# Patient Record
Sex: FEMALE | Race: WHITE | NOT HISPANIC OR LATINO | Employment: UNEMPLOYED | URBAN - NONMETROPOLITAN AREA
[De-identification: names, ages, dates, MRNs, and addresses within clinical notes are randomized per-mention and may not be internally consistent; named-entity substitution may affect disease eponyms.]

---

## 2023-10-18 ENCOUNTER — APPOINTMENT (OUTPATIENT)
Dept: PODIATRY | Facility: CLINIC | Age: 83
End: 2023-10-18
Payer: MEDICARE

## 2023-11-20 ENCOUNTER — LAB (OUTPATIENT)
Dept: LAB | Facility: LAB | Age: 83
End: 2023-11-20
Payer: MEDICARE

## 2023-11-20 DIAGNOSIS — E78.2 MIXED HYPERLIPIDEMIA: ICD-10-CM

## 2023-11-20 DIAGNOSIS — I10 ESSENTIAL (PRIMARY) HYPERTENSION: Primary | ICD-10-CM

## 2023-11-20 DIAGNOSIS — R74.01 ELEVATION OF LEVELS OF LIVER TRANSAMINASE LEVELS: ICD-10-CM

## 2023-11-20 DIAGNOSIS — K21.9 GASTRO-ESOPHAGEAL REFLUX DISEASE WITHOUT ESOPHAGITIS: ICD-10-CM

## 2023-11-20 DIAGNOSIS — E83.42 HYPOMAGNESEMIA: ICD-10-CM

## 2023-11-20 DIAGNOSIS — E11.9 TYPE 2 DIABETES MELLITUS WITHOUT COMPLICATIONS (MULTI): ICD-10-CM

## 2023-11-20 LAB
ALBUMIN SERPL BCP-MCNC: 4.2 G/DL (ref 3.4–5)
ALP SERPL-CCNC: 68 U/L (ref 33–136)
ALT SERPL W P-5'-P-CCNC: 26 U/L (ref 7–45)
ANION GAP SERPL CALC-SCNC: 12 MMOL/L (ref 10–20)
AST SERPL W P-5'-P-CCNC: 29 U/L (ref 9–39)
BASOPHILS # BLD AUTO: 0.14 X10*3/UL (ref 0–0.1)
BASOPHILS NFR BLD AUTO: 1.2 %
BILIRUB SERPL-MCNC: 0.8 MG/DL (ref 0–1.2)
BUN SERPL-MCNC: 20 MG/DL (ref 6–23)
CALCIUM SERPL-MCNC: 9.7 MG/DL (ref 8.6–10.3)
CHLORIDE SERPL-SCNC: 102 MMOL/L (ref 98–107)
CHOLEST SERPL-MCNC: 122 MG/DL (ref 0–199)
CHOLESTEROL/HDL RATIO: 2
CO2 SERPL-SCNC: 30 MMOL/L (ref 21–32)
CREAT SERPL-MCNC: 0.74 MG/DL (ref 0.5–1.05)
CREAT UR-MCNC: 47 MG/DL (ref 20–320)
EOSINOPHIL # BLD AUTO: 0.4 X10*3/UL (ref 0–0.4)
EOSINOPHIL NFR BLD AUTO: 3.4 %
ERYTHROCYTE [DISTWIDTH] IN BLOOD BY AUTOMATED COUNT: 12.3 % (ref 11.5–14.5)
GFR SERPL CREATININE-BSD FRML MDRD: 79 ML/MIN/1.73M*2
GLUCOSE SERPL-MCNC: 126 MG/DL (ref 74–99)
HCT VFR BLD AUTO: 46.8 % (ref 36–46)
HDLC SERPL-MCNC: 59.6 MG/DL
HGB BLD-MCNC: 14.5 G/DL (ref 12–16)
IMM GRANULOCYTES # BLD AUTO: 0.04 X10*3/UL (ref 0–0.5)
IMM GRANULOCYTES NFR BLD AUTO: 0.3 % (ref 0–0.9)
LDLC SERPL CALC-MCNC: 39 MG/DL
LYMPHOCYTES # BLD AUTO: 2.41 X10*3/UL (ref 0.8–3)
LYMPHOCYTES NFR BLD AUTO: 20.3 %
MAGNESIUM SERPL-MCNC: 2.18 MG/DL (ref 1.6–2.4)
MCH RBC QN AUTO: 29.5 PG (ref 26–34)
MCHC RBC AUTO-ENTMCNC: 31 G/DL (ref 32–36)
MCV RBC AUTO: 95 FL (ref 80–100)
MICROALBUMIN UR-MCNC: 18.5 MG/L
MICROALBUMIN/CREAT UR: 39.4 UG/MG CREAT
MONOCYTES # BLD AUTO: 0.94 X10*3/UL (ref 0.05–0.8)
MONOCYTES NFR BLD AUTO: 7.9 %
NEUTROPHILS # BLD AUTO: 7.92 X10*3/UL (ref 1.6–5.5)
NEUTROPHILS NFR BLD AUTO: 66.9 %
NON HDL CHOLESTEROL: 62 MG/DL (ref 0–149)
NRBC BLD-RTO: 0 /100 WBCS (ref 0–0)
PLATELET # BLD AUTO: 251 X10*3/UL (ref 150–450)
POTASSIUM SERPL-SCNC: 4.1 MMOL/L (ref 3.5–5.3)
PROT SERPL-MCNC: 7.5 G/DL (ref 6.4–8.2)
RBC # BLD AUTO: 4.92 X10*6/UL (ref 4–5.2)
SODIUM SERPL-SCNC: 140 MMOL/L (ref 136–145)
TRIGL SERPL-MCNC: 119 MG/DL (ref 0–149)
TSH SERPL-ACNC: 0.21 MIU/L (ref 0.44–3.98)
VLDL: 24 MG/DL (ref 0–40)
WBC # BLD AUTO: 11.9 X10*3/UL (ref 4.4–11.3)

## 2023-11-20 PROCEDURE — 36415 COLL VENOUS BLD VENIPUNCTURE: CPT

## 2023-11-20 PROCEDURE — 82043 UR ALBUMIN QUANTITATIVE: CPT

## 2023-11-20 PROCEDURE — 83036 HEMOGLOBIN GLYCOSYLATED A1C: CPT

## 2023-11-20 PROCEDURE — 82570 ASSAY OF URINE CREATININE: CPT

## 2023-11-21 LAB
EST. AVERAGE GLUCOSE BLD GHB EST-MCNC: 140 MG/DL
HBA1C MFR BLD: 6.5 %

## 2024-04-05 DIAGNOSIS — I63.09: Primary | ICD-10-CM

## 2024-04-09 RX ORDER — ASPIRIN 81 MG/1
81 TABLET ORAL DAILY
Qty: 90 TABLET | Refills: 0 | Status: SHIPPED | OUTPATIENT
Start: 2024-04-09

## 2024-05-07 ENCOUNTER — HOSPITAL ENCOUNTER (EMERGENCY)
Facility: HOSPITAL | Age: 84
Discharge: ANOTHER HOSPITAL | End: 2024-05-08
Attending: EMERGENCY MEDICINE
Payer: MEDICARE

## 2024-05-07 ENCOUNTER — APPOINTMENT (OUTPATIENT)
Dept: CARDIOLOGY | Facility: HOSPITAL | Age: 84
End: 2024-05-07
Payer: MEDICARE

## 2024-05-07 ENCOUNTER — APPOINTMENT (OUTPATIENT)
Dept: RADIOLOGY | Facility: HOSPITAL | Age: 84
End: 2024-05-07
Payer: MEDICARE

## 2024-05-07 DIAGNOSIS — K52.9 ENTERITIS: ICD-10-CM

## 2024-05-07 DIAGNOSIS — E87.6 HYPOKALEMIA: ICD-10-CM

## 2024-05-07 DIAGNOSIS — R53.1 GENERALIZED WEAKNESS: ICD-10-CM

## 2024-05-07 DIAGNOSIS — R19.7 DIARRHEA, UNSPECIFIED TYPE: Primary | ICD-10-CM

## 2024-05-07 DIAGNOSIS — N30.00 ACUTE CYSTITIS WITHOUT HEMATURIA: ICD-10-CM

## 2024-05-07 PROBLEM — N39.0 UTI (URINARY TRACT INFECTION): Status: ACTIVE | Noted: 2024-05-07

## 2024-05-07 LAB
ALBUMIN SERPL BCP-MCNC: 4.4 G/DL (ref 3.4–5)
ALP SERPL-CCNC: 54 U/L (ref 33–136)
ALT SERPL W P-5'-P-CCNC: 29 U/L (ref 7–45)
ANION GAP SERPL CALC-SCNC: 12 MMOL/L (ref 10–20)
APPEARANCE UR: ABNORMAL
AST SERPL W P-5'-P-CCNC: 31 U/L (ref 9–39)
BACTERIA #/AREA URNS AUTO: ABNORMAL /HPF
BASOPHILS # BLD AUTO: 0.07 X10*3/UL (ref 0–0.1)
BASOPHILS NFR BLD AUTO: 0.5 %
BILIRUB SERPL-MCNC: 0.8 MG/DL (ref 0–1.2)
BILIRUB UR STRIP.AUTO-MCNC: NEGATIVE MG/DL
BUN SERPL-MCNC: 24 MG/DL (ref 6–23)
CALCIUM SERPL-MCNC: 10 MG/DL (ref 8.6–10.3)
CARDIAC TROPONIN I PNL SERPL HS: 13 NG/L (ref 0–13)
CHLORIDE SERPL-SCNC: 102 MMOL/L (ref 98–107)
CO2 SERPL-SCNC: 29 MMOL/L (ref 21–32)
COLOR UR: COLORLESS
CREAT SERPL-MCNC: 0.64 MG/DL (ref 0.5–1.05)
EGFRCR SERPLBLD CKD-EPI 2021: 87 ML/MIN/1.73M*2
EOSINOPHIL # BLD AUTO: 0.06 X10*3/UL (ref 0–0.4)
EOSINOPHIL NFR BLD AUTO: 0.4 %
ERYTHROCYTE [DISTWIDTH] IN BLOOD BY AUTOMATED COUNT: 13.2 % (ref 11.5–14.5)
GLUCOSE SERPL-MCNC: 192 MG/DL (ref 74–99)
GLUCOSE UR STRIP.AUTO-MCNC: ABNORMAL MG/DL
HCT VFR BLD AUTO: 42.2 % (ref 36–46)
HGB BLD-MCNC: 14.1 G/DL (ref 12–16)
IMM GRANULOCYTES # BLD AUTO: 0.05 X10*3/UL (ref 0–0.5)
IMM GRANULOCYTES NFR BLD AUTO: 0.4 % (ref 0–0.9)
KETONES UR STRIP.AUTO-MCNC: NEGATIVE MG/DL
LEUKOCYTE ESTERASE UR QL STRIP.AUTO: ABNORMAL
LYMPHOCYTES # BLD AUTO: 1.67 X10*3/UL (ref 0.8–3)
LYMPHOCYTES NFR BLD AUTO: 11.8 %
MAGNESIUM SERPL-MCNC: 2.17 MG/DL (ref 1.6–2.4)
MCH RBC QN AUTO: 29.9 PG (ref 26–34)
MCHC RBC AUTO-ENTMCNC: 33.4 G/DL (ref 32–36)
MCV RBC AUTO: 89 FL (ref 80–100)
MONOCYTES # BLD AUTO: 0.83 X10*3/UL (ref 0.05–0.8)
MONOCYTES NFR BLD AUTO: 5.8 %
MUCOUS THREADS #/AREA URNS AUTO: ABNORMAL /LPF
NEUTROPHILS # BLD AUTO: 11.51 X10*3/UL (ref 1.6–5.5)
NEUTROPHILS NFR BLD AUTO: 81.1 %
NITRITE UR QL STRIP.AUTO: NEGATIVE
NRBC BLD-RTO: 0 /100 WBCS (ref 0–0)
PH UR STRIP.AUTO: 7 [PH]
PLATELET # BLD AUTO: 237 X10*3/UL (ref 150–450)
POTASSIUM SERPL-SCNC: 2.9 MMOL/L (ref 3.5–5.3)
PROT SERPL-MCNC: 8.2 G/DL (ref 6.4–8.2)
PROT UR STRIP.AUTO-MCNC: ABNORMAL MG/DL
RBC # BLD AUTO: 4.72 X10*6/UL (ref 4–5.2)
RBC # UR STRIP.AUTO: ABNORMAL /UL
RBC #/AREA URNS AUTO: ABNORMAL /HPF
SODIUM SERPL-SCNC: 140 MMOL/L (ref 136–145)
SP GR UR STRIP.AUTO: 1.01
UROBILINOGEN UR STRIP.AUTO-MCNC: NORMAL MG/DL
WBC # BLD AUTO: 14.2 X10*3/UL (ref 4.4–11.3)
WBC #/AREA URNS AUTO: ABNORMAL /HPF

## 2024-05-07 PROCEDURE — 84075 ASSAY ALKALINE PHOSPHATASE: CPT | Performed by: EMERGENCY MEDICINE

## 2024-05-07 PROCEDURE — 36415 COLL VENOUS BLD VENIPUNCTURE: CPT | Performed by: EMERGENCY MEDICINE

## 2024-05-07 PROCEDURE — 83735 ASSAY OF MAGNESIUM: CPT | Performed by: EMERGENCY MEDICINE

## 2024-05-07 PROCEDURE — 80053 COMPREHEN METABOLIC PANEL: CPT | Mod: 91,MUE | Performed by: NURSE PRACTITIONER

## 2024-05-07 PROCEDURE — 93005 ELECTROCARDIOGRAM TRACING: CPT

## 2024-05-07 PROCEDURE — 81001 URINALYSIS AUTO W/SCOPE: CPT | Performed by: EMERGENCY MEDICINE

## 2024-05-07 PROCEDURE — 2550000001 HC RX 255 CONTRASTS: Performed by: EMERGENCY MEDICINE

## 2024-05-07 PROCEDURE — 96365 THER/PROPH/DIAG IV INF INIT: CPT

## 2024-05-07 PROCEDURE — 87086 URINE CULTURE/COLONY COUNT: CPT | Mod: GENLAB | Performed by: EMERGENCY MEDICINE

## 2024-05-07 PROCEDURE — 84484 ASSAY OF TROPONIN QUANT: CPT | Performed by: EMERGENCY MEDICINE

## 2024-05-07 PROCEDURE — 74177 CT ABD & PELVIS W/CONTRAST: CPT

## 2024-05-07 PROCEDURE — 2500000001 HC RX 250 WO HCPCS SELF ADMINISTERED DRUGS (ALT 637 FOR MEDICARE OP): Performed by: STUDENT IN AN ORGANIZED HEALTH CARE EDUCATION/TRAINING PROGRAM

## 2024-05-07 PROCEDURE — 2500000005 HC RX 250 GENERAL PHARMACY W/O HCPCS: Performed by: EMERGENCY MEDICINE

## 2024-05-07 PROCEDURE — 74177 CT ABD & PELVIS W/CONTRAST: CPT | Mod: FOREIGN READ | Performed by: RADIOLOGY

## 2024-05-07 PROCEDURE — 99285 EMERGENCY DEPT VISIT HI MDM: CPT | Mod: 25

## 2024-05-07 PROCEDURE — 96366 THER/PROPH/DIAG IV INF ADDON: CPT

## 2024-05-07 PROCEDURE — 96367 TX/PROPH/DG ADDL SEQ IV INF: CPT

## 2024-05-07 PROCEDURE — 85025 COMPLETE CBC W/AUTO DIFF WBC: CPT | Performed by: EMERGENCY MEDICINE

## 2024-05-07 PROCEDURE — 2500000004 HC RX 250 GENERAL PHARMACY W/ HCPCS (ALT 636 FOR OP/ED): Performed by: EMERGENCY MEDICINE

## 2024-05-07 RX ORDER — TALC
3 POWDER (GRAM) TOPICAL NIGHTLY PRN
Status: CANCELLED | OUTPATIENT
Start: 2024-05-07

## 2024-05-07 RX ORDER — POTASSIUM CHLORIDE 20 MEQ/1
40 TABLET, EXTENDED RELEASE ORAL ONCE
Status: CANCELLED | OUTPATIENT
Start: 2024-05-07 | End: 2024-05-07

## 2024-05-07 RX ORDER — POLYETHYLENE GLYCOL 3350 17 G/17G
17 POWDER, FOR SOLUTION ORAL DAILY PRN
Status: CANCELLED | OUTPATIENT
Start: 2024-05-07

## 2024-05-07 RX ORDER — MAGNESIUM HYDROXIDE 2400 MG/10ML
10 SUSPENSION ORAL DAILY PRN
Status: CANCELLED | OUTPATIENT
Start: 2024-05-07

## 2024-05-07 RX ORDER — POTASSIUM CHLORIDE 14.9 MG/ML
20 INJECTION INTRAVENOUS
Status: COMPLETED | OUTPATIENT
Start: 2024-05-07 | End: 2024-05-07

## 2024-05-07 RX ORDER — ONDANSETRON HYDROCHLORIDE 2 MG/ML
4 INJECTION, SOLUTION INTRAVENOUS EVERY 8 HOURS PRN
Status: CANCELLED | OUTPATIENT
Start: 2024-05-07

## 2024-05-07 RX ORDER — CEFTRIAXONE 1 G/50ML
1 INJECTION, SOLUTION INTRAVENOUS ONCE
Status: COMPLETED | OUTPATIENT
Start: 2024-05-07 | End: 2024-05-07

## 2024-05-07 RX ORDER — ACETAMINOPHEN 325 MG/1
650 TABLET ORAL EVERY 4 HOURS PRN
Status: CANCELLED | OUTPATIENT
Start: 2024-05-07

## 2024-05-07 RX ORDER — TRAZODONE HYDROCHLORIDE 50 MG/1
50 TABLET ORAL NIGHTLY
Status: DISCONTINUED | OUTPATIENT
Start: 2024-05-07 | End: 2024-05-08 | Stop reason: HOSPADM

## 2024-05-07 RX ORDER — BISACODYL 10 MG/1
10 SUPPOSITORY RECTAL DAILY PRN
Status: CANCELLED | OUTPATIENT
Start: 2024-05-07

## 2024-05-07 RX ORDER — HEPARIN SODIUM 5000 [USP'U]/ML
5000 INJECTION, SOLUTION INTRAVENOUS; SUBCUTANEOUS EVERY 8 HOURS
Status: CANCELLED | OUTPATIENT
Start: 2024-05-07

## 2024-05-07 RX ADMIN — IOHEXOL 75 ML: 350 INJECTION, SOLUTION INTRAVENOUS at 16:08

## 2024-05-07 RX ADMIN — CEFTRIAXONE 1 G: 1 INJECTION, SOLUTION INTRAVENOUS at 16:37

## 2024-05-07 RX ADMIN — TRAZODONE HYDROCHLORIDE 50 MG: 50 TABLET ORAL at 21:21

## 2024-05-07 RX ADMIN — Medication 2 L/MIN: at 20:00

## 2024-05-07 RX ADMIN — Medication 2 L/MIN: at 18:25

## 2024-05-07 RX ADMIN — POTASSIUM CHLORIDE 20 MEQ: 14.9 INJECTION, SOLUTION INTRAVENOUS at 18:27

## 2024-05-07 RX ADMIN — POTASSIUM CHLORIDE 20 MEQ: 14.9 INJECTION, SOLUTION INTRAVENOUS at 17:01

## 2024-05-07 ASSESSMENT — PAIN SCALES - GENERAL
PAINLEVEL_OUTOF10: 0 - NO PAIN
PAINLEVEL_OUTOF10: 0 - NO PAIN

## 2024-05-07 ASSESSMENT — COLUMBIA-SUICIDE SEVERITY RATING SCALE - C-SSRS
6. HAVE YOU EVER DONE ANYTHING, STARTED TO DO ANYTHING, OR PREPARED TO DO ANYTHING TO END YOUR LIFE?: NO
2. HAVE YOU ACTUALLY HAD ANY THOUGHTS OF KILLING YOURSELF?: NO
1. IN THE PAST MONTH, HAVE YOU WISHED YOU WERE DEAD OR WISHED YOU COULD GO TO SLEEP AND NOT WAKE UP?: NO

## 2024-05-07 ASSESSMENT — PAIN - FUNCTIONAL ASSESSMENT: PAIN_FUNCTIONAL_ASSESSMENT: 0-10

## 2024-05-07 NOTE — ED PROVIDER NOTES
HPI   Chief Complaint   Patient presents with    Diarrhea     Pt. States she woke this a.m. with diarrhea.       HPI                    Sant[place] Coma Scale Score: 15                     Patient History   Past Medical History:   Diagnosis Date    Deficiency of other specified B group vitamins 2016    Vitamin B12 deficiency    Encounter for examination of eyes and vision without abnormal findings     Diabetic eye exam    Occlusion and stenosis of bilateral carotid arteries 2016    Atherosclerosis of both carotid arteries    Personal history of other medical treatment     History of mammogram     Past Surgical History:   Procedure Laterality Date     SECTION, CLASSIC  2016     Section    CHOLECYSTECTOMY  2014    Cholecystectomy    COLONOSCOPY  2014    Colonoscopy (Fiberoptic)    CT HEAD ANGIO W AND WO IV CONTRAST  2021    CT HEAD ANGIO W AND WO IV CONTRAST [facility]    ESOPHAGOGASTRODUODENOSCOPY  2014    Diagnostic Esophagogastroduodenoscopy    GALLBLADDER SURGERY  2016    Gallbladder Surgery    MR HEAD ANGIO WO IV CONTRAST  2021    MR HEAD ANGIO WO IV CONTRAST [facility]    MR NECK ANGIO WO IV CONTRAST  2021    MR NECK ANGIO WO IV CONTRAST [facility]     No family history on file.  Social History     Tobacco Use    Smoking status: Never    Smokeless tobacco: Never   Vaping Use    Vaping status: Never Used   Substance Use Topics    Alcohol use: Never    Drug use: Never       Physical Exam   ED Triage Vitals [24 1420]   Temperature Heart Rate Respirations BP   36.4 °C (97.6 °F) 71 16 175/71      Pulse Ox Temp Source Heart Rate Source Patient Position   97 % Temporal Monitor Sitting      BP Location FiO2 (%)     Left arm --       Physical Exam  Constitutional:       General: She is not in acute distress.     Appearance: Normal appearance. She is not toxic-appearing.   HENT:      Head:  Normocephalic and atraumatic.      Right Ear: Tympanic membrane normal.      Left Ear: Tympanic membrane normal.      Mouth/Throat:      Mouth: Mucous membranes are moist.      Pharynx: Oropharynx is clear.   Eyes:      Conjunctiva/sclera: Conjunctivae normal.      Pupils: Pupils are equal, round, and reactive to light.   Cardiovascular:      Rate and Rhythm: Normal rate and regular rhythm.      Pulses: Normal pulses.      Heart sounds: Normal heart sounds.   Pulmonary:      Effort: Pulmonary effort is normal. No respiratory distress.      Breath sounds: Normal breath sounds. No wheezing.   Abdominal:      General: Bowel sounds are normal.      Palpations: Abdomen is soft.      Tenderness: There is no abdominal tenderness. There is no guarding or rebound.   Musculoskeletal:         General: Normal range of motion.      Cervical back: Normal range of motion.   Skin:     General: Skin is warm and dry.   Neurological:      General: No focal deficit present.      Mental Status: She is alert and oriented to person, place, and time.         ED Course & MDM   Diagnoses as of 05/07/24 1826   Diarrhea, unspecified type   Hypokalemia   Enteritis   Acute cystitis without hematuria   Generalized weakness       Medical Decision Making  Pleasant 83-year-old female presents to the ER with chief complaint of diarrhea generalized weakness just feeling blah she reports that started last night around 1600.  Patient came to the ED with generalized weakness son brought the patient in.  Patient a workup here in the ED patient is found to have a urinary tract infection and hypokalemia believe the patient would benefit IV fluids potassium and antibiotics.  Discussed with the patient patient will be transferred there for definitive care.        Procedure  Procedures     Saul Hall DO  05/07/24 1826

## 2024-05-08 ENCOUNTER — APPOINTMENT (OUTPATIENT)
Dept: CARDIOLOGY | Facility: HOSPITAL | Age: 84
DRG: 690 | End: 2024-05-08
Payer: MEDICARE

## 2024-05-08 ENCOUNTER — HOSPITAL ENCOUNTER (INPATIENT)
Facility: HOSPITAL | Age: 84
LOS: 2 days | Discharge: HOME HEALTH | DRG: 690 | End: 2024-05-10
Attending: INTERNAL MEDICINE | Admitting: INTERNAL MEDICINE
Payer: MEDICARE

## 2024-05-08 VITALS
RESPIRATION RATE: 14 BRPM | OXYGEN SATURATION: 95 % | SYSTOLIC BLOOD PRESSURE: 121 MMHG | HEART RATE: 64 BPM | TEMPERATURE: 97.6 F | HEIGHT: 61 IN | DIASTOLIC BLOOD PRESSURE: 76 MMHG | BODY MASS INDEX: 17.94 KG/M2 | WEIGHT: 95.02 LBS

## 2024-05-08 DIAGNOSIS — K59.00 CONSTIPATION, UNSPECIFIED CONSTIPATION TYPE: ICD-10-CM

## 2024-05-08 DIAGNOSIS — F41.9 ANXIETY: ICD-10-CM

## 2024-05-08 DIAGNOSIS — N39.0 UTI (URINARY TRACT INFECTION): Primary | ICD-10-CM

## 2024-05-08 LAB
ALBUMIN SERPL BCP-MCNC: 4.2 G/DL (ref 3.4–5)
ALBUMIN SERPL BCP-MCNC: 4.4 G/DL (ref 3.4–5)
ALP SERPL-CCNC: 52 U/L (ref 33–136)
ALP SERPL-CCNC: 61 U/L (ref 33–136)
ALT SERPL W P-5'-P-CCNC: 26 U/L (ref 7–45)
ALT SERPL W P-5'-P-CCNC: 28 U/L (ref 7–45)
ANION GAP SERPL CALC-SCNC: 12 MMOL/L (ref 10–20)
ANION GAP SERPL CALC-SCNC: 15 MMOL/L (ref 10–20)
AST SERPL W P-5'-P-CCNC: 27 U/L (ref 9–39)
AST SERPL W P-5'-P-CCNC: 37 U/L (ref 9–39)
BASOPHILS # BLD AUTO: 0.07 X10*3/UL (ref 0–0.1)
BASOPHILS NFR BLD AUTO: 0.5 %
BILIRUB SERPL-MCNC: 0.8 MG/DL (ref 0–1.2)
BILIRUB SERPL-MCNC: 0.9 MG/DL (ref 0–1.2)
BUN SERPL-MCNC: 20 MG/DL (ref 6–23)
BUN SERPL-MCNC: 26 MG/DL (ref 6–23)
CALCIUM SERPL-MCNC: 10.1 MG/DL (ref 8.6–10.3)
CALCIUM SERPL-MCNC: 9.3 MG/DL (ref 8.6–10.3)
CHLORIDE SERPL-SCNC: 101 MMOL/L (ref 98–107)
CHLORIDE SERPL-SCNC: 101 MMOL/L (ref 98–107)
CO2 SERPL-SCNC: 28 MMOL/L (ref 21–32)
CO2 SERPL-SCNC: 31 MMOL/L (ref 21–32)
CREAT SERPL-MCNC: 0.67 MG/DL (ref 0.5–1.05)
CREAT SERPL-MCNC: 0.69 MG/DL (ref 0.5–1.05)
EGFRCR SERPLBLD CKD-EPI 2021: 85 ML/MIN/1.73M*2
EGFRCR SERPLBLD CKD-EPI 2021: 86 ML/MIN/1.73M*2
EOSINOPHIL # BLD AUTO: 0.03 X10*3/UL (ref 0–0.4)
EOSINOPHIL NFR BLD AUTO: 0.2 %
ERYTHROCYTE [DISTWIDTH] IN BLOOD BY AUTOMATED COUNT: 13.1 % (ref 11.5–14.5)
GLUCOSE BLD MANUAL STRIP-MCNC: 112 MG/DL (ref 74–99)
GLUCOSE BLD MANUAL STRIP-MCNC: 124 MG/DL (ref 74–99)
GLUCOSE BLD MANUAL STRIP-MCNC: 126 MG/DL (ref 74–99)
GLUCOSE SERPL-MCNC: 112 MG/DL (ref 74–99)
GLUCOSE SERPL-MCNC: 196 MG/DL (ref 74–99)
HCT VFR BLD AUTO: 46.9 % (ref 36–46)
HGB BLD-MCNC: 15.3 G/DL (ref 12–16)
HOLD SPECIMEN: NORMAL
IMM GRANULOCYTES # BLD AUTO: 0.04 X10*3/UL (ref 0–0.5)
IMM GRANULOCYTES NFR BLD AUTO: 0.3 % (ref 0–0.9)
LYMPHOCYTES # BLD AUTO: 1.92 X10*3/UL (ref 0.8–3)
LYMPHOCYTES NFR BLD AUTO: 14.9 %
MCH RBC QN AUTO: 30.1 PG (ref 26–34)
MCHC RBC AUTO-ENTMCNC: 32.6 G/DL (ref 32–36)
MCV RBC AUTO: 92 FL (ref 80–100)
MONOCYTES # BLD AUTO: 1.14 X10*3/UL (ref 0.05–0.8)
MONOCYTES NFR BLD AUTO: 8.9 %
NEUTROPHILS # BLD AUTO: 9.67 X10*3/UL (ref 1.6–5.5)
NEUTROPHILS NFR BLD AUTO: 75.2 %
NRBC BLD-RTO: 0 /100 WBCS (ref 0–0)
PLATELET # BLD AUTO: 249 X10*3/UL (ref 150–450)
POTASSIUM SERPL-SCNC: 3 MMOL/L (ref 3.5–5.3)
POTASSIUM SERPL-SCNC: 3.3 MMOL/L (ref 3.5–5.3)
PROT SERPL-MCNC: 7.7 G/DL (ref 6.4–8.2)
PROT SERPL-MCNC: 8.1 G/DL (ref 6.4–8.2)
RBC # BLD AUTO: 5.09 X10*6/UL (ref 4–5.2)
SODIUM SERPL-SCNC: 141 MMOL/L (ref 136–145)
SODIUM SERPL-SCNC: 141 MMOL/L (ref 136–145)
WBC # BLD AUTO: 12.9 X10*3/UL (ref 4.4–11.3)

## 2024-05-08 PROCEDURE — 87506 IADNA-DNA/RNA PROBE TQ 6-11: CPT | Mod: GEALAB | Performed by: NURSE PRACTITIONER

## 2024-05-08 PROCEDURE — 85025 COMPLETE CBC W/AUTO DIFF WBC: CPT | Performed by: NURSE PRACTITIONER

## 2024-05-08 PROCEDURE — 93010 ELECTROCARDIOGRAM REPORT: CPT | Performed by: INTERNAL MEDICINE

## 2024-05-08 PROCEDURE — G0378 HOSPITAL OBSERVATION PER HR: HCPCS

## 2024-05-08 PROCEDURE — 82947 ASSAY GLUCOSE BLOOD QUANT: CPT

## 2024-05-08 PROCEDURE — 99223 1ST HOSP IP/OBS HIGH 75: CPT | Performed by: NURSE PRACTITIONER

## 2024-05-08 PROCEDURE — 93005 ELECTROCARDIOGRAM TRACING: CPT

## 2024-05-08 PROCEDURE — 87493 C DIFF AMPLIFIED PROBE: CPT | Mod: GEALAB | Performed by: NURSE PRACTITIONER

## 2024-05-08 PROCEDURE — 97165 OT EVAL LOW COMPLEX 30 MIN: CPT | Mod: GO

## 2024-05-08 PROCEDURE — 1100000001 HC PRIVATE ROOM DAILY

## 2024-05-08 PROCEDURE — 96372 THER/PROPH/DIAG INJ SC/IM: CPT | Performed by: NURSE PRACTITIONER

## 2024-05-08 PROCEDURE — 2500000001 HC RX 250 WO HCPCS SELF ADMINISTERED DRUGS (ALT 637 FOR MEDICARE OP): Performed by: NURSE PRACTITIONER

## 2024-05-08 PROCEDURE — 87329 GIARDIA AG IA: CPT | Performed by: NURSE PRACTITIONER

## 2024-05-08 PROCEDURE — 2500000004 HC RX 250 GENERAL PHARMACY W/ HCPCS (ALT 636 FOR OP/ED): Performed by: NURSE PRACTITIONER

## 2024-05-08 PROCEDURE — 87328 CRYPTOSPORIDIUM AG IA: CPT | Performed by: NURSE PRACTITIONER

## 2024-05-08 PROCEDURE — 99221 1ST HOSP IP/OBS SF/LOW 40: CPT | Performed by: INTERNAL MEDICINE

## 2024-05-08 PROCEDURE — 84075 ASSAY ALKALINE PHOSPHATASE: CPT | Performed by: NURSE PRACTITIONER

## 2024-05-08 RX ORDER — AMLODIPINE BESYLATE 5 MG/1
5 TABLET ORAL DAILY
COMMUNITY

## 2024-05-08 RX ORDER — CEFTRIAXONE 1 G/50ML
1 INJECTION, SOLUTION INTRAVENOUS EVERY 24 HOURS
Status: DISCONTINUED | OUTPATIENT
Start: 2024-05-08 | End: 2024-05-10 | Stop reason: HOSPADM

## 2024-05-08 RX ORDER — LEVOTHYROXINE SODIUM 50 UG/1
50 TABLET ORAL
COMMUNITY

## 2024-05-08 RX ORDER — DEXTROSE 50 % IN WATER (D50W) INTRAVENOUS SYRINGE
12.5
Status: DISCONTINUED | OUTPATIENT
Start: 2024-05-08 | End: 2024-05-10 | Stop reason: HOSPADM

## 2024-05-08 RX ORDER — ONDANSETRON HYDROCHLORIDE 2 MG/ML
4 INJECTION, SOLUTION INTRAVENOUS EVERY 6 HOURS PRN
Status: DISCONTINUED | OUTPATIENT
Start: 2024-05-08 | End: 2024-05-10 | Stop reason: HOSPADM

## 2024-05-08 RX ORDER — TRAZODONE HYDROCHLORIDE 50 MG/1
50 TABLET ORAL NIGHTLY
COMMUNITY

## 2024-05-08 RX ORDER — LANOLIN ALCOHOL/MO/W.PET/CERES
400 CREAM (GRAM) TOPICAL DAILY
COMMUNITY

## 2024-05-08 RX ORDER — ASPIRIN 81 MG/1
81 TABLET ORAL DAILY
Status: DISCONTINUED | OUTPATIENT
Start: 2024-05-08 | End: 2024-05-10 | Stop reason: HOSPADM

## 2024-05-08 RX ORDER — RAMIPRIL 10 MG/1
10 CAPSULE ORAL DAILY
COMMUNITY
Start: 2024-02-12

## 2024-05-08 RX ORDER — POTASSIUM CHLORIDE 1.5 G/1.58G
40 POWDER, FOR SOLUTION ORAL DAILY
Status: DISCONTINUED | OUTPATIENT
Start: 2024-05-08 | End: 2024-05-10 | Stop reason: HOSPADM

## 2024-05-08 RX ORDER — INSULIN LISPRO 100 [IU]/ML
0-5 INJECTION, SOLUTION INTRAVENOUS; SUBCUTANEOUS
Status: DISCONTINUED | OUTPATIENT
Start: 2024-05-08 | End: 2024-05-10 | Stop reason: HOSPADM

## 2024-05-08 RX ORDER — SODIUM CHLORIDE 9 MG/ML
75 INJECTION, SOLUTION INTRAVENOUS CONTINUOUS
Status: DISCONTINUED | OUTPATIENT
Start: 2024-05-08 | End: 2024-05-10 | Stop reason: HOSPADM

## 2024-05-08 RX ORDER — OLANZAPINE 10 MG/2ML
2.5 INJECTION, POWDER, FOR SOLUTION INTRAMUSCULAR EVERY 6 HOURS PRN
Status: DISCONTINUED | OUTPATIENT
Start: 2024-05-08 | End: 2024-05-10 | Stop reason: HOSPADM

## 2024-05-08 RX ORDER — PANTOPRAZOLE SODIUM 40 MG/1
40 TABLET, DELAYED RELEASE ORAL
Status: DISCONTINUED | OUTPATIENT
Start: 2024-05-09 | End: 2024-05-08

## 2024-05-08 RX ORDER — ONDANSETRON HYDROCHLORIDE 2 MG/ML
INJECTION, SOLUTION INTRAVENOUS
Status: DISPENSED
Start: 2024-05-08 | End: 2024-05-08

## 2024-05-08 RX ORDER — METOPROLOL TARTRATE 25 MG/1
25 TABLET, FILM COATED ORAL 2 TIMES DAILY
COMMUNITY

## 2024-05-08 RX ORDER — HYDRALAZINE HYDROCHLORIDE 20 MG/ML
5 INJECTION INTRAMUSCULAR; INTRAVENOUS EVERY 6 HOURS PRN
Status: DISCONTINUED | OUTPATIENT
Start: 2024-05-08 | End: 2024-05-10 | Stop reason: HOSPADM

## 2024-05-08 RX ORDER — ATORVASTATIN CALCIUM 40 MG/1
40 TABLET, FILM COATED ORAL DAILY
COMMUNITY

## 2024-05-08 RX ORDER — PANTOPRAZOLE SODIUM 40 MG/10ML
40 INJECTION, POWDER, LYOPHILIZED, FOR SOLUTION INTRAVENOUS
Status: DISCONTINUED | OUTPATIENT
Start: 2024-05-09 | End: 2024-05-10 | Stop reason: HOSPADM

## 2024-05-08 RX ORDER — HEPARIN SODIUM 5000 [USP'U]/ML
5000 INJECTION, SOLUTION INTRAVENOUS; SUBCUTANEOUS EVERY 8 HOURS
Status: DISCONTINUED | OUTPATIENT
Start: 2024-05-08 | End: 2024-05-10 | Stop reason: HOSPADM

## 2024-05-08 RX ADMIN — HEPARIN SODIUM 5000 UNITS: 5000 INJECTION INTRAVENOUS; SUBCUTANEOUS at 10:51

## 2024-05-08 RX ADMIN — CEFTRIAXONE SODIUM 1 G: 1 INJECTION, SOLUTION INTRAVENOUS at 10:51

## 2024-05-08 RX ADMIN — SODIUM CHLORIDE 75 ML/HR: 9 INJECTION, SOLUTION INTRAVENOUS at 10:51

## 2024-05-08 RX ADMIN — ONDANSETRON 4 MG: 2 INJECTION INTRAMUSCULAR; INTRAVENOUS at 10:53

## 2024-05-08 RX ADMIN — ASPIRIN 81 MG: 81 TABLET, COATED ORAL at 10:51

## 2024-05-08 RX ADMIN — POTASSIUM CHLORIDE 40 MEQ: 1.5 POWDER, FOR SOLUTION ORAL at 17:00

## 2024-05-08 RX ADMIN — HEPARIN SODIUM 5000 UNITS: 5000 INJECTION INTRAVENOUS; SUBCUTANEOUS at 17:00

## 2024-05-08 SDOH — SOCIAL STABILITY: SOCIAL INSECURITY: ARE YOU OR HAVE YOU BEEN THREATENED OR ABUSED PHYSICALLY, EMOTIONALLY, OR SEXUALLY BY ANYONE?: NO

## 2024-05-08 SDOH — SOCIAL STABILITY: SOCIAL INSECURITY: DOES ANYONE TRY TO KEEP YOU FROM HAVING/CONTACTING OTHER FRIENDS OR DOING THINGS OUTSIDE YOUR HOME?: NO

## 2024-05-08 SDOH — ECONOMIC STABILITY: HOUSING INSECURITY: IN THE LAST 12 MONTHS, HOW MANY PLACES HAVE YOU LIVED?: 1

## 2024-05-08 SDOH — SOCIAL STABILITY: SOCIAL INSECURITY: DO YOU FEEL ANYONE HAS EXPLOITED OR TAKEN ADVANTAGE OF YOU FINANCIALLY OR OF YOUR PERSONAL PROPERTY?: NO

## 2024-05-08 SDOH — SOCIAL STABILITY: SOCIAL INSECURITY: DO YOU FEEL UNSAFE GOING BACK TO THE PLACE WHERE YOU ARE LIVING?: NO

## 2024-05-08 SDOH — ECONOMIC STABILITY: INCOME INSECURITY: IN THE LAST 12 MONTHS, WAS THERE A TIME WHEN YOU WERE NOT ABLE TO PAY THE MORTGAGE OR RENT ON TIME?: NO

## 2024-05-08 SDOH — ECONOMIC STABILITY: TRANSPORTATION INSECURITY
IN THE PAST 12 MONTHS, HAS LACK OF TRANSPORTATION KEPT YOU FROM MEETINGS, WORK, OR FROM GETTING THINGS NEEDED FOR DAILY LIVING?: NO

## 2024-05-08 SDOH — SOCIAL STABILITY: SOCIAL INSECURITY: HAS ANYONE EVER THREATENED TO HURT YOUR FAMILY OR YOUR PETS?: NO

## 2024-05-08 SDOH — ECONOMIC STABILITY: INCOME INSECURITY: HOW HARD IS IT FOR YOU TO PAY FOR THE VERY BASICS LIKE FOOD, HOUSING, MEDICAL CARE, AND HEATING?: NOT HARD AT ALL

## 2024-05-08 SDOH — ECONOMIC STABILITY: HOUSING INSECURITY
IN THE LAST 12 MONTHS, WAS THERE A TIME WHEN YOU DID NOT HAVE A STEADY PLACE TO SLEEP OR SLEPT IN A SHELTER (INCLUDING NOW)?: NO

## 2024-05-08 SDOH — SOCIAL STABILITY: SOCIAL INSECURITY: ARE THERE ANY APPARENT SIGNS OF INJURIES/BEHAVIORS THAT COULD BE RELATED TO ABUSE/NEGLECT?: NO

## 2024-05-08 SDOH — SOCIAL STABILITY: SOCIAL INSECURITY: HAVE YOU HAD THOUGHTS OF HARMING ANYONE ELSE?: NO

## 2024-05-08 SDOH — SOCIAL STABILITY: SOCIAL INSECURITY: WERE YOU ABLE TO COMPLETE ALL THE BEHAVIORAL HEALTH SCREENINGS?: YES

## 2024-05-08 SDOH — SOCIAL STABILITY: SOCIAL INSECURITY: ABUSE: ADULT

## 2024-05-08 SDOH — ECONOMIC STABILITY: TRANSPORTATION INSECURITY
IN THE PAST 12 MONTHS, HAS THE LACK OF TRANSPORTATION KEPT YOU FROM MEDICAL APPOINTMENTS OR FROM GETTING MEDICATIONS?: NO

## 2024-05-08 SDOH — SOCIAL STABILITY: SOCIAL INSECURITY: HAVE YOU HAD ANY THOUGHTS OF HARMING ANYONE ELSE?: NO

## 2024-05-08 ASSESSMENT — ACTIVITIES OF DAILY LIVING (ADL)
GROOMING: NEEDS ASSISTANCE
WALKS IN HOME: NEEDS ASSISTANCE
ADEQUATE_TO_COMPLETE_ADL: YES
PATIENT'S MEMORY ADEQUATE TO SAFELY COMPLETE DAILY ACTIVITIES?: YES
HEARING - RIGHT EAR: FUNCTIONAL
FEEDING YOURSELF: NEEDS ASSISTANCE
TOILETING: NEEDS ASSISTANCE
BATHING_ASSISTANCE: STAND BY
ADL_ASSISTANCE: NEEDS ASSISTANCE
DRESSING YOURSELF: NEEDS ASSISTANCE
JUDGMENT_ADEQUATE_SAFELY_COMPLETE_DAILY_ACTIVITIES: YES
HEARING - LEFT EAR: FUNCTIONAL
BATHING: NEEDS ASSISTANCE

## 2024-05-08 ASSESSMENT — COGNITIVE AND FUNCTIONAL STATUS - GENERAL
CLIMB 3 TO 5 STEPS WITH RAILING: A LOT
DRESSING REGULAR UPPER BODY CLOTHING: A LITTLE
STANDING UP FROM CHAIR USING ARMS: A LITTLE
DAILY ACTIVITIY SCORE: 20
WALKING IN HOSPITAL ROOM: A LOT
PERSONAL GROOMING: A LITTLE
MOVING TO AND FROM BED TO CHAIR: A LITTLE
STANDING UP FROM CHAIR USING ARMS: A LITTLE
WALKING IN HOSPITAL ROOM: A LITTLE
CLIMB 3 TO 5 STEPS WITH RAILING: A LOT
MOVING FROM LYING ON BACK TO SITTING ON SIDE OF FLAT BED WITH BEDRAILS: A LITTLE
DRESSING REGULAR LOWER BODY CLOTHING: A LITTLE
MOVING TO AND FROM BED TO CHAIR: A LITTLE
STANDING UP FROM CHAIR USING ARMS: A LITTLE
MOVING FROM LYING ON BACK TO SITTING ON SIDE OF FLAT BED WITH BEDRAILS: A LITTLE
MOBILITY SCORE: 16
TURNING FROM BACK TO SIDE WHILE IN FLAT BAD: A LITTLE
DRESSING REGULAR LOWER BODY CLOTHING: A LITTLE
DAILY ACTIVITIY SCORE: 19
DAILY ACTIVITIY SCORE: 19
TURNING FROM BACK TO SIDE WHILE IN FLAT BAD: A LITTLE
TOILETING: A LITTLE
HELP NEEDED FOR BATHING: A LITTLE
MOVING FROM LYING ON BACK TO SITTING ON SIDE OF FLAT BED WITH BEDRAILS: A LITTLE
TOILETING: A LITTLE
WALKING IN HOSPITAL ROOM: A LITTLE
TOILETING: A LITTLE
TURNING FROM BACK TO SIDE WHILE IN FLAT BAD: A LITTLE
MOVING FROM LYING ON BACK TO SITTING ON SIDE OF FLAT BED WITH BEDRAILS: A LITTLE
MOVING TO AND FROM BED TO CHAIR: A LITTLE
MOVING TO AND FROM BED TO CHAIR: A LITTLE
TURNING FROM BACK TO SIDE WHILE IN FLAT BAD: A LITTLE
MOBILITY SCORE: 17
CLIMB 3 TO 5 STEPS WITH RAILING: A LOT
DRESSING REGULAR UPPER BODY CLOTHING: A LITTLE
DRESSING REGULAR LOWER BODY CLOTHING: A LITTLE
HELP NEEDED FOR BATHING: A LITTLE
HELP NEEDED FOR BATHING: A LITTLE
DAILY ACTIVITIY SCORE: 19
MOBILITY SCORE: 17
DRESSING REGULAR LOWER BODY CLOTHING: A LITTLE
MOBILITY SCORE: 16
DRESSING REGULAR UPPER BODY CLOTHING: A LITTLE
PERSONAL GROOMING: A LITTLE
HELP NEEDED FOR BATHING: A LITTLE
TOILETING: A LITTLE
TOILETING: A LITTLE
DRESSING REGULAR LOWER BODY CLOTHING: A LITTLE
PERSONAL GROOMING: A LITTLE
WALKING IN HOSPITAL ROOM: A LOT
DAILY ACTIVITIY SCORE: 20
PERSONAL GROOMING: A LITTLE
HELP NEEDED FOR BATHING: A LITTLE
CLIMB 3 TO 5 STEPS WITH RAILING: A LOT
PATIENT BASELINE BEDBOUND: NO
STANDING UP FROM CHAIR USING ARMS: A LITTLE
PERSONAL GROOMING: A LITTLE

## 2024-05-08 ASSESSMENT — COLUMBIA-SUICIDE SEVERITY RATING SCALE - C-SSRS
2. HAVE YOU ACTUALLY HAD ANY THOUGHTS OF KILLING YOURSELF?: NO
6. HAVE YOU EVER DONE ANYTHING, STARTED TO DO ANYTHING, OR PREPARED TO DO ANYTHING TO END YOUR LIFE?: NO
1. IN THE PAST MONTH, HAVE YOU WISHED YOU WERE DEAD OR WISHED YOU COULD GO TO SLEEP AND NOT WAKE UP?: NO

## 2024-05-08 ASSESSMENT — LIFESTYLE VARIABLES
HOW OFTEN DO YOU HAVE A DRINK CONTAINING ALCOHOL: NEVER
HOW MANY STANDARD DRINKS CONTAINING ALCOHOL DO YOU HAVE ON A TYPICAL DAY: PATIENT DOES NOT DRINK
AUDIT-C TOTAL SCORE: 0
SKIP TO QUESTIONS 9-10: 1
AUDIT-C TOTAL SCORE: 0
HOW OFTEN DO YOU HAVE 6 OR MORE DRINKS ON ONE OCCASION: NEVER

## 2024-05-08 ASSESSMENT — PATIENT HEALTH QUESTIONNAIRE - PHQ9
1. LITTLE INTEREST OR PLEASURE IN DOING THINGS: NOT AT ALL
SUM OF ALL RESPONSES TO PHQ9 QUESTIONS 1 & 2: 0
2. FEELING DOWN, DEPRESSED OR HOPELESS: NOT AT ALL

## 2024-05-08 ASSESSMENT — PAIN SCALES - GENERAL
PAINLEVEL_OUTOF10: 0 - NO PAIN

## 2024-05-08 ASSESSMENT — ENCOUNTER SYMPTOMS
ABDOMINAL PAIN: 0
RESPIRATORY NEGATIVE: 1
ABDOMINAL DISTENTION: 1
VOMITING: 0
FATIGUE: 1
BLOOD IN STOOL: 0
DIARRHEA: 1
JOINT SWELLING: 0
RECTAL PAIN: 0
WEAKNESS: 1
PSYCHIATRIC NEGATIVE: 1

## 2024-05-08 ASSESSMENT — PAIN - FUNCTIONAL ASSESSMENT
PAIN_FUNCTIONAL_ASSESSMENT: 0-10

## 2024-05-08 NOTE — PROGRESS NOTES
Occupational Therapy    Evaluation    Patient Name: Maisha Agosto  MRN: [id number]  Today's Date: 5/8/2024  Time Calculation  Start Time: 1318  Stop Time: 1339  Time Calculation (min): 21 min    Assessment  IP OT Assessment  OT Assessment: Pt presents at baseline (supervision level) for ADL and functional mobility. No skilled OT needs identified, recommend ambulating with nursing staff using WW to maintain general strength.  Prognosis: Good  Barriers to Discharge: None  Evaluation/Treatment Tolerance: Patient tolerated treatment well  Medical Staff Made Aware: Yes  End of Session Communication: Bedside nurse  End of Session Patient Position: Up in chair, Alarm on  Plan:  Treatment Interventions: ADL retraining  OT Discharge Recommendations: No further acute OT  Equipment Recommended upon Discharge: Wheeled walker  OT Recommended Transfer Status: Stand by assist  OT - OK to Discharge: Yes (per OT POC)    Subjective   Current Problem:  No diagnosis found.  General:  General  Reason for Referral: 84 yo female referred to OT for weakness, impaired ADL, impaired mobility  Referred By: ELIO Gibson-CNP  Past Medical History Relevant to Rehab: None  Family/Caregiver Present: Yes  Caregiver Feedback: Son present, able to confirm PLOF and housing layout  Prior to Session Communication: Bedside nurse  Patient Position Received: Bed, 3 rail up, Alarm off, not on at start of session  General Comment: Pt pleasantly confused, cooperative, participative in OT evaluation. IV in place  Precautions:  Medical Precautions: Fall precautions  Precautions Comment: C-diff ruleout     Pain:  Pain Assessment  Pain Assessment: 0-10  Pain Score: 0 - No pain    Objective   Cognition:  Overall Cognitive Status: Impaired  Orientation Level: Disoriented to time, Disoriented to situation  Insight: Within function limits  Impulsive: Within functional limits     Home Living:  Type of Home: House  Lives With: Adult children (son, dtr)  Home  Adaptive Equipment: Walker rolling or standard  Home Layout: One level  Home Access: Stairs to enter with rails  Entrance Stairs-Rails: Right  Entrance Stairs-Number of Steps: 2  Bathroom Shower/Tub: Walk-in shower  Bathroom Toilet: Standard  Bathroom Equipment: Grab bars in shower, Shower chair with back   Prior Function:  Level of [place]: Needs assistance with ADLs, Needs assistance with homemaking  Receives Help From: Home health, Family  ADL Assistance: Needs assistance (Dtr assists with showers, has supervision for dressing/toileting)  Homemaking Assistance: Needs assistance (family completes)  Ambulatory Assistance: Independent (with FWW)  Prior Function Comments: Has home aide M-F to assist with day-to-day needs when dtr is at work, has 24/7 supervision at home     ADL:  Eating Assistance: Independent  Grooming Assistance: Stand by  Bathing Assistance: Stand by  UE Dressing Assistance: Independent  LE Dressing Assistance: Stand by  Toileting Assistance with Device: Stand by  Functional Assistance: Stand by  ADL Comments: Pt able to don/doff socks using forward flexion and no assist needed. Other ADL performance anticipated d/t current clinical presentation.  Activity Tolerance:  Endurance: Tolerates 10 - 20 min exercise with multiple rests  Bed Mobility/Transfers: Bed Mobility  Bed Mobility: Yes  Bed Mobility 1  Bed Mobility 1: Supine to sitting  Level of Assistance 1: Close supervision    Transfers  Transfer: Yes  Transfer 1  Transfer From 1: Bed to  Transfer to 1: Stand  Technique 1: Sit to stand, Stand to sit  Transfer Device 1: Walker  Transfer Level of Assistance 1: Close supervision  Trials/Comments 1: multiple trials no assist needed  Transfers 2  Transfer From 2: Stand to  Transfer to 2: Chair with arms  Technique 2: Stand pivot  Transfer Device 2: Walker  Transfer Level of Assistance 2: Close supervision, Minimal verbal cues  Trials/Comments 2: cues to square up to sitting surface, hand  placement.    Functional Mobility:  Functional Mobility  Functional Mobility Performed: Yes  Functional Mobility 1  Surface 1: Level tile  Device 1: Rolling walker  Assistance 1: Contact guard  Comments 1: ambulated household distance using WW with good balance, fair-good endurance, no physical assist needed  Sitting Balance:  Static Sitting Balance  Static Sitting-Balance Support: Feet supported  Static Sitting-Level of Assistance: Independent  Standing Balance:  Static Standing Balance  Static Standing-Balance Support: Bilateral upper extremity supported  Static Standing-Level of Assistance: Close supervision    Strength:  Strength Comments: BUE WFL    Coordination:  Movements are Fluid and Coordinated: Yes   Hand Function:  Hand Function  Gross Grasp: Functional  Coordination: Functional  Extremities: RUE   RUE : Within Functional Limits and LUE   LUE: Within Functional Limits    Outcome Measures: [facility] Daily Activity  Putting on and taking off regular lower body clothing: A little  Bathing (including washing, rinsing, drying): A little  Putting on and taking off regular upper body clothing: None  Toileting, which includes using toilet, bedpan or urinal: A little  Taking care of personal grooming such as brushing teeth: A little  Eating Meals: None  Daily Activity - Total Score: 20      Education Documentation  Body Mechanics, taught by Agusto Garcia OT at 5/8/2024  1:58 PM.  Learner: Patient  Readiness: Acceptance  Method: Explanation  Response: Verbalizes Understanding    Precautions, taught by Agusto Garcia OT at 5/8/2024  1:58 PM.  Learner: Patient  Readiness: Acceptance  Method: Explanation  Response: Verbalizes Understanding    ADL Training, taught by Agusto Garcia OT at 5/8/2024  1:58 PM.  Learner: Patient  Readiness: Acceptance  Method: Explanation  Response: Verbalizes Understanding    Education Comments  No comments found.

## 2024-05-08 NOTE — CONSULTS
Patient: Maisha Agosto   Age: 83 y.o.   Gender: female   Room/Bed: 234/234-A     Attending: Arcadio Marie DO  Code Status:  Full Code    History of Presenting Illness  Patient: Maisha Agosto is a 83-year-old female with history of moderate dementia, COPD, CAD, diabetes, GERD, hypertension, hyperlipidemia, rheumatoid arthritis who presents with acute onset diarrhea.  History is limited due to patient's mentation.  Patient complains of watery diarrhea for the past couple days but is uncertain when started exactly.  She denies red blood per rectum, hematochezia, melena.  She admits to chills but no fevers. she denies abdominal pain, nausea, vomiting.  She is normally constipated at baseline.     Past Medical History   Past Medical History:   Diagnosis Date    Deficiency of other specified B group vitamins 2016    Vitamin B12 deficiency    Encounter for examination of eyes and vision without abnormal findings     Diabetic eye exam    Occlusion and stenosis of bilateral carotid arteries 2016    Atherosclerosis of both carotid arteries    Personal history of other medical treatment     History of mammogram      Past Surgical History:   Past Surgical History:   Procedure Laterality Date     SECTION, CLASSIC  2016     Section    CHOLECYSTECTOMY  2014    Cholecystectomy    COLONOSCOPY  2014    Colonoscopy (Fiberoptic)    CT HEAD ANGIO W AND WO IV CONTRAST  2021    CT HEAD ANGIO W AND WO IV CONTRAST [facility]    ESOPHAGOGASTRODUODENOSCOPY  2014    Diagnostic Esophagogastroduodenoscopy    GALLBLADDER SURGERY  2016    Gallbladder Surgery    MR HEAD ANGIO WO IV CONTRAST  2021    MR HEAD ANGIO WO IV CONTRAST [facility]    MR NECK ANGIO WO IV CONTRAST  2021    MR NECK ANGIO WO IV CONTRAST [facility]     Family History:   No family history on file.  Social History:   Tobacco Use: Low Risk   "(5/7/2024)    Patient History     Smoking Tobacco Use: Never     Smokeless Tobacco Use: Never     Passive Exposure: Not on file      Social History     Substance and Sexual Activity   Alcohol Use Never       Outpatient Medications:  Current Outpatient Medications   Medication Instructions    aspirin 81 mg, oral, Daily, as directed        ED Course   Vitals - /78 (BP Location: Right arm, Patient Position: Lying)   Pulse 85   Temp 36.7 °C (98.1 °F) (Temporal)   Resp 18   Wt (!) 44 kg (97 lb)   SpO2 93%     Labs:   CBC:  Recent Labs     05/08/24  0938 05/07/24  1440 11/20/23  0915   WBC 12.9* 14.2* 11.9*   HGB 15.3 14.1 14.5   HCT 46.9* 42.2 46.8*    237 251   MCV 92 89 95     CMP:  Recent Labs     05/07/24  1440 11/20/23  0915 06/23/22  1048     140 140 139   K 3.0*  2.9* 4.1 3.8     102 102 101   CO2 28  29 30 32   ANIONGAP 15  12 12 10   BUN 26*  24* 20 21   CREATININE 0.69  0.64 0.74 0.76   EGFR 85  87 79  --    GLUCOSE 196*  192* 126* 141*     Recent Labs     05/07/24  1440 11/20/23  0915 04/30/22  1114 05/11/20  0831 01/09/20  0615 01/08/20  1509   ALBUMIN 4.4  4.4 4.2 3.7   < > 3.7 4.3   ALKPHOS 61  54 68 45   < > 42 47   ALT 28  29 26 23   < > 20 26   AST 37  31 29 22   < > 20 27   BILITOT 0.8  0.8 0.8 0.4   < > 0.5 0.5   LIPASE  --   --   --   --  44 760*    < > = values in this interval not displayed.     Calcium/Phos:   Lab Results   Component Value Date    CALCIUM 10.0 05/07/2024    CALCIUM 10.1 05/07/2024      COAG:   Recent Labs     06/23/22  1048 04/30/22  1114 04/27/22  1328   INR 1.0 0.9 0.9   DDIMERVTE  --  684*  --      CRP: No results found for: \"CRP\"   [unfilled]   [facility]:  Recent Labs     12/29/23  0913 11/20/23  0915 04/24/23  0837 10/21/22  0918 07/06/21  0818 03/12/21  0821 11/23/20  0915 05/11/20  0831 01/10/20  0558   TSH  --  0.21*  --   --   --  1.05 0.81  --  0.95   HGBA1C 6.5* 6.5* 6.6* 7.1*   < > 6.2  --    < >  --     < > = values in this " "interval not displayed.      CARDIAC:   Recent Labs     05/07/24  1440 06/23/22  1345 06/23/22  1223 06/23/22  1048 06/23/22  1048 04/23/18  1108   TROPHS 13 10 9   < > 10  --    BNP  --   --   --   --  91 97    < > = values in this interval not displayed.     Recent Labs     11/20/23  0915 04/28/22  0617 03/12/21  0821 11/23/20  0915   CHOL 122 215* 238* 194   LDLF  --  117* 134* 80   LDLCALC 39  --   --   --    HDL 59.6 53.1 64.0 50.0   TRIG 119 225* 199* 320*     No data recorded    Micro/ID:   No results found for the last 90 days.                   No lab exists for component: \"AGALPCRNB\"   .ID  No results found for: \"URINECULTURE\", \"BLOODCULT\", \"CSFCULTSMEAR\"    Imaging:   No orders to display     Encounter Date: 05/07/24   ECG 12 lead   Result Value    Ventricular Rate 64    Atrial Rate 64    CT Interval 164    QRS Duration 74    QT Interval 414    QTC Calculation(Bazett) 427    P Axis 49    R Axis 13    T Axis 95    QRS Count 11    Q Onset 225    P Onset 143    P Offset 206    T Offset 432    QTC Fredericia 422    Narrative    Sinus rhythm with Premature atrial complexes with Aberrant conduction  ST & T wave abnormality, consider anterolateral ischemia  Abnormal ECG  When compared with ECG of 23-JUN-2022 13:50,  Aberrant conduction is now Present  T wave inversion now evident in Anterior leads  QT has shortened     No echocardiogram results found for the past 12 months  CT abdomen pelvis w IV contrast    Result Date: 5/7/2024  STUDY: CT Abdomen and Pelvis with IV Contrast; 05/07/2024, 4:09 PM INDICATION: Generalized chest pain. COMPARISON: CT AP: 06/23/22; CTA chest: 06/23/22. ACCESSION NUMBER(S): [id number] ORDERING CLINICIAN: AVELINO MCINTYRE TECHNIQUE: CT of the abdomen and pelvis was performed.  Contiguous axial images were obtained at 3 mm slice thickness through the abdomen and pelvis. Coronal and sagittal reconstructions at 3 mm slice thickness were performed.  Omnipaque 350 65 mL was administered " intravenously.  FINDINGS: LOWER CHEST: Limited visualization of the lower chest. Upper normal heart size. Mildly increased markings in the lung bases, likely scarring and/or atelectasis.  ABDOMEN:  LIVER: No hepatomegaly.  Smooth surface contour.  Normal attenuation.  BILE DUCTS: Dilated intra and extrahepatic bile duct measuring up to 1.5 cm in diameter, previously 1.3 cm.  GALLBLADDER: Prior cholecystectomy. STOMACH: Small hiatal hernia.  No abnormalities otherwise identified.  PANCREAS: Abnormal appearance of the pancreas. No solid appearing lesions are identified, but there are numerous cystic lesions, mildly increased from prior. The largest measures approximately 2.8 x 2.3 cm, previously 2.3 x 1.9 cm. There appears to be relative atrophy of the body and tail of the pancreas with prominence of the pancreatic duct.  SPLEEN: No splenomegaly or focal splenic lesion.  ADRENAL GLANDS: No thickening or nodules.  KIDNEYS AND URETERS: Kidneys are normal in size and location.  No renal or ureteral calculi.  PELVIS:  BLADDER: Moderate distention of the urinary bladder.  REPRODUCTIVE ORGANS: Normal size uterus. No evidence of an abnormal adnexal mass.  BOWEL: Mild prominence of fluid-filled small bowel loops without definite transition. The appendix is not visualized with certainty. Moderate colonic diverticulosis without associated inflammatory changes.  Prominent stool in the distal colon/rectum.  VESSELS: Very prominent atherosclerotic calcifications.  PERITONEUM/RETROPERITONEUM/LYMPH NODES: No free fluid.  No pneumoperitoneum. No lymphadenopathy.  ABDOMINAL WALL: No abnormalities identified. SOFT TISSUES: No abnormalities identified.  BONES: No acute fracture or aggressive osseous lesion.  Degenerative changes in the spine, particularly at L5-S1.    1.Prior cholecystectomy. Dilated intra and extrahepatic bile duct measuring up to 1.5 cm in diameter, previously 1.3 cm. No visualized obstructing mass or common duct  stone. 2.Abnormal appearance of the pancreas. No solid appearing lesions are identified, but there are numerous cystic lesions, mildly increased in size from prior. The largest measures approximately 2.8 x 2.3 cm, previously 2.3 x 1.9 cm. There appears to be relative atrophy of the body and tail of the pancreas with prominence of the pancreatic duct. 3.Moderate colonic diverticulosis without associated inflammatory changes. Prominent stool in the distal colon/rectum suggesting constipation. 4.Mild prominence of fluid-filled small bowel loops without definite transition. This may be ileus or enteritis. 5.No evidence of free fluid or free air. 6.Moderate distention of the urinary bladder. 7.Very prominent atherosclerotic calcifications. Elective follow-up with MRI of the pancreas pre and postcontrast with MRCP is recommended. Signed by Piter Araujo MD    ECG 12 lead    Result Date: 5/7/2024  Sinus rhythm with Premature atrial complexes with Aberrant conduction ST & T wave abnormality, consider anterolateral ischemia Abnormal ECG When compared with ECG of 23-JUN-2022 13:50, Aberrant conduction is now Present T wave inversion now evident in Anterior leads QT has shortened     Interventions:  Medications   cefTRIAXone (Rocephin) IVPB 1 g (0 g intravenous Stopped 5/8/24 1121)   sodium chloride 0.9% infusion (75 mL/hr intravenous New Bag 5/8/24 1051)   heparin (porcine) injection 5,000 Units (5,000 Units subcutaneous Given 5/8/24 1051)   pantoprazole (ProtoNix) injection 40 mg (has no administration in time range)   aspirin EC tablet 81 mg (81 mg oral Given 5/8/24 1051)   ondansetron (Zofran) injection 4 mg (4 mg intravenous Given 5/8/24 1053)   ondansetron (Zofran) injection  - Omnicell Override Pull (has no administration in time range)       Objective Data  Physical Exam  Constitutional:       General: She is not in acute distress.     Appearance: Normal appearance.   HENT:      Head: Normocephalic and atraumatic.       Right Ear: Tympanic membrane, ear canal and external ear normal.      Left Ear: Tympanic membrane, ear canal and external ear normal.      Nose: Nose normal.      Mouth/Throat:      Mouth: Mucous membranes are moist.      Pharynx: Oropharynx is clear.   Eyes:      General: No scleral icterus.     Extraocular Movements: Extraocular movements intact.      Conjunctiva/sclera: Conjunctivae normal.      Pupils: Pupils are equal, round, and reactive to light.   Cardiovascular:      Rate and Rhythm: Normal rate and regular rhythm.      Pulses: Normal pulses.      Heart sounds: Normal heart sounds. No murmur heard.     No gallop.   Pulmonary:      Effort: Pulmonary effort is normal.      Breath sounds: Normal breath sounds.   Abdominal:      General: Abdomen is flat. Bowel sounds are normal.      Palpations: Abdomen is soft.      Tenderness: There is no abdominal tenderness. There is no guarding or rebound.   Musculoskeletal:         General: Normal range of motion.      Cervical back: Normal range of motion and neck supple.      Right lower leg: No edema.      Left lower leg: No edema.   Skin:     General: Skin is warm and dry.      Capillary Refill: Capillary refill takes less than 2 seconds.      Coloration: Skin is not jaundiced or pale.      Findings: No bruising, erythema, lesion or rash.   Neurological:      General: No focal deficit present.      Mental Status: She is alert. Mental status is at baseline.      Cranial Nerves: No cranial nerve deficit.      Sensory: No sensory deficit.      Motor: No weakness.      Coordination: Coordination normal.      Deep Tendon Reflexes: Reflexes normal.   Psychiatric:         Mood and Affect: Mood normal.         Behavior: Behavior normal.          Assessment and Plan     # Acute watery diarrhea likely overflow incontinence from chronic stool burden, rule out infectious enterocolitis  # Dilated intra and extrahepatic bile duct, Pancreatic cysts with atrophy of the body and  tail  # Colonic diverticulosis without associated inflammation  -Rule out infectious colitis with stool pathogen PCR, C. difficile PCR  -Strict bowel regimen once infectious etiology is ruled out  -Supportive care fluids, diet as tolerated  -LFTs within normal limits, can follow-up with MRCP outpatient, however further surveillance is not recommended based on previous workup with benign etiology, age, comorbidities    GI will sign off, thank you for the consult    Chon Shirley,    PGY-2, IM     I saw and evaluated the patient. I personally obtained the key and critical portions of the history and physical exam or was physically present for key and critical portions performed by the resident-Dr Shirley. I reviewed the resident's documentation and discussed the patient with the resident. I agree with the resident's medical decision making as documented in the note.      Suspected overflow incontinence.  Rule out infectious etiology  Bowel regimen if infection.  Branched IPMN SP EUS 4 years ago  Consider outpatient MRI

## 2024-05-08 NOTE — CARE PLAN
The clinical goals for the shift include patient will remain free of falls    Over the shift, the patient uneventful night. Pt rested comfortably.  VSS. Will continue to monitor.

## 2024-05-08 NOTE — PROGRESS NOTES
Physical Therapy                 Therapy Communication Note    Patient Name: Maisha Agosto  MRN: [id number]  Today's Date: 5/8/2024     Discipline: Physical Therapy    Missed Visit Reason: Missed Visit Reason: Other (Comment) (Per OT patient is at functional baseline. No skilled PT necessary at this time. Cancel consult.)    Missed Time: Cancel

## 2024-05-08 NOTE — H&P
History Of Present Illness  Maisha Agosto is a 83 y.o. female who was transferred from [place] after presenting with a 4 days history of diarrhea and generalized weakness. She was diagnosed with UTI and hypokalemia. Patient had one episode of watery diarrhea over  24 hours. She denies red blood per rectum, hematochezia, melena. She admits to chills but no fevers. she denies abdominal pain, nausea, vomiting.  CT abdomen showed: Dilated intra and extrahepatic bile duct measuring up to 1.5 cm in diameter, previously 1.3 cm. No visualized obstructing mass or common duct stone .Abnormal appearance of the pancreas. No solid appearing lesions are identified, but there are numerous cystic lesions, mildly  increased in size from prior. The largest measures approximately 2.8 x 2.3 cm, previously 2.3 x 1.9 cm. There appears to be relative atrophy of the body and tail of the pancreas with prominence of the pancreatic duct.  It also shows fluid-filled bowel loops.  Pertinent blood work showed a potassium level of 2.9.  Patient transferred to [facility] for GI workup.     Past Medical History  She has a past medical history of Deficiency of other specified B group vitamins (2016), Encounter for examination of eyes and vision without abnormal findings, Occlusion and stenosis of bilateral carotid arteries (2016), and Personal history of other medical treatment.    Surgical History  She has a past surgical history that includes Cholecystectomy (2014); Esophagogastroduodenoscopy (2014); Colonoscopy (2014);  section, classic (2016); Gallbladder surgery (2016); MR angio head wo IV contrast (2021); MR angio neck wo IV contrast (2021); and CT angio head w and wo IV contrast (2021).     Social History  She reports that she has never smoked. She has never used smokeless tobacco. She reports that she does not drink alcohol and does not use drugs.    Family History  No family  history on file.     Allergies  Patient has no known allergies.    Review of Systems   Constitutional:  Positive for fatigue.   HENT: Negative.     Respiratory: Negative.     Cardiovascular:  Negative for chest pain and leg swelling.   Gastrointestinal:  Positive for abdominal distention and diarrhea. Negative for abdominal pain, blood in stool, rectal pain and vomiting.   Genitourinary:  Negative for menstrual problem and urgency.   Musculoskeletal:  Negative for joint swelling.   Neurological:  Positive for weakness.   Psychiatric/Behavioral: Negative.          Physical Exam  HENT:      Mouth/Throat:      Mouth: Mucous membranes are dry.   Eyes:      Extraocular Movements: Extraocular movements intact.   Cardiovascular:      Rate and Rhythm: Regular rhythm.      Heart sounds: Normal heart sounds.   Pulmonary:      Breath sounds: Normal breath sounds.   Abdominal:      General: There is distension.      Tenderness: There is no abdominal tenderness. There is no left CVA tenderness or guarding.   Musculoskeletal:      Cervical back: Neck supple.      Right lower leg: No edema.      Left lower leg: No edema.   Skin:     General: Skin is dry.   Neurological:      General: No focal deficit present.      Mental Status: She is alert and oriented to person, place, and time.   Psychiatric:         Mood and Affect: Mood normal.          Last Recorded Vitals  /83   Pulse 79   Temp 36.5 °C (97.7 °F) (Temporal)   Resp 20   Wt (!) 44 kg (97 lb)   SpO2 98%     Relevant Results        CBC and Auto Differential        Component Value Flag Ref Range Units Status    WBC 14.2      4.4 - 11.3 x10*3/uL Final    nRBC 0.0      0.0 - 0.0 /100 WBCs Final    RBC 4.72      4.00 - 5.20 x10*6/uL Final    Hemoglobin 14.1      12.0 - 16.0 g/dL Final    Hematocrit 42.2      36.0 - 46.0 % Final    MCV 89      80 - 100 fL Final    MCH 29.9      26.0 - 34.0 pg Final    MCHC 33.4      32.0 - 36.0 g/dL Final    RDW 13.2      11.5 - 14.5 %  Final    Platelets 237      150 - 450 x10*3/uL Final    Neutrophils % 81.1      40.0 - 80.0 % Final    Immature Granulocytes %, Automated 0.4      0.0 - 0.9 % Final    Comment:    Immature Granulocyte Count (IG) includes promyelocytes, myelocytes and metamyelocytes but does not include bands. Percent differential counts (%) should be interpreted in the context of the absolute cell counts (cells/UL).    Lymphocytes % 11.8      13.0 - 44.0 % Final    Monocytes % 5.8      2.0 - 10.0 % Final    Eosinophils % 0.4      0.0 - 6.0 % Final    Basophils % 0.5      0.0 - 2.0 % Final    Neutrophils Absolute 11.51      1.60 - 5.50 x10*3/uL Final    Comment:    Percent differential counts (%) should be interpreted in the context of the absolute cell counts (cells/uL).    Immature Granulocytes Absolute, Automated 0.05      0.00 - 0.50 x10*3/uL Final    Lymphocytes Absolute 1.67      0.80 - 3.00 x10*3/uL Final    Monocytes Absolute 0.83      0.05 - 0.80 x10*3/uL Final    Eosinophils Absolute 0.06      0.00 - 0.40 x10*3/uL Final    Basophils Absolute 0.07      0.00 - 0.10 x10*3/uL Final                  Comprehensive metabolic panel        Component Value Flag Ref Range Units Status    Glucose 192      74 - 99 mg/dL Final    Sodium 140      136 - 145 mmol/L Final    Potassium 2.9      3.5 - 5.3 mmol/L Final    Chloride 102      98 - 107 mmol/L Final    Bicarbonate 29      21 - 32 mmol/L Final    Anion Gap 12      10 - 20 mmol/L Final    Urea Nitrogen 24      6 - 23 mg/dL Final    Creatinine 0.64      0.50 - 1.05 mg/dL Final    eGFR 87      >60 mL/min/1.73m*2 Final    Comment:    Calculations of estimated GFR are performed using the 2021 CKD-EPI Study Refit equation without the race variable for the IDMS-Traceable creatinine methods.  https://jasn.asnjournals.org/content/early/2021/09/22/ASN.8784173506    Calcium 10.0      8.6 - 10.3 mg/dL Final    Albumin 4.4      3.4 - 5.0 g/dL Final    Alkaline Phosphatase 54      33 - 136 U/L  Final    Total Protein 8.2      6.4 - 8.2 g/dL Final    AST 31      9 - 39 U/L Final    Bilirubin, Total 0.8      0.0 - 1.2 mg/dL Final    ALT 29      7 - 45 U/L Final    Comment:    Patients treated with Sulfasalazine may generate falsely decreased results for ALT.                  Urinalysis with Reflex Microscopic        Component Value Flag Ref Range Units Status    Color, Urine Colorless   (Normal)   Light-Yellow, Yellow, Dark-Yellow  Final    Appearance, Urine Turbid   (Normal)   Clear  Final    Specific Gravity, Urine 1.010      1.005 - 1.035  Final    pH, Urine 7.0      5.0, 5.5, 6.0, 6.5, 7.0, 7.5, 8.0  Final    Protein, Urine 50 (1+)      NEGATIVE, 10 (TRACE), 20 (TRACE) mg/dL Final    Glucose, Urine 200 (2+)      Normal mg/dL Final    Blood, Urine 0.06 (1+)      NEGATIVE  Final    Ketones, Urine NEGATIVE      NEGATIVE mg/dL Final    Bilirubin, Urine NEGATIVE      NEGATIVE  Final    Urobilinogen, Urine Normal      Normal mg/dL Final    Nitrite, Urine NEGATIVE      NEGATIVE  Final    Leukocyte Esterase, Urine 75 Loida/µL      NEGATIVE  Final                  Troponin I, High Sensitivity        Component Value Flag Ref Range Units Status    Troponin I, High Sensitivity 13      0 - 13 ng/L Final                  ECG 12 lead        Component Value Flag Ref Range Units Status    Ventricular Rate 64       BPM Preliminary    Atrial Rate 64       BPM Preliminary    CO Interval 164       ms Preliminary    QRS Duration 74       ms Preliminary    QT Interval 414       ms Preliminary    QTC Calculation(Bazett) 427       ms Preliminary    P Axis 49       degrees Preliminary    R Axis 13       degrees Preliminary    T Axis 95       degrees Preliminary    QRS Count 11       beats Preliminary    Q Onset 225       ms Preliminary    P Onset 143       ms Preliminary    P Offset 206       ms Preliminary    T Offset 432       ms Preliminary    QTC Fredericia 422       ms Preliminary                 Sinus rhythm with Premature  atrial complexes with Aberrant conduction  ST & T wave abnormality, consider anterolateral ischemia  Abnormal ECG  When compared with ECG of 23-JUN-2022 13:50,  Aberrant conduction is now Present  T wave inversion now evident in Anterior leads  QT has shortened         Microscopic Only, Urine        Component Value Flag Ref Range Units Status    WBC, Urine 11-20      1-5, NONE /HPF Final    RBC, Urine 6-10      NONE, 1-2, 3-5 /HPF Final    Bacteria, Urine 1+      NONE SEEN /HPF Final    Mucus, Urine FEW      Reference range not established. /LPF Final                  CT abdomen pelvis w IV contrast          STUDY:  CT Abdomen and Pelvis with IV Contrast; 05/07/2024, 4:09 PM  INDICATION:  Generalized chest pain.  COMPARISON:  CT AP: 06/23/22; CTA chest: 06/23/22.  ACCESSION NUMBER(S):  [id number]  ORDERING CLINICIAN:  AVELINO MCINTYRE  TECHNIQUE:  CT of the abdomen and pelvis was performed.  Contiguous axial images  were obtained at 3 mm slice thickness through the abdomen and pelvis.   Coronal and sagittal reconstructions at 3 mm slice thickness were  performed.  Omnipaque 350 65 mL was administered intravenously.    FINDINGS:  LOWER CHEST:  Limited visualization of the lower chest. Upper normal heart size.  Mildly increased markings in the lung bases, likely scarring and/or  atelectasis.    ABDOMEN:     LIVER:  No hepatomegaly.  Smooth surface contour.  Normal attenuation.     BILE DUCTS:  Dilated intra and extrahepatic bile duct measuring up to 1.5 cm in  diameter, previously 1.3 cm.     GALLBLADDER:  Prior cholecystectomy.   STOMACH:  Small hiatal hernia.  No abnormalities otherwise identified.     PANCREAS:  Abnormal appearance of the pancreas. No solid appearing lesions are  identified, but there are numerous cystic lesions, mildly increased  from prior. The largest measures approximately 2.8 x 2.3 cm,  previously 2.3 x 1.9 cm. There appears to be relative atrophy of the  body and tail of the pancreas with  prominence of the pancreatic duct.     SPLEEN:  No splenomegaly or focal splenic lesion.     ADRENAL GLANDS:  No thickening or nodules.     KIDNEYS AND URETERS:  Kidneys are normal in size and location.  No renal or ureteral  calculi.     PELVIS:     BLADDER:  Moderate distention of the urinary bladder.     REPRODUCTIVE ORGANS:  Normal size uterus. No evidence of an abnormal adnexal mass.     BOWEL:  Mild prominence of fluid-filled small bowel loops without definite  transition.  The appendix is not visualized with certainty. Moderate colonic  diverticulosis without associated inflammatory changes.  Prominent  stool in the distal colon/rectum.     VESSELS:  Very prominent atherosclerotic calcifications.     PERITONEUM/RETROPERITONEUM/LYMPH NODES:  No free fluid.  No pneumoperitoneum.  No lymphadenopathy.     ABDOMINAL WALL:  No abnormalities identified.  SOFT TISSUES:   No abnormalities identified.     BONES:  No acute fracture or aggressive osseous lesion.  Degenerative changes  in the spine, particularly at L5-S1.  IMPRESSION:  1.Prior cholecystectomy. Dilated intra and extrahepatic bile duct  measuring up to 1.5 cm in diameter, previously 1.3 cm. No visualized  obstructing mass or common duct stone.  2.Abnormal appearance of the pancreas. No solid appearing lesions  are identified, but there are numerous cystic lesions, mildly  increased in size from prior. The largest measures approximately 2.8 x  2.3 cm, previously 2.3 x 1.9 cm. There appears to be relative atrophy  of the body and tail of the pancreas with prominence of the pancreatic  duct.  3.Moderate colonic diverticulosis without associated inflammatory  changes. Prominent stool in the distal colon/rectum suggesting  constipation.  4.Mild prominence of fluid-filled small bowel loops without  definite transition. This may be ileus or enteritis.  5.No evidence of free fluid or free air.  6.Moderate distention of the urinary bladder.  7.Very prominent  atherosclerotic calcifications.  Elective follow-up with MRI of the pancreas pre and postcontrast with  MRCP is recommended.  Signed by Piter Araujo MD         Magnesium        Component Value Flag Ref Range Units Status    Magnesium 2.17      1.60 - 2.40 mg/dL Final                       Assessment/Plan   Principal Problem:    UTI (urinary tract infection)      Diarrhea  -One episode in 24 hours  -CT shows fluid filled loops  -Stool studies for PCR pathogens and C. Difficile  -Antiemetic for nausea  -Pain management  -Monitor electrolytes  -GI consult    Hypokalemia  -Treated with potassium yesterday, will repeat level and treat as needed  -Magnesium level in the a.m.    Abnormal CT  -Pancreatic lesions an dilated intra and extrahepatic ducts  -GI consult    Possible UTI  Leukocytosis  -Pending UC  -Will continue ceftriaxone  -Repeat CBC in the a.m.  -Monitor for SIRS    DM type II  -Not currently on any treatment  -Hemoglobin A1c 6.5  -Insulin sliding scale ordered  -Diabetic diet    Dementia  -Adding Zyprexa    Chronic respiratory failure on 2L/NC    HTN  -PRN Hydralazine  -Monitor vitals    Nursing is calling the family for a list of medications.        ELIO Gibson-CNP

## 2024-05-08 NOTE — CARE PLAN
The patient's goals for the shift include  pt will remain safe.    The clinical goals for the shift include pt will remain oriented to self during shift    Pt remained oriented to self during shift. Pt visiting with at the bedside. Uneventful day for pt.     Problem: Pain - Adult  Goal: Verbalizes/displays adequate comfort level or baseline comfort level  Outcome: Progressing     Problem: Safety - Adult  Goal: Free from fall injury  Outcome: Progressing     Problem: Fall/Injury  Goal: Not fall by end of shift  Outcome: Progressing  Goal: Be free from injury by end of the shift  Outcome: Progressing  Goal: Use assistive devices by end of the shift  Outcome: Progressing

## 2024-05-09 LAB
ANION GAP SERPL CALC-SCNC: 15 MMOL/L (ref 10–20)
BASOPHILS # BLD AUTO: 0.1 X10*3/UL (ref 0–0.1)
BASOPHILS NFR BLD AUTO: 0.6 %
BUN SERPL-MCNC: 17 MG/DL (ref 6–23)
C COLI+JEJ+UPSA DNA STL QL NAA+PROBE: NOT DETECTED
C DIF TOX TCDA+TCDB STL QL NAA+PROBE: NOT DETECTED
CALCIUM SERPL-MCNC: 8.6 MG/DL (ref 8.6–10.3)
CHLORIDE SERPL-SCNC: 103 MMOL/L (ref 98–107)
CO2 SERPL-SCNC: 27 MMOL/L (ref 21–32)
CREAT SERPL-MCNC: 0.59 MG/DL (ref 0.5–1.05)
EC STX1 GENE STL QL NAA+PROBE: NOT DETECTED
EC STX2 GENE STL QL NAA+PROBE: NOT DETECTED
EGFRCR SERPLBLD CKD-EPI 2021: 88 ML/MIN/1.73M*2
EOSINOPHIL # BLD AUTO: 0.02 X10*3/UL (ref 0–0.4)
EOSINOPHIL NFR BLD AUTO: 0.1 %
ERYTHROCYTE [DISTWIDTH] IN BLOOD BY AUTOMATED COUNT: 13.1 % (ref 11.5–14.5)
ERYTHROCYTE [DISTWIDTH] IN BLOOD BY AUTOMATED COUNT: 13.1 % (ref 11.5–14.5)
GLUCOSE BLD MANUAL STRIP-MCNC: 106 MG/DL (ref 74–99)
GLUCOSE BLD MANUAL STRIP-MCNC: 113 MG/DL (ref 74–99)
GLUCOSE BLD MANUAL STRIP-MCNC: 113 MG/DL (ref 74–99)
GLUCOSE SERPL-MCNC: 114 MG/DL (ref 74–99)
HCT VFR BLD AUTO: 42.3 % (ref 36–46)
HCT VFR BLD AUTO: 42.3 % (ref 36–46)
HGB BLD-MCNC: 13.6 G/DL (ref 12–16)
HGB BLD-MCNC: 13.6 G/DL (ref 12–16)
IMM GRANULOCYTES # BLD AUTO: 0.05 X10*3/UL (ref 0–0.5)
IMM GRANULOCYTES NFR BLD AUTO: 0.3 % (ref 0–0.9)
LYMPHOCYTES # BLD AUTO: 2.19 X10*3/UL (ref 0.8–3)
LYMPHOCYTES NFR BLD AUTO: 14.2 %
MAGNESIUM SERPL-MCNC: 1.79 MG/DL (ref 1.6–2.4)
MCH RBC QN AUTO: 29.5 PG (ref 26–34)
MCH RBC QN AUTO: 29.5 PG (ref 26–34)
MCHC RBC AUTO-ENTMCNC: 32.2 G/DL (ref 32–36)
MCHC RBC AUTO-ENTMCNC: 32.2 G/DL (ref 32–36)
MCV RBC AUTO: 92 FL (ref 80–100)
MCV RBC AUTO: 92 FL (ref 80–100)
MONOCYTES # BLD AUTO: 1.47 X10*3/UL (ref 0.05–0.8)
MONOCYTES NFR BLD AUTO: 9.5 %
NEUTROPHILS # BLD AUTO: 11.63 X10*3/UL (ref 1.6–5.5)
NEUTROPHILS NFR BLD AUTO: 75.3 %
NOROVIRUS GI + GII RNA STL NAA+PROBE: NOT DETECTED
NRBC BLD-RTO: 0 /100 WBCS (ref 0–0)
NRBC BLD-RTO: 0 /100 WBCS (ref 0–0)
PLATELET # BLD AUTO: 257 X10*3/UL (ref 150–450)
PLATELET # BLD AUTO: 257 X10*3/UL (ref 150–450)
POTASSIUM SERPL-SCNC: 3.1 MMOL/L (ref 3.5–5.3)
RBC # BLD AUTO: 4.61 X10*6/UL (ref 4–5.2)
RBC # BLD AUTO: 4.61 X10*6/UL (ref 4–5.2)
RV RNA STL NAA+PROBE: NOT DETECTED
SALMONELLA DNA STL QL NAA+PROBE: NOT DETECTED
SHIGELLA DNA SPEC QL NAA+PROBE: NOT DETECTED
SODIUM SERPL-SCNC: 142 MMOL/L (ref 136–145)
V CHOLERAE DNA STL QL NAA+PROBE: NOT DETECTED
WBC # BLD AUTO: 15.4 X10*3/UL (ref 4.4–11.3)
WBC # BLD AUTO: 15.4 X10*3/UL (ref 4.4–11.3)
Y ENTEROCOL DNA STL QL NAA+PROBE: NOT DETECTED

## 2024-05-09 PROCEDURE — 2500000004 HC RX 250 GENERAL PHARMACY W/ HCPCS (ALT 636 FOR OP/ED): Performed by: NURSE PRACTITIONER

## 2024-05-09 PROCEDURE — 83735 ASSAY OF MAGNESIUM: CPT | Performed by: NURSE PRACTITIONER

## 2024-05-09 PROCEDURE — 96372 THER/PROPH/DIAG INJ SC/IM: CPT | Performed by: NURSE PRACTITIONER

## 2024-05-09 PROCEDURE — 2500000005 HC RX 250 GENERAL PHARMACY W/O HCPCS: Performed by: NURSE PRACTITIONER

## 2024-05-09 PROCEDURE — 80048 BASIC METABOLIC PNL TOTAL CA: CPT | Performed by: NURSE PRACTITIONER

## 2024-05-09 PROCEDURE — 1100000001 HC PRIVATE ROOM DAILY

## 2024-05-09 PROCEDURE — 2500000001 HC RX 250 WO HCPCS SELF ADMINISTERED DRUGS (ALT 637 FOR MEDICARE OP): Performed by: NURSE PRACTITIONER

## 2024-05-09 PROCEDURE — 82947 ASSAY GLUCOSE BLOOD QUANT: CPT

## 2024-05-09 PROCEDURE — 99233 SBSQ HOSP IP/OBS HIGH 50: CPT | Performed by: NURSE PRACTITIONER

## 2024-05-09 PROCEDURE — 85027 COMPLETE CBC AUTOMATED: CPT | Performed by: NURSE PRACTITIONER

## 2024-05-09 PROCEDURE — 36415 COLL VENOUS BLD VENIPUNCTURE: CPT | Performed by: NURSE PRACTITIONER

## 2024-05-09 PROCEDURE — 87324 CLOSTRIDIUM AG IA: CPT | Mod: GEALAB | Performed by: INTERNAL MEDICINE

## 2024-05-09 PROCEDURE — 2500000004 HC RX 250 GENERAL PHARMACY W/ HCPCS (ALT 636 FOR OP/ED): Mod: JZ | Performed by: INTERNAL MEDICINE

## 2024-05-09 PROCEDURE — 87493 C DIFF AMPLIFIED PROBE: CPT | Mod: GEALAB | Performed by: INTERNAL MEDICINE

## 2024-05-09 PROCEDURE — C9113 INJ PANTOPRAZOLE SODIUM, VIA: HCPCS | Performed by: NURSE PRACTITIONER

## 2024-05-09 RX ORDER — TRAZODONE HYDROCHLORIDE 50 MG/1
50 TABLET ORAL NIGHTLY
Status: DISCONTINUED | OUTPATIENT
Start: 2024-05-09 | End: 2024-05-10 | Stop reason: HOSPADM

## 2024-05-09 RX ORDER — TRAMADOL HYDROCHLORIDE 50 MG/1
50 TABLET ORAL EVERY 8 HOURS PRN
Status: DISCONTINUED | OUTPATIENT
Start: 2024-05-09 | End: 2024-05-10 | Stop reason: HOSPADM

## 2024-05-09 RX ORDER — METOPROLOL TARTRATE 25 MG/1
25 TABLET, FILM COATED ORAL 2 TIMES DAILY
Status: DISCONTINUED | OUTPATIENT
Start: 2024-05-09 | End: 2024-05-10 | Stop reason: HOSPADM

## 2024-05-09 RX ORDER — AMLODIPINE BESYLATE 5 MG/1
5 TABLET ORAL DAILY
Status: DISCONTINUED | OUTPATIENT
Start: 2024-05-09 | End: 2024-05-10 | Stop reason: HOSPADM

## 2024-05-09 RX ORDER — LANOLIN ALCOHOL/MO/W.PET/CERES
400 CREAM (GRAM) TOPICAL DAILY
Status: DISCONTINUED | OUTPATIENT
Start: 2024-05-09 | End: 2024-05-10 | Stop reason: HOSPADM

## 2024-05-09 RX ORDER — ATORVASTATIN CALCIUM 40 MG/1
40 TABLET, FILM COATED ORAL NIGHTLY
Status: DISCONTINUED | OUTPATIENT
Start: 2024-05-09 | End: 2024-05-10 | Stop reason: HOSPADM

## 2024-05-09 RX ORDER — LEVOTHYROXINE SODIUM 50 UG/1
50 TABLET ORAL
Status: DISCONTINUED | OUTPATIENT
Start: 2024-05-10 | End: 2024-05-10 | Stop reason: HOSPADM

## 2024-05-09 RX ORDER — LISINOPRIL 20 MG/1
20 TABLET ORAL DAILY
Status: DISCONTINUED | OUTPATIENT
Start: 2024-05-09 | End: 2024-05-10 | Stop reason: HOSPADM

## 2024-05-09 RX ORDER — POTASSIUM CHLORIDE 1.5 G/1.58G
40 POWDER, FOR SOLUTION ORAL ONCE
Status: COMPLETED | OUTPATIENT
Start: 2024-05-09 | End: 2024-05-09

## 2024-05-09 RX ORDER — HYDROXYZINE HYDROCHLORIDE 25 MG/1
25 TABLET, FILM COATED ORAL 3 TIMES DAILY
Status: DISCONTINUED | OUTPATIENT
Start: 2024-05-09 | End: 2024-05-10 | Stop reason: HOSPADM

## 2024-05-09 RX ORDER — METRONIDAZOLE 500 MG/100ML
500 INJECTION, SOLUTION INTRAVENOUS EVERY 12 HOURS
Status: DISCONTINUED | OUTPATIENT
Start: 2024-05-09 | End: 2024-05-10 | Stop reason: HOSPADM

## 2024-05-09 RX ADMIN — HYDROXYZINE HYDROCHLORIDE 25 MG: 25 TABLET ORAL at 16:45

## 2024-05-09 RX ADMIN — POTASSIUM CHLORIDE 40 MEQ: 1.5 POWDER, FOR SOLUTION ORAL at 09:00

## 2024-05-09 RX ADMIN — HEPARIN SODIUM 5000 UNITS: 5000 INJECTION INTRAVENOUS; SUBCUTANEOUS at 09:00

## 2024-05-09 RX ADMIN — ASPIRIN 81 MG: 81 TABLET, COATED ORAL at 09:00

## 2024-05-09 RX ADMIN — HYDROXYZINE HYDROCHLORIDE 25 MG: 25 TABLET ORAL at 20:56

## 2024-05-09 RX ADMIN — CEFTRIAXONE SODIUM 1 G: 1 INJECTION, SOLUTION INTRAVENOUS at 09:00

## 2024-05-09 RX ADMIN — HEPARIN SODIUM 5000 UNITS: 5000 INJECTION INTRAVENOUS; SUBCUTANEOUS at 16:45

## 2024-05-09 RX ADMIN — SODIUM CHLORIDE 75 ML/HR: 9 INJECTION, SOLUTION INTRAVENOUS at 01:38

## 2024-05-09 RX ADMIN — METOPROLOL TARTRATE 25 MG: 25 TABLET, FILM COATED ORAL at 20:56

## 2024-05-09 RX ADMIN — ATORVASTATIN CALCIUM 40 MG: 40 TABLET, FILM COATED ORAL at 20:56

## 2024-05-09 RX ADMIN — TRAMADOL HYDROCHLORIDE 50 MG: 50 TABLET, COATED ORAL at 10:29

## 2024-05-09 RX ADMIN — PANTOPRAZOLE SODIUM 40 MG: 40 INJECTION, POWDER, FOR SOLUTION INTRAVENOUS at 06:17

## 2024-05-09 RX ADMIN — METOPROLOL TARTRATE 25 MG: 25 TABLET, FILM COATED ORAL at 10:29

## 2024-05-09 RX ADMIN — Medication 400 MG: at 10:29

## 2024-05-09 RX ADMIN — POTASSIUM CHLORIDE 40 MEQ: 1.5 POWDER, FOR SOLUTION ORAL at 16:45

## 2024-05-09 RX ADMIN — HYDROXYZINE HYDROCHLORIDE 25 MG: 25 TABLET ORAL at 10:29

## 2024-05-09 RX ADMIN — METRONIDAZOLE 500 MG: 500 INJECTION, SOLUTION INTRAVENOUS at 14:14

## 2024-05-09 RX ADMIN — ONDANSETRON 4 MG: 2 INJECTION INTRAMUSCULAR; INTRAVENOUS at 10:21

## 2024-05-09 RX ADMIN — HEPARIN SODIUM 5000 UNITS: 5000 INJECTION INTRAVENOUS; SUBCUTANEOUS at 01:29

## 2024-05-09 RX ADMIN — AMLODIPINE BESYLATE 5 MG: 5 TABLET ORAL at 10:29

## 2024-05-09 RX ADMIN — SODIUM PHOSPHATE 1 ENEMA: 7; 19 ENEMA RECTAL at 14:53

## 2024-05-09 RX ADMIN — LISINOPRIL 20 MG: 20 TABLET ORAL at 10:29

## 2024-05-09 RX ADMIN — TRAZODONE HYDROCHLORIDE 50 MG: 50 TABLET ORAL at 20:56

## 2024-05-09 ASSESSMENT — PAIN SCALES - GENERAL
PAINLEVEL_OUTOF10: 2
PAINLEVEL_OUTOF10: 8
PAINLEVEL_OUTOF10: 0 - NO PAIN

## 2024-05-09 ASSESSMENT — COGNITIVE AND FUNCTIONAL STATUS - GENERAL
STANDING UP FROM CHAIR USING ARMS: A LITTLE
DAILY ACTIVITIY SCORE: 20
TURNING FROM BACK TO SIDE WHILE IN FLAT BAD: A LITTLE
HELP NEEDED FOR BATHING: A LITTLE
MOBILITY SCORE: 17
MOVING FROM LYING ON BACK TO SITTING ON SIDE OF FLAT BED WITH BEDRAILS: A LITTLE
HELP NEEDED FOR BATHING: A LITTLE
MOVING FROM LYING ON BACK TO SITTING ON SIDE OF FLAT BED WITH BEDRAILS: A LITTLE
STANDING UP FROM CHAIR USING ARMS: A LITTLE
CLIMB 3 TO 5 STEPS WITH RAILING: A LOT
MOVING TO AND FROM BED TO CHAIR: A LITTLE
CLIMB 3 TO 5 STEPS WITH RAILING: A LOT
TURNING FROM BACK TO SIDE WHILE IN FLAT BAD: A LITTLE
PERSONAL GROOMING: A LITTLE
DAILY ACTIVITIY SCORE: 20
WALKING IN HOSPITAL ROOM: A LITTLE
MOVING TO AND FROM BED TO CHAIR: A LITTLE
DRESSING REGULAR LOWER BODY CLOTHING: A LITTLE
MOBILITY SCORE: 17
WALKING IN HOSPITAL ROOM: A LITTLE
DRESSING REGULAR LOWER BODY CLOTHING: A LITTLE
PERSONAL GROOMING: A LITTLE
TOILETING: A LITTLE
TOILETING: A LITTLE

## 2024-05-09 ASSESSMENT — PAIN - FUNCTIONAL ASSESSMENT
PAIN_FUNCTIONAL_ASSESSMENT: 0-10

## 2024-05-09 ASSESSMENT — ACTIVITIES OF DAILY LIVING (ADL): LACK_OF_TRANSPORTATION: NO

## 2024-05-09 NOTE — CARE PLAN
The patient's goals for the shift include  pt will have controlled pain by the end of shift.    The clinical goals for the shift include pt will remain oriented to self during shift    Pt remained oriented to self during shift. Antibiotics given per order. Tolerated well. Pt able to verbalize when pt needed pain interventions.  Pt complaining of increased stomach pain/constipation/urgency with BM. Messaged Cheyenne Barksdale and Dr solorzano for interventions. Sp suggested bowel regimen/KUB. Fleet enema ordered. Tolerated well with minimal results. Cheyenne barksdale said may try mag citrate after KUB or soap cheryl enema.    Problem: Pain - Adult  Goal: Verbalizes/displays adequate comfort level or baseline comfort level  Outcome: Progressing     Problem: Safety - Adult  Goal: Free from fall injury  Outcome: Progressing     Problem: Fall/Injury  Goal: Not fall by end of shift  Outcome: Progressing  Goal: Be free from injury by end of the shift  Outcome: Progressing  Goal: Use assistive devices by end of the shift  Outcome: Progressing

## 2024-05-09 NOTE — CONSULTS
Consults  Referred by MANDI Marie MD: Ascencion Valente, DO    Reason For Consult  leukocytosis    History Of Present Illness  Maisha Agosto is a 83 y.o. female, hx of HTN, hx of DM, hx of RA, she was admitted for diarrhea and weakness x 3 days, no fever, no abdominal pain, no emesis, no travel, no ill contacts, no recent antibiotics, the WBC were slightly up, the LFT were N, the ct without dilated biliary sp cholecystectomy, pancreatic cysts, prominent fluid filled small bowel, distended bladder, the ua with pyuria, the culture with g-ve bacilli, her WBC are trending up, she is on Rocephin     Past Medical History  She has a past medical history of Deficiency of other specified B group vitamins (2016), Encounter for examination of eyes and vision without abnormal findings, Occlusion and stenosis of bilateral carotid arteries (2016), and Personal history of other medical treatment.    Surgical History  She has a past surgical history that includes Cholecystectomy (2014); Esophagogastroduodenoscopy (2014); Colonoscopy (2014);  section, classic (2016); Gallbladder surgery (2016); MR angio head wo IV contrast (2021); MR angio neck wo IV contrast (2021); and CT angio head w and wo IV contrast (2021).     Social History     Occupational History    Not on file   Tobacco Use    Smoking status: Never    Smokeless tobacco: Never   Vaping Use    Vaping status: Never Used   Substance and Sexual Activity    Alcohol use: Never    Drug use: Never    Sexual activity: Not on file     Travel History   Travel since 22    No documented travel since 24          Family History  No family history on file., no immunodeficiency  Allergies  Patient has no known allergies.     Immunization History   Administered Date(s) Administered    Pfizer COVID-19 vaccine, 2023, 12 years and older, (30mcg/0.3mL) 2023    Pfizer COVID-19 vaccine, bivalent, age 12  years and older (30 mcg/0.3 mL) 11/03/2022    Pfizer Gray Cap SARS-CoV-2 08/01/2022    Pfizer Purple Cap SARS-CoV-2 03/14/2021, 04/02/2021, 11/12/2021   Pneumonia vaccine is up to date  Zacarias fall risk 60, preventive protocol was implemented  Depression screen is negative    Medications  Home medications:  Medications Prior to Admission   Medication Sig Dispense Refill Last Dose    ramipril (Altace) 10 mg capsule Take 1 capsule (10 mg) by mouth once daily.       amLODIPine (Norvasc) 5 mg tablet Take 1 tablet (5 mg) by mouth once daily.       aspirin 81 mg EC tablet TAKE ONE TABLET BY MOUTH EVERY DAY AS DIRECTED 90 tablet 0     atorvastatin (Lipitor) 40 mg tablet Take 1 tablet (40 mg) by mouth once daily.       levothyroxine (Synthroid, Levoxyl) 50 mcg tablet Take 1 tablet (50 mcg) by mouth once daily in the morning. Take before meals.       magnesium oxide (Mag-Ox) 400 mg (241.3 mg magnesium) tablet Take 1 tablet (400 mg) by mouth once daily.       metoprolol tartrate (Lopressor) 25 mg tablet Take 1 tablet (25 mg) by mouth 2 times a day.       traZODone (Desyrel) 50 mg tablet Take 1 tablet (50 mg) by mouth once daily at bedtime.        Current medications:  Scheduled medications  amLODIPine, 5 mg, oral, Daily  aspirin, 81 mg, oral, Daily  atorvastatin, 40 mg, oral, Nightly  cefTRIAXone, 1 g, intravenous, q24h  heparin (porcine), 5,000 Units, subcutaneous, q8h  hydrOXYzine HCL, 25 mg, oral, TID  insulin lispro, 0-5 Units, subcutaneous, TID with meals  [START ON 5/10/2024] levothyroxine, 50 mcg, oral, Daily before breakfast  lisinopril, 20 mg, oral, Daily  magnesium oxide, 400 mg, oral, Daily  metoprolol tartrate, 25 mg, oral, BID  pantoprazole, 40 mg, intravenous, Daily before breakfast  potassium chloride, 40 mEq, oral, Daily  potassium chloride, 40 mEq, oral, Once  traZODone, 50 mg, oral, Nightly      Continuous medications  sodium chloride 0.9%, 75 mL/hr, Last Rate: 75 mL/hr (05/09/24 0138)      PRN  medications  PRN medications: dextrose, glucagon, hydrALAZINE, OLANZapine, ondansetron, traMADol    Review of Systems   All other systems reviewed and are negative.       Objective  Range of Vitals (last 24 hours)  Heart Rate:  [64-96]   Temp:  [36.5 °C (97.7 °F)-37 °C (98.6 °F)]   Resp:  [16-18]   BP: (117-143)/(67-80)   SpO2:  [91 %-94 %]   Daily Weight  05/08/24 : (!) 44 kg (97 lb)    Body mass index is 18.94 kg/m².   Nutritional consult    Physical Exam  Constitutional:       Appearance: Normal appearance.   HENT:      Head: Normocephalic and atraumatic.      Mouth/Throat:      Mouth: Mucous membranes are moist.      Pharynx: Oropharynx is clear.   Eyes:      Pupils: Pupils are equal, round, and reactive to light.   Cardiovascular:      Rate and Rhythm: Normal rate and regular rhythm.      Heart sounds: Normal heart sounds.   Pulmonary:      Effort: Pulmonary effort is normal.      Breath sounds: Normal breath sounds.   Abdominal:      General: Abdomen is flat. Bowel sounds are normal.      Palpations: Abdomen is soft.   Musculoskeletal:      Cervical back: Normal range of motion.   Neurological:      Mental Status: She is alert.          Relevant Results  Outside Hospital Results  reviewed  Labs  Results from last 72 hours   Lab Units 05/09/24  0610 05/08/24  0938 05/07/24  1440   WBC AUTO x10*3/uL 15.4* 12.9* 14.2*   HEMOGLOBIN g/dL 13.6 15.3 14.1   HEMATOCRIT % 42.3 46.9* 42.2   PLATELETS AUTO x10*3/uL 257 249 237   NEUTROS PCT AUTO %  --  75.2 81.1   LYMPHS PCT AUTO %  --  14.9 11.8   MONOS PCT AUTO %  --  8.9 5.8   EOS PCT AUTO %  --  0.2 0.4     Results from last 72 hours   Lab Units 05/09/24  0610 05/08/24  0938 05/07/24  1440   SODIUM mmol/L 142 141 141  140   POTASSIUM mmol/L 3.1* 3.3* 3.0*  2.9*   CHLORIDE mmol/L 103 101 101  102   CO2 mmol/L 27 31 28  29   BUN mg/dL 17 20 26*  24*   CREATININE mg/dL 0.59 0.67 0.69  0.64   GLUCOSE mg/dL 114* 112* 196*  192*   CALCIUM mg/dL 8.6 9.3 10.1  10.0  "  ANION GAP mmol/L 15 12 15  12   EGFR mL/min/1.73m*2 88 86 85  87     Results from last 72 hours   Lab Units 05/08/24  0938 05/07/24  1440   ALK PHOS U/L 52 61  54   BILIRUBIN TOTAL mg/dL 0.9 0.8  0.8   PROTEIN TOTAL g/dL 7.7 8.1  8.2   ALT U/L 26 28  29   AST U/L 27 37  31   ALBUMIN g/dL 4.2 4.4  4.4     Estimated Creatinine Clearance: 48.4 mL/min (by C-G formula based on SCr of 0.59 mg/dL).  No results found for: \"CRP\", \"SEDRATE\"  No results found for: \"HIV1X2\", \"HIVCONF\", \"RMAVLD6RU\"  No results found for: \"HEPCABINIT\", \"HEPCAB\", \"HCVPCRQUANT\"  Microbiology  Susceptibility data from last 90 days.  Collected Specimen Info Organism   05/07/24 Urine from Clean Catch/Voided Enteric bacilli   Imaging  Reviewed      Assessment/Plan   Leukocytosis  Diarrhea  Possible UTI    Recommendations :  Continue Rocephin  Add Flagyl  Stool studies  Add the diff to the CBC    I spent minutes in the professional and overall care of this patient.      Marcello Falcon MD  "

## 2024-05-09 NOTE — PROGRESS NOTES
05/09/24 1119   Discharge Planning   Living Arrangements Children   Support Systems Children   Assistance Needed Alert and oriented x 1-2, Patient lives at home with her daughter, while the daughter is at work the patient has private duty [employer] M-F 8-8.5 hours per day, Walker, Shower chair, W/C used at home, Does not drive   Type of Residence Private residence   Number of Stairs to Enter Residence 2   Number of Stairs Within Residence 0   Do you have animals or pets at home? Yes   Type of Animals or Pets 1 dog   Who is requesting discharge planning? Provider   Home or Post Acute Services Other (Comment)  (TBD PT evaluation pending, OT evaluation completed)   Patient expects to be discharged to: TBD pending PT/OT recommendations   Does the patient need discharge transport arranged? No   Financial Resource Strain   How hard is it for you to pay for the very basics like food, housing, medical care, and heating? Not hard   Housing Stability   In the last 12 months, was there a time when you were not able to pay the mortgage or rent on time? N   In the last 12 months, how many places have you lived? 1   In the last 12 months, was there a time when you did not have a steady place to sleep or slept in a shelter (including now)? N   Transportation Needs   In the past 12 months, has lack of transportation kept you from medical appointments or from getting medications? no   In the past 12 months, has lack of transportation kept you from meetings, work, or from getting things needed for daily living? No   Patient Choice   Provider Choice list and CMS website (https://medicare.gov/care-compare#search) for post-acute Quality and Resource Measure Data were provided and reviewed with: Family;Patient   Patient / Family choosing to utilize agency / facility established prior to hospitalization No

## 2024-05-09 NOTE — PROGRESS NOTES
Maisha Agosto is a 83 y.o. female on day 1 of admission presenting with UTI (urinary tract infection).      Subjective   The patient is in bed c/o abdominal cramping. Patients feeling like she has to have a bowel movement.        Objective     Last Recorded Vitals  /75 (BP Location: Right arm, Patient Position: Lying)   Pulse 66   Temp 36.3 °C (97.3 °F) (Temporal)   Resp 18   Wt (!) 44 kg (97 lb)   SpO2 92%   Intake/Output last 3 Shifts:    Intake/Output Summary (Last 24 hours) at 5/9/2024 1525  Last data filed at 5/9/2024 1151  Gross per 24 hour   Intake 1692.5 ml   Output 1 ml   Net 1691.5 ml       Admission Weight  Weight: (!) 44 kg (97 lb) (05/08/24 0158)    Daily Weight  05/08/24 : (!) 44 kg (97 lb)    Image Results  ECG 12 lead  Sinus rhythm with occasional Premature ventricular complexes  Moderate voltage criteria for LVH, may be normal variant  T wave abnormality, consider inferior ischemia  T wave abnormality, consider anterolateral ischemia  Abnormal ECG  When compared with ECG of 07-MAY-2024 14:35, (unconfirmed)  Premature ventricular complexes are now Present  Aberrant conduction is no longer Present      Physical Exam  HENT:      Mouth/Throat:      Mouth: Mucous membranes are dry.   Eyes:      Extraocular Movements: Extraocular movements intact.   Cardiovascular:      Rate and Rhythm: Regular rhythm.      Heart sounds: Normal heart sounds.   Pulmonary:      Breath sounds: Normal breath sounds.   Abdominal:      General: There is distension.      Tenderness: There is no abdominal tenderness. There is no left CVA tenderness or guarding.   Musculoskeletal:      Cervical back: Neck supple.      Right lower leg: No edema.      Left lower leg: No edema.   Skin:     General: Skin is dry.   Neurological:      General: No focal deficit present.      Mental Status: She is alert and oriented to person, place, and time.   Psychiatric:         Mood and Affect: Mood normal.     Relevant  Results      Results for orders placed or performed during the hospital encounter of 05/08/24 (from the past 24 hour(s))   POCT GLUCOSE   Result Value Ref Range    POCT Glucose 112 (H) 74 - 99 mg/dL   POCT GLUCOSE   Result Value Ref Range    POCT Glucose 124 (H) 74 - 99 mg/dL   ECG 12 lead   Result Value Ref Range    Ventricular Rate 83 BPM    Atrial Rate 83 BPM    MD Interval 146 ms    QRS Duration 76 ms    QT Interval 376 ms    QTC Calculation(Bazett) 441 ms    P Axis 54 degrees    R Axis 21 degrees    T Axis 162 degrees    QRS Count 14 beats    Q Onset 218 ms    P Onset 145 ms    P Offset 203 ms    T Offset 406 ms    QTC Fredericia 419 ms   CBC   Result Value Ref Range    WBC 15.4 (H) 4.4 - 11.3 x10*3/uL    nRBC 0.0 0.0 - 0.0 /100 WBCs    RBC 4.61 4.00 - 5.20 x10*6/uL    Hemoglobin 13.6 12.0 - 16.0 g/dL    Hematocrit 42.3 36.0 - 46.0 %    MCV 92 80 - 100 fL    MCH 29.5 26.0 - 34.0 pg    MCHC 32.2 32.0 - 36.0 g/dL    RDW 13.1 11.5 - 14.5 %    Platelets 257 150 - 450 x10*3/uL   Magnesium   Result Value Ref Range    Magnesium 1.79 1.60 - 2.40 mg/dL   Basic Metabolic Panel   Result Value Ref Range    Glucose 114 (H) 74 - 99 mg/dL    Sodium 142 136 - 145 mmol/L    Potassium 3.1 (L) 3.5 - 5.3 mmol/L    Chloride 103 98 - 107 mmol/L    Bicarbonate 27 21 - 32 mmol/L    Anion Gap 15 10 - 20 mmol/L    Urea Nitrogen 17 6 - 23 mg/dL    Creatinine 0.59 0.50 - 1.05 mg/dL    eGFR 88 >60 mL/min/1.73m*2    Calcium 8.6 8.6 - 10.3 mg/dL   CBC and Auto Differential   Result Value Ref Range    WBC 15.4 (H) 4.4 - 11.3 x10*3/uL    nRBC 0.0 0.0 - 0.0 /100 WBCs    RBC 4.61 4.00 - 5.20 x10*6/uL    Hemoglobin 13.6 12.0 - 16.0 g/dL    Hematocrit 42.3 36.0 - 46.0 %    MCV 92 80 - 100 fL    MCH 29.5 26.0 - 34.0 pg    MCHC 32.2 32.0 - 36.0 g/dL    RDW 13.1 11.5 - 14.5 %    Platelets 257 150 - 450 x10*3/uL    Neutrophils % 75.3 40.0 - 80.0 %    Immature Granulocytes %, Automated 0.3 0.0 - 0.9 %    Lymphocytes % 14.2 13.0 - 44.0 %    Monocytes %  9.5 2.0 - 10.0 %    Eosinophils % 0.1 0.0 - 6.0 %    Basophils % 0.6 0.0 - 2.0 %    Neutrophils Absolute 11.63 (H) 1.60 - 5.50 x10*3/uL    Immature Granulocytes Absolute, Automated 0.05 0.00 - 0.50 x10*3/uL    Lymphocytes Absolute 2.19 0.80 - 3.00 x10*3/uL    Monocytes Absolute 1.47 (H) 0.05 - 0.80 x10*3/uL    Eosinophils Absolute 0.02 0.00 - 0.40 x10*3/uL    Basophils Absolute 0.10 0.00 - 0.10 x10*3/uL   POCT GLUCOSE   Result Value Ref Range    POCT Glucose 106 (H) 74 - 99 mg/dL   POCT GLUCOSE   Result Value Ref Range    POCT Glucose 113 (H) 74 - 99 mg/dL             Assessment/Plan                  Principal Problem:    UTI (urinary tract infection)    Diarrhea vs constipation  Abdominal pain  -One episode in 24 hours  -CT shows fluid filled loops  -Stool studies for PCR pathogens   -C. Difficile  -Antiemetic for nausea  -Pain management  -Monitor electrolytes  -GI provided rec including bowel regimen and KUB     Hypokalemia  -Treated with potassium yesterday, will repeat level and treat as needed  -Magnesium level in the a.m.     Abnormal CT  -Pancreatic lesions an dilated intra and extrahepatic ducts  -GI provided recommendations     Possible UTI  Leukocytosis worsening  -Pending UC  -Will continue ceftriaxone and add Flagyl   -Repeat CBC in the a.m.  -Monitor for SIRS  -ID added     DM type II  -Not currently on any treatment  -Hemoglobin A1c 6.5  -Insulin sliding scale ordered  -Diabetic diet     Dementia  -Adding Zyprexa     Chronic respiratory failure on 2L/NC     HTN  -PRN Hydralazine  -Monitor vitals     Dispo: Anticipate discharge home with medically ready.              Cheyenne Draper, APRN-CNP

## 2024-05-10 ENCOUNTER — HOME HEALTH ADMISSION (OUTPATIENT)
Dept: HOME HEALTH SERVICES | Facility: HOME HEALTH | Age: 84
End: 2024-05-10
Payer: MEDICARE

## 2024-05-10 ENCOUNTER — DOCUMENTATION (OUTPATIENT)
Dept: HOME HEALTH SERVICES | Facility: HOME HEALTH | Age: 84
End: 2024-05-10
Payer: MEDICARE

## 2024-05-10 VITALS
HEART RATE: 61 BPM | WEIGHT: 97 LBS | DIASTOLIC BLOOD PRESSURE: 70 MMHG | RESPIRATION RATE: 18 BRPM | BODY MASS INDEX: 19.04 KG/M2 | HEIGHT: 60 IN | TEMPERATURE: 98.1 F | SYSTOLIC BLOOD PRESSURE: 173 MMHG | OXYGEN SATURATION: 93 %

## 2024-05-10 LAB
ANION GAP SERPL CALC-SCNC: 10 MMOL/L (ref 10–20)
ATRIAL RATE: 64 BPM
BACTERIA UR CULT: ABNORMAL
BUN SERPL-MCNC: 18 MG/DL (ref 6–23)
C DIF TOX TCDA+TCDB STL QL NAA+PROBE: DETECTED
CALCIUM SERPL-MCNC: 8.4 MG/DL (ref 8.6–10.3)
CHLORIDE SERPL-SCNC: 110 MMOL/L (ref 98–107)
CO2 SERPL-SCNC: 25 MMOL/L (ref 21–32)
CREAT SERPL-MCNC: 0.65 MG/DL (ref 0.5–1.05)
EGFRCR SERPLBLD CKD-EPI 2021: 86 ML/MIN/1.73M*2
ERYTHROCYTE [DISTWIDTH] IN BLOOD BY AUTOMATED COUNT: 13.4 % (ref 11.5–14.5)
GLUCOSE BLD MANUAL STRIP-MCNC: 148 MG/DL (ref 74–99)
GLUCOSE BLD MANUAL STRIP-MCNC: 96 MG/DL (ref 74–99)
GLUCOSE SERPL-MCNC: 91 MG/DL (ref 74–99)
HCT VFR BLD AUTO: 37.9 % (ref 36–46)
HGB BLD-MCNC: 12 G/DL (ref 12–16)
MCH RBC QN AUTO: 29.6 PG (ref 26–34)
MCHC RBC AUTO-ENTMCNC: 31.7 G/DL (ref 32–36)
MCV RBC AUTO: 93 FL (ref 80–100)
NRBC BLD-RTO: 0 /100 WBCS (ref 0–0)
P AXIS: 49 DEGREES
P OFFSET: 206 MS
P ONSET: 143 MS
PLATELET # BLD AUTO: 204 X10*3/UL (ref 150–450)
POTASSIUM SERPL-SCNC: 3.8 MMOL/L (ref 3.5–5.3)
PR INTERVAL: 164 MS
Q ONSET: 225 MS
QRS COUNT: 11 BEATS
QRS DURATION: 74 MS
QT INTERVAL: 414 MS
QTC CALCULATION(BAZETT): 427 MS
QTC FREDERICIA: 422 MS
R AXIS: 13 DEGREES
RBC # BLD AUTO: 4.06 X10*6/UL (ref 4–5.2)
SODIUM SERPL-SCNC: 141 MMOL/L (ref 136–145)
T AXIS: 95 DEGREES
T OFFSET: 432 MS
VENTRICULAR RATE: 64 BPM
WBC # BLD AUTO: 11.7 X10*3/UL (ref 4.4–11.3)

## 2024-05-10 PROCEDURE — 36415 COLL VENOUS BLD VENIPUNCTURE: CPT | Performed by: NURSE PRACTITIONER

## 2024-05-10 PROCEDURE — C9113 INJ PANTOPRAZOLE SODIUM, VIA: HCPCS | Performed by: NURSE PRACTITIONER

## 2024-05-10 PROCEDURE — 99239 HOSP IP/OBS DSCHRG MGMT >30: CPT | Performed by: NURSE PRACTITIONER

## 2024-05-10 PROCEDURE — 2500000001 HC RX 250 WO HCPCS SELF ADMINISTERED DRUGS (ALT 637 FOR MEDICARE OP): Performed by: NURSE PRACTITIONER

## 2024-05-10 PROCEDURE — 80048 BASIC METABOLIC PNL TOTAL CA: CPT | Performed by: NURSE PRACTITIONER

## 2024-05-10 PROCEDURE — 2500000004 HC RX 250 GENERAL PHARMACY W/ HCPCS (ALT 636 FOR OP/ED): Performed by: NURSE PRACTITIONER

## 2024-05-10 PROCEDURE — 82947 ASSAY GLUCOSE BLOOD QUANT: CPT

## 2024-05-10 PROCEDURE — 85027 COMPLETE CBC AUTOMATED: CPT | Performed by: NURSE PRACTITIONER

## 2024-05-10 PROCEDURE — 2500000004 HC RX 250 GENERAL PHARMACY W/ HCPCS (ALT 636 FOR OP/ED): Mod: JZ | Performed by: INTERNAL MEDICINE

## 2024-05-10 PROCEDURE — 97161 PT EVAL LOW COMPLEX 20 MIN: CPT | Mod: GP

## 2024-05-10 RX ORDER — HYDROXYZINE HYDROCHLORIDE 25 MG/1
25 TABLET, FILM COATED ORAL 3 TIMES DAILY PRN
Qty: 90 TABLET | Refills: 0 | Status: SHIPPED | OUTPATIENT
Start: 2024-05-10 | End: 2024-06-09

## 2024-05-10 RX ORDER — POLYETHYLENE GLYCOL 3350 17 G/17G
17 POWDER, FOR SOLUTION ORAL DAILY
Qty: 30 PACKET | Refills: 0 | Status: SHIPPED | OUTPATIENT
Start: 2024-05-10 | End: 2024-06-09

## 2024-05-10 RX ORDER — NITROFURANTOIN 25; 75 MG/1; MG/1
100 CAPSULE ORAL 2 TIMES DAILY
Qty: 14 CAPSULE | Refills: 0 | Status: SHIPPED | OUTPATIENT
Start: 2024-05-10 | End: 2024-05-18 | Stop reason: HOSPADM

## 2024-05-10 RX ADMIN — ASPIRIN 81 MG: 81 TABLET, COATED ORAL at 09:06

## 2024-05-10 RX ADMIN — HEPARIN SODIUM 5000 UNITS: 5000 INJECTION INTRAVENOUS; SUBCUTANEOUS at 09:05

## 2024-05-10 RX ADMIN — CEFTRIAXONE SODIUM 1 G: 1 INJECTION, SOLUTION INTRAVENOUS at 09:09

## 2024-05-10 RX ADMIN — PANTOPRAZOLE SODIUM 40 MG: 40 INJECTION, POWDER, FOR SOLUTION INTRAVENOUS at 06:16

## 2024-05-10 RX ADMIN — SODIUM CHLORIDE 75 ML/HR: 9 INJECTION, SOLUTION INTRAVENOUS at 09:07

## 2024-05-10 RX ADMIN — LISINOPRIL 20 MG: 20 TABLET ORAL at 09:06

## 2024-05-10 RX ADMIN — LEVOTHYROXINE SODIUM 50 MCG: 50 TABLET ORAL at 06:16

## 2024-05-10 RX ADMIN — METOPROLOL TARTRATE 25 MG: 25 TABLET, FILM COATED ORAL at 09:06

## 2024-05-10 RX ADMIN — Medication 400 MG: at 11:55

## 2024-05-10 RX ADMIN — POTASSIUM CHLORIDE 40 MEQ: 1.5 POWDER, FOR SOLUTION ORAL at 09:06

## 2024-05-10 RX ADMIN — HYDROXYZINE HYDROCHLORIDE 25 MG: 25 TABLET ORAL at 09:06

## 2024-05-10 RX ADMIN — METRONIDAZOLE 500 MG: 500 INJECTION, SOLUTION INTRAVENOUS at 13:03

## 2024-05-10 RX ADMIN — AMLODIPINE BESYLATE 5 MG: 5 TABLET ORAL at 09:06

## 2024-05-10 RX ADMIN — METRONIDAZOLE 500 MG: 500 INJECTION, SOLUTION INTRAVENOUS at 02:23

## 2024-05-10 ASSESSMENT — COGNITIVE AND FUNCTIONAL STATUS - GENERAL
MOVING TO AND FROM BED TO CHAIR: A LITTLE
WALKING IN HOSPITAL ROOM: A LITTLE
MOBILITY SCORE: 18
STANDING UP FROM CHAIR USING ARMS: A LITTLE
MOVING FROM LYING ON BACK TO SITTING ON SIDE OF FLAT BED WITH BEDRAILS: A LITTLE
CLIMB 3 TO 5 STEPS WITH RAILING: A LITTLE
TURNING FROM BACK TO SIDE WHILE IN FLAT BAD: A LITTLE

## 2024-05-10 ASSESSMENT — PAIN SCALES - GENERAL
PAINLEVEL_OUTOF10: 0 - NO PAIN
PAINLEVEL_OUTOF10: 0 - NO PAIN

## 2024-05-10 ASSESSMENT — ACTIVITIES OF DAILY LIVING (ADL): ADL_ASSISTANCE: NEEDS ASSISTANCE

## 2024-05-10 ASSESSMENT — PAIN - FUNCTIONAL ASSESSMENT
PAIN_FUNCTIONAL_ASSESSMENT: 0-10
PAIN_FUNCTIONAL_ASSESSMENT: 0-10

## 2024-05-10 NOTE — PROGRESS NOTES
Maisha Agosto is a 83 y.o. female on day 2 of admission presenting with UTI (urinary tract infection).    Subjective   Interval History: no fever, no new complaints        Review of Systems    Objective   Range of Vitals (last 24 hours)  Heart Rate:  [61-83]   Temp:  [35.7 °C (96.3 °F)-36.7 °C (98.1 °F)]   Resp:  [16-18]   BP: (122-185)/(70-78)   SpO2:  [91 %-94 %]   Daily Weight  05/08/24 : (!) 44 kg (97 lb)    Body mass index is 18.94 kg/m².    Physical Exam  Constitutional:       Appearance: Normal appearance.   HENT:      Head: Normocephalic and atraumatic.      Mouth/Throat:      Mouth: Mucous membranes are moist.      Pharynx: Oropharynx is clear.   Eyes:      Pupils: Pupils are equal, round, and reactive to light.   Cardiovascular:      Rate and Rhythm: Normal rate and regular rhythm.      Heart sounds: Normal heart sounds.   Pulmonary:      Effort: Pulmonary effort is normal.      Breath sounds: Normal breath sounds.   Abdominal:      General: Abdomen is flat. Bowel sounds are normal.      Palpations: Abdomen is soft.   Musculoskeletal:      Cervical back: Normal range of motion.   Neurological:      Mental Status: She is alert.         Antibiotics  cefTRIAXone (Rocephin) IVPB 1 g  sodium chloride 0.9% infusion  heparin (porcine) injection 5,000 Units  pantoprazole (ProtoNix) EC tablet 40 mg  pantoprazole (ProtoNix) injection 40 mg  aspirin EC tablet 81 mg  ondansetron (Zofran) injection 4 mg  ondansetron (Zofran) injection  - Omnicell Override Pull  potassium chloride (Klor-Con) packet 40 mEq  glucagon (Glucagen) injection 1 mg  dextrose 50 % injection 12.5 g  insulin lispro (HumaLOG) injection 0-5 Units  OLANZapine (ZyPREXA) injection 2.5 mg  hydrALAZINE (Apresoline) injection 5 mg  levothyroxine (Synthroid, Levoxyl) tablet  atorvastatin (Lipitor) tablet    metoprolol tartrate (Lopressor) tablet  ramipril (Altace) capsule  traZODone (Desyrel) tablet  amLODIPine (Norvasc) tablet  amLODIPine (Norvasc) tablet  "5 mg  atorvastatin (Lipitor) tablet 40 mg  levothyroxine (Synthroid, Levoxyl) tablet 50 mcg  magnesium oxide (Mag-Ox) tablet 400 mg  metoprolol tartrate (Lopressor) tablet 25 mg  lisinopril tablet 20 mg  traZODone (Desyrel) tablet 50 mg  potassium chloride (Klor-Con) packet 40 mEq  hydrOXYzine HCL (Atarax) tablet 25 mg  traMADol (Ultram) tablet 50 mg  metroNIDAZOLE (Flagyl) 500 mg in NaCl (iso-os) 100 mL  sodium phosphates (Fleets) 19-7 gram/118 mL enema 1 enema      Relevant Results  Labs  Results from last 72 hours   Lab Units 05/10/24  0619 05/09/24  0610 05/08/24  0938 05/07/24  1440   WBC AUTO x10*3/uL 11.7* 15.4*  15.4* 12.9* 14.2*   HEMOGLOBIN g/dL 12.0 13.6  13.6 15.3 14.1   HEMATOCRIT % 37.9 42.3  42.3 46.9* 42.2   PLATELETS AUTO x10*3/uL 204 257  257 249 237   NEUTROS PCT AUTO %  --  75.3 75.2 81.1   LYMPHS PCT AUTO %  --  14.2 14.9 11.8   MONOS PCT AUTO %  --  9.5 8.9 5.8   EOS PCT AUTO %  --  0.1 0.2 0.4     Results from last 72 hours   Lab Units 05/10/24  0619 05/09/24  0610 05/08/24  0938   SODIUM mmol/L 141 142 141   POTASSIUM mmol/L 3.8 3.1* 3.3*   CHLORIDE mmol/L 110* 103 101   CO2 mmol/L 25 27 31   BUN mg/dL 18 17 20   CREATININE mg/dL 0.65 0.59 0.67   GLUCOSE mg/dL 91 114* 112*   CALCIUM mg/dL 8.4* 8.6 9.3   ANION GAP mmol/L 10 15 12   EGFR mL/min/1.73m*2 86 88 86     Results from last 72 hours   Lab Units 05/08/24  0938 05/07/24  1440   ALK PHOS U/L 52 61  54   BILIRUBIN TOTAL mg/dL 0.9 0.8  0.8   PROTEIN TOTAL g/dL 7.7 8.1  8.2   ALT U/L 26 28  29   AST U/L 27 37  31   ALBUMIN g/dL 4.2 4.4  4.4     Estimated Creatinine Clearance: 44 mL/min (by C-G formula based on SCr of 0.65 mg/dL).  No results found for: \"CRP\"  Microbiology  Susceptibility data from last 14 days.  Collected Specimen Info Organism Amoxicillin/Clavulanate Ampicillin Ampicillin/Sulbactam Cefazolin Cefazolin (uncomplicated UTIs only) Ciprofloxacin Gentamicin Nitrofurantoin Piperacillin/Tazobactam " Trimethoprim/Sulfamethoxazole   05/07/24 Urine from Clean Catch/Voided Klebsiella pneumoniae/variicola  S  R  S  S  S  S  S  S  S  S   Imaging  Reviewed        Assessment/Plan   Leukocytosis, trending down  Diarrhea, C. Diff / stoo; PCR negative  Possible UTI, the culture with Klebsiella     Recommendations :  Continue Rocephin and Flagyl  Discussed with the medical team     I spent minutes in the professional and overall care of this patient.      Marcello Falcon MD

## 2024-05-10 NOTE — DISCHARGE SUMMARY
Discharge Diagnosis  UTI (urinary tract infection)  Constipation  Dilated intra and extrahepatic bile duct, Pancreatic cysts with atrophy of the body and tail     Issues Requiring Follow-Up  Constipation  Abnormal CT    Discharge Meds     Your medication list        START taking these medications        Instructions Last Dose Given Next Dose Due   hydrOXYzine HCL 25 mg tablet  Commonly known as: Atarax      Take 1 tablet (25 mg) by mouth 3 times a day as needed for anxiety.       nitrofurantoin (macrocrystal-monohydrate) 100 mg capsule  Commonly known as: Macrobid      Take 1 capsule (100 mg) by mouth 2 times a day for 7 days.       polyethylene glycol 17 gram packet  Commonly known as: Glycolax, Miralax      Take 17 g by mouth once daily.              CONTINUE taking these medications        Instructions Last Dose Given Next Dose Due   amLODIPine 5 mg tablet  Commonly known as: Norvasc           aspirin 81 mg EC tablet      TAKE ONE TABLET BY MOUTH EVERY DAY AS DIRECTED       atorvastatin 40 mg tablet  Commonly known as: Lipitor           levothyroxine 50 mcg tablet  Commonly known as: Synthroid, Levoxyl           magnesium oxide 400 mg (241.3 mg magnesium) tablet  Commonly known as: Mag-Ox           metoprolol tartrate 25 mg tablet  Commonly known as: Lopressor           ramipril 10 mg capsule  Commonly known as: Altace           traZODone 50 mg tablet  Commonly known as: Desyrel                     Where to Get Your Medications        These medications were sent to [id number] [place] [id number] [address]      Phone: [phone]   hydrOXYzine HCL 25 mg tablet  nitrofurantoin (macrocrystal-monohydrate) 100 mg capsule  polyethylene glycol 17 gram packet         Test Results Pending At Discharge  Pending Labs       Order Current Status    C. difficile, PCR In process    Cryptosporidium Antigen, Stool In process    Giardia Antigen In process    Ova/Para +  Giardia/Cryptosporidium Antigen In process          Maisha Agosto is a 83 y.o. female who was transferred from [place] after presenting with a 4 days history of diarrhea and generalized weakness.       #Constipation  #Abdominal pain  #Dilated intra and extrahepatic bile duct, Pancreatic cysts with atrophy of the body and tail   #UTI   #Hypokalemia  -CT shows fluid filled loops  -Stool studies for PCR pathogens   -C. Difficile negative  -UC with Klebsiella   -Mag Citrate one dose  -Enema  -Potassium supplement  -LFTs within normal limits, can follow-up with MRCP outpatient, however further surveillance is not recommended based on previous workup with benign etiology, age, comorbidities        Pertinent Physical Exam At Time of Discharge  Physical Exam  HENT:      Mouth/Throat:      Mouth: Mucous membranes are moist.   Eyes:      Extraocular Movements: Extraocular movements intact.   Cardiovascular:      Rate and Rhythm: Regular rhythm.      Heart sounds: Normal heart sounds.   Pulmonary:      Breath sounds: Normal breath sounds.   Abdominal:      General: There is no distention.      Tenderness: There is no abdominal tenderness. There is no left CVA tenderness or guarding.   Musculoskeletal:      Cervical back: Neck supple.      Right lower leg: No edema.      Left lower leg: No edema.   Skin:     General: Skin is dry.   Neurological:      General: No focal deficit present.      Mental Status: She is alert and oriented to person, place, and time.   Psychiatric:         Mood and Affect: Mood normal.        Outpatient Follow-Up  No future appointments.      ELIO Gibson-CNP

## 2024-05-10 NOTE — NURSING NOTE
Discharge instructions reviewed with patient and son. Eduction on new prescriptions, side effects and follow up appointments noted. IV removed, intact. No additional questions.

## 2024-05-10 NOTE — PROGRESS NOTES
05/10/24 1036   Discharge Planning   Living Arrangements Children   Support Systems Children   Assistance Needed Alert and oriented x 1-2, Patient lives at home with her daughter, while the daughter is at work the patient has private duty HHA M-F 8-8.5 hours per day, Walker, Shower chair, W/C used at home, Does not drive   Type of Residence Private residence   Number of Stairs to Enter Residence 2   Number of Stairs Within Residence 0   Do you have animals or pets at home? Yes   Type of Animals or Pets 1dog   Who is requesting discharge planning? Provider   Home or Post Acute Services None   Patient expects to be discharged to: Home with daughter and son, no discharge needs identified, PT/OT state patient is at her fucntional baseline, patient also has private duty caregivers 5 days/week 8-8.5 hours per day   Does the patient need discharge transport arranged? No   Patient Choice   Provider Choice list and CMS website (https://medicare.gov/care-compare#search) for post-acute Quality and Resource Measure Data were provided and reviewed with: Family   Patient / Family choosing to utilize agency / facility established prior to hospitalization Yes

## 2024-05-10 NOTE — PROGRESS NOTES
Physical Therapy    Physical Therapy Evaluation    Patient Name: Maisha Agosto  MRN: [id number]  Today's Date: 5/10/2024   Time Calculation  Start Time: 1235  Stop Time: 1247  Time Calculation (min): 12 min    Assessment/Plan   PT Assessment  PT Assessment Results: Decreased strength, Decreased endurance, Impaired balance, Decreased mobility  Rehab Prognosis: Good  Barriers to Discharge: None  Evaluation/Treatment Tolerance: Patient tolerated treatment well  Medical Staff Made Aware: Yes  Strengths: Housing layout, Support of Caregivers  Barriers to Participation: Comorbidities  End of Session Communication: Bedside nurse  Assessment Comment: Patient tolerated mobility well, son present throughout assessment and feels comfortable with patients level of care. Rec low intensity PT intervention.  End of Session Patient Position: Up in chair, Alarm off, not on at start of session (no alarm necessary per staff)  IP OR SWING BED PT PLAN  Inpatient or Swing Bed: Inpatient  PT Plan  Treatment/Interventions: Bed mobility, Transfer training, Gait training, Balance training, Strengthening, Endurance training  PT Plan: Skilled PT  PT Frequency: 2 times per week  PT Discharge Recommendations: Low intensity level of continued care  Equipment Recommended upon Discharge: Wheeled walker (owns)  PT Recommended Transfer Status: Assist x1  PT - OK to Discharge: Yes (per PT POC)      Subjective   General Visit Information:  General  Reason for Referral: Impaired functional mobility; UTI (Reconsulted d/t increased assist required)  Referred By: Arcadio Marie  Past Medical History Relevant to Rehab: None  Missed Visit: No  Missed Visit Reason: Other (Comment) (Per OT patient is at functional baseline. No skilled PT necessary at this time. Cancel consult.)  Family/Caregiver Present: Yes (Son)  Prior to Session Communication: Bedside nurse (NP)  Patient Position Received: Up in chair, Alarm off, not on at start of session  General Comment:  Patient pleasantly confused, agreeable to PT eval  Home Living:  Home Living  Type of Home: House  Lives With: Adult children (Patient currently staying at sons home while dtr is on vacation, typically lives with dtr)  Home Adaptive Equipment: Walker rolling or standard  Home Layout: One level  Home Access: Stairs to enter with rails  Entrance Stairs-Rails: Right  Entrance Stairs-Number of Steps: 2  Bathroom Shower/Tub: Walk-in shower  Bathroom Toilet: Standard  Bathroom Equipment: Grab bars in shower, Shower chair with back  Home Living Comments: Values are related to dtrs home. Son has a ramped entrance, tub/shower combination with a transfer bench, first floor set up  Prior Level of Function:  Prior Function Per Pt/Caregiver Report  Level of [place]: Needs assistance with ADLs, Needs assistance with homemaking  Receives Help From: Family, Personal care attendant (Patient has 24/7 care in place, when dtr is at work aide is with patient)  ADL Assistance: Needs assistance  Homemaking Assistance: Needs assistance  Ambulatory Assistance: Independent (with 2WW)  Precautions:  Precautions  Medical Precautions: Fall precautions    Objective   Pain:  Pain Assessment  Pain Assessment: 0-10  Pain Score: 0 - No pain  Cognition:  Cognition  Overall Cognitive Status: Impaired at baseline  Orientation Level: Disoriented to time, Disoriented to situation    General Assessments:    Activity Tolerance  Endurance: Tolerates 10 - 20 min exercise with multiple rests    Sensation  Light Touch: No apparent deficits    Strength  Strength Comments: B LE 4/5    Coordination  Movements are Fluid and Coordinated: Yes    Postural Control  Postural Control: Within Functional Limits    Static Sitting Balance  Static Sitting-Balance Support: No upper extremity supported, Feet supported  Static Sitting-Level of Assistance: Independent    Static Standing Balance  Static Standing-Balance Support: Bilateral upper extremity supported  Static  Standing-Level of Assistance: Contact guard  Functional Assessments:    Transfers  Transfer: Yes  Transfer 1  Transfer From 1: Sit to, Stand to  Transfer to 1: Sit, Stand  Technique 1: Sit to stand, Stand to sit  Transfer Device 1: Walker  Transfer Level of Assistance 1: Contact guard    Ambulation/Gait Training  Ambulation/Gait Training Performed: Yes  Ambulation/Gait Training 1  Surface 1: Level tile  Device 1: Rolling walker  Assistance 1: Close supervision  Quality of Gait 1:  (flexed posture)  Comments/Distance (ft) 1: 10' fwd/retro    Outcome Measures:  [facility] Basic Mobility  Turning from your back to your side while in a flat bed without using bedrails: A little  Moving from lying on your back to sitting on the side of a flat bed without using bedrails: A little  Moving to and from bed to chair (including a wheelchair): A little  Standing up from a chair using your arms (e.g. wheelchair or bedside chair): A little  To walk in hospital room: A little  Climbing 3-5 steps with railing: A little  Basic Mobility - Total Score: 18    Encounter Problems       Encounter Problems (Active)       Balance       STG - Maintains dynamic standing balance without upper extremity support x 5' with dual task supervision  (Progressing)       Start:  05/10/24    Expected End:  05/24/24               Mobility       STG - Patient will ambulate with 2WW 50' mod I  (Progressing)       Start:  05/10/24    Expected End:  05/24/24               PT Transfers       STG - Patient will perform bed mobility independently  (Progressing)       Start:  05/10/24    Expected End:  05/24/24            STG - Patient will transfer sit to and from stand mod I  (Progressing)       Start:  05/10/24    Expected End:  05/24/24               Pain - Adult              Education Documentation  Precautions, taught by Eleanor Rea, PT at 5/10/2024  1:41 PM.  Learner: Patient  Readiness: Acceptance  Method: Explanation  Response: Verbalizes  Understanding    Body Mechanics, taught by Eleanor Rea, PT at 5/10/2024  1:41 PM.  Learner: Patient  Readiness: Acceptance  Method: Explanation  Response: Verbalizes Understanding    Mobility Training, taught by Eleanor Rea, PT at 5/10/2024  1:41 PM.  Learner: Patient  Readiness: Acceptance  Method: Explanation  Response: Verbalizes Understanding    Education Comments  No comments found.

## 2024-05-10 NOTE — CARE PLAN
The patient's goals for the shift include  to sleep at least 6 hours.    The clinical goals for the shift include Pt to have formed stool this shift    Over the shift, the patient did not make progress toward the following goals. Barriers to progression include ***. Recommendations to address these barriers include ***.

## 2024-05-10 NOTE — CARE PLAN
The patient's goals for the shift include  no inconitence    The clinical goals for the shift include Pt to have formed stool this shift      Problem: Safety - Adult  Goal: Free from fall injury  Outcome: Progressing     Problem: Chronic Conditions and Co-morbidities  Goal: Patient's chronic conditions and co-morbidity symptoms are monitored and maintained or improved  Outcome: Progressing     Problem: Fall/Injury  Goal: Not fall by end of shift  Outcome: Progressing  Goal: Be free from injury by end of the shift  Outcome: Progressing  Goal: Use assistive devices by end of the shift  Outcome: Progressing

## 2024-05-10 NOTE — DISCHARGE INSTRUCTIONS
Please take the nitrofurantion as prescribed.   Follow up with PCP in one week.  Abnormal CT result: Dilated intra and extrahepatic bile duct, Pancreatic cysts with atrophy of the body and tail. Follow up with GI.

## 2024-05-11 LAB
C DIFF TOX A+B STL QL IA: NEGATIVE
CRYPTOSP AG STL QL IA: NEGATIVE
G LAMBLIA AG STL QL IA: NEGATIVE

## 2024-05-15 ENCOUNTER — HOSPITAL ENCOUNTER (EMERGENCY)
Facility: HOSPITAL | Age: 84
Discharge: OTHER INSTITUTION | End: 2024-05-16
Attending: EMERGENCY MEDICINE
Payer: MEDICARE

## 2024-05-15 DIAGNOSIS — R19.7 DIARRHEA, UNSPECIFIED TYPE: Primary | ICD-10-CM

## 2024-05-15 LAB
ALBUMIN SERPL BCP-MCNC: 3.6 G/DL (ref 3.4–5)
ALP SERPL-CCNC: 43 U/L (ref 33–136)
ALT SERPL W P-5'-P-CCNC: 31 U/L (ref 7–45)
ANION GAP SERPL CALC-SCNC: 9 MMOL/L (ref 10–20)
AST SERPL W P-5'-P-CCNC: 34 U/L (ref 9–39)
BILIRUB SERPL-MCNC: 0.5 MG/DL (ref 0–1.2)
BUN SERPL-MCNC: 27 MG/DL (ref 6–23)
CALCIUM SERPL-MCNC: 10.2 MG/DL (ref 8.6–10.3)
CHLORIDE SERPL-SCNC: 103 MMOL/L (ref 98–107)
CO2 SERPL-SCNC: 31 MMOL/L (ref 21–32)
CREAT SERPL-MCNC: 1.01 MG/DL (ref 0.5–1.05)
EGFRCR SERPLBLD CKD-EPI 2021: 55 ML/MIN/1.73M*2
ERYTHROCYTE [DISTWIDTH] IN BLOOD BY AUTOMATED COUNT: 13.2 % (ref 11.5–14.5)
GLUCOSE SERPL-MCNC: 106 MG/DL (ref 74–99)
HCT VFR BLD AUTO: 38.3 % (ref 36–46)
HGB BLD-MCNC: 12.3 G/DL (ref 12–16)
MCH RBC QN AUTO: 29.8 PG (ref 26–34)
MCHC RBC AUTO-ENTMCNC: 32.1 G/DL (ref 32–36)
MCV RBC AUTO: 93 FL (ref 80–100)
NRBC BLD-RTO: 0 /100 WBCS (ref 0–0)
PLATELET # BLD AUTO: 235 X10*3/UL (ref 150–450)
POTASSIUM SERPL-SCNC: 3.7 MMOL/L (ref 3.5–5.3)
PROT SERPL-MCNC: 6.8 G/DL (ref 6.4–8.2)
RBC # BLD AUTO: 4.13 X10*6/UL (ref 4–5.2)
SODIUM SERPL-SCNC: 139 MMOL/L (ref 136–145)
WBC # BLD AUTO: 15 X10*3/UL (ref 4.4–11.3)

## 2024-05-15 PROCEDURE — 96360 HYDRATION IV INFUSION INIT: CPT

## 2024-05-15 PROCEDURE — 80053 COMPREHEN METABOLIC PANEL: CPT | Performed by: EMERGENCY MEDICINE

## 2024-05-15 PROCEDURE — 2500000004 HC RX 250 GENERAL PHARMACY W/ HCPCS (ALT 636 FOR OP/ED): Performed by: EMERGENCY MEDICINE

## 2024-05-15 PROCEDURE — 86140 C-REACTIVE PROTEIN: CPT | Performed by: EMERGENCY MEDICINE

## 2024-05-15 PROCEDURE — 96361 HYDRATE IV INFUSION ADD-ON: CPT

## 2024-05-15 PROCEDURE — 85027 COMPLETE CBC AUTOMATED: CPT | Performed by: EMERGENCY MEDICINE

## 2024-05-15 PROCEDURE — 36415 COLL VENOUS BLD VENIPUNCTURE: CPT | Performed by: EMERGENCY MEDICINE

## 2024-05-15 PROCEDURE — 85652 RBC SED RATE AUTOMATED: CPT | Performed by: EMERGENCY MEDICINE

## 2024-05-15 PROCEDURE — 99285 EMERGENCY DEPT VISIT HI MDM: CPT | Mod: 25

## 2024-05-15 PROCEDURE — 82565 ASSAY OF CREATININE: CPT | Performed by: EMERGENCY MEDICINE

## 2024-05-15 RX ADMIN — SODIUM CHLORIDE 1000 ML: 9 INJECTION, SOLUTION INTRAVENOUS at 22:02

## 2024-05-15 ASSESSMENT — PAIN - FUNCTIONAL ASSESSMENT: PAIN_FUNCTIONAL_ASSESSMENT: 0-10

## 2024-05-15 ASSESSMENT — PAIN SCALES - GENERAL: PAINLEVEL_OUTOF10: 0 - NO PAIN

## 2024-05-15 NOTE — SIGNIFICANT EVENT
Follow Up Phone Call    Outgoing phone call    Spoke to: Maisha Agotso Relationship:self   Phone number: [phone]      Outcome: I left a message on answering machine   No chief complaint on file.         Diagnosis:Not applicable

## 2024-05-16 ENCOUNTER — APPOINTMENT (OUTPATIENT)
Dept: RADIOLOGY | Facility: HOSPITAL | Age: 84
End: 2024-05-16
Payer: MEDICARE

## 2024-05-16 ENCOUNTER — HOSPITAL ENCOUNTER (OUTPATIENT)
Facility: HOSPITAL | Age: 84
Setting detail: OBSERVATION
LOS: 1 days | Discharge: HOME | End: 2024-05-18
Attending: INTERNAL MEDICINE | Admitting: INTERNAL MEDICINE
Payer: MEDICARE

## 2024-05-16 ENCOUNTER — APPOINTMENT (OUTPATIENT)
Dept: CARDIOLOGY | Facility: HOSPITAL | Age: 84
End: 2024-05-16
Payer: MEDICARE

## 2024-05-16 VITALS
HEIGHT: 62 IN | BODY MASS INDEX: 17.11 KG/M2 | RESPIRATION RATE: 18 BRPM | WEIGHT: 93 LBS | SYSTOLIC BLOOD PRESSURE: 113 MMHG | DIASTOLIC BLOOD PRESSURE: 62 MMHG | OXYGEN SATURATION: 100 % | HEART RATE: 65 BPM | TEMPERATURE: 97.8 F

## 2024-05-16 DIAGNOSIS — N39.0 URINARY TRACT INFECTION WITHOUT HEMATURIA, SITE UNSPECIFIED: ICD-10-CM

## 2024-05-16 DIAGNOSIS — A04.72 COLITIS DUE TO CLOSTRIDIOIDES DIFFICILE: ICD-10-CM

## 2024-05-16 DIAGNOSIS — Z74.09 IMPAIRED FUNCTIONAL MOBILITY, BALANCE, GAIT, AND ENDURANCE: ICD-10-CM

## 2024-05-16 DIAGNOSIS — E86.0 DEHYDRATION: Primary | ICD-10-CM

## 2024-05-16 LAB
APPEARANCE UR: CLEAR
BILIRUB UR STRIP.AUTO-MCNC: NEGATIVE MG/DL
C DIF TOX TCDA+TCDB STL QL NAA+PROBE: DETECTED
COLOR UR: COLORLESS
CREAT SERPL-MCNC: 1.01 MG/DL (ref 0.5–1.05)
CRP SERPL-MCNC: 0.3 MG/DL
EGFRCR SERPLBLD CKD-EPI 2021: 55 ML/MIN/1.73M*2
ERYTHROCYTE [SEDIMENTATION RATE] IN BLOOD BY WESTERGREN METHOD: 14 MM/H (ref 0–30)
GLUCOSE UR STRIP.AUTO-MCNC: NORMAL MG/DL
KETONES UR STRIP.AUTO-MCNC: NEGATIVE MG/DL
LACTATE SERPL-SCNC: 0.6 MMOL/L (ref 0.4–2)
LEUKOCYTE ESTERASE UR QL STRIP.AUTO: NEGATIVE
NITRITE UR QL STRIP.AUTO: NEGATIVE
PH UR STRIP.AUTO: 7.5 [PH]
PROT UR STRIP.AUTO-MCNC: NEGATIVE MG/DL
RBC # UR STRIP.AUTO: NEGATIVE /UL
SP GR UR STRIP.AUTO: 1.01
UROBILINOGEN UR STRIP.AUTO-MCNC: NORMAL MG/DL

## 2024-05-16 PROCEDURE — 2500000001 HC RX 250 WO HCPCS SELF ADMINISTERED DRUGS (ALT 637 FOR MEDICARE OP)

## 2024-05-16 PROCEDURE — 87493 C DIFF AMPLIFIED PROBE: CPT | Mod: GENLAB | Performed by: EMERGENCY MEDICINE

## 2024-05-16 PROCEDURE — 36415 COLL VENOUS BLD VENIPUNCTURE: CPT | Performed by: EMERGENCY MEDICINE

## 2024-05-16 PROCEDURE — 2500000005 HC RX 250 GENERAL PHARMACY W/O HCPCS: Performed by: EMERGENCY MEDICINE

## 2024-05-16 PROCEDURE — 83605 ASSAY OF LACTIC ACID: CPT | Performed by: EMERGENCY MEDICINE

## 2024-05-16 PROCEDURE — 2500000004 HC RX 250 GENERAL PHARMACY W/ HCPCS (ALT 636 FOR OP/ED): Performed by: INTERNAL MEDICINE

## 2024-05-16 PROCEDURE — 93005 ELECTROCARDIOGRAM TRACING: CPT

## 2024-05-16 PROCEDURE — 74177 CT ABD & PELVIS W/CONTRAST: CPT | Performed by: STUDENT IN AN ORGANIZED HEALTH CARE EDUCATION/TRAINING PROGRAM

## 2024-05-16 PROCEDURE — 2550000001 HC RX 255 CONTRASTS: Performed by: EMERGENCY MEDICINE

## 2024-05-16 PROCEDURE — G0378 HOSPITAL OBSERVATION PER HR: HCPCS

## 2024-05-16 PROCEDURE — 87324 CLOSTRIDIUM AG IA: CPT | Mod: 59,GENLAB | Performed by: EMERGENCY MEDICINE

## 2024-05-16 PROCEDURE — 2500000006 HC RX 250 W HCPCS SELF ADMINISTERED DRUGS (ALT 637 FOR ALL PAYERS): Performed by: INTERNAL MEDICINE

## 2024-05-16 PROCEDURE — 96372 THER/PROPH/DIAG INJ SC/IM: CPT | Performed by: INTERNAL MEDICINE

## 2024-05-16 PROCEDURE — 2500000001 HC RX 250 WO HCPCS SELF ADMINISTERED DRUGS (ALT 637 FOR MEDICARE OP): Performed by: INTERNAL MEDICINE

## 2024-05-16 PROCEDURE — 81003 URINALYSIS AUTO W/O SCOPE: CPT | Performed by: EMERGENCY MEDICINE

## 2024-05-16 PROCEDURE — 2500000006 HC RX 250 W HCPCS SELF ADMINISTERED DRUGS (ALT 637 FOR ALL PAYERS): Performed by: EMERGENCY MEDICINE

## 2024-05-16 PROCEDURE — 74177 CT ABD & PELVIS W/CONTRAST: CPT

## 2024-05-16 RX ORDER — METOPROLOL TARTRATE 25 MG/1
25 TABLET, FILM COATED ORAL 2 TIMES DAILY
Status: DISCONTINUED | OUTPATIENT
Start: 2024-05-16 | End: 2024-05-18 | Stop reason: HOSPADM

## 2024-05-16 RX ORDER — LEVOTHYROXINE SODIUM 25 UG/1
TABLET ORAL
Status: COMPLETED
Start: 2024-05-16 | End: 2024-05-16

## 2024-05-16 RX ORDER — LEVOTHYROXINE SODIUM 50 UG/1
50 TABLET ORAL
Status: DISCONTINUED | OUTPATIENT
Start: 2024-05-17 | End: 2024-05-18 | Stop reason: HOSPADM

## 2024-05-16 RX ORDER — AMLODIPINE BESYLATE 5 MG/1
5 TABLET ORAL DAILY
Status: DISCONTINUED | OUTPATIENT
Start: 2024-05-16 | End: 2024-05-18 | Stop reason: HOSPADM

## 2024-05-16 RX ORDER — ATORVASTATIN CALCIUM 40 MG/1
40 TABLET, FILM COATED ORAL DAILY
Status: DISCONTINUED | OUTPATIENT
Start: 2024-05-16 | End: 2024-05-16

## 2024-05-16 RX ORDER — ACETAMINOPHEN 325 MG/1
650 TABLET ORAL 3 TIMES DAILY
Status: DISCONTINUED | OUTPATIENT
Start: 2024-05-16 | End: 2024-05-18 | Stop reason: HOSPADM

## 2024-05-16 RX ORDER — VANCOMYCIN HYDROCHLORIDE 125 MG/1
125 CAPSULE ORAL 4 TIMES DAILY
Status: DISCONTINUED | OUTPATIENT
Start: 2024-05-16 | End: 2024-05-16 | Stop reason: HOSPADM

## 2024-05-16 RX ORDER — VANCOMYCIN HYDROCHLORIDE 125 MG/1
125 CAPSULE ORAL 4 TIMES DAILY
Status: DISCONTINUED | OUTPATIENT
Start: 2024-05-16 | End: 2024-05-18 | Stop reason: HOSPADM

## 2024-05-16 RX ORDER — HEPARIN SODIUM 5000 [USP'U]/ML
5000 INJECTION, SOLUTION INTRAVENOUS; SUBCUTANEOUS EVERY 12 HOURS
Status: DISCONTINUED | OUTPATIENT
Start: 2024-05-16 | End: 2024-05-18 | Stop reason: HOSPADM

## 2024-05-16 RX ORDER — ASPIRIN 81 MG/1
81 TABLET ORAL DAILY
Status: DISCONTINUED | OUTPATIENT
Start: 2024-05-16 | End: 2024-05-18 | Stop reason: HOSPADM

## 2024-05-16 RX ORDER — ATORVASTATIN CALCIUM 40 MG/1
40 TABLET, FILM COATED ORAL NIGHTLY
Status: DISCONTINUED | OUTPATIENT
Start: 2024-05-16 | End: 2024-05-18 | Stop reason: HOSPADM

## 2024-05-16 RX ORDER — TRAZODONE HYDROCHLORIDE 50 MG/1
50 TABLET ORAL NIGHTLY
Status: DISCONTINUED | OUTPATIENT
Start: 2024-05-16 | End: 2024-05-18 | Stop reason: HOSPADM

## 2024-05-16 RX ORDER — HYDROXYZINE HYDROCHLORIDE 25 MG/1
25 TABLET, FILM COATED ORAL 3 TIMES DAILY PRN
Status: DISCONTINUED | OUTPATIENT
Start: 2024-05-16 | End: 2024-05-18 | Stop reason: HOSPADM

## 2024-05-16 RX ORDER — DEXTROSE MONOHYDRATE, SODIUM CHLORIDE, SODIUM LACTATE, POTASSIUM CHLORIDE, CALCIUM CHLORIDE 5; 600; 310; 179; 20 G/100ML; MG/100ML; MG/100ML; MG/100ML; MG/100ML
50 INJECTION, SOLUTION INTRAVENOUS CONTINUOUS
Status: DISPENSED | OUTPATIENT
Start: 2024-05-16 | End: 2024-05-17

## 2024-05-16 RX ORDER — LEVOTHYROXINE SODIUM 50 UG/1
50 TABLET ORAL DAILY
Status: DISCONTINUED | OUTPATIENT
Start: 2024-05-16 | End: 2024-05-16 | Stop reason: HOSPADM

## 2024-05-16 RX ADMIN — Medication 2 L/MIN: at 07:57

## 2024-05-16 RX ADMIN — VANCOMYCIN HYDROCHLORIDE 125 MG: 125 CAPSULE ORAL at 13:20

## 2024-05-16 RX ADMIN — VANCOMYCIN HYDROCHLORIDE 125 MG: 125 CAPSULE ORAL at 06:08

## 2024-05-16 RX ADMIN — HEPARIN SODIUM 5000 UNITS: 5000 INJECTION, SOLUTION INTRAVENOUS; SUBCUTANEOUS at 13:20

## 2024-05-16 RX ADMIN — VANCOMYCIN HYDROCHLORIDE 125 MG: 125 CAPSULE ORAL at 20:20

## 2024-05-16 RX ADMIN — ATORVASTATIN CALCIUM 40 MG: 40 TABLET, FILM COATED ORAL at 20:20

## 2024-05-16 RX ADMIN — TRAZODONE HYDROCHLORIDE 50 MG: 50 TABLET ORAL at 20:20

## 2024-05-16 RX ADMIN — METOPROLOL TARTRATE 25 MG: 25 TABLET, FILM COATED ORAL at 21:00

## 2024-05-16 RX ADMIN — IOHEXOL 63 ML: 350 INJECTION, SOLUTION INTRAVENOUS at 00:45

## 2024-05-16 RX ADMIN — ASPIRIN 81 MG: 81 TABLET, COATED ORAL at 13:20

## 2024-05-16 RX ADMIN — LEVOTHYROXINE SODIUM 50 MCG: 50 TABLET ORAL at 06:07

## 2024-05-16 RX ADMIN — VANCOMYCIN HYDROCHLORIDE 125 MG: 125 CAPSULE ORAL at 17:09

## 2024-05-16 RX ADMIN — DEXTROSE MONOHYDRATE, SODIUM CHLORIDE, SODIUM LACTATE, POTASSIUM CHLORIDE, CALCIUM CHLORIDE 50 ML/HR: 5; 600; 310; 179; 20 INJECTION, SOLUTION INTRAVENOUS at 13:28

## 2024-05-16 RX ADMIN — HYDROXYZINE HYDROCHLORIDE 25 MG: 25 TABLET, FILM COATED ORAL at 20:20

## 2024-05-16 RX ADMIN — LEVOTHYROXINE SODIUM 50 MCG: 25 TABLET ORAL at 06:07

## 2024-05-16 RX ADMIN — ACETAMINOPHEN 650 MG: 325 TABLET ORAL at 20:20

## 2024-05-16 SDOH — SOCIAL STABILITY: SOCIAL INSECURITY: WITHIN THE LAST YEAR, HAVE YOU BEEN HUMILIATED OR EMOTIONALLY ABUSED IN OTHER WAYS BY YOUR PARTNER OR EX-PARTNER?: NO

## 2024-05-16 SDOH — SOCIAL STABILITY: SOCIAL INSECURITY
WITHIN THE LAST YEAR, HAVE TO BEEN RAPED OR FORCED TO HAVE ANY KIND OF SEXUAL ACTIVITY BY YOUR PARTNER OR EX-PARTNER?: NO

## 2024-05-16 SDOH — ECONOMIC STABILITY: INCOME INSECURITY: IN THE PAST 12 MONTHS, HAS THE ELECTRIC, GAS, OIL, OR WATER COMPANY THREATENED TO SHUT OFF SERVICE IN YOUR HOME?: NO

## 2024-05-16 SDOH — HEALTH STABILITY: MENTAL HEALTH: HOW OFTEN DO YOU HAVE 6 OR MORE DRINKS ON ONE OCCASION?: NEVER

## 2024-05-16 SDOH — SOCIAL STABILITY: SOCIAL INSECURITY: DO YOU FEEL ANYONE HAS EXPLOITED OR TAKEN ADVANTAGE OF YOU FINANCIALLY OR OF YOUR PERSONAL PROPERTY?: NO

## 2024-05-16 SDOH — SOCIAL STABILITY: SOCIAL INSECURITY: ARE THERE ANY APPARENT SIGNS OF INJURIES/BEHAVIORS THAT COULD BE RELATED TO ABUSE/NEGLECT?: NO

## 2024-05-16 SDOH — HEALTH STABILITY: MENTAL HEALTH: HOW OFTEN DO YOU HAVE A DRINK CONTAINING ALCOHOL?: NEVER

## 2024-05-16 SDOH — HEALTH STABILITY: PHYSICAL HEALTH: ON AVERAGE, HOW MANY DAYS PER WEEK DO YOU ENGAGE IN MODERATE TO STRENUOUS EXERCISE (LIKE A BRISK WALK)?: 0 DAYS

## 2024-05-16 SDOH — SOCIAL STABILITY: SOCIAL INSECURITY
WITHIN THE LAST YEAR, HAVE YOU BEEN KICKED, HIT, SLAPPED, OR OTHERWISE PHYSICALLY HURT BY YOUR PARTNER OR EX-PARTNER?: NO

## 2024-05-16 SDOH — SOCIAL STABILITY: SOCIAL NETWORK: ARE YOU MARRIED, WIDOWED, DIVORCED, SEPARATED, NEVER MARRIED, OR LIVING WITH A PARTNER?: WIDOWED

## 2024-05-16 SDOH — ECONOMIC STABILITY: HOUSING INSECURITY: IN THE LAST 12 MONTHS, HOW MANY PLACES HAVE YOU LIVED?: 1

## 2024-05-16 SDOH — SOCIAL STABILITY: SOCIAL NETWORK
DO YOU BELONG TO ANY CLUBS OR ORGANIZATIONS SUCH AS CHURCH GROUPS UNIONS, FRATERNAL OR ATHLETIC GROUPS, OR SCHOOL GROUPS?: NO

## 2024-05-16 SDOH — SOCIAL STABILITY: SOCIAL INSECURITY: DO YOU FEEL UNSAFE GOING BACK TO THE PLACE WHERE YOU ARE LIVING?: NO

## 2024-05-16 SDOH — SOCIAL STABILITY: SOCIAL INSECURITY: ABUSE: ADULT

## 2024-05-16 SDOH — HEALTH STABILITY: MENTAL HEALTH
STRESS IS WHEN SOMEONE FEELS TENSE, NERVOUS, ANXIOUS, OR CAN'T SLEEP AT NIGHT BECAUSE THEIR MIND IS TROUBLED. HOW STRESSED ARE YOU?: NOT AT ALL

## 2024-05-16 SDOH — ECONOMIC STABILITY: FOOD INSECURITY: WITHIN THE PAST 12 MONTHS, YOU WORRIED THAT YOUR FOOD WOULD RUN OUT BEFORE YOU GOT MONEY TO BUY MORE.: NEVER TRUE

## 2024-05-16 SDOH — ECONOMIC STABILITY: FOOD INSECURITY: WITHIN THE PAST 12 MONTHS, THE FOOD YOU BOUGHT JUST DIDN'T LAST AND YOU DIDN'T HAVE MONEY TO GET MORE.: NEVER TRUE

## 2024-05-16 SDOH — SOCIAL STABILITY: SOCIAL INSECURITY: HAVE YOU HAD ANY THOUGHTS OF HARMING ANYONE ELSE?: NO

## 2024-05-16 SDOH — HEALTH STABILITY: MENTAL HEALTH: HOW MANY STANDARD DRINKS CONTAINING ALCOHOL DO YOU HAVE ON A TYPICAL DAY?: PATIENT DOES NOT DRINK

## 2024-05-16 SDOH — SOCIAL STABILITY: SOCIAL NETWORK
IN A TYPICAL WEEK, HOW MANY TIMES DO YOU TALK ON THE PHONE WITH FAMILY, FRIENDS, OR NEIGHBORS?: MORE THAN THREE TIMES A WEEK

## 2024-05-16 SDOH — ECONOMIC STABILITY: INCOME INSECURITY: IN THE LAST 12 MONTHS, WAS THERE A TIME WHEN YOU WERE NOT ABLE TO PAY THE MORTGAGE OR RENT ON TIME?: NO

## 2024-05-16 SDOH — ECONOMIC STABILITY: INCOME INSECURITY: HOW HARD IS IT FOR YOU TO PAY FOR THE VERY BASICS LIKE FOOD, HOUSING, MEDICAL CARE, AND HEATING?: NOT HARD AT ALL

## 2024-05-16 SDOH — SOCIAL STABILITY: SOCIAL INSECURITY: WITHIN THE LAST YEAR, HAVE YOU BEEN AFRAID OF YOUR PARTNER OR EX-PARTNER?: NO

## 2024-05-16 SDOH — SOCIAL STABILITY: SOCIAL INSECURITY: HAVE YOU HAD THOUGHTS OF HARMING ANYONE ELSE?: NO

## 2024-05-16 SDOH — SOCIAL STABILITY: SOCIAL INSECURITY: WERE YOU ABLE TO COMPLETE ALL THE BEHAVIORAL HEALTH SCREENINGS?: NO

## 2024-05-16 SDOH — SOCIAL STABILITY: SOCIAL NETWORK: HOW OFTEN DO YOU GET TOGETHER WITH FRIENDS OR RELATIVES?: MORE THAN THREE TIMES A WEEK

## 2024-05-16 SDOH — SOCIAL STABILITY: SOCIAL INSECURITY: HAS ANYONE EVER THREATENED TO HURT YOUR FAMILY OR YOUR PETS?: NO

## 2024-05-16 SDOH — SOCIAL STABILITY: SOCIAL NETWORK: HOW OFTEN DO YOU ATTEND CHURCH OR RELIGIOUS SERVICES?: MORE THAN 4 TIMES PER YEAR

## 2024-05-16 SDOH — HEALTH STABILITY: MENTAL HEALTH
HOW OFTEN DO YOU NEED TO HAVE SOMEONE HELP YOU WHEN YOU READ INSTRUCTIONS, PAMPHLETS, OR OTHER WRITTEN MATERIAL FROM YOUR DOCTOR OR PHARMACY?: NEVER

## 2024-05-16 SDOH — HEALTH STABILITY: PHYSICAL HEALTH: ON AVERAGE, HOW MANY MINUTES DO YOU ENGAGE IN EXERCISE AT THIS LEVEL?: 0 MIN

## 2024-05-16 SDOH — SOCIAL STABILITY: SOCIAL INSECURITY: DOES ANYONE TRY TO KEEP YOU FROM HAVING/CONTACTING OTHER FRIENDS OR DOING THINGS OUTSIDE YOUR HOME?: NO

## 2024-05-16 SDOH — SOCIAL STABILITY: SOCIAL NETWORK: HOW OFTEN DO YOU ATTENT MEETINGS OF THE CLUB OR ORGANIZATION YOU BELONG TO?: NEVER

## 2024-05-16 SDOH — SOCIAL STABILITY: SOCIAL INSECURITY: ARE YOU OR HAVE YOU BEEN THREATENED OR ABUSED PHYSICALLY, EMOTIONALLY, OR SEXUALLY BY ANYONE?: NO

## 2024-05-16 ASSESSMENT — ACTIVITIES OF DAILY LIVING (ADL)
WALKS IN HOME: INDEPENDENT
JUDGMENT_ADEQUATE_SAFELY_COMPLETE_DAILY_ACTIVITIES: YES
HEARING - RIGHT EAR: FUNCTIONAL
LACK_OF_TRANSPORTATION: NO
LACK_OF_TRANSPORTATION: NO
HEARING - LEFT EAR: FUNCTIONAL
PATIENT'S MEMORY ADEQUATE TO SAFELY COMPLETE DAILY ACTIVITIES?: NO
GROOMING: INDEPENDENT
TOILETING: INDEPENDENT
BATHING: INDEPENDENT
FEEDING YOURSELF: INDEPENDENT
ADEQUATE_TO_COMPLETE_ADL: YES
DRESSING YOURSELF: INDEPENDENT

## 2024-05-16 ASSESSMENT — ENCOUNTER SYMPTOMS
CONFUSION: 1
CONSTITUTIONAL NEGATIVE: 1
EYES NEGATIVE: 1
DIARRHEA: 1
MUSCULOSKELETAL NEGATIVE: 1
ALLERGIC/IMMUNOLOGIC NEGATIVE: 1
HEMATOLOGIC/LYMPHATIC NEGATIVE: 1
NEUROLOGICAL NEGATIVE: 1
ENDOCRINE NEGATIVE: 1
CARDIOVASCULAR NEGATIVE: 1
RESPIRATORY NEGATIVE: 1

## 2024-05-16 ASSESSMENT — PAIN DESCRIPTION - LOCATION: LOCATION: GENERALIZED

## 2024-05-16 ASSESSMENT — COLUMBIA-SUICIDE SEVERITY RATING SCALE - C-SSRS
2. HAVE YOU ACTUALLY HAD ANY THOUGHTS OF KILLING YOURSELF?: NO
1. IN THE PAST MONTH, HAVE YOU WISHED YOU WERE DEAD OR WISHED YOU COULD GO TO SLEEP AND NOT WAKE UP?: NO
6. HAVE YOU EVER DONE ANYTHING, STARTED TO DO ANYTHING, OR PREPARED TO DO ANYTHING TO END YOUR LIFE?: NO

## 2024-05-16 ASSESSMENT — PAIN SCALES - GENERAL
PAINLEVEL_OUTOF10: 0 - NO PAIN
PAINLEVEL_OUTOF10: 5 - MODERATE PAIN
PAINLEVEL_OUTOF10: 0 - NO PAIN

## 2024-05-16 ASSESSMENT — COGNITIVE AND FUNCTIONAL STATUS - GENERAL
DAILY ACTIVITIY SCORE: 18
DRESSING REGULAR UPPER BODY CLOTHING: A LITTLE
PERSONAL GROOMING: A LITTLE
MOBILITY SCORE: 20
MOBILITY SCORE: 20
DRESSING REGULAR LOWER BODY CLOTHING: A LITTLE
WALKING IN HOSPITAL ROOM: A LITTLE
DRESSING REGULAR UPPER BODY CLOTHING: A LITTLE
HELP NEEDED FOR BATHING: A LITTLE
HELP NEEDED FOR BATHING: A LITTLE
DAILY ACTIVITIY SCORE: 18
MOVING TO AND FROM BED TO CHAIR: A LITTLE
TOILETING: A LITTLE
TOILETING: A LITTLE
WALKING IN HOSPITAL ROOM: A LITTLE
CLIMB 3 TO 5 STEPS WITH RAILING: A LITTLE
STANDING UP FROM CHAIR USING ARMS: A LITTLE
STANDING UP FROM CHAIR USING ARMS: A LITTLE
DRESSING REGULAR LOWER BODY CLOTHING: A LITTLE
CLIMB 3 TO 5 STEPS WITH RAILING: A LITTLE
MOVING TO AND FROM BED TO CHAIR: A LITTLE
PATIENT BASELINE BEDBOUND: NO
EATING MEALS: A LITTLE
EATING MEALS: A LITTLE
PERSONAL GROOMING: A LITTLE

## 2024-05-16 ASSESSMENT — LIFESTYLE VARIABLES
AUDIT-C TOTAL SCORE: 0
HOW OFTEN DO YOU HAVE 6 OR MORE DRINKS ON ONE OCCASION: NEVER
SKIP TO QUESTIONS 9-10: 1
SKIP TO QUESTIONS 9-10: 1
AUDIT-C TOTAL SCORE: 0
HOW MANY STANDARD DRINKS CONTAINING ALCOHOL DO YOU HAVE ON A TYPICAL DAY: PATIENT DOES NOT DRINK
AUDIT-C TOTAL SCORE: 0
HOW OFTEN DO YOU HAVE A DRINK CONTAINING ALCOHOL: NEVER

## 2024-05-16 ASSESSMENT — PATIENT HEALTH QUESTIONNAIRE - PHQ9
SUM OF ALL RESPONSES TO PHQ9 QUESTIONS 1 & 2: 0
1. LITTLE INTEREST OR PLEASURE IN DOING THINGS: NOT AT ALL
2. FEELING DOWN, DEPRESSED OR HOPELESS: NOT AT ALL

## 2024-05-16 ASSESSMENT — PAIN - FUNCTIONAL ASSESSMENT
PAIN_FUNCTIONAL_ASSESSMENT: 0-10
PAIN_FUNCTIONAL_ASSESSMENT: 0-10

## 2024-05-16 NOTE — PROGRESS NOTES
05/16/24 1543   Physical Activity   On average, how many days per week do you engage in moderate to strenuous exercise (like a brisk walk)? 0 days   On average, how many minutes do you engage in exercise at this level? 0 min   Financial Resource Strain   How hard is it for you to pay for the very basics like food, housing, medical care, and heating? Not hard   Housing Stability   In the last 12 months, was there a time when you were not able to pay the mortgage or rent on time? N   In the last 12 months, how many places have you lived? 1   In the last 12 months, was there a time when you did not have a steady place to sleep or slept in a shelter (including now)? N   Transportation Needs   In the past 12 months, has lack of transportation kept you from medical appointments or from getting medications? no   In the past 12 months, has lack of transportation kept you from meetings, work, or from getting things needed for daily living? No   Food Insecurity   Within the past 12 months, you worried that your food would run out before you got the money to buy more. Never true   Within the past 12 months, the food you bought just didn't last and you didn't have money to get more. Never true   Stress   Do you feel stress - tense, restless, nervous, or anxious, or unable to sleep at night because your mind is troubled all the time - these days? Not at all   Social Connections   In a typical week, how many times do you talk on the phone with family, friends, or neighbors? More than 3   How often do you get together with friends or relatives? More than 3   How often do you attend Nondenominational or Yarsani services? More than 4   Do you belong to any clubs or organizations such as Nondenominational groups, unions, fraternal or athletic groups, or school groups? No   How often do you attend meetings of the clubs or organizations you belong to? Never   Are you , , , , never , or living with a partner?     Intimate Partner Violence   Within the last year, have you been afraid of your partner or ex-partner? No   Within the last year, have you been humiliated or emotionally abused in other ways by your partner or ex-partner? No   Within the last year, have you been kicked, hit, slapped, or otherwise physically hurt by your partner or ex-partner? No   Within the last year, have you been raped or forced to have any kind of sexual activity by your partner or ex-partner? No   Alcohol Use   Q1: How often do you have a drink containing alcohol? Never   Q2: How many drinks containing alcohol do you have on a typical day when you are drinking? None   Q3: How often do you have six or more drinks on one occasion? Never   Utilities   In the past 12 months has the electric, gas, oil, or water company threatened to shut off services in your home? No   Health Literacy   How often do you need to have someone help you when you read instructions, pamphlets, or other written material from your doctor or pharmacy? Never

## 2024-05-16 NOTE — PROGRESS NOTES
05/16/24 1459   [facility] Disability Status   Are you deaf or do you have serious difficulty hearing? N   Are you blind or do you have serious difficulty seeing, even when wearing glasses? N   Because of a physical, mental, or emotional condition, do you have serious difficulty concentrating, remembering, or making decisions? (5 years old or older) Y   Do you have serious difficulty walking or climbing stairs? Y   Do you have serious difficulty dressing or bathing? N   Because of a physical, mental, or emotional condition, do you have serious difficulty doing errands alone such as visiting the doctor? N

## 2024-05-16 NOTE — PROGRESS NOTES
05/16/24 1500   Discharge Planning   Living Arrangements Children   Support Systems Children;Family members   Assistance Needed Assist with ADLS   Type of Residence Private residence  (has 3 childre, ech child takes her to their home weekly)   Number of Stairs to Enter Residence 3   Number of Stairs Within Residence 0   Type of Animals or Pets cat   Home or Post Acute Services None   Patient expects to be discharged to: Home with no needs   Does the patient need discharge transport arranged? No   Financial Resource Strain   How hard is it for you to pay for the very basics like food, housing, medical care, and heating? Not hard   Housing Stability   In the last 12 months, was there a time when you were not able to pay the mortgage or rent on time? N   In the last 12 months, was there a time when you did not have a steady place to sleep or slept in a shelter (including now)? N   Transportation Needs   In the past 12 months, has lack of transportation kept you from medical appointments or from getting medications? no   In the past 12 months, has lack of transportation kept you from meetings, work, or from getting things needed for daily living? No     Maisha Agosto is a 83 y.o. female presenting with diarrhea.  Patient had recently been hospitalized at [facility] for diarrhea was discharged back to home C. difficile PCR's were positive patient continued to have episodes of diarrhea presented to [facility] emergency room earlier today he was found to have evidence of dehydration and CT scan showing some distal colitis she does not appear toxic she has been eating and drinking she is very frail has evidence of vascular dementia does not appear toxic she was and transferred subsequently to [facility].   Patient is , has 3 children, patient stays with children and takes turns weekly.Patient some help with ADLs, children do the house chores, cooking and shopping. Patient does not  drive and dependent on family. Patient has no outside services.      Discharge to home with no needs

## 2024-05-16 NOTE — H&P
History Of Present Illness  Maisha Agosto is a 83 y.o. female presenting with diarrhea.  Patient had recently been hospitalized at [facility] for diarrhea was discharged back to home C. difficile PCR's were positive patient continued to have episodes of diarrhea presented to [facility] emergency room earlier today he was found to have evidence of dehydration and CT scan showing some distal colitis she does not appear toxic she has been eating and drinking she is very frail has evidence of vascular dementia does not appear toxic she was and transferred subsequently to [facility].  IV fluids are initiated as well as well as oral vancomycin she is assigned to observation status would anticipate discharge tomorrow.     Past Medical History  Past Medical History:   Diagnosis Date    Deficiency of other specified B group vitamins 2016    Vitamin B12 deficiency    Encounter for examination of eyes and vision without abnormal findings     Diabetic eye exam    Occlusion and stenosis of bilateral carotid arteries 2016    Atherosclerosis of both carotid arteries    Personal history of other medical treatment     History of mammogram       Surgical History  Past Surgical History:   Procedure Laterality Date     SECTION, CLASSIC  2016     Section    CHOLECYSTECTOMY  2014    Cholecystectomy    COLONOSCOPY  2014    Colonoscopy (Fiberoptic)    CT HEAD ANGIO W AND WO IV CONTRAST  2021    CT HEAD ANGIO W AND WO IV CONTRAST 2021 [facility]    ESOPHAGOGASTRODUODENOSCOPY  2014    Diagnostic Esophagogastroduodenoscopy    GALLBLADDER SURGERY  2016    Gallbladder Surgery    MR HEAD ANGIO WO IV CONTRAST  2021    MR HEAD ANGIO WO IV CONTRAST 2021 [facility]    MR NECK ANGIO WO IV CONTRAST  2021    MR NECK ANGIO WO IV CONTRAST 2021 [facility]        Social History  She reports that she has never  smoked. She has never used smokeless tobacco. She reports that she does not drink alcohol and does not use drugs.    Family History  No family history on file.     Allergies  Patient has no known allergies.    Review of Systems   Constitutional: Negative.    HENT: Negative.     Eyes: Negative.    Respiratory: Negative.     Cardiovascular: Negative.    Gastrointestinal:  Positive for diarrhea.   Endocrine: Negative.    Genitourinary: Negative.    Musculoskeletal: Negative.    Skin: Negative.    Allergic/Immunologic: Negative.    Neurological: Negative.    Hematological: Negative.    Psychiatric/Behavioral:  Positive for confusion.    All other systems reviewed and are negative.       Physical Exam  Vitals and nursing note reviewed.   Constitutional:       Appearance: Normal appearance.      Comments: Frail in appearance very thin body habitus with loss of subcutaneous fat suggestive of at least moderate protein calorie malnutrition   HENT:      Head: Normocephalic and atraumatic.      Mouth/Throat:      Mouth: Mucous membranes are dry.   Eyes:      Extraocular Movements: Extraocular movements intact.      Pupils: Pupils are equal, round, and reactive to light.   Cardiovascular:      Rate and Rhythm: Normal rate and regular rhythm.      Pulses: Normal pulses.      Heart sounds: Murmur heard.   Pulmonary:      Effort: Pulmonary effort is normal.      Breath sounds: Normal breath sounds.   Abdominal:      General: Abdomen is flat.      Palpations: Abdomen is soft.   Musculoskeletal:         General: Normal range of motion.      Cervical back: Normal range of motion and neck supple.   Skin:     General: Skin is warm and dry.      Capillary Refill: Capillary refill takes less than 2 seconds.   Neurological:      General: No focal deficit present.      Mental Status: She is alert and oriented to person, place, and time. Mental status is at baseline.   Psychiatric:         Mood and Affect: Mood normal.          Last  Recorded Vitals  Blood pressure 105/58, pulse 66, temperature 36.6 °C (97.9 °F), temperature source Temporal, resp. rate 16, SpO2 98%.    Relevant Results        Results for orders placed or performed during the hospital encounter of 05/15/24 (from the past 24 hour(s))   CBC   Result Value Ref Range    WBC 15.0 (H) 4.4 - 11.3 x10*3/uL    nRBC 0.0 0.0 - 0.0 /100 WBCs    RBC 4.13 4.00 - 5.20 x10*6/uL    Hemoglobin 12.3 12.0 - 16.0 g/dL    Hematocrit 38.3 36.0 - 46.0 %    MCV 93 80 - 100 fL    MCH 29.8 26.0 - 34.0 pg    MCHC 32.1 32.0 - 36.0 g/dL    RDW 13.2 11.5 - 14.5 %    Platelets 235 150 - 450 x10*3/uL   Comprehensive metabolic panel   Result Value Ref Range    Glucose 106 (H) 74 - 99 mg/dL    Sodium 139 136 - 145 mmol/L    Potassium 3.7 3.5 - 5.3 mmol/L    Chloride 103 98 - 107 mmol/L    Bicarbonate 31 21 - 32 mmol/L    Anion Gap 9 (L) 10 - 20 mmol/L    Urea Nitrogen 27 (H) 6 - 23 mg/dL    Creatinine 1.01 0.50 - 1.05 mg/dL    eGFR 55 (L) >60 mL/min/1.73m*2    Calcium 10.2 8.6 - 10.3 mg/dL    Albumin 3.6 3.4 - 5.0 g/dL    Alkaline Phosphatase 43 33 - 136 U/L    Total Protein 6.8 6.4 - 8.2 g/dL    AST 34 9 - 39 U/L    Bilirubin, Total 0.5 0.0 - 1.2 mg/dL    ALT 31 7 - 45 U/L   C-reactive protein   Result Value Ref Range    C-Reactive Protein 0.30 <1.00 mg/dL   Creatinine, Serum   Result Value Ref Range    Creatinine 1.01 0.50 - 1.05 mg/dL    eGFR 55 (L) >60 mL/min/1.73m*2   Sedimentation rate, automated   Result Value Ref Range    Sedimentation Rate 14 0 - 30 mm/h   Lactate   Result Value Ref Range    Lactate 0.6 0.4 - 2.0 mmol/L   Urinalysis with Reflex Microscopic   Result Value Ref Range    Color, Urine Colorless (N) Light-Yellow, Yellow, Dark-Yellow    Appearance, Urine Clear Clear    Specific Gravity, Urine 1.014 1.005 - 1.035    pH, Urine 7.5 5.0, 5.5, 6.0, 6.5, 7.0, 7.5, 8.0    Protein, Urine NEGATIVE NEGATIVE, 10 (TRACE), 20 (TRACE) mg/dL    Glucose, Urine Normal Normal mg/dL    Blood, Urine NEGATIVE  NEGATIVE    Ketones, Urine NEGATIVE NEGATIVE mg/dL    Bilirubin, Urine NEGATIVE NEGATIVE    Urobilinogen, Urine Normal Normal mg/dL    Nitrite, Urine NEGATIVE NEGATIVE    Leukocyte Esterase, Urine NEGATIVE NEGATIVE   ECG 12 lead   Result Value Ref Range    Ventricular Rate 67 BPM    Atrial Rate 67 BPM    ND Interval 134 ms    QRS Duration 78 ms    QT Interval 414 ms    QTC Calculation(Bazett) 437 ms    P Axis 48 degrees    R Axis 4 degrees    T Axis 73 degrees    QRS Count 11 beats    Q Onset 224 ms    P Onset 157 ms    P Offset 200 ms    T Offset 431 ms    QTC Fredericia 429 ms     Signed Time Phone Pager   Jude Lovett MD 5/16/2024 01:31 [phone]     Exam Information    Status Exam Begun Exam Ended   Final 5/16/2024 00:34 5/16/2024 00:43     Study Result    Narrative & Impression   Interpreted By:  Jude Lovett,   STUDY:  CT ABDOMEN PELVIS W IV CONTRAST;  5/16/2024 12:43 am      INDICATION:  Signs/Symptoms:continued pain and diarrhea.      COMPARISON:  05/07/2024      ACCESSION NUMBER(S):  [id number]      ORDERING CLINICIAN:  YOJANA JIMENEZ      TECHNIQUE:  Contiguous axial images of the abdomen and pelvis were obtained after  the intravenous administration of iodinated contrast. Coronal and  sagittal reformatted images were reconstructed from the axial data.      FINDINGS:  LOWER CHEST: Cardiomegaly. Mitral annular calcifications. Trace  bilateral pleural effusions (right > left) (new from prior). Mildly  basilar atelectasis. Redemonstration of mild scarring in the medial  left lower lobe with traction bronchiectasis. Redemonstration of mild  bronchiectasis and atelectasis in the lingula.          ABDOMEN/PELVIS:          LIVER: Stable hepatomegaly. Scattered simple cysts again noted. No  suspicious lesions. There is periportal edema.      BILE DUCTS: Diffuse intrahepatic and extrahepatic dilatation is  unchanged.      GALLBLADDER: Surgically absent.      PANCREAS: Redemonstration off  numerous pancreatic cysts the largest  measuring 3.7 x 1.9 cm within the pancreatic neck partially encasing  the SMV. Additional cysts are seen within the pancreatic tail  measuring up to 2.5 cm x 2 cm. There is mild dilatation of the main  pancreatic duct within the tail measuring approximately 0.4 cm.      SPLEEN: No significant abnormality.      ADRENALS: No significant abnormality.      KIDNEYS, URETERS, BLADDER: There is mild bilateral  hydroureteronephrosis similar to prior study. The urinary bladder  wall thickness appears within normal limits for degree of distention.      REPRODUCTIVE ORGANS: The uterus is surgically absent. There is  diffuse hypoattenuation thickening of the vaginal cuff similar to  prior study with small amount of air in the vagina.      VESSELS: The portal and hepatic veins are patent. Moderate calcific  aorta bi-iliac atherosclerosis. 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 There is aneurysmal  dilatation of the left common iliac artery measuring 1.6 cm. There  transient occlusion of the right common femoral artery with immediate  reconstitution of the superficial femoral artery, unchanged from  prior study.      RETROPERITONEUM/LYMPH NODES: No enlarged lymph nodes. No acute  retroperitoneal abnormality.      BOWEL/MESENTERY/PERITONEUM: There is diffuse wall and fold thickening  of the stomach similar to prior study consistent with chronic  gastritis/peptic ulcer disease. There is redemonstration of a large  air and fluid-filled diverticulum from the descending duodenum  without evidence of acute diverticulitis. There is another large  diverticulum arising from the 4th segment of the duodenum with  air-fluid level not demonstrating evidence of diverticulitis.  Compared to prior study, there is significant progression of rectal  wall thickening and inflammation with surrounding fat stranding  consistent with proctitis. There is generalized milder  diffuse  colonic wall thickening in the descending colon and ascending colon  with mild surrounding fat stranding similar to prior study. There is  colonic diverticulosis without evidence of acute diverticulitis.          ABDOMINAL WALL: Small fat containing bilateral femoral hernias.          MUSCULOSKELETAL: No acute osseous abnormality. No suspicious osseous  lesion.      IMPRESSION:  1. Significant worsening of proctitis in similar degree of diffuse  colitis involving the ascending and descending colon predominantly.      2. Similar appearance of diffuse gastritis.      3. Numerous noninflamed duodenal and colonic diverticula.      4. Mildly larger small bilateral pleural effusions.      Additional chronic nonacute findings as above.          MACRO:  None.      Signed by: Jude Lovett 5/16/2024   Scheduled medications  acetaminophen, 650 mg, oral, TID  amLODIPine, 5 mg, oral, Daily  aspirin, 81 mg, oral, Daily  atorvastatin, 40 mg, oral, Daily  heparin, 5,000 Units, subcutaneous, q12h  [START ON 5/17/2024] levothyroxine, 50 mcg, oral, Daily before breakfast  metoprolol tartrate, 25 mg, oral, BID  traZODone, 50 mg, oral, Nightly  vancomycin, 125 mg, oral, 4x daily      Continuous medications   dextrose 5% lactated Ringer's with KCl 20 mEq/L, 50 mL/hr      PRN medications  PRN medications: hydrOXYzine HCL         Assessment/Plan   Principal Problem:    Colitis due to Clostridioides difficile  Active Problems:    Dehydration      Hypertensive vascular disease  Dehydration  Moderate protein calorie malnutrition  Vascular dementia    Patient assigned observation initiated on oral vancomycin therapy her ramipril is held IV fluids are initiated she is continued on her metoprolol and Norvasc.  Will monitor her vital signs PT OT assessment again process towards discharge planning with possible home health care monitoring       I spent 60 minutes in the professional and overall care of this patient.      Carlos LEIGH  Maryjo, DO

## 2024-05-16 NOTE — ACP (ADVANCE CARE PLANNING)
Confirming Previous Code Status:   Advance Care Planning Note     Discussion Date: 05/16/24   Discussion Participants: patient and daughter    The patient wishes to discuss Advance Care Planning today and the following is a brief summary of our discussion.     Patient has capacity to make their own medical decisions: Yes  Health Care Agent/Surrogate Decision Maker documented in chart: Yes    Documents on file and valid:  Advance Directive/Living Will: Yes   Health Care Power of : Yes  Other: Per the patient's daughter the patient does have a DNR for care arrest DO NOT INTUBATE order in her living well would refuse such services in her opinion given her advanced age and comorbidities    Communication of Medical Status/Prognosis:   Discussed the patient's underlying medical conditions including advanced vascular dementia    Communication of Treatment Goals/Options:   Discussed advance care planning relative to CODE STATUS    Treatment Decisions  Goals of Care: quality of life is prioritized; willing to accept low-burden treatments to achieve meaningful goals   DNR CCA DNI no ICU level care  Follow Up Plan  CODE STATUS changed to DNR CCA DNI no ICU level care  Team Members  Dr. Sibley  Time Statement: Total face to face time spent on advance care planning was 30 minutes minutes with 25 minutes spent in counseling, including the explanation.    Carlos Sibley DO  5/16/2024 5:21 PM

## 2024-05-16 NOTE — ED PROVIDER NOTES
HPI   Chief Complaint   Patient presents with    Diarrhea     Loose stool starting         Patient has been home from the hospital for 5 days and continued to have profuse malodorous diarrhea.  She was hospitalized 5 or 6 days ago for what was thought to be C. difficile.  Her PCR was positive but her Eun was negative so she was not treated for C. difficile.  Since going home her diarrhea has worsened and she is having abdominal pain nausea but no vomiting.  Her bed smells strongly of diarrhea.  Her stool is very malodorous and mucoid.                        [place] Coma Scale Score: 15                     Patient History   Past Medical History:   Diagnosis Date    Deficiency of other specified B group vitamins 2016    Vitamin B12 deficiency    Encounter for examination of eyes and vision without abnormal findings     Diabetic eye exam    Occlusion and stenosis of bilateral carotid arteries 2016    Atherosclerosis of both carotid arteries    Personal history of other medical treatment     History of mammogram     Past Surgical History:   Procedure Laterality Date     SECTION, CLASSIC  2016     Section    CHOLECYSTECTOMY  2014    Cholecystectomy    COLONOSCOPY  2014    Colonoscopy (Fiberoptic)    CT HEAD ANGIO W AND WO IV CONTRAST  2021    CT HEAD ANGIO W AND WO IV CONTRAST 2021 [facility]    ESOPHAGOGASTRODUODENOSCOPY  2014    Diagnostic Esophagogastroduodenoscopy    GALLBLADDER SURGERY  2016    Gallbladder Surgery    MR HEAD ANGIO WO IV CONTRAST  2021    MR HEAD ANGIO WO IV CONTRAST 2021 [facility]    MR NECK ANGIO WO IV CONTRAST  2021    MR NECK ANGIO WO IV CONTRAST 2021 [facility]     No family history on file.  Social History     Tobacco Use    Smoking status: Never    Smokeless tobacco: Never   Vaping Use    Vaping status: Never Used   Substance Use Topics    Alcohol use: Never    Drug  use: Never       Physical Exam   ED Triage Vitals   Temperature Heart Rate Respirations BP   05/15/24 2008 05/15/24 2008 05/15/24 2008 05/15/24 2008   36.6 °C (97.8 °F) 60 18 (!) 84/49      Pulse Ox Temp src Heart Rate Source Patient Position   05/16/24 0128 -- 05/16/24 0128 05/15/24 2008   97 %  Monitor Sitting      BP Location FiO2 (%)     05/15/24 2008 --     Left arm        Physical Exam  Vitals and nursing note reviewed.   Constitutional:       Appearance: She is ill-appearing.   HENT:      Head: Normocephalic and atraumatic.      Right Ear: Tympanic membrane and ear canal normal.      Left Ear: Tympanic membrane and ear canal normal.      Nose: Nose normal.      Mouth/Throat:      Mouth: Mucous membranes are moist.   Cardiovascular:      Rate and Rhythm: Normal rate.   Pulmonary:      Effort: Pulmonary effort is normal.      Breath sounds: Normal breath sounds.   Abdominal:      General: Abdomen is flat. There is no distension.      Palpations: Abdomen is soft.      Tenderness: There is abdominal tenderness.   Musculoskeletal:         General: Normal range of motion.      Cervical back: Normal range of motion.   Skin:     General: Skin is warm and dry.   Neurological:      General: No focal deficit present.      Mental Status: She is alert.         ED Course & MDM   Diagnoses as of 05/16/24 0509   Diarrhea, unspecified type       Medical Decision Making  EKG interpreted by me: Sinus rhythm with rate of 67.  Intervals and axes normal.      Patient's lab work was remarkable for suggesting dehydration with an elevated BUN of 27 and a creatinine of 1.01.  Her CRP and sed rate were not elevated, however.  She does have a white count of 15,000 tonight.  We scanned her abdomen and this does show a worsening of what apparently was thought to be a proctitis prior to her previous admission.  No free air or obstruction was noted.  She is far from improving and does live at home with daughter and this is not working out  well for either of them.  She is soiling her self every hour or 2.  We send another specimen of stool for another PCR but in the meantime I started her empirically on vancomycin 125 orally 4 times a day.  We have no beds here tonight so I will have to try to transfer her hopefully back to [facility] where she was treated last week.    There is no gastroenterology coverage at [facility] this week so we have had the patient admitted and transferred to [facility].        Procedure  Procedures     Cody Young MD  05/16/24 0257       Cody Young MD  05/16/24 4229       Cody Young MD  05/16/24 [id number]

## 2024-05-17 LAB
ALBUMIN SERPL-MCNC: 3 G/DL (ref 3.5–5)
ALP BLD-CCNC: 46 U/L (ref 35–125)
ALT SERPL-CCNC: 30 U/L (ref 5–40)
ANION GAP SERPL CALC-SCNC: 8 MMOL/L
AST SERPL-CCNC: 31 U/L (ref 5–40)
BASOPHILS # BLD AUTO: 0.09 X10*3/UL (ref 0–0.1)
BASOPHILS NFR BLD AUTO: 0.9 %
BILIRUB SERPL-MCNC: 0.5 MG/DL (ref 0.1–1.2)
BUN SERPL-MCNC: 19 MG/DL (ref 8–25)
C DIFF TOX A+B STL QL IA: NEGATIVE
CALCIUM SERPL-MCNC: 9.2 MG/DL (ref 8.5–10.4)
CHLORIDE SERPL-SCNC: 104 MMOL/L (ref 97–107)
CO2 SERPL-SCNC: 30 MMOL/L (ref 24–31)
CREAT SERPL-MCNC: 0.7 MG/DL (ref 0.4–1.6)
EGFRCR SERPLBLD CKD-EPI 2021: 85 ML/MIN/1.73M*2
EOSINOPHIL # BLD AUTO: 0.31 X10*3/UL (ref 0–0.4)
EOSINOPHIL NFR BLD AUTO: 3.1 %
ERYTHROCYTE [DISTWIDTH] IN BLOOD BY AUTOMATED COUNT: 13.2 % (ref 11.5–14.5)
GLUCOSE SERPL-MCNC: 130 MG/DL (ref 65–99)
HCT VFR BLD AUTO: 39.4 % (ref 36–46)
HGB BLD-MCNC: 12.6 G/DL (ref 12–16)
IMM GRANULOCYTES # BLD AUTO: 0.11 X10*3/UL (ref 0–0.5)
IMM GRANULOCYTES NFR BLD AUTO: 1.1 % (ref 0–0.9)
LYMPHOCYTES # BLD AUTO: 1.87 X10*3/UL (ref 0.8–3)
LYMPHOCYTES NFR BLD AUTO: 18.6 %
MAGNESIUM SERPL-MCNC: 1.8 MG/DL (ref 1.6–3.1)
MCH RBC QN AUTO: 29.3 PG (ref 26–34)
MCHC RBC AUTO-ENTMCNC: 32 G/DL (ref 32–36)
MCV RBC AUTO: 92 FL (ref 80–100)
MONOCYTES # BLD AUTO: 1.19 X10*3/UL (ref 0.05–0.8)
MONOCYTES NFR BLD AUTO: 11.9 %
NEUTROPHILS # BLD AUTO: 6.46 X10*3/UL (ref 1.6–5.5)
NEUTROPHILS NFR BLD AUTO: 64.4 %
NRBC BLD-RTO: 0 /100 WBCS (ref 0–0)
PLATELET # BLD AUTO: 234 X10*3/UL (ref 150–450)
POTASSIUM SERPL-SCNC: 3.4 MMOL/L (ref 3.4–5.1)
PROT SERPL-MCNC: 6.1 G/DL (ref 5.9–7.9)
RBC # BLD AUTO: 4.3 X10*6/UL (ref 4–5.2)
SODIUM SERPL-SCNC: 142 MMOL/L (ref 133–145)
WBC # BLD AUTO: 10 X10*3/UL (ref 4.4–11.3)

## 2024-05-17 PROCEDURE — 2500000001 HC RX 250 WO HCPCS SELF ADMINISTERED DRUGS (ALT 637 FOR MEDICARE OP)

## 2024-05-17 PROCEDURE — 36415 COLL VENOUS BLD VENIPUNCTURE: CPT | Performed by: INTERNAL MEDICINE

## 2024-05-17 PROCEDURE — 2500000001 HC RX 250 WO HCPCS SELF ADMINISTERED DRUGS (ALT 637 FOR MEDICARE OP): Performed by: INTERNAL MEDICINE

## 2024-05-17 PROCEDURE — 97165 OT EVAL LOW COMPLEX 30 MIN: CPT | Mod: GO

## 2024-05-17 PROCEDURE — 96372 THER/PROPH/DIAG INJ SC/IM: CPT | Performed by: INTERNAL MEDICINE

## 2024-05-17 PROCEDURE — 2500000004 HC RX 250 GENERAL PHARMACY W/ HCPCS (ALT 636 FOR OP/ED): Performed by: INTERNAL MEDICINE

## 2024-05-17 PROCEDURE — 2500000006 HC RX 250 W HCPCS SELF ADMINISTERED DRUGS (ALT 637 FOR ALL PAYERS): Performed by: INTERNAL MEDICINE

## 2024-05-17 PROCEDURE — 85025 COMPLETE CBC W/AUTO DIFF WBC: CPT | Performed by: INTERNAL MEDICINE

## 2024-05-17 PROCEDURE — G0378 HOSPITAL OBSERVATION PER HR: HCPCS

## 2024-05-17 PROCEDURE — 97161 PT EVAL LOW COMPLEX 20 MIN: CPT | Mod: GP

## 2024-05-17 PROCEDURE — 97535 SELF CARE MNGMENT TRAINING: CPT | Mod: GO

## 2024-05-17 PROCEDURE — 80053 COMPREHEN METABOLIC PANEL: CPT | Performed by: INTERNAL MEDICINE

## 2024-05-17 PROCEDURE — 83735 ASSAY OF MAGNESIUM: CPT | Performed by: INTERNAL MEDICINE

## 2024-05-17 RX ORDER — CHOLESTYRAMINE 4 G/4.8G
4 POWDER, FOR SUSPENSION ORAL 2 TIMES DAILY
Status: DISCONTINUED | OUTPATIENT
Start: 2024-05-17 | End: 2024-05-18 | Stop reason: HOSPADM

## 2024-05-17 RX ORDER — LOPERAMIDE HYDROCHLORIDE 2 MG/1
2 CAPSULE ORAL 4 TIMES DAILY PRN
Status: DISCONTINUED | OUTPATIENT
Start: 2024-05-17 | End: 2024-05-18 | Stop reason: HOSPADM

## 2024-05-17 RX ADMIN — AMLODIPINE BESYLATE 5 MG: 5 TABLET ORAL at 09:06

## 2024-05-17 RX ADMIN — TRAZODONE HYDROCHLORIDE 50 MG: 50 TABLET ORAL at 20:37

## 2024-05-17 RX ADMIN — VANCOMYCIN HYDROCHLORIDE 125 MG: 125 CAPSULE ORAL at 17:56

## 2024-05-17 RX ADMIN — ACETAMINOPHEN 650 MG: 325 TABLET ORAL at 20:37

## 2024-05-17 RX ADMIN — HEPARIN SODIUM 5000 UNITS: 5000 INJECTION, SOLUTION INTRAVENOUS; SUBCUTANEOUS at 05:58

## 2024-05-17 RX ADMIN — DEXTROSE MONOHYDRATE, SODIUM CHLORIDE, SODIUM LACTATE, POTASSIUM CHLORIDE, CALCIUM CHLORIDE 50 ML/HR: 5; 600; 310; 179; 20 INJECTION, SOLUTION INTRAVENOUS at 09:37

## 2024-05-17 RX ADMIN — METOPROLOL TARTRATE 25 MG: 25 TABLET, FILM COATED ORAL at 20:37

## 2024-05-17 RX ADMIN — CHOLESTYRAMINE 4 G: 4 POWDER, FOR SUSPENSION ORAL at 12:25

## 2024-05-17 RX ADMIN — HEPARIN SODIUM 5000 UNITS: 5000 INJECTION, SOLUTION INTRAVENOUS; SUBCUTANEOUS at 17:55

## 2024-05-17 RX ADMIN — CHOLESTYRAMINE 4 G: 4 POWDER, FOR SUSPENSION ORAL at 20:37

## 2024-05-17 RX ADMIN — VANCOMYCIN HYDROCHLORIDE 125 MG: 125 CAPSULE ORAL at 05:58

## 2024-05-17 RX ADMIN — ATORVASTATIN CALCIUM 40 MG: 40 TABLET, FILM COATED ORAL at 20:37

## 2024-05-17 RX ADMIN — VANCOMYCIN HYDROCHLORIDE 125 MG: 125 CAPSULE ORAL at 12:18

## 2024-05-17 RX ADMIN — ASPIRIN 81 MG: 81 TABLET, COATED ORAL at 09:00

## 2024-05-17 RX ADMIN — VANCOMYCIN HYDROCHLORIDE 125 MG: 125 CAPSULE ORAL at 20:37

## 2024-05-17 RX ADMIN — LEVOTHYROXINE SODIUM 50 MCG: 50 TABLET ORAL at 05:58

## 2024-05-17 ASSESSMENT — COGNITIVE AND FUNCTIONAL STATUS - GENERAL
DAILY ACTIVITIY SCORE: 19
HELP NEEDED FOR BATHING: A LITTLE
MOVING TO AND FROM BED TO CHAIR: A LITTLE
DRESSING REGULAR UPPER BODY CLOTHING: A LITTLE
DAILY ACTIVITIY SCORE: 23
DAILY ACTIVITIY SCORE: 23
TOILETING: A LITTLE
MOBILITY SCORE: 20
CLIMB 3 TO 5 STEPS WITH RAILING: A LITTLE
WALKING IN HOSPITAL ROOM: A LITTLE
MOBILITY SCORE: 19
WALKING IN HOSPITAL ROOM: A LITTLE
DRESSING REGULAR LOWER BODY CLOTHING: A LITTLE
TURNING FROM BACK TO SIDE WHILE IN FLAT BAD: A LITTLE
STANDING UP FROM CHAIR USING ARMS: A LITTLE
STANDING UP FROM CHAIR USING ARMS: A LITTLE
HELP NEEDED FOR BATHING: A LITTLE
PERSONAL GROOMING: A LITTLE
MOBILITY SCORE: 20
CLIMB 3 TO 5 STEPS WITH RAILING: A LITTLE
HELP NEEDED FOR BATHING: A LITTLE
CLIMB 3 TO 5 STEPS WITH RAILING: A LITTLE
MOVING TO AND FROM BED TO CHAIR: A LITTLE
STANDING UP FROM CHAIR USING ARMS: A LITTLE
WALKING IN HOSPITAL ROOM: A LITTLE
MOVING TO AND FROM BED TO CHAIR: A LITTLE

## 2024-05-17 ASSESSMENT — ACTIVITIES OF DAILY LIVING (ADL)
ADL_ASSISTANCE: INDEPENDENT
ADL_ASSISTANCE: NEEDS ASSISTANCE
HOME_MANAGEMENT_TIME_ENTRY: 9
BATHING_ASSISTANCE: STAND BY

## 2024-05-17 ASSESSMENT — PAIN - FUNCTIONAL ASSESSMENT
PAIN_FUNCTIONAL_ASSESSMENT: 0-10

## 2024-05-17 ASSESSMENT — PAIN SCALES - GENERAL
PAINLEVEL_OUTOF10: 0 - NO PAIN

## 2024-05-17 NOTE — PROGRESS NOTES
Evaluation/Treatment    Patient Name: Maisha Agosto  MRN: [id number]  : 1940  Today's Date: 24  Time Calculation  Start Time: 1250  Stop Time: 1314  Time Calculation (min): 24 min       Assessment:  OT Assessment: 84 yo female admiited with diarrhea and dehydration, found to have C-Diff and colitis, presents at baseline functional status with ADLs.  Additionally, patient has 24 hour supervision at home.  No further skilled OT needs identified at this time.  Will sign off.  Evaluation/Treatment Tolerance: Patient tolerated treatment well  Medical Staff Made Aware: Yes  End of Session Communication: Bedside nurse  End of Session Patient Position: Up in chair, Alarm on  Evaluation/Treatment Tolerance: Patient tolerated treatment well  Medical Staff Made Aware: Yes    Plan:  Treatment Interventions: ADL retraining, Functional transfer training, Patient/family training  OT Frequency:  (No further needs)  OT Discharge Recommendations: 24 hr supervision due to cognition  Equipment Recommended upon Discharge: Wheeled walker  OT Recommended Transfer Status: Stand by assist  OT - OK to Discharge: Yes  Treatment Interventions: ADL retraining, Functional transfer training, Patient/family training      Subjective     General:   OT Received On: 24  General  Reason for Referral: impaired ADLs  Referred By: Dr Bhat  Past Medical History Relevant to Rehab: vascular dementia??, gallbladder surgery, vik  Family/Caregiver Present: Yes  Caregiver Feedback: Daughter (Eli) - supportive  Patient Position Received: Up in chair, Alarm on  Preferred Learning Style: verbal, visual  General Comment: 86 yo female with colitis and C-Diff was cleared by nursing for therapy and agreeable to same.    Precautions:  Hearing/Visual Limitations: age-related impairments  Medical Precautions: Fall precautions, Infection precautions (+C-Diff)  Precautions Comment: DNR    Pain:  Pain Assessment  Pain Assessment: 0-10  Pain Score:  0 - No pain      Objective     Cognition:  Overall Cognitive Status: Impaired  Orientation Level: Disoriented to time  Following Commands: Follows one step commands with repetition  Cognition Comments: Pt is a questionable historian.  Daughter present to verify accuracy of intake information  Insight: Mild  Impulsive: Mildly    Home Living:  Type of Home: House  Lives With: Adult children (Alternates staying with daughter or son.)  Home Adaptive Equipment: Walker rolling or standard  Home Layout: One level  Home Access: Stairs to enter with rails  Entrance Stairs-Rails: Right  Entrance Stairs-Number of Steps: 2  Bathroom Shower/Tub: Walk-in shower  Bathroom Toilet: Standard  Bathroom Equipment: Grab bars in shower, Built-in shower seat  Home Living Comments: Information above reflects daughter's house.  Son's house is one story with a ramped entry and tub/shower combo with tub transfer bench.    Prior Function:  Level of [place]: Independent with ADLs and functional transfers, Needs assistance with homemaking  Receives Help From: Family, Other (Comment) (Hired help when daughter is at work for 24 hour supervision)  ADL Assistance: Independent (supervision for bathing, otherwise indep)  Homemaking Assistance: Needs assistance (family completes)  Ambulatory Assistance: Independent (with a 2 wheeled walker)  Hand Dominance: Right    ADL:  Eating Assistance: Independent  Grooming Assistance: Independent  Grooming Deficit:  (washing hands)  Bathing Assistance: Stand by  Bathing Deficit: Supervision/safety (per clinical judgement)  UE Dressing Assistance: Independent  UE Dressing Deficit:  (per clinical judgement)  LE Dressing Assistance: Independent  LE Dressing Deficit:  (donning socks while seated in chair)  Toileting Assistance with Device: Independent  Toileting Deficit:  (per clinical judgement)    Activity Tolerance:  Endurance: Endurance does not limit participation in activity    Bed Mobility/Transfers:  Transfers  Transfer: Yes  Transfer 1  Transfer From 1: Chair with arms to  Transfer to 1: Stand  Technique 1: Sit to stand, Stand to sit  Transfer Device 1: Walker  Transfer Level of Assistance 1: Close supervision  Trials/Comments 1: cues for hand placement    Functional Mobility:  Functional Mobility  Functional Mobility Performed: Yes (Modified independent for functional mobility in bedroom with a 2 wheeled walker ~20'.  Gait steady with fast taz.)    Sensation:  Light Touch: No apparent deficits  Sensation Comment: Denies tingling/numbness    Strength:  Strength Comments: BUEs=~3+/5 grossly    Hand Function:  Hand Function  Gross Grasp: Functional  Coordination: Impaired (at baseline d/t arthritic hand changes)    Extremities: RUE   RUE : Within Functional Limits and LUE   LUE: Within Functional Limits    Outcome Measures: [facility] Daily Activity  Putting on and taking off regular lower body clothing: None  Bathing (including washing, rinsing, drying): A little  Putting on and taking off regular upper body clothing: None  Toileting, which includes using toilet, bedpan or urinal: None  Taking care of personal grooming such as brushing teeth: None  Eating Meals: None  Daily Activity - Total Score: 23    Education Documentation  ADL Training, taught by Geno Zavlaa OT at 5/17/2024  1:51 PM.  Learner: Family, Patient  Readiness: Acceptance  Method: Explanation  Response: Verbalizes Understanding, Needs Reinforcement  Comment: Instructed in home safety for fall prevention    OP EDUCATION:  Education  Individual(s) Educated: Patient (Daughter)  Education Provided: Other (Home safety for fall prevention)  Patient Response to Education: Patient/Caregiver Verbalized Understanding of Information

## 2024-05-17 NOTE — PROGRESS NOTES
"Maisha Agosto is a 83 y.o. female on day 1 of admission presenting with Colitis due to Clostridioides difficile.    Subjective   Seen and examined patient is sleeping currently discussed with nursing staff still having large semiformed bowel movements but incontinent of stool we have added Imodium and cholestyramine continuing her vancomycin IV fluids can be discontinued at this time and they will  this evening at approximately 10 PM.  Would anticipate discharge of this patient tomorrow as her family is working and hopefully we can get the patient the opportunity for improved fecal continence prior to discharge.       Objective     Physical Exam  Vitals and nursing note reviewed.   Cardiovascular:      Rate and Rhythm: Regular rhythm.      Heart sounds: Normal heart sounds.   Pulmonary:      Breath sounds: Normal breath sounds.   Abdominal:      Palpations: Abdomen is soft.   Musculoskeletal:         General: Normal range of motion.      Cervical back: Neck supple.   Skin:     General: Skin is warm.      Capillary Refill: Capillary refill takes less than 2 seconds.   Neurological:      General: No focal deficit present.      Mental Status: Mental status is at baseline.   Psychiatric:         Mood and Affect: Mood normal.         Last Recorded Vitals  Blood pressure 133/78, pulse 56, temperature 36.7 °C (98.1 °F), temperature source Oral, resp. rate 16, height 1.575 m (5' 2\"), weight (!) 42 kg (92 lb 9.5 oz), SpO2 96%.  Intake/Output last 3 Shifts:  I/O last 3 completed shifts:  In: 1529.2 (36.4 mL/kg) [P.O.:240; IV Piggyback:1289.2]  Out: - (0 mL/kg)   Weight: 42 kg     Relevant Results              Results for orders placed or performed during the hospital encounter of 24 (from the past 24 hour(s))   Comprehensive Metabolic Panel   Result Value Ref Range    Glucose 130 (H) 65 - 99 mg/dL    Sodium 142 133 - 145 mmol/L    Potassium 3.4 3.4 - 5.1 mmol/L    Chloride 104 97 - 107 mmol/L    Bicarbonate 30 24 " - 31 mmol/L    Urea Nitrogen 19 8 - 25 mg/dL    Creatinine 0.70 0.40 - 1.60 mg/dL    eGFR 85 >60 mL/min/1.73m*2    Calcium 9.2 8.5 - 10.4 mg/dL    Albumin 3.0 (L) 3.5 - 5.0 g/dL    Alkaline Phosphatase 46 35 - 125 U/L    Total Protein 6.1 5.9 - 7.9 g/dL    AST 31 5 - 40 U/L    Bilirubin, Total 0.5 0.1 - 1.2 mg/dL    ALT 30 5 - 40 U/L    Anion Gap 8 <=19 mmol/L   Magnesium   Result Value Ref Range    Magnesium 1.80 1.60 - 3.10 mg/dL   CBC and Auto Differential   Result Value Ref Range    WBC 10.0 4.4 - 11.3 x10*3/uL    nRBC 0.0 0.0 - 0.0 /100 WBCs    RBC 4.30 4.00 - 5.20 x10*6/uL    Hemoglobin 12.6 12.0 - 16.0 g/dL    Hematocrit 39.4 36.0 - 46.0 %    MCV 92 80 - 100 fL    MCH 29.3 26.0 - 34.0 pg    MCHC 32.0 32.0 - 36.0 g/dL    RDW 13.2 11.5 - 14.5 %    Platelets 234 150 - 450 x10*3/uL    Neutrophils % 64.4 40.0 - 80.0 %    Immature Granulocytes %, Automated 1.1 (H) 0.0 - 0.9 %    Lymphocytes % 18.6 13.0 - 44.0 %    Monocytes % 11.9 2.0 - 10.0 %    Eosinophils % 3.1 0.0 - 6.0 %    Basophils % 0.9 0.0 - 2.0 %    Neutrophils Absolute 6.46 (H) 1.60 - 5.50 x10*3/uL    Immature Granulocytes Absolute, Automated 0.11 0.00 - 0.50 x10*3/uL    Lymphocytes Absolute 1.87 0.80 - 3.00 x10*3/uL    Monocytes Absolute 1.19 (H) 0.05 - 0.80 x10*3/uL    Eosinophils Absolute 0.31 0.00 - 0.40 x10*3/uL    Basophils Absolute 0.09 0.00 - 0.10 x10*3/uL                      Assessment/Plan   Principal Problem:    Colitis due to Clostridioides difficile  Active Problems:    Dehydration    Replace potassium discontinue IV fluids later today add cholestyramine for bulking agent and Imodium for diarrhea control continue vancomycin anticipate discharge tomorrow        I spent 30 minutes in the professional and overall care of this patient.      Carlos Sibley DO

## 2024-05-17 NOTE — PROGRESS NOTES
Physical Therapy Evaluation    Patient Name: Maisha Agosto  MRN: [id number]  Today's Date: 5/17/2024   Time Calculation  Start Time: 1341  Stop Time: 1356  Time Calculation (min): 15 min    Assessment/Plan   PT Assessment  PT Assessment Results: Decreased strength, Decreased endurance, Impaired balance, Decreased mobility, Decreased cognition, Decreased safety awareness, Impaired judgement  Rehab Prognosis: Good  Evaluation/Treatment Tolerance: Patient tolerated treatment well  Medical Staff Made Aware: Yes  End of Session Communication: Bedside nurse  Assessment Comment: 83 year old female admits with Colitis due to C-diff infection along with dehydration. On eval, she demonstrates weakness warranting skilled PT care. At NE, low intensity rehab is recommended as Pt is likely near functional baseline  End of Session Patient Position: Up in chair, Alarm on  IP OR SWING BED PT PLAN  Inpatient or Swing Bed: Inpatient  PT Plan  Treatment/Interventions: Transfer training, Gait training, Balance training, Strengthening, Therapeutic exercise, Therapeutic activity, Home exercise program  PT Plan: Skilled PT  PT Frequency: 3 times per week  PT Discharge Recommendations: Low intensity level of continued care  Equipment Recommended upon Discharge: Wheeled walker  PT Recommended Transfer Status: Contact guard, Assistive device  PT - OK to Discharge: Yes      Subjective   General Visit Information:  General  Reason for Referral: Impaired Mobility  Referred By: Dr. Estrada  Past Medical History Relevant to Rehab: vascular dementia??, gallbladder surgery, vik  Family/Caregiver Present: Yes  Caregiver Feedback: DTR--Interested in [facility] PT for strengthening  Patient Position Received: Up in chair, Alarm on  Preferred Learning Style: verbal  General Comment: 83 year old female admits with Colitis due to C-diff infection along with dehydration.  Home Living:  Home Living  Type of Home: House  Lives With: Adult children  Home Adaptive  Equipment: Walker rolling or standard  Home Layout: One level  Home Access: Stairs to enter with rails  Entrance Stairs-Rails: Right  Entrance Stairs-Number of Steps: 2  Bathroom Shower/Tub: Walk-in shower  Bathroom Toilet: Standard  Bathroom Equipment: Grab bars in shower, Built-in shower seat  Prior Level of Function:  Prior Function Per Pt/Caregiver Report  Level of [place]: Independent with ADLs and functional transfers, Independent with homemaking with ambulation  Receives Help From: Family  ADL Assistance: Needs assistance (supervision for bathing)  Homemaking Assistance: Needs assistance  Ambulatory Assistance: Independent (FWW)  Hand Dominance: Right  Precautions:  Precautions  Medical Precautions: Fall precautions  Precautions Comment: C-Diff Infection Contact Plus Precautions    Objective   Pain:  Pain Assessment  Pain Assessment: 0-10  Pain Score: 0 - No pain  Cognition:  Cognition  Overall Cognitive Status: Impaired at baseline  Orientation Level: Disoriented to time    General Assessments:  Activity Tolerance  Endurance: Decreased tolerance for upright activites  Activity Tolerance Comments: Minimally fatigued    Sensation  Light Touch: No apparent deficits    Strength  Strength Comments: Grossly 4/5 BLEs on MMT  Functional Assessments:  Transfers  Transfer: Yes  Transfer 1  Transfer From 1: Chair with arms to  Transfer to 1: Stand  Technique 1: Sit to stand, Stand to sit  Transfer Device 1: Walker  Transfer Level of Assistance 1: Close supervision  Trials/Comments 1: cues for safety    Ambulation/Gait Training  Ambulation/Gait Training Performed: Yes  Ambulation/Gait Training 1  Surface 1: Level tile  Device 1: Rolling walker  Assistance 1: Close supervision  Quality of Gait 1:  (often pushes device too far out in front limiting JEREMY, cues for safety, education on proper device use, can be impulsive and quick to turn leading to increased lateral sway)  Comments/Distance (ft) 1: 60    Outcome  Measures:  [facility] Basic Mobility  Turning from your back to your side while in a flat bed without using bedrails: None  Moving from lying on your back to sitting on the side of a flat bed without using bedrails: None  Moving to and from bed to chair (including a wheelchair): A little  Standing up from a chair using your arms (e.g. wheelchair or bedside chair): A little  To walk in hospital room: A little  Climbing 3-5 steps with railing: A little  Basic Mobility - Total Score: 20    Encounter Problems       Encounter Problems (Active)       Balance       Standing Balance (Progressing)       Start:  05/17/24    Expected End:  05/31/24       Pt will demonstrate good static standing balance to promote safe participation with out of bed activity, transfers, and mobility              Mobility       Ambulation (Progressing)       Start:  05/17/24    Expected End:  05/31/24       Pt will ambulate 150' modified independent assist with walker to promote safe home mobility           Strength (Progressing)       Start:  05/17/24    Expected End:  05/31/24       Pt will tolerate exercise to improve BLE strength to 5/5 on manual muscle testing            PT Transfers       Sit to stand (Progressing)       Start:  05/17/24    Expected End:  05/31/24       Pt will transfer sit to standing position with modified independent assist and walker to promote safe out of bed activity              Safety       Safe Mobility Techniques (Progressing)       Start:  05/17/24    Expected End:  05/31/24       Pt will correctly identify and demonstrate safe mobility techniques to reduce their risks for falls during their acute care stay                   Education Documentation  Mobility Training, taught by Jovany Tejeda, PT at 5/17/2024  2:20 PM.  Learner: Family, Patient  Readiness: Acceptance  Method: Explanation, Demonstration  Response: Needs Reinforcement  Comment: safe mobility techniques    Education Comments  No comments  found.

## 2024-05-17 NOTE — NURSING NOTE
Was up in chair for breakfast carrol well up to brp with 1 assist inc large amt loose stool  ret to bed bed alarm in use

## 2024-05-17 NOTE — PROGRESS NOTES
05/17/24 1238   Discharge Planning   Living Arrangements Children;Spouse/significant other   Support Systems Children;Family members   Assistance Needed Assist with ADLs   Type of Residence Private residence   Do you have animals or pets at home? Yes   Type of Animals or Pets cat   Home or Post Acute Services None   Patient expects to be discharged to: Would anticipate discharge of this patient tomorrow as her family is working and hopefully we can get the patient the opportunity for improved fecal continence prior to discharge.   Does the patient need discharge transport arranged? No     PLAN: Discharge with Family on Saturday with no needs     1425: Daughter is requesting [facility], will need an internal home care order prior to discharge

## 2024-05-18 ENCOUNTER — DOCUMENTATION (OUTPATIENT)
Dept: HOME HEALTH SERVICES | Facility: HOME HEALTH | Age: 84
End: 2024-05-18
Payer: MEDICARE

## 2024-05-18 ENCOUNTER — HOME HEALTH ADMISSION (OUTPATIENT)
Dept: HOME HEALTH SERVICES | Facility: HOME HEALTH | Age: 84
End: 2024-05-18
Payer: MEDICARE

## 2024-05-18 VITALS
SYSTOLIC BLOOD PRESSURE: 138 MMHG | OXYGEN SATURATION: 98 % | HEIGHT: 62 IN | BODY MASS INDEX: 17.04 KG/M2 | HEART RATE: 65 BPM | DIASTOLIC BLOOD PRESSURE: 70 MMHG | WEIGHT: 92.59 LBS | TEMPERATURE: 98.6 F | RESPIRATION RATE: 16 BRPM

## 2024-05-18 PROBLEM — Z74.09 IMPAIRED FUNCTIONAL MOBILITY, BALANCE, GAIT, AND ENDURANCE: Status: ACTIVE | Noted: 2024-05-18

## 2024-05-18 LAB
ALBUMIN SERPL-MCNC: 3 G/DL (ref 3.5–5)
ALP BLD-CCNC: 42 U/L (ref 35–125)
ALT SERPL-CCNC: 25 U/L (ref 5–40)
ANION GAP SERPL CALC-SCNC: 6 MMOL/L
APPEARANCE UR: CLEAR
AST SERPL-CCNC: 26 U/L (ref 5–40)
BASOPHILS # BLD AUTO: 0.06 X10*3/UL (ref 0–0.1)
BASOPHILS NFR BLD AUTO: 0.6 %
BILIRUB SERPL-MCNC: 0.4 MG/DL (ref 0.1–1.2)
BILIRUB UR STRIP.AUTO-MCNC: NEGATIVE MG/DL
BUN SERPL-MCNC: 17 MG/DL (ref 8–25)
CALCIUM SERPL-MCNC: 9 MG/DL (ref 8.5–10.4)
CHLORIDE SERPL-SCNC: 105 MMOL/L (ref 97–107)
CO2 SERPL-SCNC: 30 MMOL/L (ref 24–31)
COLOR UR: ABNORMAL
CREAT SERPL-MCNC: 0.7 MG/DL (ref 0.4–1.6)
EGFRCR SERPLBLD CKD-EPI 2021: 85 ML/MIN/1.73M*2
EOSINOPHIL # BLD AUTO: 0.47 X10*3/UL (ref 0–0.4)
EOSINOPHIL NFR BLD AUTO: 4.5 %
ERYTHROCYTE [DISTWIDTH] IN BLOOD BY AUTOMATED COUNT: 13.2 % (ref 11.5–14.5)
GLUCOSE SERPL-MCNC: 126 MG/DL (ref 65–99)
GLUCOSE UR STRIP.AUTO-MCNC: NORMAL MG/DL
HCT VFR BLD AUTO: 36.3 % (ref 36–46)
HGB BLD-MCNC: 11.8 G/DL (ref 12–16)
HOLD SPECIMEN: NORMAL
IMM GRANULOCYTES # BLD AUTO: 0.11 X10*3/UL (ref 0–0.5)
IMM GRANULOCYTES NFR BLD AUTO: 1 % (ref 0–0.9)
KETONES UR STRIP.AUTO-MCNC: NEGATIVE MG/DL
LEUKOCYTE ESTERASE UR QL STRIP.AUTO: ABNORMAL
LYMPHOCYTES # BLD AUTO: 2.14 X10*3/UL (ref 0.8–3)
LYMPHOCYTES NFR BLD AUTO: 20.3 %
MAGNESIUM SERPL-MCNC: 1.7 MG/DL (ref 1.6–3.1)
MCH RBC QN AUTO: 29.6 PG (ref 26–34)
MCHC RBC AUTO-ENTMCNC: 32.5 G/DL (ref 32–36)
MCV RBC AUTO: 91 FL (ref 80–100)
MONOCYTES # BLD AUTO: 1.21 X10*3/UL (ref 0.05–0.8)
MONOCYTES NFR BLD AUTO: 11.5 %
MUCOUS THREADS #/AREA URNS AUTO: ABNORMAL /LPF
NEUTROPHILS # BLD AUTO: 6.55 X10*3/UL (ref 1.6–5.5)
NEUTROPHILS NFR BLD AUTO: 62.1 %
NITRITE UR QL STRIP.AUTO: NEGATIVE
NRBC BLD-RTO: 0 /100 WBCS (ref 0–0)
PH UR STRIP.AUTO: 7 [PH]
PLATELET # BLD AUTO: 237 X10*3/UL (ref 150–450)
POTASSIUM SERPL-SCNC: 3.9 MMOL/L (ref 3.4–5.1)
PROT SERPL-MCNC: 5.7 G/DL (ref 5.9–7.9)
PROT UR STRIP.AUTO-MCNC: NEGATIVE MG/DL
RBC # BLD AUTO: 3.98 X10*6/UL (ref 4–5.2)
RBC # UR STRIP.AUTO: NEGATIVE /UL
RBC #/AREA URNS AUTO: ABNORMAL /HPF
SODIUM SERPL-SCNC: 141 MMOL/L (ref 133–145)
SP GR UR STRIP.AUTO: 1.01
SQUAMOUS #/AREA URNS AUTO: ABNORMAL /HPF
UROBILINOGEN UR STRIP.AUTO-MCNC: NORMAL MG/DL
WBC # BLD AUTO: 10.5 X10*3/UL (ref 4.4–11.3)
WBC #/AREA URNS AUTO: >50 /HPF

## 2024-05-18 PROCEDURE — 81001 URINALYSIS AUTO W/SCOPE: CPT | Performed by: STUDENT IN AN ORGANIZED HEALTH CARE EDUCATION/TRAINING PROGRAM

## 2024-05-18 PROCEDURE — 96365 THER/PROPH/DIAG IV INF INIT: CPT

## 2024-05-18 PROCEDURE — 80053 COMPREHEN METABOLIC PANEL: CPT | Performed by: INTERNAL MEDICINE

## 2024-05-18 PROCEDURE — 2500000006 HC RX 250 W HCPCS SELF ADMINISTERED DRUGS (ALT 637 FOR ALL PAYERS): Performed by: INTERNAL MEDICINE

## 2024-05-18 PROCEDURE — G0378 HOSPITAL OBSERVATION PER HR: HCPCS

## 2024-05-18 PROCEDURE — 36415 COLL VENOUS BLD VENIPUNCTURE: CPT | Performed by: INTERNAL MEDICINE

## 2024-05-18 PROCEDURE — 83735 ASSAY OF MAGNESIUM: CPT | Performed by: INTERNAL MEDICINE

## 2024-05-18 PROCEDURE — 85025 COMPLETE CBC W/AUTO DIFF WBC: CPT | Performed by: INTERNAL MEDICINE

## 2024-05-18 PROCEDURE — 2500000004 HC RX 250 GENERAL PHARMACY W/ HCPCS (ALT 636 FOR OP/ED): Performed by: STUDENT IN AN ORGANIZED HEALTH CARE EDUCATION/TRAINING PROGRAM

## 2024-05-18 PROCEDURE — 96372 THER/PROPH/DIAG INJ SC/IM: CPT | Performed by: INTERNAL MEDICINE

## 2024-05-18 PROCEDURE — 87086 URINE CULTURE/COLONY COUNT: CPT | Mod: TRILAB | Performed by: STUDENT IN AN ORGANIZED HEALTH CARE EDUCATION/TRAINING PROGRAM

## 2024-05-18 PROCEDURE — 2500000004 HC RX 250 GENERAL PHARMACY W/ HCPCS (ALT 636 FOR OP/ED): Performed by: INTERNAL MEDICINE

## 2024-05-18 PROCEDURE — 2500000001 HC RX 250 WO HCPCS SELF ADMINISTERED DRUGS (ALT 637 FOR MEDICARE OP): Performed by: INTERNAL MEDICINE

## 2024-05-18 RX ORDER — AMOXICILLIN 500 MG/1
500 CAPSULE ORAL 3 TIMES DAILY
Qty: 12 CAPSULE | Refills: 0 | Status: SHIPPED | OUTPATIENT
Start: 2024-05-19 | End: 2024-05-23

## 2024-05-18 RX ORDER — CHOLESTYRAMINE 4 G/4.8G
4 POWDER, FOR SUSPENSION ORAL 2 TIMES DAILY
Qty: 24 PACKET | Refills: 0 | Status: SHIPPED | OUTPATIENT
Start: 2024-05-18

## 2024-05-18 RX ORDER — LOPERAMIDE HYDROCHLORIDE 2 MG/1
2 CAPSULE ORAL 4 TIMES DAILY PRN
Qty: 30 CAPSULE | Refills: 0 | Status: SHIPPED | OUTPATIENT
Start: 2024-05-18

## 2024-05-18 RX ORDER — VANCOMYCIN HYDROCHLORIDE 125 MG/1
125 CAPSULE ORAL 4 TIMES DAILY
Qty: 48 CAPSULE | Refills: 0 | Status: SHIPPED | OUTPATIENT
Start: 2024-05-18 | End: 2024-06-07 | Stop reason: HOSPADM

## 2024-05-18 RX ORDER — CEFTRIAXONE 1 G/50ML
1 INJECTION, SOLUTION INTRAVENOUS EVERY 24 HOURS
Status: DISCONTINUED | OUTPATIENT
Start: 2024-05-18 | End: 2024-05-18 | Stop reason: HOSPADM

## 2024-05-18 RX ADMIN — METOPROLOL TARTRATE 25 MG: 25 TABLET, FILM COATED ORAL at 09:49

## 2024-05-18 RX ADMIN — LEVOTHYROXINE SODIUM 50 MCG: 50 TABLET ORAL at 05:21

## 2024-05-18 RX ADMIN — VANCOMYCIN HYDROCHLORIDE 125 MG: 125 CAPSULE ORAL at 13:33

## 2024-05-18 RX ADMIN — ACETAMINOPHEN 650 MG: 325 TABLET ORAL at 09:49

## 2024-05-18 RX ADMIN — VANCOMYCIN HYDROCHLORIDE 125 MG: 125 CAPSULE ORAL at 05:21

## 2024-05-18 RX ADMIN — CHOLESTYRAMINE 4 G: 4 POWDER, FOR SUSPENSION ORAL at 09:49

## 2024-05-18 RX ADMIN — ASPIRIN 81 MG: 81 TABLET, COATED ORAL at 09:49

## 2024-05-18 RX ADMIN — CEFTRIAXONE SODIUM 1 G: 1 INJECTION, SOLUTION INTRAVENOUS at 12:31

## 2024-05-18 RX ADMIN — HEPARIN SODIUM 5000 UNITS: 5000 INJECTION, SOLUTION INTRAVENOUS; SUBCUTANEOUS at 05:21

## 2024-05-18 RX ADMIN — AMLODIPINE BESYLATE 5 MG: 5 TABLET ORAL at 09:49

## 2024-05-18 ASSESSMENT — PAIN - FUNCTIONAL ASSESSMENT: PAIN_FUNCTIONAL_ASSESSMENT: 0-10

## 2024-05-18 ASSESSMENT — PAIN SCALES - GENERAL: PAINLEVEL_OUTOF10: 0 - NO PAIN

## 2024-05-18 NOTE — PROGRESS NOTES
05/18/24 1217   Discharge Planning   Patient expects to be discharged to: [facility]   Does the patient need discharge transport arranged? No     Home with [facility] today.  SOC 2448

## 2024-05-18 NOTE — DISCHARGE INSTRUCTIONS
The following medication changes were made during your hospital stay:    START: vancomycin 125mg four times a day for the next 12 days for treatment of C. Diff colitis (total of 14 days)  START: amoxicillin 50mg three times a day for the next 4 days for treatment of UTI (total of 5 days)  START: cholestyramine 1 packet twice a day. This is to help the diarrhea by bulking up the stool. You can stop using this once the diarrhea resolves.   START: loperamide 2mg four times a day as needed. This is to help with the diarrhea.     Please follow up with your PCP for a hospital follow up visit.

## 2024-05-18 NOTE — CARE PLAN
Problem: Pain  Goal: My pain/discomfort is manageable  Outcome: Progressing     Problem: Safety  Goal: I will remain free of falls  Outcome: Progressing       The clinical goals for the shift include safety    Over the shift, the patient did not make progress toward the above goals.

## 2024-05-18 NOTE — HH CARE COORDINATION
Home Care received a Referral for Physical Therapy and Occupational Therapy. We have processed the referral for a Start of Care on 05-19-24 24-48 hours.     If you have any questions or concerns, please feel free to contact us at [phone]. Follow the prompts, enter your five digit zip code, and you will be directed to your care team on [facility] 2.

## 2024-05-18 NOTE — PROGRESS NOTES
Maisha Agosto is a 83 y.o. female on day 1 of admission presenting with Colitis due to Clostridioides difficile.      Subjective   Seen sleeping comfortably in bed, awakened easily to voice. Reports urinary frequency and dysuria. Denies suprapubic pain, flank pain, nausea, vomiting, and fever. States she's had 2-3 episodes of diarrhea over the last 24 hrs.        Objective     Last Recorded Vitals  /78 (BP Location: Left arm, Patient Position: Sitting)   Pulse 60   Temp 36.8 °C (98.2 °F) (Oral)   Resp 14   Wt (!) 42 kg (92 lb 9.5 oz)   SpO2 97%   Intake/Output last 3 Shifts:    Intake/Output Summary (Last 24 hours) at 5/18/2024 0849  Last data filed at 5/17/2024 1300  Gross per 24 hour   Intake 652.5 ml   Output --   Net 652.5 ml       Admission Weight  Weight: (!) 42 kg (92 lb 9.5 oz) (05/16/24 1127)    Daily Weight  05/16/24 : (!) 42 kg (92 lb 9.5 oz)    Image Results  ECG 12 lead  Normal sinus rhythm  Septal infarct , age undetermined  Abnormal ECG  When compared with ECG of 08-MAY-2024 22:12, (unconfirmed)  Premature ventricular complexes are no longer Present  Septal infarct is now Present  T wave inversion no longer evident in Inferior leads  T wave inversion no longer evident in Anterior leads  CT abdomen pelvis w IV contrast  Narrative: Interpreted By:  Jude Lovett,   STUDY:  CT ABDOMEN PELVIS W IV CONTRAST;  5/16/2024 12:43 am      INDICATION:  Signs/Symptoms:continued pain and diarrhea.      COMPARISON:  05/07/2024      ACCESSION NUMBER(S):  [id number]      ORDERING CLINICIAN:  YOJANA JIMENEZ      TECHNIQUE:  Contiguous axial images of the abdomen and pelvis were obtained after  the intravenous administration of iodinated contrast. Coronal and  sagittal reformatted images were reconstructed from the axial data.      FINDINGS:  LOWER CHEST: Cardiomegaly. Mitral annular calcifications. Trace  bilateral pleural effusions (right > left) (new from prior). Mildly  basilar atelectasis.  Redemonstration of mild scarring in the medial  left lower lobe with traction bronchiectasis. Redemonstration of mild  bronchiectasis and atelectasis in the lingula.          ABDOMEN/PELVIS:          LIVER: Stable hepatomegaly. Scattered simple cysts again noted. No  suspicious lesions. There is periportal edema.      BILE DUCTS: Diffuse intrahepatic and extrahepatic dilatation is  unchanged.      GALLBLADDER: Surgically absent.      PANCREAS: Redemonstration off numerous pancreatic cysts the largest  measuring 3.7 x 1.9 cm within the pancreatic neck partially encasing  the SMV. Additional cysts are seen within the pancreatic tail  measuring up to 2.5 cm x 2 cm. There is mild dilatation of the main  pancreatic duct within the tail measuring approximately 0.4 cm.      SPLEEN: No significant abnormality.      ADRENALS: No significant abnormality.      KIDNEYS, URETERS, BLADDER: There is mild bilateral  hydroureteronephrosis similar to prior study. The urinary bladder  wall thickness appears within normal limits for degree of distention.      REPRODUCTIVE ORGANS: The uterus is surgically absent. There is  diffuse hypoattenuation thickening of the vaginal cuff similar to  prior study with small amount of air in the vagina.      VESSELS: The portal and hepatic veins are patent. Moderate calcific  aorta bi-iliac atherosclerosis. 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 There is aneurysmal  dilatation of the left common iliac artery measuring 1.6 cm. There  transient occlusion of the right common femoral artery with immediate  reconstitution of the superficial femoral artery, unchanged from  prior study.      RETROPERITONEUM/LYMPH NODES: No enlarged lymph nodes. No acute  retroperitoneal abnormality.      BOWEL/MESENTERY/PERITONEUM: There is diffuse wall and fold thickening  of the stomach similar to prior study consistent with chronic  gastritis/peptic ulcer disease. There is  redemonstration of a large  air and fluid-filled diverticulum from the descending duodenum  without evidence of acute diverticulitis. There is another large  diverticulum arising from the 4th segment of the duodenum with  air-fluid level not demonstrating evidence of diverticulitis.  Compared to prior study, there is significant progression of rectal  wall thickening and inflammation with surrounding fat stranding  consistent with proctitis. There is generalized milder diffuse  colonic wall thickening in the descending colon and ascending colon  with mild surrounding fat stranding similar to prior study. There is  colonic diverticulosis without evidence of acute diverticulitis.          ABDOMINAL WALL: Small fat containing bilateral femoral hernias.          MUSCULOSKELETAL: No acute osseous abnormality. No suspicious osseous  lesion.      Impression: 1. Significant worsening of proctitis in similar degree of diffuse  colitis involving the ascending and descending colon predominantly.      2. Similar appearance of diffuse gastritis.      3. Numerous noninflamed duodenal and colonic diverticula.      4. Mildly larger small bilateral pleural effusions.      Additional chronic nonacute findings as above.          MACRO:  None.      Signed by: Jude Lovett 5/16/2024 1:31 AM  Dictation workstation:   [id number]      Physical Exam  HENT:      Head: Normocephalic.      Mouth/Throat:      Pharynx: Oropharynx is clear.   Eyes:      General: No scleral icterus.  Cardiovascular:      Rate and Rhythm: Normal rate.   Pulmonary:      Effort: No respiratory distress.      Breath sounds: No wheezing.   Abdominal:      General: There is no distension.      Palpations: Abdomen is soft.      Tenderness: There is no abdominal tenderness. There is no right CVA tenderness or left CVA tenderness.   Musculoskeletal:      Right lower leg: No edema.      Left lower leg: No edema.   Neurological:      Mental Status: She is alert.    Psychiatric:         Behavior: Behavior normal.         Relevant Results               Assessment/Plan            Principal Problem:    Colitis due to Clostridioides difficile  Active Problems:    Dehydration    Impaired functional mobility, balance, gait, and endurance    C. Diff colitis  Continue PO vanc, cholestyramine, and immodium  Contact precautions    HTN  Continue home norvasc and metoprolol    Hypothyroidism  Continue home synthroid    Dysuria/urinary frequency  Repeat UA ordered  Will hold off on antibiotics until UA results are back    Addendum 11:42 - UA with +WBC, +RBC, +leukocyte esterase, may represent the start of a UTI. Given that patient is symptomatic, will start rocephin while inpatient and plan to discharge home with PO antibiotics.     Dispo: anticipate possible discharge home today. [facility] referral ordered.                     Keara Trinidad MD

## 2024-05-18 NOTE — DISCHARGE SUMMARY
Discharge Diagnosis  Colitis due to Clostridioides difficile    Issues Requiring Follow-Up  C diff colitis    Discharge Meds     Your medication list        START taking these medications        Instructions Last Dose Given Next Dose Due   amoxicillin 500 mg capsule  Commonly known as: Amoxil  Start taking on: May 19, 2024      Take 1 capsule (500 mg) by mouth 3 times a day for 4 days. Do not fill before May 19, 2024.       cholestyramine light 4 gram packet  Commonly known as: Prevalite      Take 1 packet (4 g) by mouth 2 times a day.       loperamide 2 mg capsule  Commonly known as: Imodium      Take 1 capsule (2 mg) by mouth 4 times a day as needed for diarrhea.       vancomycin 125 mg capsule  Commonly known as: Vancocin      Take 1 capsule (125 mg) by mouth 4 times a day for 12 days.              CONTINUE taking these medications        Instructions Last Dose Given Next Dose Due   amLODIPine 5 mg tablet  Commonly known as: Norvasc           aspirin 81 mg EC tablet      TAKE ONE TABLET BY MOUTH EVERY DAY AS DIRECTED       atorvastatin 40 mg tablet  Commonly known as: Lipitor           hydrOXYzine HCL 25 mg tablet  Commonly known as: Atarax      Take 1 tablet (25 mg) by mouth 3 times a day as needed for anxiety.       levothyroxine 50 mcg tablet  Commonly known as: Synthroid, Levoxyl           magnesium oxide 400 mg (241.3 mg magnesium) tablet  Commonly known as: Mag-Ox           metoprolol tartrate 25 mg tablet  Commonly known as: Lopressor           polyethylene glycol 17 gram packet  Commonly known as: Glycolax, Miralax      Take 17 g by mouth once daily.       ramipril 10 mg capsule  Commonly known as: Altace           traZODone 50 mg tablet  Commonly known as: Desyrel                  STOP taking these medications      nitrofurantoin (macrocrystal-monohydrate) 100 mg capsule  Commonly known as: Macrobid                  Where to Get Your Medications        These medications were sent to [id number] -  [place], [place] - [address] [place] [address]  [address] [facility] [address]      Phone: [phone]   amoxicillin 500 mg capsule  cholestyramine light 4 gram packet  loperamide 2 mg capsule  vancomycin 125 mg capsule         Test Results Pending At Discharge  Pending Labs       Order Current Status    Extra Urine Gray Tube In process    Urinalysis with Reflex Culture and Microscopic In process    Urine Culture In process            Hospital Course   83yoF hx of HTN, hypothyroidism who presented with diarrhea. Found to be C. Diff positive. Started on IVF and PO vanc. PT/OT consulted, recommended low intensity rehab. Family agreeable to [facility] referral which was ordered. Diarrhea improved. Medically cleared for discharge home with [facility].     Pertinent Physical Exam At Time of Discharge  Physical Exam see today's progress note    Outpatient Follow-Up  No future appointments.    Time spent on discharge: 35 minutes    Keara Trinidad MD

## 2024-05-19 ENCOUNTER — HOME CARE VISIT (OUTPATIENT)
Dept: HOME HEALTH SERVICES | Facility: HOME HEALTH | Age: 84
End: 2024-05-19
Payer: MEDICARE

## 2024-05-19 VITALS — RESPIRATION RATE: 18 BRPM | HEART RATE: 50 BPM | SYSTOLIC BLOOD PRESSURE: 118 MMHG | DIASTOLIC BLOOD PRESSURE: 70 MMHG

## 2024-05-19 LAB — BACTERIA UR CULT: NORMAL

## 2024-05-19 PROCEDURE — 0023 HH SOC

## 2024-05-19 PROCEDURE — 169592 NO-PAY CLAIM PROCEDURE

## 2024-05-19 PROCEDURE — 1090000001 HH PPS REVENUE CREDIT

## 2024-05-19 PROCEDURE — 1090000002 HH PPS REVENUE DEBIT

## 2024-05-19 PROCEDURE — G0151 HHCP-SERV OF PT,EA 15 MIN: HCPCS | Mod: HHH

## 2024-05-19 ASSESSMENT — ENCOUNTER SYMPTOMS
DENIES PAIN: 1
PERSON REPORTING PAIN: PATIENT

## 2024-05-19 ASSESSMENT — ACTIVITIES OF DAILY LIVING (ADL)
OASIS_M1830: 03
AMBULATION ASSISTANCE ON FLAT SURFACES: 1
AMBULATION_DISTANCE/DURATION_TOLERATED: 30 FEET
ENTERING_EXITING_HOME: NEEDS ASSISTANCE

## 2024-05-20 ENCOUNTER — HOME CARE VISIT (OUTPATIENT)
Dept: HOME HEALTH SERVICES | Facility: HOME HEALTH | Age: 84
End: 2024-05-20
Payer: MEDICARE

## 2024-05-20 PROCEDURE — 1090000002 HH PPS REVENUE DEBIT

## 2024-05-20 PROCEDURE — 1090000001 HH PPS REVENUE CREDIT

## 2024-05-20 NOTE — CASE COMMUNICATION
Patient is an 82 y/o feamle with multiple rehospitalizations with diagnosis of UTI and CDiff. patient lives with daugheter sometimes and son something. Patient ambulating with FWW and SBA, transfers with supervision. decreased TUG with evidence by TUG time of 56 seconds. PT frequency 2w4, patient agreeable.

## 2024-05-21 PROCEDURE — 1090000002 HH PPS REVENUE DEBIT

## 2024-05-21 PROCEDURE — 1090000001 HH PPS REVENUE CREDIT

## 2024-05-22 ENCOUNTER — HOME CARE VISIT (OUTPATIENT)
Dept: HOME HEALTH SERVICES | Facility: HOME HEALTH | Age: 84
End: 2024-05-22
Payer: MEDICARE

## 2024-05-22 LAB
ATRIAL RATE: 67 BPM
P AXIS: 48 DEGREES
P OFFSET: 200 MS
P ONSET: 157 MS
PR INTERVAL: 134 MS
Q ONSET: 224 MS
QRS COUNT: 11 BEATS
QRS DURATION: 78 MS
QT INTERVAL: 414 MS
QTC CALCULATION(BAZETT): 437 MS
QTC FREDERICIA: 429 MS
R AXIS: 4 DEGREES
T AXIS: 73 DEGREES
T OFFSET: 431 MS
VENTRICULAR RATE: 67 BPM

## 2024-05-22 PROCEDURE — 1090000002 HH PPS REVENUE DEBIT

## 2024-05-22 PROCEDURE — 1090000001 HH PPS REVENUE CREDIT

## 2024-05-23 PROCEDURE — 1090000001 HH PPS REVENUE CREDIT

## 2024-05-23 PROCEDURE — 1090000002 HH PPS REVENUE DEBIT

## 2024-05-24 ENCOUNTER — HOME CARE VISIT (OUTPATIENT)
Dept: HOME HEALTH SERVICES | Facility: HOME HEALTH | Age: 84
End: 2024-05-24
Payer: MEDICARE

## 2024-05-24 VITALS — OXYGEN SATURATION: 96 % | TEMPERATURE: 98 F | HEART RATE: 49 BPM

## 2024-05-24 PROCEDURE — 1090000002 HH PPS REVENUE DEBIT

## 2024-05-24 PROCEDURE — G0152 HHCP-SERV OF OT,EA 15 MIN: HCPCS | Mod: HHH

## 2024-05-24 PROCEDURE — 1090000001 HH PPS REVENUE CREDIT

## 2024-05-24 ASSESSMENT — ACTIVITIES OF DAILY LIVING (ADL)
DRESSING_UB_CURRENT_FUNCTION: SUPERVISION
PHYSICAL TRANSFERS ASSESSED: 1
AMBULATION ASSISTANCE: 1
PHYSICAL_TRANSFERS_DEVICES: FWW
DRESSING_LB_CURRENT_FUNCTION: SUPERVISION
CURRENT_FUNCTION: SUPERVISION
BATHING ASSESSED: 1
BATHING_CURRENT_FUNCTION: MINIMUM ASSIST
BATHING EQUIPMENT USED: SHOWER CHAIR
AMBULATION ASSISTANCE: SUPERVISION

## 2024-05-24 ASSESSMENT — ENCOUNTER SYMPTOMS
DOUBLE VISION: 0
LOWEST PAIN SEVERITY IN PAST 24 HOURS: 0/10
PAIN LOCATION - PAIN FREQUENCY: INTERMITTENT
NYSTAGMUS: 0
PAIN LOCATION: LEFT HAND
DESCRIPTION OF MEMORY LOSS: SHORT TERM
PAIN: 1
PAIN SEVERITY GOAL: 0/10
SUBJECTIVE PAIN PROGRESSION: UNCHANGED
HIGHEST PAIN SEVERITY IN PAST 24 HOURS: 3/10
PAIN LOCATION - PAIN QUALITY: ACHY
PAIN LOCATION - PAIN SEVERITY: 0/10
PAIN LOCATION - RELIEVING FACTORS: REST
PERSON REPORTING PAIN: PATIENT

## 2024-05-25 PROCEDURE — 1090000001 HH PPS REVENUE CREDIT

## 2024-05-25 PROCEDURE — 1090000002 HH PPS REVENUE DEBIT

## 2024-05-26 PROCEDURE — 1090000002 HH PPS REVENUE DEBIT

## 2024-05-26 PROCEDURE — 1090000001 HH PPS REVENUE CREDIT

## 2024-05-27 PROCEDURE — 1090000002 HH PPS REVENUE DEBIT

## 2024-05-27 PROCEDURE — 1090000001 HH PPS REVENUE CREDIT

## 2024-05-28 LAB
ATRIAL RATE: 83 BPM
P AXIS: 54 DEGREES
P OFFSET: 203 MS
P ONSET: 145 MS
PR INTERVAL: 146 MS
Q ONSET: 218 MS
QRS COUNT: 14 BEATS
QRS DURATION: 76 MS
QT INTERVAL: 376 MS
QTC CALCULATION(BAZETT): 441 MS
QTC FREDERICIA: 419 MS
R AXIS: 21 DEGREES
T AXIS: 162 DEGREES
T OFFSET: 406 MS
VENTRICULAR RATE: 83 BPM

## 2024-05-28 PROCEDURE — 1090000001 HH PPS REVENUE CREDIT

## 2024-05-28 PROCEDURE — 1090000002 HH PPS REVENUE DEBIT

## 2024-05-28 PROCEDURE — 87086 URINE CULTURE/COLONY COUNT: CPT

## 2024-05-29 ENCOUNTER — LAB (OUTPATIENT)
Dept: LAB | Facility: LAB | Age: 84
End: 2024-05-29
Payer: MEDICARE

## 2024-05-29 ENCOUNTER — HOME CARE VISIT (OUTPATIENT)
Dept: HOME HEALTH SERVICES | Facility: HOME HEALTH | Age: 84
End: 2024-05-29
Payer: MEDICARE

## 2024-05-29 DIAGNOSIS — N39.0 URINARY TRACT INFECTION, SITE NOT SPECIFIED: Primary | ICD-10-CM

## 2024-05-29 LAB
APPEARANCE UR: CLEAR
BILIRUB UR STRIP.AUTO-MCNC: NEGATIVE MG/DL
COLOR UR: NORMAL
GLUCOSE UR STRIP.AUTO-MCNC: NORMAL MG/DL
KETONES UR STRIP.AUTO-MCNC: NEGATIVE MG/DL
LEUKOCYTE ESTERASE UR QL STRIP.AUTO: NEGATIVE
NITRITE UR QL STRIP.AUTO: NEGATIVE
PH UR STRIP.AUTO: 7 [PH]
PROT UR STRIP.AUTO-MCNC: NEGATIVE MG/DL
RBC # UR STRIP.AUTO: NEGATIVE /UL
SP GR UR STRIP.AUTO: 1.01
UROBILINOGEN UR STRIP.AUTO-MCNC: NORMAL MG/DL

## 2024-05-29 PROCEDURE — 1090000002 HH PPS REVENUE DEBIT

## 2024-05-29 PROCEDURE — 1090000001 HH PPS REVENUE CREDIT

## 2024-05-29 PROCEDURE — G0157 HHC PT ASSISTANT EA 15: HCPCS | Mod: CQ,HHH

## 2024-05-30 VITALS
OXYGEN SATURATION: 95 % | TEMPERATURE: 97.5 F | SYSTOLIC BLOOD PRESSURE: 114 MMHG | HEART RATE: 45 BPM | DIASTOLIC BLOOD PRESSURE: 58 MMHG

## 2024-05-30 LAB — BACTERIA UR CULT: NORMAL

## 2024-05-30 PROCEDURE — 1090000002 HH PPS REVENUE DEBIT

## 2024-05-30 PROCEDURE — 1090000001 HH PPS REVENUE CREDIT

## 2024-05-30 ASSESSMENT — ENCOUNTER SYMPTOMS: DENIES PAIN: 1

## 2024-05-31 ENCOUNTER — HOME CARE VISIT (OUTPATIENT)
Dept: HOME HEALTH SERVICES | Facility: HOME HEALTH | Age: 84
End: 2024-05-31
Payer: MEDICARE

## 2024-05-31 VITALS — HEART RATE: 48 BPM | OXYGEN SATURATION: 94 % | TEMPERATURE: 97.3 F

## 2024-05-31 PROCEDURE — G0157 HHC PT ASSISTANT EA 15: HCPCS | Mod: CQ,HHH

## 2024-05-31 PROCEDURE — 1090000002 HH PPS REVENUE DEBIT

## 2024-05-31 PROCEDURE — 1090000001 HH PPS REVENUE CREDIT

## 2024-05-31 ASSESSMENT — ENCOUNTER SYMPTOMS
PAIN LOCATION - PAIN FREQUENCY: CONSTANT
PAIN LOCATION: LEFT ARM
PAIN LOCATION - PAIN QUALITY: ACHING
PAIN LOCATION - RELIEVING FACTORS: MEDICATION
PAIN LOCATION - PAIN QUALITY: ACHING
PAIN: 1
PAIN LOCATION - PAIN SEVERITY: 3/10
PAIN LOCATION - EXACERBATING FACTORS: UNKNOW
PAIN LOCATION - PAIN FREQUENCY: CONSTANT
PAIN LOCATION - PAIN SEVERITY: 3/10
PAIN LOCATION: RIGHT ARM
PAIN LOCATION - RELIEVING FACTORS: MEDICATION

## 2024-06-01 PROCEDURE — 1090000002 HH PPS REVENUE DEBIT

## 2024-06-01 PROCEDURE — 1090000001 HH PPS REVENUE CREDIT

## 2024-06-02 PROCEDURE — 1090000002 HH PPS REVENUE DEBIT

## 2024-06-02 PROCEDURE — 1090000001 HH PPS REVENUE CREDIT

## 2024-06-03 PROCEDURE — 1090000001 HH PPS REVENUE CREDIT

## 2024-06-03 PROCEDURE — 1090000002 HH PPS REVENUE DEBIT

## 2024-06-04 ENCOUNTER — HOME CARE VISIT (OUTPATIENT)
Dept: HOME HEALTH SERVICES | Facility: HOME HEALTH | Age: 84
End: 2024-06-04
Payer: MEDICARE

## 2024-06-04 PROCEDURE — 1090000001 HH PPS REVENUE CREDIT

## 2024-06-04 PROCEDURE — G0151 HHCP-SERV OF PT,EA 15 MIN: HCPCS | Mod: HHH

## 2024-06-04 PROCEDURE — 1090000002 HH PPS REVENUE DEBIT

## 2024-06-04 ASSESSMENT — ENCOUNTER SYMPTOMS
PERSON REPORTING PAIN: PATIENT
DENIES PAIN: 1

## 2024-06-05 ENCOUNTER — HOSPITAL ENCOUNTER (INPATIENT)
Facility: HOSPITAL | Age: 84
LOS: 2 days | Discharge: HOME HEALTH | DRG: 394 | End: 2024-06-07
Attending: EMERGENCY MEDICINE | Admitting: INTERNAL MEDICINE
Payer: MEDICARE

## 2024-06-05 ENCOUNTER — APPOINTMENT (OUTPATIENT)
Dept: RADIOLOGY | Facility: HOSPITAL | Age: 84
DRG: 394 | End: 2024-06-05
Payer: MEDICARE

## 2024-06-05 ENCOUNTER — APPOINTMENT (OUTPATIENT)
Dept: CARDIOLOGY | Facility: HOSPITAL | Age: 84
DRG: 394 | End: 2024-06-05
Payer: MEDICARE

## 2024-06-05 DIAGNOSIS — Z74.09 IMPAIRED FUNCTIONAL MOBILITY, BALANCE, GAIT, AND ENDURANCE: ICD-10-CM

## 2024-06-05 DIAGNOSIS — K63.1 BOWEL PERFORATION (MULTI): Primary | ICD-10-CM

## 2024-06-05 DIAGNOSIS — K52.9 COLITIS: ICD-10-CM

## 2024-06-05 DIAGNOSIS — R09.02 HYPOXIA: ICD-10-CM

## 2024-06-05 DIAGNOSIS — E87.6 HYPOKALEMIA: ICD-10-CM

## 2024-06-05 DIAGNOSIS — R53.1 GENERALIZED WEAKNESS: ICD-10-CM

## 2024-06-05 PROBLEM — R53.83 FATIGUE: Status: ACTIVE | Noted: 2024-06-05

## 2024-06-05 PROBLEM — E78.5 HYPERLIPIDEMIA: Status: ACTIVE | Noted: 2024-06-05

## 2024-06-05 PROBLEM — R54 FRAIL ELDERLY: Status: ACTIVE | Noted: 2024-06-05

## 2024-06-05 PROBLEM — R60.9 EDEMA: Status: RESOLVED | Noted: 2024-06-05 | Resolved: 2024-06-05

## 2024-06-05 PROBLEM — J18.9 PNEUMONIA: Status: RESOLVED | Noted: 2024-06-05 | Resolved: 2024-06-05

## 2024-06-05 PROBLEM — R07.81 RIB PAIN ON LEFT SIDE: Status: RESOLVED | Noted: 2018-11-06 | Resolved: 2024-06-05

## 2024-06-05 PROBLEM — J18.9 FREQUENT EPISODES OF PNEUMONIA: Status: RESOLVED | Noted: 2024-06-05 | Resolved: 2024-06-05

## 2024-06-05 PROBLEM — H04.123 DRY EYES, BILATERAL: Status: RESOLVED | Noted: 2018-04-11 | Resolved: 2024-06-05

## 2024-06-05 PROBLEM — K29.60 GASTRITIS DUE TO NONSTEROIDAL ANTI-INFLAMMATORY DRUG: Status: RESOLVED | Noted: 2024-06-05 | Resolved: 2024-06-05

## 2024-06-05 PROBLEM — S09.90XA INJURY OF HEAD: Status: RESOLVED | Noted: 2024-06-05 | Resolved: 2024-06-05

## 2024-06-05 PROBLEM — M06.9 RHEUMATOID ARTHRITIS (MULTI): Status: ACTIVE | Noted: 2024-06-05

## 2024-06-05 PROBLEM — E03.9 HYPOTHYROIDISM: Status: ACTIVE | Noted: 2024-06-05

## 2024-06-05 PROBLEM — M85.80 OSTEOPENIA: Status: ACTIVE | Noted: 2024-06-05

## 2024-06-05 PROBLEM — K86.2 CYST OF PANCREAS (HHS-HCC): Status: ACTIVE | Noted: 2020-01-24

## 2024-06-05 PROBLEM — R91.1 SOLITARY PULMONARY NODULE: Status: ACTIVE | Noted: 2024-06-05

## 2024-06-05 PROBLEM — J44.9 COPD (CHRONIC OBSTRUCTIVE PULMONARY DISEASE) (MULTI): Status: ACTIVE | Noted: 2024-06-05

## 2024-06-05 PROBLEM — I48.0 PAROXYSMAL ATRIAL FIBRILLATION (MULTI): Status: ACTIVE | Noted: 2020-01-24

## 2024-06-05 PROBLEM — R74.01 ELEVATED ALT MEASUREMENT: Status: RESOLVED | Noted: 2023-05-05 | Resolved: 2024-06-05

## 2024-06-05 PROBLEM — T39.395A GASTRITIS DUE TO NONSTEROIDAL ANTI-INFLAMMATORY DRUG: Status: RESOLVED | Noted: 2024-06-05 | Resolved: 2024-06-05

## 2024-06-05 PROBLEM — S32.10XA FRACTURE OF SACRUM (MULTI): Status: RESOLVED | Noted: 2024-06-05 | Resolved: 2024-06-05

## 2024-06-05 PROBLEM — E83.42 HYPOMAGNESEMIA: Status: RESOLVED | Noted: 2020-05-22 | Resolved: 2024-06-05

## 2024-06-05 PROBLEM — K21.9 GERD (GASTROESOPHAGEAL REFLUX DISEASE): Status: ACTIVE | Noted: 2024-06-05

## 2024-06-05 PROBLEM — I10 HTN (HYPERTENSION): Status: ACTIVE | Noted: 2024-06-05

## 2024-06-05 PROBLEM — G45.9 TRANSIENT ISCHEMIC ATTACK: Status: RESOLVED | Noted: 2024-06-05 | Resolved: 2024-06-05

## 2024-06-05 PROBLEM — I25.10 ARTERIOSCLEROSIS OF CORONARY ARTERY: Status: ACTIVE | Noted: 2024-06-05

## 2024-06-05 PROBLEM — E87.0 HYPERNATREMIA: Status: RESOLVED | Noted: 2024-06-05 | Resolved: 2024-06-05

## 2024-06-05 PROBLEM — K44.9 HIATAL HERNIA: Status: ACTIVE | Noted: 2024-06-05

## 2024-06-05 PROBLEM — F51.04 CHRONIC INSOMNIA: Status: RESOLVED | Noted: 2024-06-05 | Resolved: 2024-06-05

## 2024-06-05 PROBLEM — I95.9 HYPOTENSION: Status: RESOLVED | Noted: 2024-06-05 | Resolved: 2024-06-05

## 2024-06-05 PROBLEM — R26.9 ABNORMAL GAIT: Status: ACTIVE | Noted: 2024-06-05

## 2024-06-05 PROBLEM — S80.12XA CONTUSION OF LEFT LEG: Status: RESOLVED | Noted: 2024-06-05 | Resolved: 2024-06-05

## 2024-06-05 PROBLEM — R05.9 COUGH: Status: RESOLVED | Noted: 2024-06-05 | Resolved: 2024-06-05

## 2024-06-05 PROBLEM — K57.10 DUODENAL DIVERTICULUM: Status: RESOLVED | Noted: 2020-02-21 | Resolved: 2024-06-05

## 2024-06-05 PROBLEM — F03.90 DEMENTIA (MULTI): Status: ACTIVE | Noted: 2024-06-05

## 2024-06-05 LAB
ALBUMIN SERPL BCP-MCNC: 3.3 G/DL (ref 3.4–5)
ALP SERPL-CCNC: 46 U/L (ref 33–136)
ALT SERPL W P-5'-P-CCNC: 34 U/L (ref 7–45)
AMORPH CRY #/AREA UR COMP ASSIST: NORMAL /HPF
ANION GAP SERPL CALC-SCNC: 10 MMOL/L (ref 10–20)
APPEARANCE UR: ABNORMAL
AST SERPL W P-5'-P-CCNC: 19 U/L (ref 9–39)
ATRIAL RATE: 72 BPM
ATRIAL RATE: 76 BPM
BASOPHILS # BLD AUTO: 0.08 X10*3/UL (ref 0–0.1)
BASOPHILS NFR BLD AUTO: 0.4 %
BILIRUB SERPL-MCNC: 0.8 MG/DL (ref 0–1.2)
BILIRUB UR STRIP.AUTO-MCNC: NEGATIVE MG/DL
BUN SERPL-MCNC: 19 MG/DL (ref 6–23)
CALCIUM SERPL-MCNC: 8.4 MG/DL (ref 8.6–10.3)
CARDIAC TROPONIN I PNL SERPL HS: 12 NG/L (ref 0–13)
CARDIAC TROPONIN I PNL SERPL HS: 13 NG/L (ref 0–13)
CHLORIDE SERPL-SCNC: 104 MMOL/L (ref 98–107)
CO2 SERPL-SCNC: 29 MMOL/L (ref 21–32)
COLOR UR: ABNORMAL
CREAT SERPL-MCNC: 0.57 MG/DL (ref 0.5–1.05)
EGFRCR SERPLBLD CKD-EPI 2021: 89 ML/MIN/1.73M*2
EOSINOPHIL # BLD AUTO: 0.04 X10*3/UL (ref 0–0.4)
EOSINOPHIL NFR BLD AUTO: 0.2 %
ERYTHROCYTE [DISTWIDTH] IN BLOOD BY AUTOMATED COUNT: 13.6 % (ref 11.5–14.5)
GLUCOSE SERPL-MCNC: 130 MG/DL (ref 74–99)
GLUCOSE UR STRIP.AUTO-MCNC: NORMAL MG/DL
HCT VFR BLD AUTO: 37.4 % (ref 36–46)
HGB BLD-MCNC: 12.1 G/DL (ref 12–16)
IMM GRANULOCYTES # BLD AUTO: 0.13 X10*3/UL (ref 0–0.5)
IMM GRANULOCYTES NFR BLD AUTO: 0.7 % (ref 0–0.9)
KETONES UR STRIP.AUTO-MCNC: NEGATIVE MG/DL
LEUKOCYTE ESTERASE UR QL STRIP.AUTO: NEGATIVE
LIPASE SERPL-CCNC: 8 U/L (ref 9–82)
LYMPHOCYTES # BLD AUTO: 1.84 X10*3/UL (ref 0.8–3)
LYMPHOCYTES NFR BLD AUTO: 10 %
MAGNESIUM SERPL-MCNC: 1.79 MG/DL (ref 1.6–2.4)
MCH RBC QN AUTO: 30.3 PG (ref 26–34)
MCHC RBC AUTO-ENTMCNC: 32.4 G/DL (ref 32–36)
MCV RBC AUTO: 94 FL (ref 80–100)
MONOCYTES # BLD AUTO: 2.02 X10*3/UL (ref 0.05–0.8)
MONOCYTES NFR BLD AUTO: 11 %
MUCOUS THREADS #/AREA URNS AUTO: NORMAL /LPF
NEUTROPHILS # BLD AUTO: 14.32 X10*3/UL (ref 1.6–5.5)
NEUTROPHILS NFR BLD AUTO: 77.7 %
NITRITE UR QL STRIP.AUTO: NEGATIVE
NRBC BLD-RTO: 0 /100 WBCS (ref 0–0)
P AXIS: 44 DEGREES
P AXIS: 58 DEGREES
P OFFSET: 203 MS
P OFFSET: 210 MS
P ONSET: 147 MS
P ONSET: 149 MS
PH UR STRIP.AUTO: 7.5 [PH]
PLATELET # BLD AUTO: 198 X10*3/UL (ref 150–450)
POTASSIUM SERPL-SCNC: 3.2 MMOL/L (ref 3.5–5.3)
PR INTERVAL: 148 MS
PR INTERVAL: 150 MS
PROT SERPL-MCNC: 6.7 G/DL (ref 6.4–8.2)
PROT UR STRIP.AUTO-MCNC: ABNORMAL MG/DL
Q ONSET: 221 MS
Q ONSET: 224 MS
QRS COUNT: 12 BEATS
QRS COUNT: 13 BEATS
QRS DURATION: 76 MS
QRS DURATION: 76 MS
QT INTERVAL: 412 MS
QT INTERVAL: 412 MS
QTC CALCULATION(BAZETT): 451 MS
QTC CALCULATION(BAZETT): 463 MS
QTC FREDERICIA: 438 MS
QTC FREDERICIA: 445 MS
R AXIS: -4 DEGREES
R AXIS: 1 DEGREES
RBC # BLD AUTO: 3.99 X10*6/UL (ref 4–5.2)
RBC # UR STRIP.AUTO: NEGATIVE /UL
RBC #/AREA URNS AUTO: NORMAL /HPF
SODIUM SERPL-SCNC: 140 MMOL/L (ref 136–145)
SP GR UR STRIP.AUTO: 1.01
T AXIS: 68 DEGREES
T AXIS: 75 DEGREES
T OFFSET: 427 MS
T OFFSET: 430 MS
UROBILINOGEN UR STRIP.AUTO-MCNC: NORMAL MG/DL
VENTRICULAR RATE: 72 BPM
VENTRICULAR RATE: 76 BPM
WBC # BLD AUTO: 18.4 X10*3/UL (ref 4.4–11.3)
WBC #/AREA URNS AUTO: NORMAL /HPF

## 2024-06-05 PROCEDURE — 93005 ELECTROCARDIOGRAM TRACING: CPT

## 2024-06-05 PROCEDURE — 81003 URINALYSIS AUTO W/O SCOPE: CPT | Performed by: EMERGENCY MEDICINE

## 2024-06-05 PROCEDURE — 94760 N-INVAS EAR/PLS OXIMETRY 1: CPT | Mod: IPSPLIT

## 2024-06-05 PROCEDURE — 99285 EMERGENCY DEPT VISIT HI MDM: CPT | Mod: 25

## 2024-06-05 PROCEDURE — 83690 ASSAY OF LIPASE: CPT | Performed by: EMERGENCY MEDICINE

## 2024-06-05 PROCEDURE — 84484 ASSAY OF TROPONIN QUANT: CPT | Mod: 91 | Performed by: EMERGENCY MEDICINE

## 2024-06-05 PROCEDURE — 1090000002 HH PPS REVENUE DEBIT

## 2024-06-05 PROCEDURE — 36415 COLL VENOUS BLD VENIPUNCTURE: CPT | Performed by: EMERGENCY MEDICINE

## 2024-06-05 PROCEDURE — 1100000001 HC PRIVATE ROOM DAILY: Mod: IPSPLIT

## 2024-06-05 PROCEDURE — 2500000004 HC RX 250 GENERAL PHARMACY W/ HCPCS (ALT 636 FOR OP/ED): Performed by: EMERGENCY MEDICINE

## 2024-06-05 PROCEDURE — 2550000001 HC RX 255 CONTRASTS: Performed by: EMERGENCY MEDICINE

## 2024-06-05 PROCEDURE — 71275 CT ANGIOGRAPHY CHEST: CPT

## 2024-06-05 PROCEDURE — 74177 CT ABD & PELVIS W/CONTRAST: CPT

## 2024-06-05 PROCEDURE — 2500000005 HC RX 250 GENERAL PHARMACY W/O HCPCS: Mod: IPSPLIT | Performed by: NURSE PRACTITIONER

## 2024-06-05 PROCEDURE — 2500000004 HC RX 250 GENERAL PHARMACY W/ HCPCS (ALT 636 FOR OP/ED): Mod: IPSPLIT | Performed by: NURSE PRACTITIONER

## 2024-06-05 PROCEDURE — 2500000002 HC RX 250 W HCPCS SELF ADMINISTERED DRUGS (ALT 637 FOR MEDICARE OP, ALT 636 FOR OP/ED): Mod: MUE | Performed by: EMERGENCY MEDICINE

## 2024-06-05 PROCEDURE — 99222 1ST HOSP IP/OBS MODERATE 55: CPT | Performed by: SURGERY

## 2024-06-05 PROCEDURE — 1090000001 HH PPS REVENUE CREDIT

## 2024-06-05 PROCEDURE — 71275 CT ANGIOGRAPHY CHEST: CPT | Performed by: RADIOLOGY

## 2024-06-05 PROCEDURE — 84075 ASSAY ALKALINE PHOSPHATASE: CPT | Performed by: EMERGENCY MEDICINE

## 2024-06-05 PROCEDURE — 83735 ASSAY OF MAGNESIUM: CPT | Performed by: EMERGENCY MEDICINE

## 2024-06-05 PROCEDURE — 85025 COMPLETE CBC W/AUTO DIFF WBC: CPT | Performed by: EMERGENCY MEDICINE

## 2024-06-05 PROCEDURE — 74177 CT ABD & PELVIS W/CONTRAST: CPT | Performed by: RADIOLOGY

## 2024-06-05 PROCEDURE — 96365 THER/PROPH/DIAG IV INF INIT: CPT | Mod: 59

## 2024-06-05 PROCEDURE — 84484 ASSAY OF TROPONIN QUANT: CPT | Performed by: EMERGENCY MEDICINE

## 2024-06-05 RX ORDER — LANOLIN ALCOHOL/MO/W.PET/CERES
400 CREAM (GRAM) TOPICAL DAILY
Status: DISCONTINUED | OUTPATIENT
Start: 2024-06-05 | End: 2024-06-07 | Stop reason: HOSPADM

## 2024-06-05 RX ORDER — HYDROXYZINE HYDROCHLORIDE 25 MG/1
25 TABLET, FILM COATED ORAL 3 TIMES DAILY PRN
Status: DISCONTINUED | OUTPATIENT
Start: 2024-06-05 | End: 2024-06-07 | Stop reason: HOSPADM

## 2024-06-05 RX ORDER — PREDNISONE 5 MG/1
2.5 TABLET ORAL DAILY
Status: DISCONTINUED | OUTPATIENT
Start: 2024-06-05 | End: 2024-06-07 | Stop reason: HOSPADM

## 2024-06-05 RX ORDER — SODIUM CHLORIDE 9 MG/ML
10 INJECTION, SOLUTION INTRAVENOUS CONTINUOUS PRN
Status: DISCONTINUED | OUTPATIENT
Start: 2024-06-05 | End: 2024-06-07 | Stop reason: HOSPADM

## 2024-06-05 RX ORDER — ACETAMINOPHEN 650 MG/1
650 SUPPOSITORY RECTAL EVERY 4 HOURS PRN
Status: DISCONTINUED | OUTPATIENT
Start: 2024-06-05 | End: 2024-06-07 | Stop reason: HOSPADM

## 2024-06-05 RX ORDER — METOPROLOL TARTRATE 25 MG/1
25 TABLET, FILM COATED ORAL 2 TIMES DAILY
Status: DISCONTINUED | OUTPATIENT
Start: 2024-06-05 | End: 2024-06-07 | Stop reason: HOSPADM

## 2024-06-05 RX ORDER — TRAZODONE HYDROCHLORIDE 50 MG/1
50 TABLET ORAL NIGHTLY
Status: DISCONTINUED | OUTPATIENT
Start: 2024-06-05 | End: 2024-06-07 | Stop reason: HOSPADM

## 2024-06-05 RX ORDER — CHOLESTYRAMINE 4 G/9G
4 POWDER, FOR SUSPENSION ORAL 2 TIMES DAILY
Status: DISCONTINUED | OUTPATIENT
Start: 2024-06-05 | End: 2024-06-07 | Stop reason: HOSPADM

## 2024-06-05 RX ORDER — AMLODIPINE BESYLATE 5 MG/1
5 TABLET ORAL DAILY
Status: DISCONTINUED | OUTPATIENT
Start: 2024-06-05 | End: 2024-06-07 | Stop reason: HOSPADM

## 2024-06-05 RX ORDER — CALCIUM CARBONATE/VITAMIN D3 250-3.125
1 TABLET ORAL DAILY
COMMUNITY

## 2024-06-05 RX ORDER — LEVOTHYROXINE SODIUM 50 UG/1
50 TABLET ORAL
Status: DISCONTINUED | OUTPATIENT
Start: 2024-06-06 | End: 2024-06-07 | Stop reason: HOSPADM

## 2024-06-05 RX ORDER — POLYETHYLENE GLYCOL 3350 17 G/17G
17 POWDER, FOR SOLUTION ORAL DAILY
Status: DISCONTINUED | OUTPATIENT
Start: 2024-06-05 | End: 2024-06-07 | Stop reason: HOSPADM

## 2024-06-05 RX ORDER — ATORVASTATIN CALCIUM 40 MG/1
40 TABLET, FILM COATED ORAL DAILY
Status: DISCONTINUED | OUTPATIENT
Start: 2024-06-05 | End: 2024-06-07 | Stop reason: HOSPADM

## 2024-06-05 RX ORDER — LISINOPRIL 20 MG/1
20 TABLET ORAL DAILY
Status: DISCONTINUED | OUTPATIENT
Start: 2024-06-05 | End: 2024-06-07 | Stop reason: HOSPADM

## 2024-06-05 RX ORDER — POTASSIUM CHLORIDE 20 MEQ/1
40 TABLET, EXTENDED RELEASE ORAL ONCE
Status: COMPLETED | OUTPATIENT
Start: 2024-06-05 | End: 2024-06-05

## 2024-06-05 RX ORDER — ASPIRIN 81 MG/1
81 TABLET ORAL DAILY
Status: DISCONTINUED | OUTPATIENT
Start: 2024-06-05 | End: 2024-06-07 | Stop reason: HOSPADM

## 2024-06-05 RX ORDER — HEPARIN SODIUM 5000 [USP'U]/ML
5000 INJECTION, SOLUTION INTRAVENOUS; SUBCUTANEOUS EVERY 8 HOURS
Status: DISCONTINUED | OUTPATIENT
Start: 2024-06-05 | End: 2024-06-07 | Stop reason: HOSPADM

## 2024-06-05 RX ORDER — ONDANSETRON HYDROCHLORIDE 2 MG/ML
4 INJECTION, SOLUTION INTRAVENOUS EVERY 8 HOURS PRN
Status: DISCONTINUED | OUTPATIENT
Start: 2024-06-05 | End: 2024-06-07 | Stop reason: HOSPADM

## 2024-06-05 RX ORDER — PANTOPRAZOLE SODIUM 40 MG/10ML
40 INJECTION, POWDER, LYOPHILIZED, FOR SOLUTION INTRAVENOUS
Status: DISCONTINUED | OUTPATIENT
Start: 2024-06-06 | End: 2024-06-07 | Stop reason: HOSPADM

## 2024-06-05 RX ORDER — BISMUTH SUBSALICYLATE 262 MG
1 TABLET,CHEWABLE ORAL DAILY
COMMUNITY

## 2024-06-05 RX ORDER — GLUCOSAM/CHONDRO/HERB 149/HYAL 750-100 MG
1 TABLET ORAL DAILY
COMMUNITY

## 2024-06-05 RX ORDER — SODIUM CHLORIDE 9 MG/ML
75 INJECTION, SOLUTION INTRAVENOUS CONTINUOUS
Status: DISCONTINUED | OUTPATIENT
Start: 2024-06-05 | End: 2024-06-07 | Stop reason: HOSPADM

## 2024-06-05 RX ORDER — METOPROLOL TARTRATE 1 MG/ML
5 INJECTION, SOLUTION INTRAVENOUS EVERY 6 HOURS PRN
Status: DISCONTINUED | OUTPATIENT
Start: 2024-06-05 | End: 2024-06-07 | Stop reason: HOSPADM

## 2024-06-05 RX ORDER — LEVOTHYROXINE SODIUM 20 UG/ML
25 INJECTION, SOLUTION INTRAVENOUS
Status: DISCONTINUED | OUTPATIENT
Start: 2024-06-05 | End: 2024-06-06

## 2024-06-05 RX ORDER — PREDNISONE 1 MG/1
2.5 TABLET ORAL DAILY
COMMUNITY

## 2024-06-05 RX ORDER — POLYETHYLENE GLYCOL 3350 17 G/17G
17 POWDER, FOR SOLUTION ORAL DAILY PRN
Status: DISCONTINUED | OUTPATIENT
Start: 2024-06-05 | End: 2024-06-07 | Stop reason: HOSPADM

## 2024-06-05 RX ADMIN — PIPERACILLIN SODIUM AND TAZOBACTAM SODIUM 4.5 G: 4; .5 INJECTION, SOLUTION INTRAVENOUS at 12:08

## 2024-06-05 RX ADMIN — POTASSIUM CHLORIDE 40 MEQ: 1500 TABLET, EXTENDED RELEASE ORAL at 10:23

## 2024-06-05 RX ADMIN — IOHEXOL 63 ML: 350 INJECTION, SOLUTION INTRAVENOUS at 10:04

## 2024-06-05 RX ADMIN — PIPERACILLIN SODIUM AND TAZOBACTAM SODIUM 4.5 G: 4; .5 INJECTION, SOLUTION INTRAVENOUS at 23:34

## 2024-06-05 RX ADMIN — SODIUM CHLORIDE 100 ML/HR: 9 INJECTION, SOLUTION INTRAVENOUS at 14:51

## 2024-06-05 RX ADMIN — SODIUM CHLORIDE 100 ML/HR: 9 INJECTION, SOLUTION INTRAVENOUS at 13:30

## 2024-06-05 RX ADMIN — PIPERACILLIN SODIUM AND TAZOBACTAM SODIUM 4.5 G: 4; .5 INJECTION, SOLUTION INTRAVENOUS at 17:32

## 2024-06-05 RX ADMIN — Medication 2 L/MIN: at 18:28

## 2024-06-05 SDOH — SOCIAL STABILITY: SOCIAL INSECURITY: ABUSE: ADULT

## 2024-06-05 SDOH — SOCIAL STABILITY: SOCIAL INSECURITY: ARE YOU OR HAVE YOU BEEN THREATENED OR ABUSED PHYSICALLY, EMOTIONALLY, OR SEXUALLY BY ANYONE?: NO

## 2024-06-05 SDOH — SOCIAL STABILITY: SOCIAL INSECURITY: DO YOU FEEL ANYONE HAS EXPLOITED OR TAKEN ADVANTAGE OF YOU FINANCIALLY OR OF YOUR PERSONAL PROPERTY?: NO

## 2024-06-05 SDOH — SOCIAL STABILITY: SOCIAL INSECURITY: HAVE YOU HAD ANY THOUGHTS OF HARMING ANYONE ELSE?: NO

## 2024-06-05 SDOH — SOCIAL STABILITY: SOCIAL INSECURITY: DO YOU FEEL UNSAFE GOING BACK TO THE PLACE WHERE YOU ARE LIVING?: NO

## 2024-06-05 SDOH — SOCIAL STABILITY: SOCIAL INSECURITY: ARE THERE ANY APPARENT SIGNS OF INJURIES/BEHAVIORS THAT COULD BE RELATED TO ABUSE/NEGLECT?: NO

## 2024-06-05 SDOH — SOCIAL STABILITY: SOCIAL INSECURITY: DOES ANYONE TRY TO KEEP YOU FROM HAVING/CONTACTING OTHER FRIENDS OR DOING THINGS OUTSIDE YOUR HOME?: NO

## 2024-06-05 SDOH — SOCIAL STABILITY: SOCIAL INSECURITY: HAS ANYONE EVER THREATENED TO HURT YOUR FAMILY OR YOUR PETS?: NO

## 2024-06-05 SDOH — SOCIAL STABILITY: SOCIAL INSECURITY: HAVE YOU HAD THOUGHTS OF HARMING ANYONE ELSE?: NO

## 2024-06-05 ASSESSMENT — COGNITIVE AND FUNCTIONAL STATUS - GENERAL
DRESSING REGULAR UPPER BODY CLOTHING: A LOT
WALKING IN HOSPITAL ROOM: A LOT
MOVING TO AND FROM BED TO CHAIR: A LITTLE
DRESSING REGULAR UPPER BODY CLOTHING: A LOT
WALKING IN HOSPITAL ROOM: A LOT
PERSONAL GROOMING: A LITTLE
MOVING TO AND FROM BED TO CHAIR: A LITTLE
CLIMB 3 TO 5 STEPS WITH RAILING: A LOT
CLIMB 3 TO 5 STEPS WITH RAILING: A LOT
MOVING FROM LYING ON BACK TO SITTING ON SIDE OF FLAT BED WITH BEDRAILS: A LITTLE
TOILETING: A LOT
DRESSING REGULAR LOWER BODY CLOTHING: A LOT
TURNING FROM BACK TO SIDE WHILE IN FLAT BAD: A LITTLE
DAILY ACTIVITIY SCORE: 15
MOBILITY SCORE: 16
TURNING FROM BACK TO SIDE WHILE IN FLAT BAD: A LITTLE
DRESSING REGULAR LOWER BODY CLOTHING: A LOT
DAILY ACTIVITIY SCORE: 15
TOILETING: A LOT
STANDING UP FROM CHAIR USING ARMS: A LITTLE
PATIENT BASELINE BEDBOUND: NO
STANDING UP FROM CHAIR USING ARMS: A LITTLE
PERSONAL GROOMING: A LITTLE
HELP NEEDED FOR BATHING: A LOT
HELP NEEDED FOR BATHING: A LOT

## 2024-06-05 ASSESSMENT — ENCOUNTER SYMPTOMS
CHILLS: 0
WEAKNESS: 1
ACTIVITY CHANGE: 1
EYES NEGATIVE: 1
ABDOMINAL PAIN: 1
CARDIOVASCULAR NEGATIVE: 1
HEMATOLOGIC/LYMPHATIC NEGATIVE: 1
PSYCHIATRIC NEGATIVE: 1
DIAPHORESIS: 0
DIARRHEA: 0
SPEECH DIFFICULTY: 1
NAUSEA: 0
VOMITING: 0
FATIGUE: 0
FEVER: 0
ABDOMINAL DISTENTION: 1
APPETITE CHANGE: 0
LIGHT-HEADEDNESS: 1
ENDOCRINE NEGATIVE: 1
COUGH: 1

## 2024-06-05 ASSESSMENT — ACTIVITIES OF DAILY LIVING (ADL)
BATHING: INDEPENDENT
PATIENT'S MEMORY ADEQUATE TO SAFELY COMPLETE DAILY ACTIVITIES?: NO
DRESSING YOURSELF: INDEPENDENT
LACK_OF_TRANSPORTATION: NO
TOILETING: INDEPENDENT
WALKS IN HOME: INDEPENDENT
GROOMING: INDEPENDENT
ADEQUATE_TO_COMPLETE_ADL: YES
JUDGMENT_ADEQUATE_SAFELY_COMPLETE_DAILY_ACTIVITIES: YES
HEARING - LEFT EAR: FUNCTIONAL
FEEDING YOURSELF: INDEPENDENT
HEARING - RIGHT EAR: FUNCTIONAL

## 2024-06-05 ASSESSMENT — LIFESTYLE VARIABLES
HOW MANY STANDARD DRINKS CONTAINING ALCOHOL DO YOU HAVE ON A TYPICAL DAY: PATIENT DOES NOT DRINK
AUDIT-C TOTAL SCORE: 0
HOW OFTEN DO YOU HAVE A DRINK CONTAINING ALCOHOL: NEVER
HOW OFTEN DO YOU HAVE 6 OR MORE DRINKS ON ONE OCCASION: NEVER
AUDIT-C TOTAL SCORE: 0
SKIP TO QUESTIONS 9-10: 1

## 2024-06-05 ASSESSMENT — PAIN SCALES - GENERAL
PAINLEVEL_OUTOF10: 0 - NO PAIN
PAINLEVEL_OUTOF10: 0 - NO PAIN

## 2024-06-05 ASSESSMENT — PAIN - FUNCTIONAL ASSESSMENT
PAIN_FUNCTIONAL_ASSESSMENT: 0-10
PAIN_FUNCTIONAL_ASSESSMENT: 0-10

## 2024-06-05 NOTE — ED PROVIDER NOTES
HPI   Chief Complaint   Patient presents with    Weakness, Gen       HPI  Patient is an 83-year-old female brought to the ED today for generalized weakness.  Daughter at the bedside explains that patient undergoes physical therapy twice a week to try and increase her strength.  She is normally ambulatory with a walker.  Apparently after physical therapy yesterday, patient was having difficulty walking with her walker.  Daughter describes a wide-based gait.  Patient noted that she was having pain in her buttocks following therapy.  This morning however, caregiver was having difficulty getting patient out of bed.  She is normally able to get up on her own and walk herself to the bathroom.  With this change, she was brought to the ED for further evaluation.  At this time, patient complains of lower abdominal pain.  She otherwise denies any chest pain or shortness of breath.  Daughter at bedside denies any recent fever, cough, vomiting, or changes in bowel or bladder habits.  Patient is not on any anticoagulation.  Daughter notes that there was a previous mention of atrial fibrillation during 1 hospitalization, but patient follows with Dr. Cole and does not have atrial fibrillation chronically.                  Santosh Coma Scale Score: 14                     Patient History   Past Medical History:   Diagnosis Date    COPD (chronic obstructive pulmonary disease) (Multi)     Deficiency of other specified B group vitamins 2016    Vitamin B12 deficiency    Dementia (Multi)     Encounter for examination of eyes and vision without abnormal findings     Diabetic eye exam    Hypertension     Occlusion and stenosis of bilateral carotid arteries 2016    Atherosclerosis of both carotid arteries    Personal history of other medical treatment     History of mammogram     Past Surgical History:   Procedure Laterality Date     SECTION, CLASSIC  2016     Section    CHOLECYSTECTOMY  2014     Subjective:      Melba Frye is a 47 y.o. female who presents with Stool Color Change (Today dark black (solid))            GI Problem   This is a new problem. The current episode started more than 1 month ago. The problem occurs intermittently. Associated symptoms include abdominal pain and a change in bowel habit. Pertinent negatives include no anorexia, chills, fever, nausea, rash, urinary symptoms or vomiting. Exacerbated by: spicy foods. Treatments tried: PeptoBismal. The treatment provided mild relief.   Patient notes over the last several weeks has had episodes of diarrhea for several hours after eating spicy foods, some lower abdominal pains.  Had dose of Pepto-Bismol 2 days ago.  Today notes black loose stools, some crampy lower abdominal pain.    Review of Systems   Constitutional: Negative for chills, fever and malaise/fatigue.   Gastrointestinal: Positive for abdominal pain, change in bowel habit, diarrhea and melena. Negative for anorexia, blood in stool, constipation, heartburn, nausea and vomiting.   Genitourinary: Negative for dysuria, flank pain, frequency, hematuria and urgency.   Skin: Negative for rash.   Neurological: Negative for dizziness.     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  MEDS:   Current Outpatient Prescriptions:   •  venlafaxine (EFFEXOR) 25 MG Tab, Take 25 mg by mouth 3 times a day., Disp: , Rfl:   •  phenazopyridine (PYRIDIUM) 100 MG Tab, Take 1 Tab by mouth 3 times a day as needed. (Patient not taking: Reported on 2/25/2019), Disp: 6 Tab, Rfl: 0  •  SUMAtriptan Succinate (IMITREX PO), Take  by mouth., Disp: , Rfl:   ALLERGIES:   Allergies  "  Allergen Reactions   • Bactrim      Fatigue and ringing of the ears     SURGHX:   Past Surgical History:   Procedure Laterality Date   • MYRINGOTOMY  8/27/2010    Performed by [address] at SURGERY SAME DAY [facility] ORS   • LAPAROSCOPY  5/28/2010    Performed by JACKI SHARMA at SURGERY [facility] ORS   • LYMPH NODE SAMPLING  5/28/2010    Performed by JACKI SHARMA at SURGERY [facility] ORS   • DEBULKING  5/28/2010    Performed by JACKI SHARMA at SURGERY [facility] ORS   • CYSTOSCOPY  10/15/08    Performed by YU GODINEZ at SURGERY SAME DAY [facility]   • VAGINAL HYSTERECTOMY SCOPE TOTAL  10/15/08    Performed by YU GODINEZ at SURGERY SAME DAY [facility] OR[facility]   • OTHER  1984    TONSILECTOMY/ ADNOIDS   • APPENDECTOMY  1981   • TONSILLECTOMY AND ADENOIDECTOMY       SOCHX:  reports that she has never smoked. She has never used smokeless tobacco. She reports that she does not drink alcohol or use drugs.  FH: family history includes Arthritis in her mother; Cancer in her mother; Cancer (age of onset: 73) in her father; Diabetes in her paternal aunt; Heart Disease in her mother; Hyperlipidemia in her paternal aunt; Hypertension in her mother; Lung Disease in her maternal grandmother, mother, and paternal aunt; Non-contributory in her father and mother; Psychiatry in her mother; Stroke in her mother.       Objective:     /88 (BP Location: Right arm, Patient Position: Sitting, BP Cuff Size: Adult)   Pulse 76   Temp 37.1 °C (98.7 °F) (Temporal)   Resp 16   Ht 1.727 m (5' 8\")   Wt 109.3 kg (241 lb)   SpO2 98%   BMI 36.64 kg/m²      Physical Exam   Constitutional: She appears well-developed and well-nourished. She is cooperative. She does not have a sickly appearance. She does not appear ill. No distress.   HENT:   Mouth/Throat: Oropharynx is clear and moist.   Eyes: Conjunctivae are normal.   Neck: Phonation normal. No thyromegaly present.   Cardiovascular: Normal rate, regular rhythm and " Cholecystectomy    COLONOSCOPY  02/12/2014    Colonoscopy (Fiberoptic)    CT HEAD ANGIO W AND WO IV CONTRAST  7/22/2021    CT HEAD ANGIO W AND WO IV CONTRAST 7/22/2021 [facility]    ESOPHAGOGASTRODUODENOSCOPY  02/12/2014    Diagnostic Esophagogastroduodenoscopy    GALLBLADDER SURGERY  02/04/2016    Gallbladder Surgery    MR HEAD ANGIO WO IV CONTRAST  7/21/2021    MR HEAD ANGIO WO IV CONTRAST 7/21/2021 [facility]    MR NECK ANGIO WO IV CONTRAST  7/21/2021    MR NECK ANGIO WO IV CONTRAST 7/21/2021 [facility]     No family history on file.  Social History     Tobacco Use    Smoking status: Never    Smokeless tobacco: Never   Vaping Use    Vaping status: Never Used   Substance Use Topics    Alcohol use: Never    Drug use: Never       Physical Exam   ED Triage Vitals   Temperature Heart Rate Respirations BP   06/05/24 0746 06/05/24 0746 06/05/24 0746 06/05/24 0746   37.7 °C (99.8 °F) 83 16 (!) 182/78      Pulse Ox Temp Source Heart Rate Source Patient Position   06/05/24 0746 06/05/24 0746 06/05/24 0746 06/05/24 0746   95 % Oral Monitor Sitting      BP Location FiO2 (%)     06/05/24 0746 06/05/24 0758     Right arm 32 %       Physical Exam  Vitals and nursing note reviewed.   Constitutional:       General: She is not in acute distress.     Appearance: She is not toxic-appearing.   HENT:      Head: Normocephalic and atraumatic.      Mouth/Throat:      Mouth: Mucous membranes are moist.   Eyes:      Extraocular Movements: Extraocular movements intact.      Conjunctiva/sclera: Conjunctivae normal.   Cardiovascular:      Rate and Rhythm: Normal rate and regular rhythm.      Pulses: Normal pulses.   Pulmonary:      Effort: Pulmonary effort is normal. No respiratory distress.      Breath sounds: Normal breath sounds. No wheezing.   Abdominal:      Palpations: Abdomen is soft.      Comments: Mildly distended with mild suprapubic tenderness to palpation   Musculoskeletal:         General: No  normal heart sounds.    No murmur heard.  Pulmonary/Chest: Effort normal and breath sounds normal.   Abdominal: Soft. She exhibits no distension. There is no hepatosplenomegaly. There is tenderness in the left lower quadrant. There is no rigidity, no rebound, no guarding and no CVA tenderness. Hernia confirmed negative in the ventral area.   Genitourinary: Rectal exam shows external hemorrhoid and guaiac positive stool. Rectal exam shows no internal hemorrhoid, no fissure and no tenderness. Pelvic exam was performed with patient in the knee-chest position.   Genitourinary Comments: Examination performed with female chaperone.   Neurological: She is alert.   Skin: Skin is warm and dry.   Psychiatric: She has a normal mood and affect.     Black stools likely related to recent use of Pepto-Bismol, however Hemoccult positive.  Tenderness left lower abdomen consistent with colitis versus diverticulitis; cannot rule out bleeding.  Advised patient of need for ED evaluation and management; she agreed.  Patient appears stable enough to transport by private vehicle to [facility].  [facility] ED transfer center provided telephone report.     POCT occult blood positive  POCT urinalysis negative   Assessment/Plan:     1. Left lower quadrant abdominal tenderness without rebound tenderness  NPO    2. Melena       swelling or tenderness.      Cervical back: Neck supple.      Comments: Full active range of motion of bilateral upper and lower extremities without pain   Skin:     General: Skin is warm and dry.      Capillary Refill: Capillary refill takes less than 2 seconds.   Neurological:      Mental Status: She is alert. Mental status is at baseline.      Comments: This patient is awake, alert and oriented to person and place. Not oriented to time, consistent with baseline. Speech is clear and fluent. Cranial nerves II-XII are grossly intact. Strength and sensation are intact in all extremities. No limb ataxia. No pronator drift.          ED Course & MDM   ED Course as of 06/05/24 1402   Wed Jun 05, 2024   0810 EKG obtained at 803, interpreted by myself.  Normal sinus rhythm with ventricular rate of 76, no axis deviation, normal intervals, with no acute ischemic changes [VT]   0939 EKG obtained at 929, interpreted by myself.  Normal sinus rhythm with a ventricular rate of 72, no axis deviation, normal intervals, with no acute ischemic changes [VT]      ED Course User Index  [VT] Breonna BENITEZ MD         Diagnoses as of 06/05/24 1402   Hypokalemia   Colitis   Hypoxia   Bowel perforation (Multi)       Medical Decision Making  Patient was seen and evaluated for generalized weakness.  Differential diagnosis includes but is not limited to ACS, PE, pneumonia, dehydration, traumatic injury, electrolyte abnormality.  On arrival, patient is oxygenating in the high 80s on room air.  Patient does not normally require supplemental oxygen.  She is placed on 3 L nasal cannula at this time.  Additional labs and imaging are ordered for further evaluation of the patient's symptoms.    CBC shows leukocytosis with a WBC of 18.4, otherwise unremarkable.  CMP shows mild hypokalemia with potassium of 3.2.  This is replaced with 40 mill equivalents KCl orally.  Lipase is normal.  Urinalysis is not indicative of infection.  High-sensitivity troponin  is normal.    CT angio chest for pulmonary embolism   Final Result   1.  No pulmonary emboli or confluent airspace disease.   2. Enlargement of the pulmonary artery suggestive of pulmonary   hypertension.   3. Small layering right pleural effusion.             MACRO:   None        Signed by: Doug Anthony 6/5/2024 10:55 AM   Dictation workstation:   [id number]      CT abdomen pelvis w IV contrast   Final Result   1. Severe circumferential wall thickening of the terminal ileum with   surrounding inflammatory changes and focal contained perforation   along the superior margin of the terminal ileum. These changes are   new from relatively recent prior from 05/16/2024. Correlate with   history of inflammatory bowel disease. Allowing for the short   interval, infectious or ischemic causes should be considered.   Although not performed as a CTA, the mesenteric vasculature does   appear patent with significant background calcific atherosclerotic   disease.   2. Enlarged outpouching of the duodenal and proximal jejunum likely   related to enlarged diverticuli as no surrounding inflammatory   changes or free air identified. This may be confirmed with elective   upper GI.   3. Multiple loculated fluid collections surrounding the pancreas   which have decreased slightly in size over the interval. Findings   suggest pseudocyst formation.   4. Continued intra and extrahepatic biliary dilation with the common   bile duct measuring up to 14 mm.   5. Small layering right pleural effusion.             Signed by: Doug Anthony 6/5/2024 11:19 AM   Dictation workstation:   [id number]        Given imaging findings consistent with colitis and focal contained perforation, in the setting of patient's presentation and leukocytosis, patient is started on IV Zosyn.  I discussed the case with Dr. Drake, surgery.  He agrees with admission here at [place], and requests that patient be admitted to the medicine service.    Patient and daughter at  bedside were informed of their lab and imaging results, and all questions and concerns were answered. Admission planning for further management was discussed at this time, to which family was agreeable.  I discussed the case with Magda Campa, MILAN on-call for the hospitalist service, who accepts patient for admission under Dr. De La Vega.    Procedure  Procedures     Breonna BENITEZ MD  06/05/24 5968

## 2024-06-05 NOTE — H&P
History Of Present Illness  Maisha Agosto is a 83 y.o. female presenting with a complaint of weakness. Patient is a poor historian; she reports she came to the hospital because she could not walk. She is normally ambulatory with a walker and has PT twice a week; after PT yesterday she was having difficulty ambulating with her walker. Patient complained of pain in her abdomen and her buttocks. This morning she was having difficulty getting out of bed. She denies chest pain, fever, chills, SOB, N/V/D/C.    In the ED:  Lab: Glu 130; Na 140; K 3.2; BuN  19; Cr 0.57; WBC 18.4; Hb 12.1; Hcrt 37.4;   Radiology: CT chest No pulmonary emboli or confluent airspace disease. Enlargement of the pulmonary artery suggestive of pulmonary  hypertension. Small layering right pleural effusion. CT abd Severe circumferential wall thickening of the terminal ileum with surrounding inflammatory changes and focal contained perforation along the superior margin of the terminal ileum. These changes are new from relatively recent prior from 05/16/2024. Correlate with history of inflammatory bowel disease. Allowing for the short interval, infectious or ischemic causes should be considered.  Medication: zosyn, KCL  Disposition: admitted to med/surg     Past Medical History  Past Medical History:   Diagnosis Date    Ataxia     CAD (coronary artery disease)     COPD (chronic obstructive pulmonary disease) (Multi)     Deficiency of other specified B group vitamins 08/17/2016    Vitamin B12 deficiency    Dementia (Multi)     Dementia (Multi)     Diabetes mellitus (Multi)     Encounter for examination of eyes and vision without abnormal findings     Diabetic eye exam    GERD (gastroesophageal reflux disease)     Hiatal hernia     HLD (hyperlipidemia)     Hypertension     Hypothyroidism     Insomnia     Occlusion and stenosis of bilateral carotid arteries 08/17/2016    Atherosclerosis of both carotid arteries    Personal history of other  medical treatment     History of mammogram    Rheumatoid arthritis (Multi)        Surgical History  Past Surgical History:   Procedure Laterality Date     SECTION, CLASSIC  2016     Section     SECTION, LOW TRANSVERSE      CHOLECYSTECTOMY  2014    Cholecystectomy    COLONOSCOPY  2014    Colonoscopy (Fiberoptic)    CT HEAD ANGIO W AND WO IV CONTRAST  2021    CT HEAD ANGIO W AND WO IV CONTRAST [facility]    ESOPHAGOGASTRODUODENOSCOPY  2014    Diagnostic Esophagogastroduodenoscopy    EYE SURGERY      GALLBLADDER SURGERY  2016    Gallbladder Surgery    MR HEAD ANGIO WO IV CONTRAST  2021    MR HEAD ANGIO WO IV CONTRAST 2021 [facility]    MR NECK ANGIO WO IV CONTRAST  2021    MR NECK ANGIO WO IV CONTRAST 2021 [facility]        Social History  She reports that she has never smoked. She has never used smokeless tobacco. She reports that she does not drink alcohol and does not use drugs.    Family History  Family History   Problem Relation Name Age of Onset    Diabetes Mother          Allergies  Amoxicillin-pot clavulanate, Chloroquine phosphate, Ciprofloxacin, and Methotrexate    Review of Systems   Constitutional:  Positive for activity change. Negative for appetite change, chills, diaphoresis, fatigue and fever.   HENT: Negative.     Eyes: Negative.    Respiratory:  Positive for cough.    Cardiovascular: Negative.    Gastrointestinal:  Positive for abdominal pain. Negative for diarrhea, nausea and vomiting.   Endocrine: Negative.    Genitourinary: Negative.    Musculoskeletal:  Positive for gait problem.   Skin: Negative.    Hematological: Negative.    Psychiatric/Behavioral: Negative.          Physical Exam  Vitals reviewed.   Constitutional:       Appearance: Normal appearance. She is normal weight.   HENT:      Head: Normocephalic and atraumatic.      Nose: Nose normal.      Mouth/Throat:       "Mouth: Mucous membranes are moist.      Pharynx: Oropharynx is clear.   Eyes:      Conjunctiva/sclera: Conjunctivae normal.      Pupils: Pupils are equal, round, and reactive to light.   Cardiovascular:      Rate and Rhythm: Normal rate and regular rhythm.      Pulses: Normal pulses.      Heart sounds: Normal heart sounds.   Pulmonary:      Effort: Pulmonary effort is normal.      Breath sounds: Normal breath sounds.   Abdominal:      General: Bowel sounds are normal. There is distension.      Palpations: Abdomen is soft.      Tenderness: There is abdominal tenderness. There is guarding.   Musculoskeletal:         General: Normal range of motion.      Cervical back: Normal range of motion and neck supple.   Skin:     General: Skin is warm and dry.   Neurological:      General: No focal deficit present.      Mental Status: She is alert. She is disoriented.   Psychiatric:         Mood and Affect: Mood normal.         Behavior: Behavior normal.          Last Recorded Vitals  Blood pressure 123/79, pulse 76, temperature 36.4 °C (97.5 °F), temperature source Temporal, resp. rate 17, height 1.575 m (5' 2\"), weight (!) 41.2 kg (90 lb 13.3 oz), SpO2 96%.    Relevant Results    Scheduled medications  [Held by provider] amLODIPine, 5 mg, oral, Daily  [Held by provider] aspirin, 81 mg, oral, Daily  [Held by provider] atorvastatin, 40 mg, oral, Daily  [Held by provider] cholestyramine, 4 g, oral, BID  [Held by provider] heparin (porcine), 5,000 Units, subcutaneous, q8h  levothyroxine, 25 mcg, intravenous, q24h MINDY  [Held by provider] levothyroxine, 50 mcg, oral, Daily before breakfast  [Held by provider] lisinopril, 20 mg, oral, Daily  [Held by provider] magnesium oxide, 400 mg, oral, Daily  [Held by provider] metoprolol tartrate, 25 mg, oral, BID  [START ON 6/6/2024] pantoprazole, 40 mg, intravenous, Daily before breakfast  piperacillin-tazobactam, 4.5 g, intravenous, q6h  [Held by provider] polyethylene glycol, 17 g, oral, " Daily  [Held by provider] traZODone, 50 mg, oral, Nightly      Continuous medications  sodium chloride 0.9%, 100 mL/hr  sodium chloride 0.9%, 10 mL/hr      PRN medications  PRN medications: acetaminophen, acetaminophen, [Held by provider] hydrOXYzine HCL, metoprolol, ondansetron, polyethylene glycol, sodium chloride 0.9%      Results for orders placed or performed during the hospital encounter of 06/05/24 (from the past 24 hour(s))   ECG 12 lead   Result Value Ref Range    Ventricular Rate 76 BPM    Atrial Rate 76 BPM    NE Interval 148 ms    QRS Duration 76 ms    QT Interval 412 ms    QTC Calculation(Bazett) 463 ms    P Axis 58 degrees    R Axis -4 degrees    T Axis 75 degrees    QRS Count 13 beats    Q Onset 221 ms    P Onset 147 ms    P Offset 210 ms    T Offset 427 ms    QTC Fredericia 445 ms   CBC and Auto Differential   Result Value Ref Range    WBC 18.4 (H) 4.4 - 11.3 x10*3/uL    nRBC 0.0 0.0 - 0.0 /100 WBCs    RBC 3.99 (L) 4.00 - 5.20 x10*6/uL    Hemoglobin 12.1 12.0 - 16.0 g/dL    Hematocrit 37.4 36.0 - 46.0 %    MCV 94 80 - 100 fL    MCH 30.3 26.0 - 34.0 pg    MCHC 32.4 32.0 - 36.0 g/dL    RDW 13.6 11.5 - 14.5 %    Platelets 198 150 - 450 x10*3/uL    Neutrophils % 77.7 40.0 - 80.0 %    Immature Granulocytes %, Automated 0.7 0.0 - 0.9 %    Lymphocytes % 10.0 13.0 - 44.0 %    Monocytes % 11.0 2.0 - 10.0 %    Eosinophils % 0.2 0.0 - 6.0 %    Basophils % 0.4 0.0 - 2.0 %    Neutrophils Absolute 14.32 (H) 1.60 - 5.50 x10*3/uL    Immature Granulocytes Absolute, Automated 0.13 0.00 - 0.50 x10*3/uL    Lymphocytes Absolute 1.84 0.80 - 3.00 x10*3/uL    Monocytes Absolute 2.02 (H) 0.05 - 0.80 x10*3/uL    Eosinophils Absolute 0.04 0.00 - 0.40 x10*3/uL    Basophils Absolute 0.08 0.00 - 0.10 x10*3/uL   Comprehensive Metabolic Panel   Result Value Ref Range    Glucose 130 (H) 74 - 99 mg/dL    Sodium 140 136 - 145 mmol/L    Potassium 3.2 (L) 3.5 - 5.3 mmol/L    Chloride 104 98 - 107 mmol/L    Bicarbonate 29 21 - 32 mmol/L     Anion Gap 10 10 - 20 mmol/L    Urea Nitrogen 19 6 - 23 mg/dL    Creatinine 0.57 0.50 - 1.05 mg/dL    eGFR 89 >60 mL/min/1.73m*2    Calcium 8.4 (L) 8.6 - 10.3 mg/dL    Albumin 3.3 (L) 3.4 - 5.0 g/dL    Alkaline Phosphatase 46 33 - 136 U/L    Total Protein 6.7 6.4 - 8.2 g/dL    AST 19 9 - 39 U/L    Bilirubin, Total 0.8 0.0 - 1.2 mg/dL    ALT 34 7 - 45 U/L   Magnesium   Result Value Ref Range    Magnesium 1.79 1.60 - 2.40 mg/dL   Lipase   Result Value Ref Range    Lipase 8 (L) 9 - 82 U/L   Troponin I, High Sensitivity, Initial   Result Value Ref Range    Troponin I, High Sensitivity 12 0 - 13 ng/L   Urinalysis with Reflex Culture and Microscopic   Result Value Ref Range    Color, Urine Light-Orange (N) Light-Yellow, Yellow, Dark-Yellow    Appearance, Urine Ex.Turbid (N) Clear    Specific Gravity, Urine 1.014 1.005 - 1.035    pH, Urine 7.5 5.0, 5.5, 6.0, 6.5, 7.0, 7.5, 8.0    Protein, Urine 10 (TRACE) NEGATIVE, 10 (TRACE), 20 (TRACE) mg/dL    Glucose, Urine Normal Normal mg/dL    Blood, Urine NEGATIVE NEGATIVE    Ketones, Urine NEGATIVE NEGATIVE mg/dL    Bilirubin, Urine NEGATIVE NEGATIVE    Urobilinogen, Urine Normal Normal mg/dL    Nitrite, Urine NEGATIVE NEGATIVE    Leukocyte Esterase, Urine NEGATIVE NEGATIVE   Urinalysis Microscopic   Result Value Ref Range    WBC, Urine NONE 1-5, NONE /HPF    RBC, Urine 3-5 NONE, 1-2, 3-5 /HPF    Mucus, Urine FEW Reference range not established. /LPF    Amorphous Crystals, Urine 1+ NONE, 1+, 2+ /HPF   Troponin, High Sensitivity, 1 Hour   Result Value Ref Range    Troponin I, High Sensitivity 13 0 - 13 ng/L   ECG 12 lead   Result Value Ref Range    Ventricular Rate 72 BPM    Atrial Rate 72 BPM    DE Interval 150 ms    QRS Duration 76 ms    QT Interval 412 ms    QTC Calculation(Bazett) 451 ms    P Axis 44 degrees    R Axis 1 degrees    T Axis 68 degrees    QRS Count 12 beats    Q Onset 224 ms    P Onset 149 ms    P Offset 203 ms    T Offset 430 ms    QTC Fredericia 438 ms              Assessment/Plan   Principal Problem:    Bowel perforation (Multi)    Acute Bowel Perforation  Abdominal pain  - CT abd: Severe circumferential wall thickening of the terminal ileum with surrounding inflammatory changes and focal contained perforation along the superior margin of the terminal ileum. These changes are new from relatively recent prior from 05/16/2024. Correlate with history of inflammatory bowel disease   - NPO  - Surgical consult  - 0.9% NS at 75 mL  - given zosyn in the ED  - continue zosyn    Essential HTN  HLD  - hold PO amlodipine, aspirin, atorvastatin, lisinopril, metoprolol tartrate  - started IV metoprolol prn  - monitor BP    Hypothyroidsim  - Hold PO levothyroxine  - started IV levothyroxine    PUD ppx  - started IV pantoprazole    DVT ppx  - hold heparin  - SCD    Code Status: DNRCC-Arrest/DNI    Disposition: Patient requires more than 2 inpatient days.    Total accumulated time spent face to face and not face to face preparing to see the patient, obtaining and reviewing separately obtained history; performing a medically appropriate examination and/or evaluation; counseling and educating the patient, family; ordering medications, tests, or procedures; referring and communicating with other health care professionals; documenting clinical information in the patient's medical record; independently interpreting results and communicating the results to the patient, family; and care coordination was 45 minutes.        Petra Campa, APRN-CNP    Attending Attestation:    I was present with the APRN-CNP who participated in the documentation of this note. I have personally seen and re-examined the patient and performed the medical decision-making components (assessment and plan of care). I have reviewed the documentation and verified the findings in the note as written with additions or exceptions as stated in the body of this note.    Víctor Marie MD  Internal Medicine

## 2024-06-05 NOTE — ED TRIAGE NOTES
Pt by squad with c/o generalized weakness and soreness after PT yesterday. Daughter states she was walking different with walker at home. Has a hx of UTI, c diff, COPD, a fib no thinners.

## 2024-06-05 NOTE — CARE PLAN
The patient's goals for the shift include      The clinical goals for the shift include admission, medications      Problem: Psychosocial Needs  Goal: Collaborate with me, my family, and caregiver to identify my specific goals  Recent Flowsheet Documentation  Taken 6/5/2024 1315 by Madai Cruz RN  Cultural Requests During Hospitalization: none  Spiritual Requests During Hospitalization: none

## 2024-06-05 NOTE — NURSING NOTE
Patient relaxing in bed at this time. VS stable. No c/o pain. Daughter at bedside throughout admission. IV fluids and IV ATB's administered per orders.

## 2024-06-05 NOTE — CONSULTS
Weakness, Gen  Associated symptoms include weakness.     This account request for patient with nonspecific vague abdominal pain.  This patient who lives at home with her daughter she had been walking with her daughter and then became confused felt unwell.  She has a recent history of C. difficile colitis which been treated with oral vancomycin.  She has been receiving home OT and PT.  She came to the emergency room and is on a mildly elevated white blood cell count.  CT scan confirms circumferential thickening of the terminal ileum with inflammatory changes and a questionable focal perforation.  She has had some nausea but no vomiting.  She denies any obvious abdominal pain.  Past Medical History:   Diagnosis Date    Ataxia     CAD (coronary artery disease)     Chronic insomnia 06/05/2024    COPD (chronic obstructive pulmonary disease) (Multi)     Cough 06/05/2024    Deficiency of other specified B group vitamins 08/17/2016    Vitamin B12 deficiency    Dementia (Multi)     Dementia (Multi)     Diabetes mellitus (Multi)     Edema 06/05/2024    Elevated ALT measurement 05/05/2023    Encounter for examination of eyes and vision without abnormal findings     Diabetic eye exam    Fracture of sacrum (Multi) 06/05/2024    Frequent episodes of pneumonia 06/05/2024    GERD (gastroesophageal reflux disease)     Hiatal hernia     HLD (hyperlipidemia)     Hypernatremia 06/05/2024    Hypertension     Hypomagnesemia 05/22/2020    Hypotension 06/05/2024    Hypothyroidism     Injury of head 06/05/2024    Insomnia     Occlusion and stenosis of bilateral carotid arteries 08/17/2016    Atherosclerosis of both carotid arteries    Personal history of other medical treatment     History of mammogram    Pneumonia 06/05/2024    Rheumatoid arthritis (Multi)     Transient ischemic attack 06/05/2024          Current Facility-Administered Medications:     acetaminophen (Tylenol) suppository 650 mg, 650 mg, rectal, q4h PRN, Petra Campa,  ELIO-CNP    acetaminophen (Tylenol) suppository 650 mg, 650 mg, rectal, q4h PRN, USAMA Aparicio    [Held by provider] amLODIPine (Norvasc) tablet 5 mg, 5 mg, oral, Daily, USAMA Aparicio    [Held by provider] aspirin EC tablet 81 mg, 81 mg, oral, Daily, USAMA Aparicio    [Held by provider] atorvastatin (Lipitor) tablet 40 mg, 40 mg, oral, Daily, USAMA Aparicio    [Held by provider] cholestyramine (Questran) 4 gram packet 4 g, 4 g, oral, BID, USAMA Aparicio    [Held by provider] heparin (porcine) injection 5,000 Units, 5,000 Units, subcutaneous, q8h, USAMA Aparicio    [Held by provider] hydrOXYzine HCL (Atarax) tablet 25 mg, 25 mg, oral, TID PRN, USAMA Aparicio    levothyroxine (Synthroid) injection 25 mcg, 25 mcg, intravenous, q24h MINDY, USAMA Aparicio    [Held by provider] levothyroxine (Synthroid, Levoxyl) tablet 50 mcg, 50 mcg, oral, Daily before breakfast, USAMA Aparicio    [Held by provider] lisinopril tablet 20 mg, 20 mg, oral, Daily, USAMA Aparicio    [Held by provider] magnesium oxide (Mag-Ox) tablet 400 mg, 400 mg, oral, Daily, USAMA Aparicio    metoprolol tartrate (Lopressor) injection 5 mg, 5 mg, intravenous, q6h PRN, USAMA Aparicio    [Held by provider] metoprolol tartrate (Lopressor) tablet 25 mg, 25 mg, oral, BID, USAMA Aparicio    ondansetron (Zofran) injection 4 mg, 4 mg, intravenous, q8h PRN, USAMA Aparicio    [START ON 6/6/2024] pantoprazole (ProtoNix) injection 40 mg, 40 mg, intravenous, Daily before breakfast, USAMA Aparicio    piperacillin-tazobactam-dextrose (Zosyn) IV 4.5 g, 4.5 g, intravenous, q6h, USAMA Aparicio    polyethylene glycol (Glycolax, Miralax) packet 17 g, 17 g, oral, Daily PRN, USAMA Aparicio    [Held by provider] polyethylene glycol (Glycolax, Miralax) packet 17  g, 17 g, oral, Daily, USAMA Aparicio    [Held by provider] predniSONE (Deltasone) tablet 2.5 mg, 2.5 mg, oral, Daily, ELIO Aparicio-CNP    sodium chloride 0.9% infusion, 100 mL/hr, intravenous, Continuous, USAMA Aparicio, Last Rate: 100 mL/hr at 06/05/24 1459, 100 mL/hr at 06/05/24 1459    sodium chloride 0.9% infusion, 10 mL/hr, intravenous, Continuous PRN, Rigo Burnett MD    [Held by provider] traZODone (Desyrel) tablet 50 mg, 50 mg, oral, Nightly, ELIO Aparicio-CNP     Allergies   Allergen Reactions    Acthar [Corticotropin] Psychosis    Amoxicillin-Pot Clavulanate Diarrhea    Chloroquine Phosphate Unknown     Tremors/ weakness    Ciprofloxacin Unknown    Humira [Adalimumab] Unknown    Levaquin [Levofloxacin] Unknown    Methotrexate Unknown    Sulfamethoxazole-Trimethoprim Unknown    Xeljanz [Tofacitinib] Psychosis    Enbrel [Etanercept] Rash     Legs and arms        Review of Systems  Review of Systems   Gastrointestinal:  Positive for abdominal distention.   Neurological:  Positive for speech difficulty, weakness and light-headedness.   All other systems reviewed and are negative.      Objective     Vital signs for last 24 hours:  Temp:  [36.4 °C (97.5 °F)-37.7 °C (99.8 °F)] 36.4 °C (97.5 °F)  Heart Rate:  [72-83] 76  Resp:  [16-18] 17  BP: (123-182)/(69-79) 123/79  FiO2 (%):  [32 %] 32 %    Intake/Output this shift:  I/O this shift:  In: 248.3 [I.V.:148.3; IV Piggyback:100]  Out: -     Physical Exam  Physical Exam  Vitals reviewed. Exam conducted with a chaperone present.   Constitutional:       Appearance: She is cachectic.      Comments: Extremely frail.   HENT:      Head: Normocephalic.   Cardiovascular:      Rate and Rhythm: Normal rate and regular rhythm.      Heart sounds: Murmur heard.      Systolic murmur is present.   Pulmonary:      Effort: Pulmonary effort is normal.      Breath sounds: Normal breath sounds.   Abdominal:      General: Abdomen is  flat. There is distension.      Palpations: Abdomen is soft. There is no mass.      Tenderness: There is no abdominal tenderness. There is no guarding.   Musculoskeletal:         General: Normal range of motion.      Cervical back: Normal range of motion.   Skin:     General: Skin is warm.   Neurological:      Comments: Not assessed   Psychiatric:         Mood and Affect: Mood normal.         Cognition and Memory: Cognition is impaired. Memory is impaired.      Comments: Dementia.         Labs & Radiology      Labs Reviewed   CBC WITH AUTO DIFFERENTIAL - Abnormal       Result Value    WBC 18.4 (*)     nRBC 0.0      RBC 3.99 (*)     Hemoglobin 12.1      Hematocrit 37.4      MCV 94      MCH 30.3      MCHC 32.4      RDW 13.6      Platelets 198      Neutrophils % 77.7      Immature Granulocytes %, Automated 0.7      Lymphocytes % 10.0      Monocytes % 11.0      Eosinophils % 0.2      Basophils % 0.4      Neutrophils Absolute 14.32 (*)     Immature Granulocytes Absolute, Automated 0.13      Lymphocytes Absolute 1.84      Monocytes Absolute 2.02 (*)     Eosinophils Absolute 0.04      Basophils Absolute 0.08     COMPREHENSIVE METABOLIC PANEL - Abnormal    Glucose 130 (*)     Sodium 140      Potassium 3.2 (*)     Chloride 104      Bicarbonate 29      Anion Gap 10      Urea Nitrogen 19      Creatinine 0.57      eGFR 89      Calcium 8.4 (*)     Albumin 3.3 (*)     Alkaline Phosphatase 46      Total Protein 6.7      AST 19      Bilirubin, Total 0.8      ALT 34     LIPASE - Abnormal    Lipase 8 (*)     Narrative:     Venipuncture immediately after or during the administration of Metamizole may lead to falsely low results. Testing should be performed immediately prior to Metamizole dosing.   URINALYSIS WITH REFLEX CULTURE AND MICROSCOPIC - Abnormal    Color, Urine Light-Orange (*)     Appearance, Urine Ex.Turbid (*)     Specific Gravity, Urine 1.014      pH, Urine 7.5      Protein, Urine 10 (TRACE)      Glucose, Urine Normal       Blood, Urine NEGATIVE      Ketones, Urine NEGATIVE      Bilirubin, Urine NEGATIVE      Urobilinogen, Urine Normal      Nitrite, Urine NEGATIVE      Leukocyte Esterase, Urine NEGATIVE     MAGNESIUM - Normal    Magnesium 1.79     SERIAL TROPONIN-INITIAL - Normal    Troponin I, High Sensitivity 12      Narrative:     Less than 99th percentile of normal range cutoff-                  Female and children under 18 years old <14 ng/L; Male <21 ng/L: Negative                  Repeat testing should be performed if clinically indicated.                                     Female and children under 18 years old 14-50 ng/L; Male 21-50 ng/L:                  Consistent with possible cardiac damage and possible increased clinical                   risk. Serial measurements may help to assess extent of myocardial damage.                                     >50 ng/L: Consistent with cardiac damage, increased clinical risk and                  myocardial infarction. Serial measurements may help assess extent of                   myocardial damage.                                      NOTE: Children less than 1 year old may have higher baseline troponin                   levels and results should be interpreted in conjunction with the overall                   clinical context.                                     NOTE: Troponin I testing is performed using a different                   testing methodology at [facility] than at other                   [facility]. Direct result comparisons should only                   be made within the same method.   SERIAL TROPONIN, 1 HOUR - Normal    Troponin I, High Sensitivity 13      Narrative:     Less than 99th percentile of normal range cutoff-                  Female and children under 18 years old <14 ng/L; Male <21 ng/L: Negative                  Repeat testing should be performed if clinically indicated.                                     Female and children under  18 years old 14-50 ng/L; Male 21-50 ng/L:                  Consistent with possible cardiac damage and possible increased clinical                   risk. Serial measurements may help to assess extent of myocardial damage.                                     >50 ng/L: Consistent with cardiac damage, increased clinical risk and                  myocardial infarction. Serial measurements may help assess extent of                   myocardial damage.                                      NOTE: Children less than 1 year old may have higher baseline troponin                   levels and results should be interpreted in conjunction with the overall                   clinical context.                                     NOTE: Troponin I testing is performed using a different                   testing methodology at [facility] than at other                   [facility]. Direct result comparisons should only                   be made within the same method.   TROPONIN SERIES- (INITIAL, 1 HR)    Narrative:     The following orders were created for panel order Troponin I Series, High Sensitivity (0, 1 HR).                  Procedure                               Abnormality         Status                                     ---------                               -----------         ------                                     Troponin I, High Sensiti...[[id number]]  Normal              Final result                               Troponin, High Sensitivi...[[id number]]  Normal              Final result                                                 Please view results for these tests on the individual orders.   URINALYSIS WITH REFLEX CULTURE AND MICROSCOPIC    Narrative:     The following orders were created for panel order Urinalysis with Reflex Culture and Microscopic.                  Procedure                               Abnormality         Status                                     ---------                                -----------         ------                                     Urinalysis with Reflex C...[[id number]]  Abnormal            Final result                               Extra Urine Gray Tube[[id number]]                                                                                         Please view results for these tests on the individual orders.   EXTRA URINE GRAY TUBE   URINALYSIS MICROSCOPIC WITH REFLEX CULTURE    WBC, Urine NONE      RBC, Urine 3-5      Mucus, Urine FEW      Amorphous Crystals, Urine 1+       CT abdomen pelvis w IV contrast 06/05/2024    Narrative  Interpreted By:  Doug Anthony,  STUDY:  CT ABDOMEN PELVIS W IV CONTRAST;  6/5/2024 10:03 am    INDICATION:  Signs/Symptoms:abd pain.    COMPARISON:  05/16/2024    ACCESSION NUMBER(S):  [id number]    ORDERING CLINICIAN:  MISTY HUMMEL    TECHNIQUE:  Contiguous axial images were obtained through the abdomen and pelvis  after the administration of  63 mL Omnipaque 350 intravenous  contrast. Coronal and sagittal reformations were made.    FINDINGS:  LOWER CHEST:  Small layering right pleural effusion with adjacent atelectatic  changes.    ABDOMEN:    LIVER:  Within normal limits.    BILE DUCTS:  Intra and extrahepatic biliary dilation with the common bile duct  measuring up to 14 mm.    GALLBLADDER:  The gallbladder is not distended and without calcified stones.    PANCREAS:  Multiple fluid collections seen along the course of the pancreas with  the largest measuring 2.3 cm. This represents a decrease in size when  compared to the more recent prior.    SPLEEN:  Within normal limits.    ADRENAL GLANDS:  Within normal limits.    KIDNEYS, URETERS, and BLADDER:  The kidneys enhance symmetrically without focal lesion.  No  hydroureteronephrosis bilaterally.Bladder unremarkable.    VESSELS:  There is no aneurysmal dilatation of the abdominal aorta. The IVC is  within normal limits.    BOWEL:  No obstruction. Focal outpouching seen along the  proximal duodenal  and proximal jejunum with fluid levels as seen on the prior scan.  This likely relates to enlarged diverticuli. Gross wall thickening  and surrounding inflammation about the terminal ileum. Focal  contained perforation along the superior margin of the terminal ileum  containing gas and stool material. No drainable fluid collections  identified.    PERITONEUM/RETROPERITONEUM/LYMPH NODES:  Small volume free fluid within the pelvis.    No retroperitoneal fluid collection or lymphadenopathy.    REPRODUCTIVE ORGANS:  Unremarkable.    ABDOMINAL WALL:  Unremarkable.    BONE AND SOFT TISSUE:  Bones are intact.    Impression  1. Severe circumferential wall thickening of the terminal ileum with  surrounding inflammatory changes and focal contained perforation  along the superior margin of the terminal ileum. These changes are  new from relatively recent prior from 05/16/2024. Correlate with  history of inflammatory bowel disease. Allowing for the short  interval, infectious or ischemic causes should be considered.  Although not performed as a CTA, the mesenteric vasculature does  appear patent with significant background calcific atherosclerotic  disease.  2. Enlarged outpouching of the duodenal and proximal jejunum likely  related to enlarged diverticuli as no surrounding inflammatory  changes or free air identified. This may be confirmed with elective  upper GI.  3. Multiple loculated fluid collections surrounding the pancreas  which have decreased slightly in size over the interval. Findings  suggest pseudocyst formation.  4. Continued intra and extrahepatic biliary dilation with the common  bile duct measuring up to 14 mm.  5. Small layering right pleural effusion.      Signed by: Doug Anthony 6/5/2024 11:19 AM  Dictation workstation:   [id number]      Impression  Recent history of C. difficile colitis.  Terminal ileitis.  Questionable perforation though clinical exam negative.  White blood cell count  elevated,   Plan   Follow white blood cell count.  Recommend intravenous Flagyl.  Not a surgical candidate due to very poor functional status.  Discussed with daughter.  Consider palliative care if her condition worsens.

## 2024-06-05 NOTE — CARE PLAN
Problem: Pain  Goal: My pain/discomfort is manageable  Outcome: Progressing   The patient's goals for the shift include      The clinical goals for the shift include admission, medications

## 2024-06-06 ENCOUNTER — HOME CARE VISIT (OUTPATIENT)
Dept: HOME HEALTH SERVICES | Facility: HOME HEALTH | Age: 84
End: 2024-06-06
Payer: MEDICARE

## 2024-06-06 PROBLEM — K50.019: Status: ACTIVE | Noted: 2024-06-06

## 2024-06-06 LAB
ANION GAP SERPL CALC-SCNC: 11 MMOL/L (ref 10–20)
BUN SERPL-MCNC: 15 MG/DL (ref 6–23)
CALCIUM SERPL-MCNC: 8.4 MG/DL (ref 8.6–10.3)
CHLORIDE SERPL-SCNC: 105 MMOL/L (ref 98–107)
CO2 SERPL-SCNC: 29 MMOL/L (ref 21–32)
CREAT SERPL-MCNC: 0.61 MG/DL (ref 0.5–1.05)
EGFRCR SERPLBLD CKD-EPI 2021: 88 ML/MIN/1.73M*2
ERYTHROCYTE [DISTWIDTH] IN BLOOD BY AUTOMATED COUNT: 13.6 % (ref 11.5–14.5)
GLUCOSE SERPL-MCNC: 96 MG/DL (ref 74–99)
HCT VFR BLD AUTO: 38.8 % (ref 36–46)
HGB BLD-MCNC: 12.2 G/DL (ref 12–16)
MCH RBC QN AUTO: 29.5 PG (ref 26–34)
MCHC RBC AUTO-ENTMCNC: 31.4 G/DL (ref 32–36)
MCV RBC AUTO: 94 FL (ref 80–100)
NRBC BLD-RTO: 0 /100 WBCS (ref 0–0)
PLATELET # BLD AUTO: 204 X10*3/UL (ref 150–450)
POTASSIUM SERPL-SCNC: 3.7 MMOL/L (ref 3.5–5.3)
RBC # BLD AUTO: 4.13 X10*6/UL (ref 4–5.2)
SODIUM SERPL-SCNC: 141 MMOL/L (ref 136–145)
WBC # BLD AUTO: 13.6 X10*3/UL (ref 4.4–11.3)

## 2024-06-06 PROCEDURE — 80048 BASIC METABOLIC PNL TOTAL CA: CPT | Mod: IPSPLIT | Performed by: NURSE PRACTITIONER

## 2024-06-06 PROCEDURE — 2500000005 HC RX 250 GENERAL PHARMACY W/O HCPCS: Mod: IPSPLIT | Performed by: NURSE PRACTITIONER

## 2024-06-06 PROCEDURE — 2500000004 HC RX 250 GENERAL PHARMACY W/ HCPCS (ALT 636 FOR OP/ED): Mod: IPSPLIT | Performed by: NURSE PRACTITIONER

## 2024-06-06 PROCEDURE — 2500000002 HC RX 250 W HCPCS SELF ADMINISTERED DRUGS (ALT 637 FOR MEDICARE OP, ALT 636 FOR OP/ED): Mod: IPSPLIT,MUE | Performed by: NURSE PRACTITIONER

## 2024-06-06 PROCEDURE — 36415 COLL VENOUS BLD VENIPUNCTURE: CPT | Mod: IPSPLIT | Performed by: NURSE PRACTITIONER

## 2024-06-06 PROCEDURE — 85027 COMPLETE CBC AUTOMATED: CPT | Mod: IPSPLIT | Performed by: NURSE PRACTITIONER

## 2024-06-06 PROCEDURE — 99232 SBSQ HOSP IP/OBS MODERATE 35: CPT | Performed by: SURGERY

## 2024-06-06 PROCEDURE — C9113 INJ PANTOPRAZOLE SODIUM, VIA: HCPCS | Mod: IPSPLIT | Performed by: NURSE PRACTITIONER

## 2024-06-06 PROCEDURE — 1090000002 HH PPS REVENUE DEBIT

## 2024-06-06 PROCEDURE — 97161 PT EVAL LOW COMPLEX 20 MIN: CPT | Mod: GP,IPSPLIT | Performed by: PHYSICAL THERAPIST

## 2024-06-06 PROCEDURE — 94760 N-INVAS EAR/PLS OXIMETRY 1: CPT | Mod: IPSPLIT

## 2024-06-06 PROCEDURE — 2500000001 HC RX 250 WO HCPCS SELF ADMINISTERED DRUGS (ALT 637 FOR MEDICARE OP): Mod: IPSPLIT | Performed by: NURSE PRACTITIONER

## 2024-06-06 PROCEDURE — 1100000001 HC PRIVATE ROOM DAILY: Mod: IPSPLIT

## 2024-06-06 PROCEDURE — 1090000001 HH PPS REVENUE CREDIT

## 2024-06-06 PROCEDURE — 97165 OT EVAL LOW COMPLEX 30 MIN: CPT | Mod: GO,IPSPLIT | Performed by: OCCUPATIONAL THERAPIST

## 2024-06-06 RX ORDER — METRONIDAZOLE 500 MG/1
500 TABLET ORAL EVERY 12 HOURS SCHEDULED
Status: DISCONTINUED | OUTPATIENT
Start: 2024-06-06 | End: 2024-06-07 | Stop reason: HOSPADM

## 2024-06-06 RX ORDER — VANCOMYCIN HYDROCHLORIDE 125 MG/1
125 CAPSULE ORAL DAILY
Status: DISCONTINUED | OUTPATIENT
Start: 2024-06-06 | End: 2024-06-07 | Stop reason: HOSPADM

## 2024-06-06 RX ORDER — METRONIDAZOLE 500 MG/1
500 TABLET ORAL EVERY 8 HOURS SCHEDULED
Status: DISCONTINUED | OUTPATIENT
Start: 2024-06-06 | End: 2024-06-06

## 2024-06-06 RX ORDER — VANCOMYCIN HYDROCHLORIDE 125 MG/1
125 CAPSULE ORAL EVERY 6 HOURS SCHEDULED
Status: DISCONTINUED | OUTPATIENT
Start: 2024-06-06 | End: 2024-06-06

## 2024-06-06 RX ADMIN — LISINOPRIL 20 MG: 20 TABLET ORAL at 13:26

## 2024-06-06 RX ADMIN — TRAZODONE HYDROCHLORIDE 50 MG: 50 TABLET ORAL at 21:06

## 2024-06-06 RX ADMIN — ASPIRIN 81 MG: 81 TABLET, COATED ORAL at 08:12

## 2024-06-06 RX ADMIN — CHOLESTYRAMINE 4 G: 4 POWDER, FOR SUSPENSION ORAL at 21:06

## 2024-06-06 RX ADMIN — SODIUM CHLORIDE 100 ML/HR: 9 INJECTION, SOLUTION INTRAVENOUS at 13:26

## 2024-06-06 RX ADMIN — AMLODIPINE BESYLATE 5 MG: 5 TABLET ORAL at 16:16

## 2024-06-06 RX ADMIN — PIPERACILLIN SODIUM AND TAZOBACTAM SODIUM 4.5 G: 4; .5 INJECTION, SOLUTION INTRAVENOUS at 06:17

## 2024-06-06 RX ADMIN — PREDNISONE 2.5 MG: 5 TABLET ORAL at 08:12

## 2024-06-06 RX ADMIN — METRONIDAZOLE 500 MG: 500 TABLET ORAL at 08:12

## 2024-06-06 RX ADMIN — VANCOMYCIN HYDROCHLORIDE 125 MG: 125 CAPSULE ORAL at 11:09

## 2024-06-06 RX ADMIN — Medication 2 L/MIN: at 21:41

## 2024-06-06 RX ADMIN — PANTOPRAZOLE SODIUM 40 MG: 40 INJECTION, POWDER, LYOPHILIZED, FOR SOLUTION INTRAVENOUS at 06:18

## 2024-06-06 RX ADMIN — Medication 400 MG: at 08:12

## 2024-06-06 RX ADMIN — METOPROLOL TARTRATE 25 MG: 25 TABLET, FILM COATED ORAL at 08:12

## 2024-06-06 RX ADMIN — METRONIDAZOLE 500 MG: 500 TABLET ORAL at 21:06

## 2024-06-06 RX ADMIN — SODIUM CHLORIDE 100 ML/HR: 9 INJECTION, SOLUTION INTRAVENOUS at 02:11

## 2024-06-06 RX ADMIN — ATORVASTATIN CALCIUM 40 MG: 40 TABLET, FILM COATED ORAL at 08:12

## 2024-06-06 ASSESSMENT — ACTIVITIES OF DAILY LIVING (ADL)
ADL_ASSISTANCE: NEEDS ASSISTANCE
BATHING_ASSISTANCE: STAND BY
BATHING_ASSISTANCE: STAND BY
LACK_OF_TRANSPORTATION: NO
ADL_ASSISTANCE: NEEDS ASSISTANCE

## 2024-06-06 ASSESSMENT — PAIN SCALES - GENERAL
PAINLEVEL_OUTOF10: 0 - NO PAIN

## 2024-06-06 ASSESSMENT — PAIN - FUNCTIONAL ASSESSMENT
PAIN_FUNCTIONAL_ASSESSMENT: 0-10

## 2024-06-06 ASSESSMENT — COGNITIVE AND FUNCTIONAL STATUS - GENERAL
TOILETING: A LITTLE
EATING MEALS: A LITTLE
STANDING UP FROM CHAIR USING ARMS: A LITTLE
CLIMB 3 TO 5 STEPS WITH RAILING: A LITTLE
DRESSING REGULAR UPPER BODY CLOTHING: A LITTLE
DAILY ACTIVITIY SCORE: 18
MOBILITY SCORE: 19
DAILY ACTIVITIY SCORE: 18
DRESSING REGULAR LOWER BODY CLOTHING: A LITTLE
MOVING TO AND FROM BED TO CHAIR: A LITTLE
HELP NEEDED FOR BATHING: A LITTLE
STANDING UP FROM CHAIR USING ARMS: A LITTLE
DRESSING REGULAR UPPER BODY CLOTHING: A LITTLE
EATING MEALS: A LITTLE
PERSONAL GROOMING: A LITTLE
CLIMB 3 TO 5 STEPS WITH RAILING: A LITTLE
TURNING FROM BACK TO SIDE WHILE IN FLAT BAD: A LITTLE
HELP NEEDED FOR BATHING: A LITTLE
WALKING IN HOSPITAL ROOM: A LITTLE
DRESSING REGULAR LOWER BODY CLOTHING: A LITTLE
MOVING TO AND FROM BED TO CHAIR: A LITTLE
PERSONAL GROOMING: A LITTLE
TURNING FROM BACK TO SIDE WHILE IN FLAT BAD: A LITTLE
MOBILITY SCORE: 19
TOILETING: A LITTLE
WALKING IN HOSPITAL ROOM: A LITTLE

## 2024-06-06 NOTE — PROGRESS NOTES
Physical Therapy    Physical Therapy Evaluation    Patient Name: Maisha Agosto  MRN: [id number]  Today's Date: 6/6/2024   Time Calculation  Start Time: 0845  Stop Time: 0910  Time Calculation (min): 25 min    Assessment/Plan   PT Assessment  PT Assessment Results: Decreased strength, Impaired balance, Decreased mobility, Impaired judgement, Decreased safety awareness, Impaired vision  Rehab Prognosis: Good  Evaluation/Treatment Tolerance: Patient tolerated treatment well  Medical Staff Made Aware: Yes  Strengths: Living arrangement secure, Support of Caregivers  Barriers to Participation: Ability to acquire knowledge  End of Session Communication: Bedside nurse  Assessment Comment: Pleasant 83 y.o presents with weakness at home (appears to be close to baseline now). Pt. lives with daughter and is normally Doretha with WW. Pt. currently requires close supervision/Zeinab with turns or obstacles and would benefit from additional PT to address above noted limitations and prevent further decline  End of Session Patient Position: Up in chair, Alarm on  IP OR SWING BED PT PLAN  Inpatient or Swing Bed: Inpatient  PT Plan  Treatment/Interventions: Bed mobility, Transfer training, Gait training, Stair training, Balance training, Strengthening, Therapeutic exercise, Therapeutic activity, Home exercise program  PT Plan: Skilled PT  PT Frequency: 3 times per week  PT Discharge Recommendations: Low intensity level of continued care  PT Recommended Transfer Status: Stand by assist  PT - OK to Discharge: Yes Based on completed evaluation and care plan recommendations, no barriers to discharge to next site of care    Subjective   General Visit Information:  General  Reason for Referral: impaired mobility, bowel perforation  Referred By: ELIO Aparicio-CNP  Past Medical History Relevant to Rehab: dementia, COPD, DM, HLD, hypothyroidism, RA, TIA  Family/Caregiver Present: Yes (daughter present at end of session)  Prior to Session  Communication: Bedside nurse (notified RN of blood coming from IV site)  Patient Position Received: Up in chair, Alarm on  General Comment: MAYANK bolaños, pt. is a poor historian. PLOF gathered from EMR and daughter  Home Living:  Home Living  Type of Home: House  Lives With:  (daughter (works). Per chart- pt. also goes to her sons house and has help hired help for 24 hour supervision.)  Home Adaptive Equipment: Walker rolling or standard  Home Layout: One level  Home Access: Stairs to enter with rails  Entrance Stairs-Rails: Right  Entrance Stairs-Number of Steps: 2  Bathroom Shower/Tub: Walk-in shower  Bathroom Toilet: Standard  Home Living Comments: Per EMR: Son's house is one story with a ramped entry and tub/shower combo with tub transfer bench.  Prior Level of Function:  Prior Function Per Pt/Caregiver Report  Level of [place]:  (IND with mobility, IND dressing/toileting, has supervision when bathing)  Receives Help From: Family  ADL Assistance: Needs assistance  Bath: Stand by  Homemaking Assistance: Needs assistance  Ambulatory Assistance: Independent (WW)  Precautions:  Precautions  Hearing/Visual Limitations: +glasses  Medical Precautions: Fall precautions, Infection precautions  Vital Signs:  Vital Signs  Heart Rate: 75  SpO2: 95 %    Objective   Pain:  Pain Assessment  Pain Assessment: 0-10  Pain Score: 0 - No pain  Cognition:  Cognition  Overall Cognitive Status: Impaired (confused at times; repetition of cues provided)  Orientation Level: Disoriented to time, Disoriented to situation    General Assessments:       Activity Tolerance  Endurance: Endurance does not limit participation in activity    Sensation  Sensation Comment: denies sensation loss    Strength  Strength Comments: BLE: grossly 4-/5  Coordination  Movements are Fluid and Coordinated: Yes    Static Standing Balance  Static Standing-Comment/Number of Minutes: good-; with CGA with no UE support  Dynamic Standing Balance  Dynamic  Standing-Comments: good-; able to assist in pericare with CGA. Recommend WW for support when amb.  Functional Assessments:  Transfers  Transfer: Yes  Transfer 1  Technique 1: Sit to stand, Stand to sit  Transfer Device 1: Walker  Transfer Level of Assistance 1: Contact guard, Maximum verbal cues  Transfers 2  Transfer to 2: Toilet  Technique 2: Sit to stand, Stand to sit  Transfer Device 2: Walker  Transfer Level of Assistance 2: Contact guard    Ambulation/Gait Training  Ambulation/Gait Training Performed: Yes  Ambulation/Gait Training 1  Device 1: Rolling walker  Assistance 1: Close supervision (Zeinab with obstacles/turns)  Quality of Gait 1: Wide base of support (difficulty navigating obstacles and has tendency to pick walker up with turns. Pt. questioning where to put her hands and needed to repetition for carryover.)  Comments/Distance (ft) 1: 15', 50'  Extremity/Trunk Assessments:  Chronic hand deformities. Defer to OT for UE detail   RLE   RLE : Within Functional Limits  LLE   LLE : Within Functional Limits  Outcome Measures:  [facility] Basic Mobility  Turning from your back to your side while in a flat bed without using bedrails: None  Moving from lying on your back to sitting on the side of a flat bed without using bedrails: A little  Moving to and from bed to chair (including a wheelchair): A little  Standing up from a chair using your arms (e.g. wheelchair or bedside chair): A little  To walk in hospital room: A little  Climbing 3-5 steps with railing: A little  Basic Mobility - Total Score: 19    Encounter Problems       Encounter Problems (Active)       Balance       STG - Maintains dynamic standing balance without upper extremity support with >=good balance        Start:  06/06/24    Expected End:  06/20/24               Mobility       LTG - Patient will ambulate household distance with SUP with WW       Start:  06/06/24    Expected End:  06/20/24               PT Transfers       STG - Patient will perform  bed mobility IND       Start:  06/06/24    Expected End:  06/20/24            STG - Patient will transfer sit to and from stand with SUP and WW       Start:  06/06/24    Expected End:  06/20/24               Pain - Adult              Education Documentation  Mobility Training, taught by Mariah Ridley, PT at 6/6/2024  9:53 AM.  Learner: Patient  Readiness: Acceptance  Method: Explanation  Response: Verbalizes Understanding  Comment: Educated pt. on PT POC    Education Comments  No comments found.

## 2024-06-06 NOTE — DISCHARGE INSTR - OTHER ORDERS
Thank you for choosing [facility] for your Health Care needs. As you transition from the hospital back to home, we hope we took your preferences into account on how you manage your health needs so you can manage your health at home.     You may receive a survey in the mail within the next couple weeks. Please take the time to complete it and return it. Your input is ALWAYS important to us. Thank you!  Your Care Transition Team - [facility] [employer] [facility] - [phone]    For questions about your medications listed on your discharge instructions, please call the Nurses Station at [phone].

## 2024-06-06 NOTE — PROGRESS NOTES
"Maisha Agosto is a 83 y.o. female on day 1 of admission presenting with Bowel perforation (Multi).    Subjective   Having no pain.  Still mildly confused       Objective     Physical Exam  Constitutional:       Appearance: Normal appearance.   Abdominal:      Palpations: Abdomen is soft. There is no mass.      Tenderness: There is no abdominal tenderness. There is no guarding.         Last Recorded Vitals  Blood pressure 105/58, pulse 75, temperature 36.7 °C (98.1 °F), temperature source Temporal, resp. rate 18, height 1.575 m (5' 2\"), weight (!) 41.2 kg (90 lb 13.3 oz), SpO2 95%.  Intake/Output last 3 Shifts:  I/O last 3 completed shifts:  In: 736.7 (17.9 mL/kg) [I.V.:436.7 (10.6 mL/kg); IV Piggyback:300]  Out: 300 (7.3 mL/kg) [Urine:300 (0.2 mL/kg/hr)]  Weight: 41.2 kg           Assessment/Plan   Principal Problem:    Bowel perforation (Multi)  Active Problems:    Terminal ileitis with complication (Multi)    Plan-can start clear liquid diet today.  No surgical intervention required.  Palliative care, physical therapy occupational therapy.    Rosales Drake MD      "

## 2024-06-06 NOTE — CARE PLAN
The patient's goals for the shift include      The clinical goals for the shift include Get some rest and NPO    Over the shift, the patient did make progress toward the following goals. Pt tolerated IV antibiotics, denied pain, resting throughout  night, report given to Dr. Drake, daughter Eli in to visit requesting to be seen by PT/PT prior to discharge.

## 2024-06-06 NOTE — PROGRESS NOTES
Maisha Agosto is a 83 y.o. female on day 1 of admission presenting with Bowel perforation (Multi).      Subjective   Patient assessed at bedside; sitting up in a chair. She complained she had diarrhea. She denied abdominal, fever, chills, N/V       Objective     Last Recorded Vitals  /58   Pulse 69   Temp 35.7 °C (96.3 °F) (Temporal)   Resp 18   Wt (!) 41.2 kg (90 lb 13.3 oz)   SpO2 95%   Intake/Output last 3 Shifts:    Intake/Output Summary (Last 24 hours) at 6/6/2024 1511  Last data filed at 6/6/2024 0845  Gross per 24 hour   Intake 848.33 ml   Output 900 ml   Net -51.67 ml       Admission Weight  Weight: (!) 42.6 kg (94 lb) (06/05/24 0746)    Daily Weight  06/05/24 : (!) 41.2 kg (90 lb 13.3 oz)    Image Results      Physical Exam    RVitals reviewed.   Constitutional:       Appearance: Normal appearance. She is normal weight.   HENT:      Head: Normocephalic and atraumatic.      Nose: Nose normal.      Mouth/Throat:      Mouth: Mucous membranes are moist.      Pharynx: Oropharynx is clear.   Eyes:      Conjunctiva/sclera: Conjunctivae normal.      Pupils: Pupils are equal, round, and reactive to light.   Cardiovascular:      Rate and Rhythm: Normal rate and regular rhythm.      Pulses: Normal pulses.      Heart sounds: Normal heart sounds.   Pulmonary:      Effort: Pulmonary effort is normal.      Breath sounds: Normal breath sounds.   Abdominal:      General: Bowel sounds are normal. There is distension.      Palpations: Abdomen is soft.      Tenderness: There is abdominal tenderness. There is guarding.   Musculoskeletal:         General: Normal range of motion.      Cervical back: Normal range of motion and neck supple.   Skin:     General: Skin is warm and dry.   Neurological:      General: No focal deficit present.      Mental Status: She is alert. She is disoriented.   Psychiatric:         Mood and Affect: Mood normal.         Behavior: Behavior normal. elevant Results                Assessment/Plan      Principal Problem:    Bowel perforation (Multi)  Active Problems:    Terminal ileitis with complication (Multi)    Acute Bowel Perforation  Abdominal pain  Diarrhea with Hx C. Diff  - CT abd: Severe circumferential wall thickening of the terminal ileum with surrounding inflammatory changes and focal contained perforation along the superior margin of the terminal ileum. These changes are new from relatively recent prior from 05/16/2024. Correlate with history of inflammatory bowel disease   - regular  - Surgical consult  - 0.9% NS at 75 mL  - given zosyn in the ED  - discontinue zosyn  - started PO flagyl  - started PO vancomycin  - stool for C. Diff negative    Weakness  - PT/OT evaluation     Essential HTN  HLD  - hold PO amlodipine, aspirin, atorvastatin, lisinopril, metoprolol tartrate  - started IV metoprolol prn  - monitor BP     Hypothyroidsim  - Hold PO levothyroxine  - started IV levothyroxine     PUD ppx  - started IV pantoprazole     DVT ppx  - hold heparin  - SCD     Code Status: DNRCC-Arrest/DNI     Disposition: Patient requires more than 2 inpatient days.     Total accumulated time spent face to face and not face to face preparing to see the patient, obtaining and reviewing separately obtained history; performing a medically appropriate examination and/or evaluation; counseling and educating the patient, family; ordering medications, tests, or procedures; referring and communicating with other health care professionals; documenting clinical information in the patient's medical record; independently interpreting results and communicating the results to the patient, family; and care coordination was 30 minutes.               Petra Campa, APRN-CNP

## 2024-06-06 NOTE — PROGRESS NOTES
06/06/24 1342   Discharge Planning   Living Arrangements Children  (Lives with her daughter, Eli. On the weekends that Eli works, she will go stay with her son, Wicho.)   Support Systems Children;Family members;Home care staff  (she has 2 private pay caregivers that are with her during the hours that her daughter works.)   Assistance Needed She requires assistance with ADL's and iADL's. Patient ambulates with walker. She also has a wheelchair available if needed. There are grab bars in her  shower. She does not use any home O2, but is currently on 1L O2 here at this time. She is active with [facility]. The patient and her daughter would like her to resume those services on discharge with added nursing.   Type of Residence Private residence  ([facility] [place] style single family home.)   Number of Stairs to Enter Residence 3  (there are 3 different entrances to get into the home. The patient primarily uses the garage entrance to get into the home. There are 2 steps with handles to hold to get inside. Front & back entrance have 3 steps 1 has a rail the other does not.)   Number of Stairs Within Residence 0   Do you have animals or pets at home? Yes   Type of Animals or Pets 1 dog   Who is requesting discharge planning? Provider   Home or Post Acute Services In home services   Type of Home Care Services Home nursing visits;Home PT   Patient expects to be discharged to: Home with [facility] - RN, PT   Does the patient need discharge transport arranged? No   Financial Resource Strain   How hard is it for you to pay for the very basics like food, housing, medical care, and heating? Not very   Housing Stability   In the last 12 months, was there a time when you were not able to pay the mortgage or rent on time? N   In the last 12 months, how many places have you lived? 1   Transportation Needs   In the past 12 months, has lack of transportation kept you from medical appointments or from getting medications? no   In the past 12  months, has lack of transportation kept you from meetings, work, or from getting things needed for daily living? No   Patient Choice   Provider Choice list and CMS website (https://medicare.gov/care-compare#search) for post-acute Quality and Resource Measure Data were provided and reviewed with: Family   Patient / Family choosing to utilize agency / facility established prior to hospitalization Yes     Patient evaluated at bedside along with her daughter, Eli. AAOX3.  Patient does not drive. Her daughter provides her any transportation needed. Patient denies any falls within the past 6 months. PCP: Ascencion Valente last seen 5/21/24. Pharmacy: [facility] in [place]. Her daughter manages her medications utilizing a weekly medbox. [facility] preferences reviewed with them and they would like to resume current services of [facility] with added nursing. She will need an internal referral sent prior to discharge.     Discharge plan: [facility].

## 2024-06-06 NOTE — DISCHARGE INSTRUCTIONS
Thank you for choosing [facility] for your recent healthcare needs! We hope that you found comfort and confidence in the care that we provided and were able to take advantage of the many unique services that [facility] has to offer! We are committed to making sure that you had an exceptional experience and successful discharge transition.   If you need any clarification of your discharge instructions or have any other questions related to your stay, please feel free to call: Rey (Nurse Manager; [phone]) or Winter (Assistant Nurse Manager; [phone]) from Monday-Friday 7am-3pm or the Nursing Supervisor during all other times ([phone]).   Best wishes that you will soon be back to doing all the things you love!

## 2024-06-06 NOTE — PROGRESS NOTES
Occupational Therapy    Evaluation    Patient Name: Maisha Agosto  MRN: [id number]  Today's Date: 6/6/2024  Time Calculation  Start Time: 0928  Stop Time: 0942  Time Calculation (min): 14 min    Assessment  IP OT Assessment  OT Assessment: Pt is 84 y/o female brought to hospital for pain and ambulation dysfunction. Pt resides at home with family and has 24 hour care, baseline dementia and recent set up with HH. Pt near baseline level of function d/t dementia, no further acute OT required at this time.  Barriers to Discharge: None  Evaluation/Treatment Tolerance: Patient tolerated treatment well  Medical Staff Made Aware: Yes  End of Session Communication: Bedside nurse  End of Session Patient Position: Up in chair, Alarm on  Plan:  No Skilled OT: No acute OT goals identified  OT Frequency: OT eval only  OT Discharge Recommendations: Low intensity level of continued care  OT Recommended Transfer Status: Assist of 1  OT - OK to Discharge: Yes Based on completed evaluation and care plan recommendations, no barriers to discharge to next site of care       Subjective   Current Problem:  1. Bowel perforation (Multi)        2. Hypokalemia        3. Colitis        4. Hypoxia          General:  General  Reason for Referral: Brought to ER for pain and difficulty with walking, admitted to hospital. OT eval for safety ad ADL assessment.  Referred By: Petra Campa CNP  Past Medical History Relevant to Rehab: Recent UTI and c-diff, PMH of a-fib, CAD, COPD, ataxia, dementia, GERD, RA, osteopenia.  Family/Caregiver Present: No  Prior to Session Communication:  (with PT, bedside nurse with other patient)  Patient Position Received: Up in chair, Alarm on  General Comment: Pt completed therapy at home yesterday and was having increased difficulty ambulating, pain. Brought to ER. Questioned bowel perforation, surgical consult completed and determined not a surgical candidate. Dx with hypokalemia, colitis, hypoxia, bowel perf. Pt was  recently discharged from hospital in May d/t UTI and c-diff, has home care.  Precautions:  Hearing/Visual Limitations: +glasses  Medical Precautions: Fall precautions, Oxygen therapy device and L/min  Precautions Comment: IV L arm  Vital Signs:  Heart Rate: (!) 49  SpO2: 96 % (with 2L O2 on)  Patient Position: Sitting  Pain:  Pain Assessment  Pain Assessment: 0-10  Pain Score: 0 - No pain    Objective   Cognition:  Overall Cognitive Status: Impaired at baseline  Orientation Level: Disoriented to time, Disoriented to situation  Memory: Exceptions to WFL  Safety/Judgement: Exceptions to WFL     Home Living:  Type of Home: House  Lives With:  (daughter (works). Per chart- pt. also goes to her sons house and has help hired help for 24 hour supervision.)  Home Adaptive Equipment: Walker rolling or standard  Home Layout: One level  Home Access: Stairs to enter with rails  Entrance Stairs-Rails: Right  Entrance Stairs-Number of Steps: 2  Bathroom Shower/Tub: Walk-in shower  Bathroom Toilet: Standard  Bathroom Equipment: Grab bars in shower, Built-in shower seat, Tub transfer bench  Home Living Comments: Per EMR: Son's house is one story with a ramped entry and tub/shower combo with tub transfer bench.   Prior Function:  Level of [place]:  (IND with mobility, IND dressing/toileting, has supervision when bathing)  Receives Help From: Family  ADL Assistance: Needs assistance  Bath: Stand by  Homemaking Assistance: Needs assistance  Ambulatory Assistance: Independent (WW)  IADL History:  Homemaking Responsibilities: No  ADL:  Eating Assistance: Stand by  Eating Deficit: Setup  UE Dressing Assistance: Stand by  UE Dressing Deficit: Setup  LE Dressing Assistance: Stand by  Toileting Assistance with Device: Stand by  Toileting Deficit: Verbal cueing  Activity Tolerance:  Endurance: Endurance does not limit participation in activity  Bed Mobility/Transfers: Bed Mobility  Bed Mobility: No    Transfers  Transfer: Yes  Transfer  1  Transfer From 1: Chair with arms to  Transfer to 1: Toilet  Transfer Device 1: Walker  Transfer Level of Assistance 1: Contact guard, Moderate verbal cues  Transfers 2  Transfer From 2: Toilet to  Transfer to 2: Chair with arms  Transfer Device 2: Walker  Transfer Level of Assistance 2: Close supervision, Moderate verbal cues    Functional Mobility:  Functional Mobility  Functional Mobility Performed: Yes  Functional Mobility 1  Surface 1: Level tile  Device 1: Rolling walker  Assistance 1: Contact guard  Comments 1: difficulty navigating with walker in small spaces d/t poor problem solving/cognition  Sitting Balance:  Static Sitting Balance  Static Sitting-Level of Assistance:  (Good)  Dynamic Sitting Balance  Dynamic Sitting-Balance:  (Good)  Standing Balance:  Static Standing Balance  Static Standing-Level of Assistance:  (Fair)  Dynamic Standing Balance  Dynamic Standing-Balance:  (Fair)     IADL's:   Homemaking Responsibilities: No  Vision: Vision - Basic Assessment  Current Vision: Wears glasses all the time  Sensation:  Sensation Comment: denies sensation loss     Coordination:  Coordination Comment: B severe ulnar drift in hands, R hand worse than L. R hand 3-5th MCP flexion contracture   Hand Function:  Hand Function  Gross Grasp: Functional  Coordination: Impaired  Extremities: RUE   RUE : Exceptions to WFL (Proximal AROM/strength WFL, distally impaired in hands d/t RA) and LUE   LUE: Exceptions to WFL (Proximal AROM/strength WFL, distally impaired in hands d/t RA)    Outcome Measures: [facility] Daily Activity  Putting on and taking off regular lower body clothing: A little  Bathing (including washing, rinsing, drying): A little  Putting on and taking off regular upper body clothing: A little  Toileting, which includes using toilet, bedpan or urinal: A little  Taking care of personal grooming such as brushing teeth: A little  Eating Meals: A little  Daily Activity - Total Score: 18    Education  Documentation  Precautions, taught by Gustavo Caballero, NANDO at 6/6/2024 10:11 AM.  Learner: Patient  Readiness: Acceptance  Method: Explanation, Demonstration  Response: Verbalizes Understanding, Needs Reinforcement  Comment: safety with transfers and hand placement, navigation with walker

## 2024-06-07 VITALS
TEMPERATURE: 96.6 F | HEART RATE: 60 BPM | WEIGHT: 90.83 LBS | BODY MASS INDEX: 16.71 KG/M2 | OXYGEN SATURATION: 93 % | DIASTOLIC BLOOD PRESSURE: 66 MMHG | HEIGHT: 62 IN | SYSTOLIC BLOOD PRESSURE: 136 MMHG | RESPIRATION RATE: 18 BRPM

## 2024-06-07 LAB
ANION GAP SERPL CALC-SCNC: 11 MMOL/L (ref 10–20)
BUN SERPL-MCNC: 10 MG/DL (ref 6–23)
CALCIUM SERPL-MCNC: 8.6 MG/DL (ref 8.6–10.3)
CHLORIDE SERPL-SCNC: 106 MMOL/L (ref 98–107)
CO2 SERPL-SCNC: 28 MMOL/L (ref 21–32)
CREAT SERPL-MCNC: 0.59 MG/DL (ref 0.5–1.05)
EGFRCR SERPLBLD CKD-EPI 2021: 88 ML/MIN/1.73M*2
ERYTHROCYTE [DISTWIDTH] IN BLOOD BY AUTOMATED COUNT: 13.3 % (ref 11.5–14.5)
GLUCOSE SERPL-MCNC: 132 MG/DL (ref 74–99)
HCT VFR BLD AUTO: 35.7 % (ref 36–46)
HGB BLD-MCNC: 11.7 G/DL (ref 12–16)
MCH RBC QN AUTO: 30.2 PG (ref 26–34)
MCHC RBC AUTO-ENTMCNC: 32.8 G/DL (ref 32–36)
MCV RBC AUTO: 92 FL (ref 80–100)
NRBC BLD-RTO: 0 /100 WBCS (ref 0–0)
PLATELET # BLD AUTO: 231 X10*3/UL (ref 150–450)
POTASSIUM SERPL-SCNC: 3.1 MMOL/L (ref 3.5–5.3)
RBC # BLD AUTO: 3.88 X10*6/UL (ref 4–5.2)
SODIUM SERPL-SCNC: 142 MMOL/L (ref 136–145)
WBC # BLD AUTO: 10.8 X10*3/UL (ref 4.4–11.3)

## 2024-06-07 PROCEDURE — 80048 BASIC METABOLIC PNL TOTAL CA: CPT | Mod: IPSPLIT

## 2024-06-07 PROCEDURE — 36415 COLL VENOUS BLD VENIPUNCTURE: CPT | Mod: IPSPLIT

## 2024-06-07 PROCEDURE — C9113 INJ PANTOPRAZOLE SODIUM, VIA: HCPCS | Mod: IPSPLIT | Performed by: NURSE PRACTITIONER

## 2024-06-07 PROCEDURE — 2500000002 HC RX 250 W HCPCS SELF ADMINISTERED DRUGS (ALT 637 FOR MEDICARE OP, ALT 636 FOR OP/ED): Mod: IPSPLIT | Performed by: NURSE PRACTITIONER

## 2024-06-07 PROCEDURE — 97116 GAIT TRAINING THERAPY: CPT | Mod: GP,CQ,IPSPLIT

## 2024-06-07 PROCEDURE — 85027 COMPLETE CBC AUTOMATED: CPT | Mod: IPSPLIT

## 2024-06-07 PROCEDURE — 1090000002 HH PPS REVENUE DEBIT

## 2024-06-07 PROCEDURE — 2500000004 HC RX 250 GENERAL PHARMACY W/ HCPCS (ALT 636 FOR OP/ED): Mod: IPSPLIT | Performed by: NURSE PRACTITIONER

## 2024-06-07 PROCEDURE — 2500000001 HC RX 250 WO HCPCS SELF ADMINISTERED DRUGS (ALT 637 FOR MEDICARE OP): Mod: IPSPLIT | Performed by: SURGERY

## 2024-06-07 PROCEDURE — 94760 N-INVAS EAR/PLS OXIMETRY 1: CPT | Mod: IPSPLIT

## 2024-06-07 PROCEDURE — 1090000001 HH PPS REVENUE CREDIT

## 2024-06-07 PROCEDURE — 99232 SBSQ HOSP IP/OBS MODERATE 35: CPT | Performed by: SURGERY

## 2024-06-07 PROCEDURE — 2500000001 HC RX 250 WO HCPCS SELF ADMINISTERED DRUGS (ALT 637 FOR MEDICARE OP): Mod: IPSPLIT | Performed by: NURSE PRACTITIONER

## 2024-06-07 PROCEDURE — 97110 THERAPEUTIC EXERCISES: CPT | Mod: GP,CQ,IPSPLIT

## 2024-06-07 RX ORDER — VANCOMYCIN HYDROCHLORIDE 125 MG/1
125 CAPSULE ORAL DAILY
Qty: 9 CAPSULE | Refills: 0 | Status: SHIPPED | OUTPATIENT
Start: 2024-06-07 | End: 2024-06-16

## 2024-06-07 RX ORDER — MONTELUKAST SODIUM 4 MG/1
1 TABLET, CHEWABLE ORAL
Qty: 60 TABLET | Refills: 0 | Status: SHIPPED | OUTPATIENT
Start: 2024-06-07 | End: 2024-07-07

## 2024-06-07 RX ORDER — POTASSIUM CHLORIDE 20 MEQ/1
40 TABLET, EXTENDED RELEASE ORAL ONCE
Status: COMPLETED | OUTPATIENT
Start: 2024-06-07 | End: 2024-06-07

## 2024-06-07 RX ORDER — METRONIDAZOLE 500 MG/1
500 TABLET ORAL EVERY 12 HOURS SCHEDULED
Qty: 10 TABLET | Refills: 0 | Status: SHIPPED | OUTPATIENT
Start: 2024-06-07 | End: 2024-06-12

## 2024-06-07 RX ORDER — MONTELUKAST SODIUM 4 MG/1
1 TABLET, CHEWABLE ORAL
Status: DISCONTINUED | OUTPATIENT
Start: 2024-06-07 | End: 2024-06-07 | Stop reason: HOSPADM

## 2024-06-07 RX ADMIN — LISINOPRIL 20 MG: 20 TABLET ORAL at 09:32

## 2024-06-07 RX ADMIN — METOPROLOL TARTRATE 25 MG: 25 TABLET, FILM COATED ORAL at 09:32

## 2024-06-07 RX ADMIN — ASPIRIN 81 MG: 81 TABLET, COATED ORAL at 09:32

## 2024-06-07 RX ADMIN — CHOLESTYRAMINE 4 G: 4 POWDER, FOR SUSPENSION ORAL at 09:31

## 2024-06-07 RX ADMIN — AMLODIPINE BESYLATE 5 MG: 5 TABLET ORAL at 09:31

## 2024-06-07 RX ADMIN — PREDNISONE 2.5 MG: 5 TABLET ORAL at 09:32

## 2024-06-07 RX ADMIN — VANCOMYCIN HYDROCHLORIDE 125 MG: 125 CAPSULE ORAL at 09:32

## 2024-06-07 RX ADMIN — LEVOTHYROXINE SODIUM 50 MCG: 50 TABLET ORAL at 06:15

## 2024-06-07 RX ADMIN — Medication 400 MG: at 09:32

## 2024-06-07 RX ADMIN — POTASSIUM CHLORIDE 40 MEQ: 1500 TABLET, EXTENDED RELEASE ORAL at 09:41

## 2024-06-07 RX ADMIN — METRONIDAZOLE 500 MG: 500 TABLET ORAL at 09:32

## 2024-06-07 RX ADMIN — Medication 1 CAPSULE: at 09:32

## 2024-06-07 RX ADMIN — PANTOPRAZOLE SODIUM 40 MG: 40 INJECTION, POWDER, LYOPHILIZED, FOR SOLUTION INTRAVENOUS at 06:15

## 2024-06-07 ASSESSMENT — COGNITIVE AND FUNCTIONAL STATUS - GENERAL
WALKING IN HOSPITAL ROOM: A LITTLE
CLIMB 3 TO 5 STEPS WITH RAILING: A LITTLE
MOVING TO AND FROM BED TO CHAIR: A LITTLE
TURNING FROM BACK TO SIDE WHILE IN FLAT BAD: A LITTLE
MOBILITY SCORE: 20

## 2024-06-07 ASSESSMENT — PAIN - FUNCTIONAL ASSESSMENT
PAIN_FUNCTIONAL_ASSESSMENT: 0-10

## 2024-06-07 ASSESSMENT — PAIN SCALES - GENERAL
PAINLEVEL_OUTOF10: 0 - NO PAIN

## 2024-06-07 NOTE — DISCHARGE SUMMARY
Discharge Diagnosis  Acute Bowel perforation (Multi)  Diarrhea  Weakness    Issues Requiring Follow-Up      Discharge Meds     Your medication list        START taking these medications        Instructions Last Dose Given Next Dose Due   colestipol 1 gram tablet  Commonly known as: Colestid      Take 1 tablet (1 g) by mouth 2 times a day after meals. Take at least 1 hour after or 4 hours before other medications.       lactobacillus acidophilus capsule      Take 1 capsule by mouth once daily.       metroNIDAZOLE 500 mg tablet  Commonly known as: Flagyl      Take 1 tablet (500 mg) by mouth every 12 hours for 5 days.              CHANGE how you take these medications        Instructions Last Dose Given Next Dose Due   vancomycin 125 mg capsule  Commonly known as: Vancocin  What changed: when to take this      Take 1 capsule (125 mg) by mouth once daily for 9 doses.              CONTINUE taking these medications        Instructions Last Dose Given Next Dose Due   amLODIPine 5 mg tablet  Commonly known as: Norvasc           aspirin 81 mg EC tablet      TAKE ONE TABLET BY MOUTH EVERY DAY AS DIRECTED       atorvastatin 40 mg tablet  Commonly known as: Lipitor           calcium carbonate-vitamin D3 250 mg-3.125 mcg (125 unit) tablet  Commonly known as: Oyster Shell           cholestyramine light 4 gram packet  Commonly known as: Prevalite      Take 1 packet (4 g) by mouth 2 times a day.       Fish OiL 1,000 mg (120 mg-180 mg) capsule  Generic drug: omega 3-dha-epa-fish oil           hydrOXYzine HCL 25 mg tablet  Commonly known as: Atarax      Take 1 tablet (25 mg) by mouth 3 times a day as needed for anxiety.       levothyroxine 50 mcg tablet  Commonly known as: Synthroid, Levoxyl           loperamide 2 mg capsule  Commonly known as: Imodium      Take 1 capsule (2 mg) by mouth 4 times a day as needed for diarrhea.       magnesium oxide 400 mg (241.3 mg magnesium) tablet  Commonly known as: Mag-Ox           metoprolol  tartrate 25 mg tablet  Commonly known as: Lopressor           multivitamin tablet           polyethylene glycol 17 gram packet  Commonly known as: Glycolax, Miralax      Take 17 g by mouth once daily.       predniSONE 1 mg tablet  Commonly known as: Deltasone           ramipril 10 mg capsule  Commonly known as: Altace           traZODone 50 mg tablet  Commonly known as: Desyrel                     Where to Get Your Medications        These medications were sent to [id number] - [place], [place] - [address]  [address] [address]      Phone: [phone]   colestipol 1 gram tablet  lactobacillus acidophilus capsule  metroNIDAZOLE 500 mg tablet  vancomycin 125 mg capsule         Test Results Pending At Discharge  Pending Labs       Order Current Status    Extra Urine Gray Tube Collected (06/05/24 0828)    Urinalysis with Reflex Culture and Microscopic In process         83 y.o. female presenting with a complaint of weakness. Patient is a poor historian; she reports she came to the hospital because she could not walk. She is normally ambulatory with a walker and has PT twice a week; after PT yesterday she was having difficulty ambulating with her walker. Patient complained of pain in her abdomen and her buttocks. This morning she was having difficulty getting out of bed. She denies chest pain, fever, chills, SOB, N/V/D/C.     In the ED:  Lab: Glu 130; Na 140; K 3.2; BuN  19; Cr 0.57; WBC 18.4; Hb 12.1; Hcrt 37.4;   Radiology: CT chest No pulmonary emboli or confluent airspace disease. Enlargement of the pulmonary artery suggestive of pulmonary  hypertension. Small layering right pleural effusion. CT abd Severe circumferential wall thickening of the terminal ileum with surrounding inflammatory changes and focal contained perforation along the superior margin of the terminal ileum. These changes are new from relatively recent prior from 05/16/2024. Correlate with history of  inflammatory bowel disease. Allowing for the short interval, infectious or ischemic causes should be considered.  Medication: zosyn, KCL  Disposition: admitted to med/surg    Hospital Course    Acute Bowel Perforation  Abdominal pain  Diarrhea with Hx C. Diff  - CT abd: Severe circumferential wall thickening of the terminal ileum with surrounding inflammatory changes and focal contained perforation along the superior margin of the terminal ileum. These changes are new from relatively recent prior from 05/16/2024. Correlate with history of inflammatory bowel disease   - regular  - Surgical consult  - 0.9% NS at 75 mL  - given zosyn in the ED  - discontinue zosyn  - started PO flagyl  - started PO vancomycin  - stool for C. Diff negative  - started cholestyramine     Weakness  - PT/OT evaluation; recommending low level therapy     Essential HTN  HLD  - hold PO amlodipine, aspirin, atorvastatin, lisinopril, metoprolol tartrate  - started IV metoprolol prn  - monitor BP     Hypothyroidsim  - Hold PO levothyroxine  - started IV levothyroxine     PUD ppx  - started IV pantoprazole     DVT ppx  - hold heparin  - SCD     Code Status: DNRCC-Arrest/DNI     Disposition: Patient was stable to be discharged to home. She was discharged on flagyl, vancomycin, colestipol, lactobacilus. She will follow up with her PCP.    Total cumulative time spent in preparation of this discharge including documentation review, coordination of care with the medical team including PT/SW/care coordinators and treating consultants, discussion with patient and pertinent family members and finalization of prescriptions, follow-up appointments, and this discharge summary was approximately 45 minutes.     Pertinent Physical Exam At Time of Discharge  Physical Exam  Vitals reviewed.   Constitutional:       Appearance: Normal appearance. She is normal weight.   HENT:      Head: Normocephalic and atraumatic.      Nose: Nose normal.      Mouth/Throat:       Mouth: Mucous membranes are moist.      Pharynx: Oropharynx is clear.   Eyes:      Conjunctiva/sclera: Conjunctivae normal.      Pupils: Pupils are equal, round, and reactive to light.   Cardiovascular:      Rate and Rhythm: Regular rhythm.      Pulses: Normal pulses.      Heart sounds: Normal heart sounds.   Pulmonary:      Effort: Pulmonary effort is normal.      Breath sounds: Normal breath sounds.   Abdominal:      General: Bowel sounds are normal.      Palpations: Abdomen is soft.   Musculoskeletal:         General: Normal range of motion.      Cervical back: Normal range of motion and neck supple.   Skin:     General: Skin is warm and dry.   Neurological:      General: No focal deficit present.      Mental Status: She is alert and oriented to person, place, and time.   Psychiatric:         Mood and Affect: Mood normal.         Behavior: Behavior normal.         Outpatient Follow-Up  No future appointments.      Petra Campa, ELIO-CNP

## 2024-06-07 NOTE — PROGRESS NOTES
"Maisha Agosto is a 83 y.o. female on day 2 of admission presenting with Bowel perforation (Multi).    Subjective   No further pain.  Having some diarrhea tolerating Ensure clear       Objective     Physical Exam  Constitutional:       Appearance: Normal appearance.   Abdominal:      Palpations: Abdomen is soft. There is no mass.      Tenderness: There is no abdominal tenderness. There is no guarding.         Last Recorded Vitals  Blood pressure 133/68, pulse 67, temperature 36.6 °C (97.9 °F), temperature source Temporal, resp. rate 18, height 1.575 m (5' 2\"), weight (!) 41.2 kg (90 lb 13.3 oz), SpO2 98%.  Intake/Output last 3 Shifts:  I/O last 3 completed shifts:  In: 2095.7 (50.9 mL/kg) [P.O.:540; I.V.:1255.7 (30.5 mL/kg); IV Piggyback:300]  Out: 900 (21.8 mL/kg) [Urine:900 (0.6 mL/kg/hr)]  Weight: 41.2 kg     R    Assessment/Plan   Principal Problem:    Bowel perforation (Multi)  Active Problems:    Terminal ileitis with complication (Multi)    Plan-start oral lactobacillus.  Trial of colestipol for diarrhea.  Conservative management continue Ensure clear less osmolar      Rosales Drake MD      "

## 2024-06-07 NOTE — PROGRESS NOTES
Physical Therapy    Physical Therapy Treatment    Patient Name: Maisha Agosto  MRN: [id number]  Today's Date: 6/7/2024  Time Calculation  Start Time: 0827  Stop Time: 0906  Time Calculation (min): 39 min    Assessment/Plan   PT Assessment  PT Assessment Results: Decreased mobility, Decreased cognition  Patient required multiple cues to push off from chair instead of pulling on walker.  End of Session Communication: Bedside nurse  End of Session Patient Position: Alarm on, Up in chair  PT Plan  Inpatient/Swing Bed or Outpatient: Inpatient  PT Plan  Treatment/Interventions: Transfer training, Gait training, Therapeutic exercise  PT Plan: Skilled PT  PT Frequency: 3 times per week  PT Discharge Recommendations: Low intensity level of continued care  PT Recommended Transfer Status: Stand by assist  PT - OK to Discharge: Yes    General Visit Information:   PT  Visit  PT Received On: 06/07/24     Subjective   Patient pleasant and agreeable to session.  Precautions:  Precautions  Medical Precautions: Fall precautions, Infection precautions  Vital Signs:  Vital Signs  SpO2: 97 %    Objective   Pain:  Pain Assessment  Pain Assessment: 0-10  Pain Score: 0 - No pain  Cognition:  Cognition  Orientation Level: Disoriented to time, Disoriented to situation     Treatments:  Therapeutic Exercise  Therapeutic Exercise Performed: Yes  Therapeutic Exercise Activity 1: laqs x 20 with vcs reqiured for eccentric control  Therapeutic Exercise Activity 2: hip flexion seated x 20 with vcs required to use full available ROM  Therapeutic Exercise Activity 3: iso hip abduction/adduction x 20  Therapeutic Exercise Activity 4: ankle pumps x 20    Ambulation/Gait Training  Ambulation/Gait Training Performed: Yes  Ambulation/Gait Training 1  Surface 1: Level tile  Device 1: Rolling walker  Gait Support Devices: Gait belt  Assistance 1: Contact guard, Minimal verbal cues (Vcs required to maintain erect posture, to slow pace with turns, and for  obstacle negotiation.)  Quality of Gait 1: Forward flexed posture, Antalgic  Comments/Distance (ft) 1: 125x2  Transfers  Transfer: Yes  Transfer 1  Technique 1: Sit to stand, Stand to sit  Transfer Device 1: Walker, Gait belt  Transfer Level of Assistance 1: Moderate verbal cues, Minimal tactile cues (Verbal and tactile cues required for proper hand placement.)    Outcome Measures:  [facility] Basic Mobility  Turning from your back to your side while in a flat bed without using bedrails: None  Moving from lying on your back to sitting on the side of a flat bed without using bedrails: A little  Moving to and from bed to chair (including a wheelchair): A little  Standing up from a chair using your arms (e.g. wheelchair or bedside chair): None  To walk in hospital room: A little  Climbing 3-5 steps with railing: A little  Basic Mobility - Total Score: 20    Education Documentation  Mobility Training, taught by Tonya Ruano PTA at 6/7/2024  9:16 AM.  Learner: Patient  Readiness: Acceptance  Method: Explanation, Demonstration  Response: Verbalizes Understanding, Needs Reinforcement  Comment: Educated patient on proper hand placement with transfers, also educated patient on slowing pace with turns to reduce risk for falls. Fair follow through with cues.    Education Comments  No comments found.      Encounter Problems       Encounter Problems (Active)       Balance       STG - Maintains dynamic standing balance without upper extremity support with >=good balance  (Progressing)       Start:  06/06/24    Expected End:  06/20/24               Mobility       LTG - Patient will ambulate household distance with SUP with WW (Progressing)       Start:  06/06/24    Expected End:  06/20/24               PT Transfers       STG - Patient will perform bed mobility IND       Start:  06/06/24    Expected End:  06/20/24            STG - Patient will transfer sit to and from stand with SUP and WW (Progressing)       Start:  06/06/24     Expected End:  06/20/24               Pain - Adult

## 2024-06-07 NOTE — CARE PLAN
The patient's goals for the shift include  Will go home    The clinical goals for the shift include Pt will tolerate sitting in chair throughout shift.    Patient discharged home with homecare needs. Discharge instructions reviewed with patient and daughter. Daughter and patient verbally understood discharge instructions and had no questions or concerns. VS were stable and IV was discontinued and intact. No other complaints. Pt transferred off floor via wheelchair to her ride.

## 2024-06-07 NOTE — CARE PLAN
The patient's goals for the shift include      The clinical goals for the shift include maintain SPO2 <92% throughout  night    Over the shift, the patient did make progress toward the following goals. Pt wore 2L oxygen throughout the night, up to BSC x4 to void, denied pain, call light with in reach and bed alarm on.

## 2024-06-08 ENCOUNTER — HOME CARE VISIT (OUTPATIENT)
Dept: HOME HEALTH SERVICES | Facility: HOME HEALTH | Age: 84
End: 2024-06-08
Payer: MEDICARE

## 2024-06-08 VITALS
OXYGEN SATURATION: 97 % | WEIGHT: 90 LBS | DIASTOLIC BLOOD PRESSURE: 68 MMHG | TEMPERATURE: 98.1 F | HEIGHT: 63 IN | HEART RATE: 58 BPM | SYSTOLIC BLOOD PRESSURE: 118 MMHG | RESPIRATION RATE: 20 BRPM | BODY MASS INDEX: 15.95 KG/M2

## 2024-06-08 PROCEDURE — G0299 HHS/HOSPICE OF RN EA 15 MIN: HCPCS | Mod: HHH

## 2024-06-08 PROCEDURE — 1090000002 HH PPS REVENUE DEBIT

## 2024-06-08 PROCEDURE — 1090000001 HH PPS REVENUE CREDIT

## 2024-06-08 ASSESSMENT — ENCOUNTER SYMPTOMS
DENIES PAIN: 1
MUSCLE WEAKNESS: 1
LOSS OF SENSATION IN FEET: 0
FATIGUES EASILY: 1
APPETITE LEVEL: GOOD
PAIN SEVERITY GOAL: 0/10
DEPRESSION: 0
CHANGE IN APPETITE: UNCHANGED
LOWEST PAIN SEVERITY IN PAST 24 HOURS: 0/10
OCCASIONAL FEELINGS OF UNSTEADINESS: 1
PERSON REPORTING PAIN: PATIENT
HIGHEST PAIN SEVERITY IN PAST 24 HOURS: 0/10
LIMITED RANGE OF MOTION: 1

## 2024-06-08 ASSESSMENT — ACTIVITIES OF DAILY LIVING (ADL)
ENTERING_EXITING_HOME: TWO PERSON
CURRENT_FUNCTION: TWO PERSON
OASIS_M1830: 03
AMBULATION ASSISTANCE: TWO PERSON
AMBULATION ASSISTANCE: 1
PHYSICAL TRANSFERS ASSESSED: 1

## 2024-06-08 NOTE — CASE COMMUNICATION
Resumption of care completed for this patient at this time, as she was recently hospitalized and came home yesterday.    Prior to hospitalization, patient only had PT on. OT evaluated and discharged.  Now Skilled nursing, occupational therapy and physical therapy are all ordered for patient post-hospitalization.    She was admitted for dehydration, and was not able to walk over night, could not get out of bed.  Found UTI and placed her  back on Vancomycin and Flagyl after recently having CDiff.    She uses walker for ambulation.  Has a caregiver home with her during the day while her daughter/caregiver is working full time.    Nursing will be on for a few weeks for assessment and education, infection control, and so long as there are no further issues, will be discharging.    PT and OT to re-evaluate and re-assess.

## 2024-06-08 NOTE — Clinical Note
Thank you        ----- Message -----  From: Evelia Benitez RN  Sent: 6/8/2024  12:40 PM EDT  To: Barrett Loyd, PT; *      Resumption of care completed for this patient at this time, as she was recently hospitalized and came home yesterday.    Prior to hospitalization, patient only had PT on. OT evaluated and discharged.  Now Skilled nursing, occupational therapy and physical therapy are all ordered for patient post-hospitalization.    She was admitted for dehydration, and was not able to walk over night, could not get out of bed.  Found UTI and placed her back on Vancomycin and Flagyl after recently having CDiff.    She uses walker for ambulation.  Has a caregiver home with her during the day while her daughter/caregiver is working full time.    Nursing will be on for a few weeks for assessment and education, infection control, and so long as there are no further issues, will be discharging.    PT and OT to re-evaluate and re-assess.

## 2024-06-09 PROCEDURE — 1090000002 HH PPS REVENUE DEBIT

## 2024-06-09 PROCEDURE — 1090000001 HH PPS REVENUE CREDIT

## 2024-06-10 ENCOUNTER — HOME CARE VISIT (OUTPATIENT)
Dept: HOME HEALTH SERVICES | Facility: HOME HEALTH | Age: 84
End: 2024-06-10
Payer: MEDICARE

## 2024-06-10 ENCOUNTER — APPOINTMENT (OUTPATIENT)
Dept: GASTROENTEROLOGY | Facility: CLINIC | Age: 84
End: 2024-06-10
Payer: MEDICARE

## 2024-06-10 VITALS — HEART RATE: 54 BPM | DIASTOLIC BLOOD PRESSURE: 74 MMHG | SYSTOLIC BLOOD PRESSURE: 136 MMHG | RESPIRATION RATE: 18 BRPM

## 2024-06-10 PROCEDURE — G0151 HHCP-SERV OF PT,EA 15 MIN: HCPCS | Mod: HHH

## 2024-06-10 PROCEDURE — 1090000001 HH PPS REVENUE CREDIT

## 2024-06-10 PROCEDURE — 1090000002 HH PPS REVENUE DEBIT

## 2024-06-10 ASSESSMENT — ACTIVITIES OF DAILY LIVING (ADL)
AMBULATION ASSISTANCE ON FLAT SURFACES: 1
AMBULATION_DISTANCE/DURATION_TOLERATED: 50 FT

## 2024-06-10 ASSESSMENT — ENCOUNTER SYMPTOMS
DENIES PAIN: 1
PERSON REPORTING PAIN: PATIENT

## 2024-06-12 ENCOUNTER — HOME CARE VISIT (OUTPATIENT)
Dept: HOME HEALTH SERVICES | Facility: HOME HEALTH | Age: 84
End: 2024-06-12
Payer: MEDICARE

## 2024-06-12 VITALS
TEMPERATURE: 98.5 F | HEART RATE: 60 BPM | SYSTOLIC BLOOD PRESSURE: 124 MMHG | RESPIRATION RATE: 16 BRPM | DIASTOLIC BLOOD PRESSURE: 68 MMHG | OXYGEN SATURATION: 92 %

## 2024-06-12 VITALS — OXYGEN SATURATION: 94 % | HEART RATE: 51 BPM | TEMPERATURE: 97.6 F

## 2024-06-12 PROCEDURE — G0157 HHC PT ASSISTANT EA 15: HCPCS | Mod: CQ,HHH

## 2024-06-12 PROCEDURE — G0299 HHS/HOSPICE OF RN EA 15 MIN: HCPCS | Mod: HHH

## 2024-06-12 SDOH — ECONOMIC STABILITY: FOOD INSECURITY: MEALS PER DAY: 3

## 2024-06-12 ASSESSMENT — PAIN SCALES - PAIN ASSESSMENT IN ADVANCED DEMENTIA (PAINAD)
CONSOLABILITY: 0
BREATHING: 0
BODYLANGUAGE: 0 - RELAXED.
TOTALSCORE: 0
NEGVOCALIZATION: 0
FACIALEXPRESSION: 0
NEGVOCALIZATION: 0 - NONE.
FACIALEXPRESSION: 0 - SMILING OR INEXPRESSIVE.
CONSOLABILITY: 0 - NO NEED TO CONSOLE.
BODYLANGUAGE: 0

## 2024-06-12 ASSESSMENT — ENCOUNTER SYMPTOMS
APPETITE LEVEL: GOOD
PERSON REPORTING PAIN: PATIENT
STOOL FREQUENCY: DAILY
LAST BOWEL MOVEMENT: 67003
FORGETFULNESS: 1
DENIES PAIN: 1
MUSCLE WEAKNESS: 1
DENIES PAIN: 1

## 2024-06-12 ASSESSMENT — ACTIVITIES OF DAILY LIVING (ADL)
CURRENT_FUNCTION: STAND BY ASSIST
AMBULATION ASSISTANCE: ONE PERSON
CURRENT_FUNCTION: ONE PERSON
AMBULATION ASSISTANCE: STAND BY ASSIST

## 2024-06-13 ENCOUNTER — HOME CARE VISIT (OUTPATIENT)
Dept: HOME HEALTH SERVICES | Facility: HOME HEALTH | Age: 84
End: 2024-06-13
Payer: MEDICARE

## 2024-06-14 LAB
ATRIAL RATE: 72 BPM
ATRIAL RATE: 76 BPM
P AXIS: 44 DEGREES
P AXIS: 58 DEGREES
P OFFSET: 203 MS
P OFFSET: 210 MS
P ONSET: 147 MS
P ONSET: 149 MS
PR INTERVAL: 148 MS
PR INTERVAL: 150 MS
Q ONSET: 221 MS
Q ONSET: 224 MS
QRS COUNT: 12 BEATS
QRS COUNT: 13 BEATS
QRS DURATION: 76 MS
QRS DURATION: 76 MS
QT INTERVAL: 412 MS
QT INTERVAL: 412 MS
QTC CALCULATION(BAZETT): 451 MS
QTC CALCULATION(BAZETT): 463 MS
QTC FREDERICIA: 438 MS
QTC FREDERICIA: 445 MS
R AXIS: -4 DEGREES
R AXIS: 1 DEGREES
T AXIS: 68 DEGREES
T AXIS: 75 DEGREES
T OFFSET: 427 MS
T OFFSET: 430 MS
VENTRICULAR RATE: 72 BPM
VENTRICULAR RATE: 76 BPM

## 2024-06-17 ENCOUNTER — HOME CARE VISIT (OUTPATIENT)
Dept: HOME HEALTH SERVICES | Facility: HOME HEALTH | Age: 84
End: 2024-06-17
Payer: MEDICARE

## 2024-06-17 VITALS — HEART RATE: 50 BPM | TEMPERATURE: 97.9 F | OXYGEN SATURATION: 96 %

## 2024-06-17 PROCEDURE — G0157 HHC PT ASSISTANT EA 15: HCPCS | Mod: CQ,HHH

## 2024-06-17 ASSESSMENT — ENCOUNTER SYMPTOMS: DENIES PAIN: 1

## 2024-06-19 ENCOUNTER — HOME CARE VISIT (OUTPATIENT)
Dept: HOME HEALTH SERVICES | Facility: HOME HEALTH | Age: 84
End: 2024-06-19
Payer: MEDICARE

## 2024-06-19 VITALS
DIASTOLIC BLOOD PRESSURE: 58 MMHG | SYSTOLIC BLOOD PRESSURE: 104 MMHG | HEART RATE: 56 BPM | TEMPERATURE: 99.6 F | RESPIRATION RATE: 16 BRPM

## 2024-06-19 PROCEDURE — G0299 HHS/HOSPICE OF RN EA 15 MIN: HCPCS | Mod: HHH

## 2024-06-19 ASSESSMENT — ENCOUNTER SYMPTOMS
FORGETFULNESS: 1
DRY SKIN: 1
APPETITE LEVEL: GOOD
DENIES PAIN: 1
STOOL FREQUENCY: DAILY
PERSON REPORTING PAIN: PATIENT
MUSCLE WEAKNESS: 1
BOWEL INCONTINENCE: 1

## 2024-06-19 ASSESSMENT — PAIN SCALES - PAIN ASSESSMENT IN ADVANCED DEMENTIA (PAINAD)
BREATHING: 0
BODYLANGUAGE: 0
TOTALSCORE: 0
NEGVOCALIZATION: 0 - NONE.
FACIALEXPRESSION: 0
BODYLANGUAGE: 0 - RELAXED.
FACIALEXPRESSION: 0 - SMILING OR INEXPRESSIVE.
NEGVOCALIZATION: 0
CONSOLABILITY: 0 - NO NEED TO CONSOLE.
CONSOLABILITY: 0

## 2024-06-19 ASSESSMENT — ACTIVITIES OF DAILY LIVING (ADL)
CURRENT_FUNCTION: STAND BY ASSIST
AMBULATION ASSISTANCE: STAND BY ASSIST

## 2024-06-25 ENCOUNTER — HOME CARE VISIT (OUTPATIENT)
Dept: HOME HEALTH SERVICES | Facility: HOME HEALTH | Age: 84
End: 2024-06-25
Payer: MEDICARE

## 2024-06-25 PROCEDURE — G0157 HHC PT ASSISTANT EA 15: HCPCS | Mod: CQ,HHH

## 2024-06-26 VITALS — TEMPERATURE: 98.9 F

## 2024-06-26 ASSESSMENT — ENCOUNTER SYMPTOMS: DENIES PAIN: 1

## 2024-06-28 ENCOUNTER — HOME CARE VISIT (OUTPATIENT)
Dept: HOME HEALTH SERVICES | Facility: HOME HEALTH | Age: 84
End: 2024-06-28
Payer: MEDICARE

## 2024-06-28 PROCEDURE — G0157 HHC PT ASSISTANT EA 15: HCPCS | Mod: CQ,HHH

## 2024-06-28 ASSESSMENT — ENCOUNTER SYMPTOMS: DENIES PAIN: 1

## 2024-07-02 ENCOUNTER — HOME CARE VISIT (OUTPATIENT)
Dept: HOME HEALTH SERVICES | Facility: HOME HEALTH | Age: 84
End: 2024-07-02
Payer: MEDICARE

## 2024-07-02 PROCEDURE — G0157 HHC PT ASSISTANT EA 15: HCPCS | Mod: CQ,HHH

## 2024-07-02 ASSESSMENT — ENCOUNTER SYMPTOMS: DENIES PAIN: 1

## 2024-07-05 ENCOUNTER — HOME CARE VISIT (OUTPATIENT)
Dept: HOME HEALTH SERVICES | Facility: HOME HEALTH | Age: 84
End: 2024-07-05
Payer: MEDICARE

## 2024-07-05 VITALS — TEMPERATURE: 98.5 F

## 2024-07-05 PROCEDURE — G0157 HHC PT ASSISTANT EA 15: HCPCS | Mod: CQ,HHH

## 2024-07-05 ASSESSMENT — ENCOUNTER SYMPTOMS: DENIES PAIN: 1

## 2024-07-08 ENCOUNTER — HOME CARE VISIT (OUTPATIENT)
Dept: HOME HEALTH SERVICES | Facility: HOME HEALTH | Age: 84
End: 2024-07-08
Payer: MEDICARE

## 2024-07-08 PROCEDURE — G0151 HHCP-SERV OF PT,EA 15 MIN: HCPCS | Mod: HHH

## 2024-07-08 ASSESSMENT — ACTIVITIES OF DAILY LIVING (ADL)
OASIS_M1830: 02
HOME_HEALTH_OASIS: 01

## 2024-07-16 ENCOUNTER — APPOINTMENT (OUTPATIENT)
Dept: GASTROENTEROLOGY | Facility: CLINIC | Age: 84
End: 2024-07-16
Payer: MEDICARE

## 2024-09-28 ENCOUNTER — APPOINTMENT (OUTPATIENT)
Dept: RADIOLOGY | Facility: HOSPITAL | Age: 84
End: 2024-09-28
Payer: MEDICARE

## 2024-09-28 ENCOUNTER — APPOINTMENT (OUTPATIENT)
Dept: CARDIOLOGY | Facility: HOSPITAL | Age: 84
End: 2024-09-28
Payer: MEDICARE

## 2024-09-28 ENCOUNTER — HOSPITAL ENCOUNTER (INPATIENT)
Facility: HOSPITAL | Age: 84
End: 2024-09-28
Attending: INTERNAL MEDICINE | Admitting: INTERNAL MEDICINE
Payer: MEDICARE

## 2024-09-28 DIAGNOSIS — I10 HYPERTENSION, UNSPECIFIED TYPE: ICD-10-CM

## 2024-09-28 DIAGNOSIS — I48.0 PAROXYSMAL ATRIAL FIBRILLATION (MULTI): ICD-10-CM

## 2024-09-28 DIAGNOSIS — J18.9 PNEUMONIA OF BOTH LOWER LOBES DUE TO INFECTIOUS ORGANISM: Primary | ICD-10-CM

## 2024-09-28 DIAGNOSIS — N30.00 ACUTE CYSTITIS WITHOUT HEMATURIA: ICD-10-CM

## 2024-09-28 DIAGNOSIS — R09.02 HYPOXIA: ICD-10-CM

## 2024-09-28 PROBLEM — Z86.19 HISTORY OF CLOSTRIDIOIDES DIFFICILE INFECTION: Status: ACTIVE | Noted: 2024-09-28

## 2024-09-28 PROBLEM — R63.0 DECREASED APPETITE: Status: ACTIVE | Noted: 2024-09-28

## 2024-09-28 PROBLEM — J44.1 ACUTE EXACERBATION OF COPD WITH ASTHMA: Status: ACTIVE | Noted: 2023-02-03

## 2024-09-28 PROBLEM — E03.8 SECONDARY HYPOTHYROIDISM: Status: ACTIVE | Noted: 2024-09-28

## 2024-09-28 PROBLEM — J45.901 ACUTE EXACERBATION OF COPD WITH ASTHMA: Status: ACTIVE | Noted: 2023-02-03

## 2024-09-28 PROBLEM — I73.9 PVD (PERIPHERAL VASCULAR DISEASE) (CMS-HCC): Status: ACTIVE | Noted: 2024-09-28

## 2024-09-28 PROBLEM — E11.9 TYPE 2 DIABETES MELLITUS: Status: ACTIVE | Noted: 2024-09-28

## 2024-09-28 PROBLEM — R32 URINARY INCONTINENCE: Status: ACTIVE | Noted: 2024-09-28

## 2024-09-28 LAB
ALBUMIN SERPL BCP-MCNC: 3.7 G/DL (ref 3.4–5)
ALP SERPL-CCNC: 56 U/L (ref 33–136)
ALT SERPL W P-5'-P-CCNC: 24 U/L (ref 7–45)
ANION GAP SERPL CALC-SCNC: 9 MMOL/L (ref 10–20)
APPEARANCE UR: CLEAR
AST SERPL W P-5'-P-CCNC: 23 U/L (ref 9–39)
BILIRUB SERPL-MCNC: 0.9 MG/DL (ref 0–1.2)
BILIRUB UR STRIP.AUTO-MCNC: NEGATIVE MG/DL
BUN SERPL-MCNC: 21 MG/DL (ref 6–23)
CALCIUM SERPL-MCNC: 9 MG/DL (ref 8.6–10.3)
CARDIAC TROPONIN I PNL SERPL HS: 7 NG/L (ref 0–13)
CHLORIDE SERPL-SCNC: 100 MMOL/L (ref 98–107)
CO2 SERPL-SCNC: 32 MMOL/L (ref 21–32)
COLOR UR: YELLOW
CREAT SERPL-MCNC: 0.69 MG/DL (ref 0.5–1.05)
EGFRCR SERPLBLD CKD-EPI 2021: 85 ML/MIN/1.73M*2
ERYTHROCYTE [DISTWIDTH] IN BLOOD BY AUTOMATED COUNT: 12.6 % (ref 11.5–14.5)
GLUCOSE SERPL-MCNC: 127 MG/DL (ref 74–99)
GLUCOSE UR STRIP.AUTO-MCNC: NORMAL MG/DL
HCT VFR BLD AUTO: 39.7 % (ref 36–46)
HGB BLD-MCNC: 12.8 G/DL (ref 12–16)
KETONES UR STRIP.AUTO-MCNC: NEGATIVE MG/DL
LEUKOCYTE ESTERASE UR QL STRIP.AUTO: NEGATIVE
MCH RBC QN AUTO: 29.7 PG (ref 26–34)
MCHC RBC AUTO-ENTMCNC: 32.2 G/DL (ref 32–36)
MCV RBC AUTO: 92 FL (ref 80–100)
MUCOUS THREADS #/AREA URNS AUTO: ABNORMAL /LPF
NITRITE UR QL STRIP.AUTO: NEGATIVE
NRBC BLD-RTO: 0 /100 WBCS (ref 0–0)
PH UR STRIP.AUTO: 6 [PH]
PLATELET # BLD AUTO: 206 X10*3/UL (ref 150–450)
POTASSIUM SERPL-SCNC: 3.7 MMOL/L (ref 3.5–5.3)
PROT SERPL-MCNC: 7 G/DL (ref 6.4–8.2)
PROT UR STRIP.AUTO-MCNC: ABNORMAL MG/DL
RBC # BLD AUTO: 4.31 X10*6/UL (ref 4–5.2)
RBC # UR STRIP.AUTO: ABNORMAL /UL
RBC #/AREA URNS AUTO: >20 /HPF
SODIUM SERPL-SCNC: 137 MMOL/L (ref 136–145)
SP GR UR STRIP.AUTO: 1.02
UROBILINOGEN UR STRIP.AUTO-MCNC: NORMAL MG/DL
WBC # BLD AUTO: 16.9 X10*3/UL (ref 4.4–11.3)
WBC #/AREA URNS AUTO: ABNORMAL /HPF

## 2024-09-28 PROCEDURE — 85027 COMPLETE CBC AUTOMATED: CPT | Performed by: NURSE PRACTITIONER

## 2024-09-28 PROCEDURE — 74177 CT ABD & PELVIS W/CONTRAST: CPT | Mod: FOREIGN READ | Performed by: RADIOLOGY

## 2024-09-28 PROCEDURE — 96365 THER/PROPH/DIAG IV INF INIT: CPT | Mod: 59

## 2024-09-28 PROCEDURE — 71045 X-RAY EXAM CHEST 1 VIEW: CPT | Mod: FOREIGN READ | Performed by: RADIOLOGY

## 2024-09-28 PROCEDURE — 96375 TX/PRO/DX INJ NEW DRUG ADDON: CPT

## 2024-09-28 PROCEDURE — 2500000002 HC RX 250 W HCPCS SELF ADMINISTERED DRUGS (ALT 637 FOR MEDICARE OP, ALT 636 FOR OP/ED)

## 2024-09-28 PROCEDURE — 2500000004 HC RX 250 GENERAL PHARMACY W/ HCPCS (ALT 636 FOR OP/ED)

## 2024-09-28 PROCEDURE — 96372 THER/PROPH/DIAG INJ SC/IM: CPT

## 2024-09-28 PROCEDURE — 99223 1ST HOSP IP/OBS HIGH 75: CPT

## 2024-09-28 PROCEDURE — 2550000001 HC RX 255 CONTRASTS: Performed by: NURSE PRACTITIONER

## 2024-09-28 PROCEDURE — G0378 HOSPITAL OBSERVATION PER HR: HCPCS

## 2024-09-28 PROCEDURE — 74177 CT ABD & PELVIS W/CONTRAST: CPT

## 2024-09-28 PROCEDURE — 2500000005 HC RX 250 GENERAL PHARMACY W/O HCPCS

## 2024-09-28 PROCEDURE — 71045 X-RAY EXAM CHEST 1 VIEW: CPT

## 2024-09-28 PROCEDURE — 99285 EMERGENCY DEPT VISIT HI MDM: CPT

## 2024-09-28 PROCEDURE — 96366 THER/PROPH/DIAG IV INF ADDON: CPT

## 2024-09-28 PROCEDURE — 93005 ELECTROCARDIOGRAM TRACING: CPT

## 2024-09-28 PROCEDURE — 80053 COMPREHEN METABOLIC PANEL: CPT | Performed by: NURSE PRACTITIONER

## 2024-09-28 PROCEDURE — 94640 AIRWAY INHALATION TREATMENT: CPT

## 2024-09-28 PROCEDURE — 2500000001 HC RX 250 WO HCPCS SELF ADMINISTERED DRUGS (ALT 637 FOR MEDICARE OP)

## 2024-09-28 PROCEDURE — 36415 COLL VENOUS BLD VENIPUNCTURE: CPT | Performed by: NURSE PRACTITIONER

## 2024-09-28 PROCEDURE — 96367 TX/PROPH/DG ADDL SEQ IV INF: CPT

## 2024-09-28 PROCEDURE — P9612 CATHETERIZE FOR URINE SPEC: HCPCS

## 2024-09-28 PROCEDURE — 94760 N-INVAS EAR/PLS OXIMETRY 1: CPT

## 2024-09-28 PROCEDURE — 81001 URINALYSIS AUTO W/SCOPE: CPT | Performed by: NURSE PRACTITIONER

## 2024-09-28 PROCEDURE — 94664 DEMO&/EVAL PT USE INHALER: CPT

## 2024-09-28 PROCEDURE — 84484 ASSAY OF TROPONIN QUANT: CPT | Performed by: NURSE PRACTITIONER

## 2024-09-28 RX ORDER — LISINOPRIL 20 MG/1
20 TABLET ORAL DAILY
Status: DISCONTINUED | OUTPATIENT
Start: 2024-09-28 | End: 2024-10-07 | Stop reason: HOSPADM

## 2024-09-28 RX ORDER — ACETAMINOPHEN 650 MG/1
650 SUPPOSITORY RECTAL EVERY 4 HOURS PRN
Status: DISCONTINUED | OUTPATIENT
Start: 2024-09-28 | End: 2024-09-28

## 2024-09-28 RX ORDER — FERROUS SULFATE, DRIED 160(50) MG
1 TABLET, EXTENDED RELEASE ORAL DAILY
Status: DISCONTINUED | OUTPATIENT
Start: 2024-09-28 | End: 2024-10-07 | Stop reason: HOSPADM

## 2024-09-28 RX ORDER — TALC
6 POWDER (GRAM) TOPICAL NIGHTLY PRN
Status: DISCONTINUED | OUTPATIENT
Start: 2024-09-28 | End: 2024-10-07 | Stop reason: HOSPADM

## 2024-09-28 RX ORDER — MONTELUKAST SODIUM 4 MG/1
1 TABLET, CHEWABLE ORAL
Status: DISCONTINUED | OUTPATIENT
Start: 2024-09-29 | End: 2024-10-07 | Stop reason: HOSPADM

## 2024-09-28 RX ORDER — ENOXAPARIN SODIUM 100 MG/ML
40 INJECTION SUBCUTANEOUS EVERY 24 HOURS
Status: DISCONTINUED | OUTPATIENT
Start: 2024-09-28 | End: 2024-10-07 | Stop reason: HOSPADM

## 2024-09-28 RX ORDER — ASPIRIN 81 MG/1
81 TABLET ORAL DAILY
Status: DISCONTINUED | OUTPATIENT
Start: 2024-09-28 | End: 2024-10-07 | Stop reason: HOSPADM

## 2024-09-28 RX ORDER — POLYETHYLENE GLYCOL 3350 17 G/17G
17 POWDER, FOR SOLUTION ORAL DAILY
Status: DISCONTINUED | OUTPATIENT
Start: 2024-09-28 | End: 2024-10-07 | Stop reason: HOSPADM

## 2024-09-28 RX ORDER — PREDNISONE 5 MG/1
2.5 TABLET ORAL DAILY
Status: DISCONTINUED | OUTPATIENT
Start: 2024-09-28 | End: 2024-10-07 | Stop reason: HOSPADM

## 2024-09-28 RX ORDER — PANTOPRAZOLE SODIUM 40 MG/10ML
40 INJECTION, POWDER, LYOPHILIZED, FOR SOLUTION INTRAVENOUS
Status: DISCONTINUED | OUTPATIENT
Start: 2024-09-29 | End: 2024-10-03

## 2024-09-28 RX ORDER — ACETAMINOPHEN 325 MG/1
650 TABLET ORAL EVERY 4 HOURS PRN
Status: DISCONTINUED | OUTPATIENT
Start: 2024-09-28 | End: 2024-09-28

## 2024-09-28 RX ORDER — ACETAMINOPHEN 160 MG/5ML
650 SOLUTION ORAL EVERY 4 HOURS PRN
Status: DISCONTINUED | OUTPATIENT
Start: 2024-09-28 | End: 2024-09-28

## 2024-09-28 RX ORDER — GLUCOSAM/CHONDRO/HERB 149/HYAL 750-100 MG
1000 TABLET ORAL DAILY
Status: DISCONTINUED | OUTPATIENT
Start: 2024-09-28 | End: 2024-09-29 | Stop reason: ALTCHOICE

## 2024-09-28 RX ORDER — SODIUM CHLORIDE 9 MG/ML
INJECTION, SOLUTION INTRAVENOUS
Status: COMPLETED
Start: 2024-09-28 | End: 2024-09-28

## 2024-09-28 RX ORDER — AMLODIPINE BESYLATE 5 MG/1
5 TABLET ORAL DAILY
Status: DISCONTINUED | OUTPATIENT
Start: 2024-09-28 | End: 2024-10-07 | Stop reason: HOSPADM

## 2024-09-28 RX ORDER — CHOLESTYRAMINE 4 G/9G
4 POWDER, FOR SUSPENSION ORAL 2 TIMES DAILY
Status: DISCONTINUED | OUTPATIENT
Start: 2024-09-28 | End: 2024-10-07 | Stop reason: HOSPADM

## 2024-09-28 RX ORDER — ONDANSETRON 4 MG/1
4 TABLET, ORALLY DISINTEGRATING ORAL EVERY 8 HOURS PRN
Status: DISCONTINUED | OUTPATIENT
Start: 2024-09-28 | End: 2024-10-07 | Stop reason: HOSPADM

## 2024-09-28 RX ORDER — ATORVASTATIN CALCIUM 40 MG/1
40 TABLET, FILM COATED ORAL NIGHTLY
Status: DISCONTINUED | OUTPATIENT
Start: 2024-09-28 | End: 2024-10-07 | Stop reason: HOSPADM

## 2024-09-28 RX ORDER — CEFTRIAXONE 1 G/50ML
1 INJECTION, SOLUTION INTRAVENOUS EVERY 24 HOURS
Status: DISCONTINUED | OUTPATIENT
Start: 2024-09-28 | End: 2024-10-01

## 2024-09-28 RX ORDER — LOPERAMIDE HYDROCHLORIDE 2 MG/1
2 CAPSULE ORAL 4 TIMES DAILY PRN
Status: DISCONTINUED | OUTPATIENT
Start: 2024-09-28 | End: 2024-10-07 | Stop reason: HOSPADM

## 2024-09-28 RX ORDER — LEVOTHYROXINE SODIUM 50 UG/1
50 TABLET ORAL
Status: DISCONTINUED | OUTPATIENT
Start: 2024-09-29 | End: 2024-10-07 | Stop reason: HOSPADM

## 2024-09-28 RX ORDER — METOPROLOL TARTRATE 25 MG/1
25 TABLET, FILM COATED ORAL 2 TIMES DAILY
Status: DISCONTINUED | OUTPATIENT
Start: 2024-09-28 | End: 2024-10-02

## 2024-09-28 RX ORDER — GUAIFENESIN 600 MG/1
600 TABLET, EXTENDED RELEASE ORAL EVERY 12 HOURS PRN
Status: DISCONTINUED | OUTPATIENT
Start: 2024-09-28 | End: 2024-10-07 | Stop reason: HOSPADM

## 2024-09-28 RX ORDER — HYDROXYZINE HYDROCHLORIDE 25 MG/1
25 TABLET, FILM COATED ORAL 3 TIMES DAILY PRN
Status: DISCONTINUED | OUTPATIENT
Start: 2024-09-28 | End: 2024-10-07 | Stop reason: HOSPADM

## 2024-09-28 RX ORDER — GUAIFENESIN/DEXTROMETHORPHAN 100-10MG/5
5 SYRUP ORAL EVERY 4 HOURS PRN
Status: DISCONTINUED | OUTPATIENT
Start: 2024-09-28 | End: 2024-10-07 | Stop reason: HOSPADM

## 2024-09-28 RX ORDER — PANTOPRAZOLE SODIUM 40 MG/1
40 TABLET, DELAYED RELEASE ORAL
Status: DISCONTINUED | OUTPATIENT
Start: 2024-09-29 | End: 2024-10-07 | Stop reason: HOSPADM

## 2024-09-28 RX ORDER — ACETAMINOPHEN 325 MG/1
650 TABLET ORAL EVERY 4 HOURS PRN
Status: DISCONTINUED | OUTPATIENT
Start: 2024-09-28 | End: 2024-10-07 | Stop reason: HOSPADM

## 2024-09-28 RX ORDER — TRAZODONE HYDROCHLORIDE 50 MG/1
50 TABLET ORAL NIGHTLY
Status: DISCONTINUED | OUTPATIENT
Start: 2024-09-28 | End: 2024-10-07 | Stop reason: HOSPADM

## 2024-09-28 RX ORDER — IPRATROPIUM BROMIDE AND ALBUTEROL SULFATE 2.5; .5 MG/3ML; MG/3ML
3 SOLUTION RESPIRATORY (INHALATION) 3 TIMES DAILY
Status: DISCONTINUED | OUTPATIENT
Start: 2024-09-28 | End: 2024-09-29

## 2024-09-28 RX ORDER — ONDANSETRON HYDROCHLORIDE 2 MG/ML
4 INJECTION, SOLUTION INTRAVENOUS EVERY 8 HOURS PRN
Status: DISCONTINUED | OUTPATIENT
Start: 2024-09-28 | End: 2024-10-07 | Stop reason: HOSPADM

## 2024-09-28 RX ORDER — LANOLIN ALCOHOL/MO/W.PET/CERES
400 CREAM (GRAM) TOPICAL DAILY
Status: DISCONTINUED | OUTPATIENT
Start: 2024-09-28 | End: 2024-10-07 | Stop reason: HOSPADM

## 2024-09-28 SDOH — SOCIAL STABILITY: SOCIAL INSECURITY
WITHIN THE LAST YEAR, HAVE YOU BEEN HUMILIATED OR EMOTIONALLY ABUSED IN OTHER WAYS BY YOUR PARTNER OR EX-PARTNER?: PATIENT UNABLE TO ANSWER

## 2024-09-28 SDOH — ECONOMIC STABILITY: INCOME INSECURITY: IN THE PAST 12 MONTHS, HAS THE ELECTRIC, GAS, OIL, OR WATER COMPANY THREATENED TO SHUT OFF SERVICE IN YOUR HOME?: NO

## 2024-09-28 SDOH — ECONOMIC STABILITY: FOOD INSECURITY: WITHIN THE PAST 12 MONTHS, THE FOOD YOU BOUGHT JUST DIDN'T LAST AND YOU DIDN'T HAVE MONEY TO GET MORE.: NEVER TRUE

## 2024-09-28 SDOH — SOCIAL STABILITY: SOCIAL NETWORK
DO YOU BELONG TO ANY CLUBS OR ORGANIZATIONS SUCH AS CHURCH GROUPS UNIONS, FRATERNAL OR ATHLETIC GROUPS, OR SCHOOL GROUPS?: PATIENT UNABLE TO ANSWER

## 2024-09-28 SDOH — HEALTH STABILITY: MENTAL HEALTH: HOW OFTEN DO YOU HAVE A DRINK CONTAINING ALCOHOL?: NEVER

## 2024-09-28 SDOH — ECONOMIC STABILITY: FOOD INSECURITY: WITHIN THE PAST 12 MONTHS, YOU WORRIED THAT YOUR FOOD WOULD RUN OUT BEFORE YOU GOT THE MONEY TO BUY MORE.: NEVER TRUE

## 2024-09-28 SDOH — ECONOMIC STABILITY: HOUSING INSECURITY: IN THE LAST 12 MONTHS, WAS THERE A TIME WHEN YOU WERE NOT ABLE TO PAY THE MORTGAGE OR RENT ON TIME?: NO

## 2024-09-28 SDOH — SOCIAL STABILITY: SOCIAL NETWORK
DO YOU BELONG TO ANY CLUBS OR ORGANIZATIONS SUCH AS CHURCH GROUPS, UNIONS, FRATERNAL OR ATHLETIC GROUPS, OR SCHOOL GROUPS?: PATIENT UNABLE TO ANSWER

## 2024-09-28 SDOH — ECONOMIC STABILITY: TRANSPORTATION INSECURITY
IN THE PAST 12 MONTHS, HAS LACK OF TRANSPORTATION KEPT YOU FROM MEDICAL APPOINTMENTS OR FROM GETTING MEDICATIONS?: PATIENT UNABLE TO ANSWER

## 2024-09-28 SDOH — ECONOMIC STABILITY: FOOD INSECURITY
WITHIN THE PAST 12 MONTHS, THE FOOD YOU BOUGHT JUST DIDN'T LAST AND YOU DIDN'T HAVE MONEY TO GET MORE.: PATIENT UNABLE TO ANSWER

## 2024-09-28 SDOH — HEALTH STABILITY: PHYSICAL HEALTH: ON AVERAGE, HOW MANY MINUTES DO YOU ENGAGE IN EXERCISE AT THIS LEVEL?: 0 MIN

## 2024-09-28 SDOH — SOCIAL STABILITY: SOCIAL INSECURITY: ARE YOU OR HAVE YOU BEEN THREATENED OR ABUSED PHYSICALLY, EMOTIONALLY, OR SEXUALLY BY ANYONE?: UNABLE TO ASSESS

## 2024-09-28 SDOH — ECONOMIC STABILITY: HOUSING INSECURITY: IN THE PAST 12 MONTHS, HOW MANY TIMES HAVE YOU MOVED WHERE YOU WERE LIVING?: 0

## 2024-09-28 SDOH — ECONOMIC STABILITY: HOUSING INSECURITY: AT ANY TIME IN THE PAST 12 MONTHS, WERE YOU HOMELESS OR LIVING IN A SHELTER (INCLUDING NOW)?: PATIENT UNABLE TO ANSWER

## 2024-09-28 SDOH — SOCIAL STABILITY: SOCIAL INSECURITY: ABUSE: ADULT

## 2024-09-28 SDOH — ECONOMIC STABILITY: INCOME INSECURITY: HOW HARD IS IT FOR YOU TO PAY FOR THE VERY BASICS LIKE FOOD, HOUSING, MEDICAL CARE, AND HEATING?: VERY HARD

## 2024-09-28 SDOH — SOCIAL STABILITY: SOCIAL NETWORK: IN A TYPICAL WEEK, HOW MANY TIMES DO YOU TALK ON THE PHONE WITH FAMILY, FRIENDS, OR NEIGHBORS?: PATIENT UNABLE TO ANSWER

## 2024-09-28 SDOH — ECONOMIC STABILITY: FOOD INSECURITY
WITHIN THE PAST 12 MONTHS, YOU WORRIED THAT YOUR FOOD WOULD RUN OUT BEFORE YOU GOT MONEY TO BUY MORE.: PATIENT UNABLE TO ANSWER

## 2024-09-28 SDOH — ECONOMIC STABILITY: FOOD INSECURITY: HOW HARD IS IT FOR YOU TO PAY FOR THE VERY BASICS LIKE FOOD, HOUSING, MEDICAL CARE, AND HEATING?: NOT HARD AT ALL

## 2024-09-28 SDOH — ECONOMIC STABILITY: HOUSING INSECURITY: AT ANY TIME IN THE PAST 12 MONTHS, WERE YOU HOMELESS OR LIVING IN A SHELTER (INCLUDING NOW)?: NO

## 2024-09-28 SDOH — SOCIAL STABILITY: SOCIAL INSECURITY: ARE THERE ANY APPARENT SIGNS OF INJURIES/BEHAVIORS THAT COULD BE RELATED TO ABUSE/NEGLECT?: UNABLE TO ASSESS

## 2024-09-28 SDOH — HEALTH STABILITY: MENTAL HEALTH
HOW OFTEN DO YOU NEED TO HAVE SOMEONE HELP YOU WHEN YOU READ INSTRUCTIONS, PAMPHLETS, OR OTHER WRITTEN MATERIAL FROM YOUR DOCTOR OR PHARMACY?: OFTEN

## 2024-09-28 SDOH — SOCIAL STABILITY: SOCIAL INSECURITY: DO YOU FEEL ANYONE HAS EXPLOITED OR TAKEN ADVANTAGE OF YOU FINANCIALLY OR OF YOUR PERSONAL PROPERTY?: UNABLE TO ASSESS

## 2024-09-28 SDOH — ECONOMIC STABILITY: FOOD INSECURITY: WITHIN THE PAST 12 MONTHS, YOU WORRIED THAT YOUR FOOD WOULD RUN OUT BEFORE YOU GOT MONEY TO BUY MORE.: NEVER TRUE

## 2024-09-28 SDOH — HEALTH STABILITY: MENTAL HEALTH: HOW OFTEN DO YOU HAVE 6 OR MORE DRINKS ON ONE OCCASION?: NEVER

## 2024-09-28 SDOH — HEALTH STABILITY: MENTAL HEALTH: HOW OFTEN DO YOU HAVE SIX OR MORE DRINKS ON ONE OCCASION?: NEVER

## 2024-09-28 SDOH — ECONOMIC STABILITY: FOOD INSECURITY: HOW HARD IS IT FOR YOU TO PAY FOR THE VERY BASICS LIKE FOOD, HOUSING, MEDICAL CARE, AND HEATING?: VERY HARD

## 2024-09-28 SDOH — HEALTH STABILITY: MENTAL HEALTH
DO YOU FEEL STRESS - TENSE, RESTLESS, NERVOUS, OR ANXIOUS, OR UNABLE TO SLEEP AT NIGHT BECAUSE YOUR MIND IS TROUBLED ALL THE TIME - THESE DAYS?: NOT AT ALL

## 2024-09-28 SDOH — SOCIAL STABILITY: SOCIAL INSECURITY
WITHIN THE LAST YEAR, HAVE TO BEEN RAPED OR FORCED TO HAVE ANY KIND OF SEXUAL ACTIVITY BY YOUR PARTNER OR EX-PARTNER?: PATIENT UNABLE TO ANSWER

## 2024-09-28 SDOH — ECONOMIC STABILITY: TRANSPORTATION INSECURITY
IN THE PAST 12 MONTHS, HAS THE LACK OF TRANSPORTATION KEPT YOU FROM MEDICAL APPOINTMENTS OR FROM GETTING MEDICATIONS?: PATIENT UNABLE TO ANSWER

## 2024-09-28 SDOH — SOCIAL STABILITY: SOCIAL INSECURITY: WITHIN THE LAST YEAR, HAVE YOU BEEN AFRAID OF YOUR PARTNER OR EX-PARTNER?: PATIENT UNABLE TO ANSWER

## 2024-09-28 SDOH — SOCIAL STABILITY: SOCIAL INSECURITY
WITHIN THE LAST YEAR, HAVE YOU BEEN KICKED, HIT, SLAPPED, OR OTHERWISE PHYSICALLY HURT BY YOUR PARTNER OR EX-PARTNER?: PATIENT UNABLE TO ANSWER

## 2024-09-28 SDOH — SOCIAL STABILITY: SOCIAL INSECURITY: HAS ANYONE EVER THREATENED TO HURT YOUR FAMILY OR YOUR PETS?: UNABLE TO ASSESS

## 2024-09-28 SDOH — SOCIAL STABILITY: SOCIAL NETWORK: HOW OFTEN DO YOU ATTEND CHURCH OR RELIGIOUS SERVICES?: PATIENT UNABLE TO ANSWER

## 2024-09-28 SDOH — SOCIAL STABILITY: SOCIAL INSECURITY: ARE YOU MARRIED, WIDOWED, DIVORCED, SEPARATED, NEVER MARRIED, OR LIVING WITH A PARTNER?: PATIENT UNABLE TO ANSWER

## 2024-09-28 SDOH — ECONOMIC STABILITY: TRANSPORTATION INSECURITY
IN THE PAST 12 MONTHS, HAS LACK OF TRANSPORTATION KEPT YOU FROM MEETINGS, WORK, OR FROM GETTING THINGS NEEDED FOR DAILY LIVING?: PATIENT UNABLE TO ANSWER

## 2024-09-28 SDOH — ECONOMIC STABILITY: FOOD INSECURITY
WITHIN THE PAST 12 MONTHS, YOU WORRIED THAT YOUR FOOD WOULD RUN OUT BEFORE YOU GOT THE MONEY TO BUY MORE.: PATIENT UNABLE TO ANSWER

## 2024-09-28 SDOH — HEALTH STABILITY: MENTAL HEALTH
DO YOU FEEL STRESS - TENSE, RESTLESS, NERVOUS, OR ANXIOUS, OR UNABLE TO SLEEP AT NIGHT BECAUSE YOUR MIND IS TROUBLED ALL THE TIME - THESE DAYS?: PATIENT UNABLE TO ANSWER

## 2024-09-28 SDOH — ECONOMIC STABILITY: INCOME INSECURITY: IN THE PAST 12 MONTHS HAS THE ELECTRIC, GAS, OIL, OR WATER COMPANY THREATENED TO SHUT OFF SERVICES IN YOUR HOME?: NO

## 2024-09-28 SDOH — ECONOMIC STABILITY: INCOME INSECURITY
IN THE PAST 12 MONTHS, HAS THE ELECTRIC, GAS, OIL, OR WATER COMPANY THREATENED TO SHUT OFF SERVICE IN YOUR HOME?: PATIENT UNABLE TO ANSWER

## 2024-09-28 SDOH — SOCIAL STABILITY: SOCIAL NETWORK: HOW OFTEN DO YOU GET TOGETHER WITH FRIENDS OR RELATIVES?: PATIENT UNABLE TO ANSWER

## 2024-09-28 SDOH — ECONOMIC STABILITY: HOUSING INSECURITY
IN THE LAST 12 MONTHS, WAS THERE A TIME WHEN YOU WERE NOT ABLE TO PAY THE MORTGAGE OR RENT ON TIME?: PATIENT UNABLE TO ANSWER

## 2024-09-28 SDOH — SOCIAL STABILITY: SOCIAL INSECURITY: DOES ANYONE TRY TO KEEP YOU FROM HAVING/CONTACTING OTHER FRIENDS OR DOING THINGS OUTSIDE YOUR HOME?: UNABLE TO ASSESS

## 2024-09-28 SDOH — ECONOMIC STABILITY: INCOME INSECURITY: IN THE LAST 12 MONTHS, WAS THERE A TIME WHEN YOU WERE NOT ABLE TO PAY THE MORTGAGE OR RENT ON TIME?: NO

## 2024-09-28 SDOH — SOCIAL STABILITY: SOCIAL INSECURITY
WITHIN THE LAST YEAR, HAVE YOU BEEN RAPED OR FORCED TO HAVE ANY KIND OF SEXUAL ACTIVITY BY YOUR PARTNER OR EX-PARTNER?: PATIENT UNABLE TO ANSWER

## 2024-09-28 SDOH — SOCIAL STABILITY: SOCIAL NETWORK: HOW OFTEN DO YOU ATTEND MEETINGS OF THE CLUBS OR ORGANIZATIONS YOU BELONG TO?: PATIENT UNABLE TO ANSWER

## 2024-09-28 SDOH — ECONOMIC STABILITY: INCOME INSECURITY
IN THE PAST 12 MONTHS HAS THE ELECTRIC, GAS, OIL, OR WATER COMPANY THREATENED TO SHUT OFF SERVICES IN YOUR HOME?: PATIENT UNABLE TO ANSWER

## 2024-09-28 SDOH — ECONOMIC STABILITY: INCOME INSECURITY: HOW HARD IS IT FOR YOU TO PAY FOR THE VERY BASICS LIKE FOOD, HOUSING, MEDICAL CARE, AND HEATING?: NOT HARD AT ALL

## 2024-09-28 SDOH — SOCIAL STABILITY: SOCIAL INSECURITY: WERE YOU ABLE TO COMPLETE ALL THE BEHAVIORAL HEALTH SCREENINGS?: YES

## 2024-09-28 SDOH — SOCIAL STABILITY: SOCIAL INSECURITY: DO YOU FEEL UNSAFE GOING BACK TO THE PLACE WHERE YOU ARE LIVING?: UNABLE TO ASSESS

## 2024-09-28 SDOH — ECONOMIC STABILITY: TRANSPORTATION INSECURITY: IN THE PAST 12 MONTHS, HAS LACK OF TRANSPORTATION KEPT YOU FROM MEDICAL APPOINTMENTS OR FROM GETTING MEDICATIONS?: NO

## 2024-09-28 SDOH — SOCIAL STABILITY: SOCIAL NETWORK: HOW OFTEN DO YOU ATTENT MEETINGS OF THE CLUB OR ORGANIZATION YOU BELONG TO?: PATIENT UNABLE TO ANSWER

## 2024-09-28 SDOH — SOCIAL STABILITY: SOCIAL INSECURITY: HAVE YOU HAD THOUGHTS OF HARMING ANYONE ELSE?: UNABLE TO ASSESS

## 2024-09-28 SDOH — SOCIAL STABILITY: SOCIAL INSECURITY: HAVE YOU HAD ANY THOUGHTS OF HARMING ANYONE ELSE?: UNABLE TO ASSESS

## 2024-09-28 SDOH — SOCIAL STABILITY: SOCIAL NETWORK: ARE YOU MARRIED, WIDOWED, DIVORCED, SEPARATED, NEVER MARRIED, OR LIVING WITH A PARTNER?: PATIENT UNABLE TO ANSWER

## 2024-09-28 SDOH — HEALTH STABILITY: MENTAL HEALTH
STRESS IS WHEN SOMEONE FEELS TENSE, NERVOUS, ANXIOUS, OR CAN'T SLEEP AT NIGHT BECAUSE THEIR MIND IS TROUBLED. HOW STRESSED ARE YOU?: PATIENT UNABLE TO ANSWER

## 2024-09-28 ASSESSMENT — ACTIVITIES OF DAILY LIVING (ADL)
HEARING - RIGHT EAR: FUNCTIONAL
PATIENT'S MEMORY ADEQUATE TO SAFELY COMPLETE DAILY ACTIVITIES?: NO
BATHING: INDEPENDENT
LACK_OF_TRANSPORTATION: NO
HEARING - LEFT EAR: FUNCTIONAL
ASSISTIVE_DEVICE: EYEGLASSES;DENTURES LOWER;DENTURES UPPER
JUDGMENT_ADEQUATE_SAFELY_COMPLETE_DAILY_ACTIVITIES: NO
LACK_OF_TRANSPORTATION: NO
ADEQUATE_TO_COMPLETE_ADL: YES
GROOMING: INDEPENDENT
LACK_OF_TRANSPORTATION: NO
DRESSING YOURSELF: INDEPENDENT
LACK_OF_TRANSPORTATION: PATIENT UNABLE TO ANSWER
FEEDING YOURSELF: INDEPENDENT
WALKS IN HOME: INDEPENDENT
TOILETING: INDEPENDENT

## 2024-09-28 ASSESSMENT — ENCOUNTER SYMPTOMS
CONFUSION: 1
ALLERGIC/IMMUNOLOGIC NEGATIVE: 1
MYALGIAS: 1
GASTROINTESTINAL NEGATIVE: 1
WEAKNESS: 1
EYES NEGATIVE: 1
ACTIVITY CHANGE: 1
COUGH: 1
SHORTNESS OF BREATH: 1
HEMATOLOGIC/LYMPHATIC NEGATIVE: 1
CARDIOVASCULAR NEGATIVE: 1
ARTHRALGIAS: 1
ENDOCRINE NEGATIVE: 1
APPETITE CHANGE: 1
FATIGUE: 1

## 2024-09-28 ASSESSMENT — LIFESTYLE VARIABLES
HOW OFTEN DO YOU HAVE A DRINK CONTAINING ALCOHOL: NEVER
AUDIT-C TOTAL SCORE: 0
AUDIT-C TOTAL SCORE: 0
HOW OFTEN DO YOU HAVE 6 OR MORE DRINKS ON ONE OCCASION: NEVER
HOW MANY STANDARD DRINKS CONTAINING ALCOHOL DO YOU HAVE ON A TYPICAL DAY: PATIENT DOES NOT DRINK
SKIP TO QUESTIONS 9-10: 1

## 2024-09-28 ASSESSMENT — PAIN - FUNCTIONAL ASSESSMENT: PAIN_FUNCTIONAL_ASSESSMENT: 0-10

## 2024-09-28 ASSESSMENT — COGNITIVE AND FUNCTIONAL STATUS - GENERAL
MOVING FROM LYING ON BACK TO SITTING ON SIDE OF FLAT BED WITH BEDRAILS: A LITTLE
WALKING IN HOSPITAL ROOM: A LOT
DRESSING REGULAR LOWER BODY CLOTHING: A LOT
STANDING UP FROM CHAIR USING ARMS: A LOT
CLIMB 3 TO 5 STEPS WITH RAILING: TOTAL
HELP NEEDED FOR BATHING: A LOT
DAILY ACTIVITIY SCORE: 13
PATIENT BASELINE BEDBOUND: NO
EATING MEALS: A LITTLE
MOVING TO AND FROM BED TO CHAIR: A LOT
DRESSING REGULAR UPPER BODY CLOTHING: A LOT
PERSONAL GROOMING: A LOT
TURNING FROM BACK TO SIDE WHILE IN FLAT BAD: A LITTLE
TOILETING: A LOT
MOBILITY SCORE: 13

## 2024-09-28 ASSESSMENT — COLUMBIA-SUICIDE SEVERITY RATING SCALE - C-SSRS
1. IN THE PAST MONTH, HAVE YOU WISHED YOU WERE DEAD OR WISHED YOU COULD GO TO SLEEP AND NOT WAKE UP?: NO
6. HAVE YOU EVER DONE ANYTHING, STARTED TO DO ANYTHING, OR PREPARED TO DO ANYTHING TO END YOUR LIFE?: NO
2. HAVE YOU ACTUALLY HAD ANY THOUGHTS OF KILLING YOURSELF?: NO

## 2024-09-28 ASSESSMENT — PAIN DESCRIPTION - LOCATION: LOCATION: GENERALIZED

## 2024-09-28 ASSESSMENT — PAIN SCALES - WONG BAKER: WONGBAKER_NUMERICALRESPONSE: HURTS LITTLE BIT

## 2024-09-28 NOTE — ED PROVIDER NOTES
HPI   Chief Complaint   Patient presents with    Weakness, Gen     Not eating as much or drinking as much as normal x 2 days          History provided by:  Caregiver and patient  History limited by:  Dementia   used: No      84-year-old female presents to the emergency room with her daughter with whom she lives for complaint of increased confusion.  She states that her mother is normally confused and does live with her due to her confusion.  She reports that the patient normally gets up to go to the bathroom on her own but last night she woke up in the middle of the night shouting for the daughter to help her get to the bathroom and that daughter got her to the bathroom and got her back to bed and then a few hours later the same thing happened when she had to go to the bathroom again.  Daughter states that while patient was sitting on the toilet she complained about pain with urination.  Daughter states that patient was seen by her primary care doctor approximately a month ago and found to have a urinary tract infection and was started on a 5-day course of antibiotics.  She does not recall what the name of the antibiotic was nor whether or not the patient had an urine culture performed.  Daughter states that she just has not been acting right over the last 2 days and has been more weak and has not been eating and drinking as much.  No nausea, vomiting or fever.  She does not have diarrhea but daughter states that she did have a bout of C. difficile a few months back and that she was admitted here for what they thought was a bowel perforation but they saw Dr. Drake who cleared her.  Daughter is concerned about that perforation.      Patient History   Past Medical History:   Diagnosis Date    Ataxia     CAD (coronary artery disease)     Chronic insomnia 06/05/2024    COPD (chronic obstructive pulmonary disease) (Multi)     Cough 06/05/2024    Deficiency of other specified B group vitamins 08/17/2016     Vitamin B12 deficiency    Dementia (Multi)     Dementia (Multi)     Diabetes mellitus (Multi)     Edema 2024    Elevated ALT measurement 2023    Encounter for examination of eyes and vision without abnormal findings     Diabetic eye exam    Fracture of sacrum (Multi) 2024    Frequent episodes of pneumonia 2024    GERD (gastroesophageal reflux disease)     Hiatal hernia     HLD (hyperlipidemia)     Hypernatremia 2024    Hypertension     Hypomagnesemia 2020    Hypotension 2024    Hypothyroidism     Injury of head 2024    Insomnia     Occlusion and stenosis of bilateral carotid arteries 2016    Atherosclerosis of both carotid arteries    Personal history of other medical treatment     History of mammogram    Pneumonia 2024    Rheumatoid arthritis (Multi)     Transient ischemic attack 2024     Past Surgical History:   Procedure Laterality Date     SECTION, CLASSIC  2016     Section     SECTION, LOW TRANSVERSE      CHOLECYSTECTOMY  2014    Cholecystectomy    COLONOSCOPY  2014    Colonoscopy (Fiberoptic)    CT HEAD ANGIO W AND WO IV CONTRAST  2021    CT HEAD ANGIO W AND WO IV CONTRAST 2021 [facility]    ESOPHAGOGASTRODUODENOSCOPY  2014    Diagnostic Esophagogastroduodenoscopy    EYE SURGERY      GALLBLADDER SURGERY  2016    Gallbladder Surgery    MR HEAD ANGIO WO IV CONTRAST  2021    MR HEAD ANGIO WO IV CONTRAST 2021 [facility]    MR NECK ANGIO WO IV CONTRAST  2021    MR NECK ANGIO WO IV CONTRAST 2021 [facility]     Family History   Problem Relation Name Age of Onset    Diabetes Mother       Social History     Tobacco Use    Smoking status: Never    Smokeless tobacco: Never   Vaping Use    Vaping status: Never Used   Substance Use Topics    Alcohol use: Never    Drug use: Never       Physical Exam   ED Triage Vitals   Temperature Heart  Rate Respirations BP   09/28/24 1328 09/28/24 1325 09/28/24 1325 09/28/24 1325   37.3 °C (99.1 °F) 56 19 123/90      Pulse Ox Temp Source Heart Rate Source Patient Position   09/28/24 1325 09/28/24 1328 -- --   99 % Temporal        BP Location FiO2 (%)     -- --             Physical Exam    General: Vitals noted, no distress. Afebrile.   EENT: TMs clear. Posterior oropharynx unremarkable.   Cardiac: Regular, rate, rhythm, no murmur.   Pulmonary: Lungs clear bilaterally with good aeration. No adventitious breath sounds.   Abdomen: Soft, nonsurgical.  Tenderness to lower abdomen/pelvic area. No peritoneal signs. Normoactive bowel sounds.   Extremities: No peripheral edema. Negative Homans bilaterally, no cords.   Skin: No rash.   Neuro: No focal neurologic deficits, NIH score of 0.    ED Course & MDM   Diagnoses as of 09/28/24 1650   Pneumonia of both lower lobes due to infectious organism   Hypoxia                 No data recorded     Santosh Coma Scale Score: 15 (09/28/24 1333 : ANTOLIN Harrison, RN)       NIH Stroke Scale: 0 (09/28/24 1333 : ANTOLIN Harrison, RN)                   Medical Decision Making  Patient will be admitted for pneumonia and hypoxia.  After arrival her pulse ox dropped and was running between 88 and 91% and she was put on 2 L of oxygen in the ER.    Procedure  Procedures     USAMA Nassar  09/28/24 1424       USAMA Nassar  09/28/24 1655       ELIO Nassar-VICTOR HUGO  09/28/24 1708

## 2024-09-28 NOTE — H&P
History Of Present Illness  Maisha Agosto is a 84 y.o. female presenting with  increased confusion.  Patient lives at home with daughter who works in our dietary department.  She states that her mother is normally confused and does live with her due to her confusion.  She reports that the patient normally gets up to go to the bathroom on her own but last night she woke up in the middle of the night shouting for the daughter to help her get to the bathroom and that daughter got her to the bathroom and got her back to bed and then a few hours later the same thing happened when she had to go to the bathroom again.  Daughter states that while patient was sitting on the toilet she complained about pain with urination.  Daughter states that patient was seen by her primary care doctor approximately a month ago and found to have a urinary tract infection and was started on a 5-day course of antibiotics.  She does not recall what the name of the antibiotic was nor whether or not the patient had an urine culture performed.  Daughter states that she just has not been acting right over the last 2 days and has been more weak and has not been eating and drinking as much.  No nausea, vomiting or fever.  She does not have diarrhea but daughter states that she did have a bout of C. difficile a few months back and that she was admitted here for what they thought was a bowel perforation but they saw Dr. Drake who cleared her.  Daughter is concerned about that perforation. Patient currently sitting in bed, does endorse some SOB, non productive cough, and weakness.  Discussed current treatment plan, states understanding, and agrees.     Past Medical History  Past Medical History:   Diagnosis Date    Ataxia     CAD (coronary artery disease)     Chronic insomnia 06/05/2024    COPD (chronic obstructive pulmonary disease) (Multi)     Cough 06/05/2024    Deficiency of other specified B group vitamins 08/17/2016    Vitamin B12 deficiency     Dementia (Multi)     Dementia (Multi)     Diabetes mellitus (Multi)     Edema 2024    Elevated ALT measurement 2023    Encounter for examination of eyes and vision without abnormal findings     Diabetic eye exam    Fracture of sacrum (Multi) 2024    Frequent episodes of pneumonia 2024    GERD (gastroesophageal reflux disease)     Hiatal hernia     HLD (hyperlipidemia)     Hypernatremia 2024    Hypertension     Hypomagnesemia 2020    Hypotension 2024    Hypothyroidism     Injury of head 2024    Insomnia     Occlusion and stenosis of bilateral carotid arteries 2016    Atherosclerosis of both carotid arteries    Personal history of other medical treatment     History of mammogram    Pneumonia 2024    Rheumatoid arthritis (Multi)     Transient ischemic attack 2024       Surgical History  Past Surgical History:   Procedure Laterality Date     SECTION, CLASSIC  2016     Section     SECTION, LOW TRANSVERSE      CHOLECYSTECTOMY  2014    Cholecystectomy    COLONOSCOPY  2014    Colonoscopy (Fiberoptic)    CT HEAD ANGIO W AND WO IV CONTRAST  2021    CT HEAD ANGIO W AND WO IV CONTRAST [facility]    ESOPHAGOGASTRODUODENOSCOPY  2014    Diagnostic Esophagogastroduodenoscopy    EYE SURGERY      GALLBLADDER SURGERY  2016    Gallbladder Surgery    MR HEAD ANGIO WO IV CONTRAST  2021    MR HEAD ANGIO WO IV CONTRAST 2021 [facility]    MR NECK ANGIO WO IV CONTRAST  2021    MR NECK ANGIO WO IV CONTRAST 2021 [facility]        Social History  She reports that she has never smoked. She has never used smokeless tobacco. She reports that she does not drink alcohol and does not use drugs.    Family History  Family History   Problem Relation Name Age of Onset    Diabetes Mother          Allergies  Acthar [corticotropin], Amoxicillin-pot clavulanate, Chloroquine  phosphate, Ciprofloxacin, Humira [adalimumab], Levaquin [levofloxacin], Methotrexate, Sulfamethoxazole-trimethoprim, Xeljanz [tofacitinib], and Enbrel [etanercept]    Review of Systems   Constitutional:  Positive for activity change, appetite change and fatigue.   HENT:  Positive for hearing loss.    Eyes: Negative.    Respiratory:  Positive for cough and shortness of breath.    Cardiovascular: Negative.    Gastrointestinal: Negative.    Endocrine: Negative.    Genitourinary: Negative.    Musculoskeletal:  Positive for arthralgias and myalgias.   Skin: Negative.    Allergic/Immunologic: Negative.    Neurological:  Positive for weakness.   Hematological: Negative.    Psychiatric/Behavioral:  Positive for confusion.         Physical Exam  Constitutional:       Appearance: Normal appearance.   HENT:      Head: Normocephalic and atraumatic.      Nose: Nose normal.      Mouth/Throat:      Mouth: Mucous membranes are moist.   Eyes:      Extraocular Movements: Extraocular movements intact.      Pupils: Pupils are equal, round, and reactive to light.   Cardiovascular:      Rate and Rhythm: Regular rhythm. Bradycardia present.      Pulses: Normal pulses.      Heart sounds: Normal heart sounds.   Pulmonary:      Effort: Pulmonary effort is normal.      Breath sounds: Normal breath sounds.   Abdominal:      General: Bowel sounds are normal.      Palpations: Abdomen is soft.   Musculoskeletal:         General: Normal range of motion.      Cervical back: Normal range of motion.   Skin:     General: Skin is warm and dry.      Capillary Refill: Capillary refill takes less than 2 seconds.   Neurological:      Mental Status: She is alert. Mental status is at baseline.      Motor: Weakness present.      Gait: Gait abnormal.      Comments: AxOx2   Psychiatric:         Mood and Affect: Mood normal.         Behavior: Behavior normal.          Last Recorded Vitals  Blood pressure 123/90, pulse 56, temperature 37.3 °C (99.1 °F),  "temperature source Temporal, resp. rate 19, height 1.549 m (5' 1\"), weight 47.7 kg (105 lb 2.6 oz), SpO2 99%.    Relevant Results  Scheduled medications  amLODIPine, 5 mg, oral, Daily  aspirin, 81 mg, oral, Daily  atorvastatin, 40 mg, oral, Nightly  azithromycin, 500 mg, intravenous, q24h  calcium carbonate-vitamin D3, 1 tablet, oral, Daily  cefTRIAXone, 1 g, intravenous, q24h  cholestyramine, 4 g, oral, BID  [START ON 9/29/2024] colestipol, 1 g, oral, BID after meals  enoxaparin, 40 mg, subcutaneous, q24h  ipratropium-albuteroL, 3 mL, nebulization, TID  [START ON 9/29/2024] levothyroxine, 50 mcg, oral, Daily before breakfast  lisinopril, 20 mg, oral, Daily  magnesium oxide, 400 mg, oral, Daily  metoprolol tartrate, 25 mg, oral, BID  omega 3-dha-epa-fish oil, 1,000 mg, oral, Daily  [START ON 9/29/2024] pantoprazole, 40 mg, oral, Daily before breakfast   Or  [START ON 9/29/2024] pantoprazole, 40 mg, intravenous, Daily before breakfast  polyethylene glycol, 17 g, oral, Daily  predniSONE, 2.5 mg, oral, Daily  traZODone, 50 mg, oral, Nightly      Continuous medications     PRN medications  PRN medications: acetaminophen **OR** acetaminophen **OR** acetaminophen, acetaminophen **OR** acetaminophen **OR** acetaminophen, benzocaine-menthol, dextromethorphan-guaifenesin, guaiFENesin, hydrOXYzine HCL, loperamide, melatonin, ondansetron ODT **OR** ondansetron, oxygen    XR chest 1 view    Result Date: 9/28/2024  STUDY: Chest Radiograph;  9/28/2024 15:29 INDICATION: Confusion, cough. COMPARISON: 6/23/2022 XR Chest ACCESSION NUMBER(S): [id number] ORDERING CLINICIAN: JAIRO SOTO TECHNIQUE:  Frontal chest was obtained at 15:28 hours. FINDINGS: CARDIOMEDIASTINAL SILHOUETTE: Cardiomediastinal silhouette is normal in size and configuration.  LUNGS: There are findings consistent with COPD.  There is a patchy infiltrate in the right costophrenic angle.  ABDOMEN: No remarkable upper abdominal findings.  BONES: No acute osseous " changes.    Right lower lobe infiltrate. Signed by Oracio Arnett MD    CT abdomen pelvis w IV contrast    Result Date: 9/28/2024  STUDY: CT Abdomen and Pelvis with IV Contrast; 9/28/2024 3:04 PM INDICATION: Abdominal pain. COMPARISON: CT AP 6/5/2024. ACCESSION NUMBER(S): [id number] ORDERING CLINICIAN: JAIRO SOTO TECHNIQUE: CT of the abdomen and pelvis was performed.  Contiguous axial images were obtained at 3 mm slice thickness through the abdomen and pelvis. Coronal and sagittal reconstructions at 3 mm slice thickness were performed.  Omnipaque 350 70 mL was administered intravenously.  FINDINGS: LOWER CHEST: The heart is enlarged.  There is a small pericardial effusion which is most likely physiologic.  There are infiltrates in both lung bases. There are multiple pulmonary nodules in the right lung base.  These are new since June 2024 and are most likely inflammatory.  ABDOMEN:  LIVER: No hepatomegaly.  Smooth surface contour.  Normal attenuation.  BILE DUCTS: There is biliary duct dilatation with the common bile duct measuring up to 17 mm..  GALLBLADDER: The gallbladder is absent. STOMACH: There is thickening of the wall the stomach.  This may be related to lack of distention but can be seen with gastritis. PANCREAS: There are numerous cysts in the pancreas with dilatation of the pancreatic duct.  The largest cyst measures approximately 23 mm.  SPLEEN: No splenomegaly or focal splenic lesion.  ADRENAL GLANDS: No thickening or nodules.  KIDNEYS AND URETERS: Kidneys are normal in size and location.  No renal or ureteral calculi.  PELVIS:  BLADDER: No abnormalities identified.  REPRODUCTIVE ORGANS: There are dilated uterine veins consistent with pelvic congestion syndrome.  There is fluid in the pelvis and this may represent a distended endometrium.  There is a calcification present.  BOWEL: There is diverticulosis.  Is abnormal thickening of the wall the terminal ileum.  There are diverticula involving the  distal ileum.  VESSELS: No abnormalities identified.  Abdominal aorta is normal in caliber.  PERITONEUM/RETROPERITONEUM/LYMPH NODES: No free fluid.  No pneumoperitoneum. No lymphadenopathy.  ABDOMINAL WALL: No abnormalities identified. SOFT TISSUES: No abnormalities identified.  BONES: No acute fracture or aggressive osseous lesion.    Markedly abnormally thickened distal ileum.  There is infiltration into the surrounding fat.  The previously described focal perforation has improved.  There are diverticula present in this most likely represent small bowel diverticulitis.  Ischemia cannot be excluded. Colonic diverticulosis. Multiple pancreatic cysts with dilated pancreatic duct.  There is dilatation of the common bile duct as well as intrahepatic ducts but this may be related to the patient's prior cholecystectomy. Thickening of the wall the stomach.  Gastritis cannot be excluded. Dilated uterine veins.  There is fluid in the pelvis which may represent a distended endometrium.  This is best evaluated with ultrasound.  There are bibasilar pulmonary infiltrates. Signed by Oracio Arnett MD     Results for orders placed or performed during the hospital encounter of 09/28/24 (from the past 24 hour(s))   Comprehensive metabolic panel   Result Value Ref Range    Glucose 127 (H) 74 - 99 mg/dL    Sodium 137 136 - 145 mmol/L    Potassium 3.7 3.5 - 5.3 mmol/L    Chloride 100 98 - 107 mmol/L    Bicarbonate 32 21 - 32 mmol/L    Anion Gap 9 (L) 10 - 20 mmol/L    Urea Nitrogen 21 6 - 23 mg/dL    Creatinine 0.69 0.50 - 1.05 mg/dL    eGFR 85 >60 mL/min/1.73m*2    Calcium 9.0 8.6 - 10.3 mg/dL    Albumin 3.7 3.4 - 5.0 g/dL    Alkaline Phosphatase 56 33 - 136 U/L    Total Protein 7.0 6.4 - 8.2 g/dL    AST 23 9 - 39 U/L    Bilirubin, Total 0.9 0.0 - 1.2 mg/dL    ALT 24 7 - 45 U/L   Urinalysis with Reflex Culture and Microscopic   Result Value Ref Range    Color, Urine Yellow Light-Yellow, Yellow, Dark-Yellow    Appearance, Urine Clear  Clear    Specific Gravity, Urine 1.021 1.005 - 1.035    pH, Urine 6.0 5.0, 5.5, 6.0, 6.5, 7.0, 7.5, 8.0    Protein, Urine 10 (TRACE) NEGATIVE, 10 (TRACE), 20 (TRACE) mg/dL    Glucose, Urine Normal Normal mg/dL    Blood, Urine 1.0 (3+) (A) NEGATIVE    Ketones, Urine NEGATIVE NEGATIVE mg/dL    Bilirubin, Urine NEGATIVE NEGATIVE    Urobilinogen, Urine Normal Normal mg/dL    Nitrite, Urine NEGATIVE NEGATIVE    Leukocyte Esterase, Urine NEGATIVE NEGATIVE   CBC   Result Value Ref Range    WBC 16.9 (H) 4.4 - 11.3 x10*3/uL    nRBC 0.0 0.0 - 0.0 /100 WBCs    RBC 4.31 4.00 - 5.20 x10*6/uL    Hemoglobin 12.8 12.0 - 16.0 g/dL    Hematocrit 39.7 36.0 - 46.0 %    MCV 92 80 - 100 fL    MCH 29.7 26.0 - 34.0 pg    MCHC 32.2 32.0 - 36.0 g/dL    RDW 12.6 11.5 - 14.5 %    Platelets 206 150 - 450 x10*3/uL   Urinalysis Microscopic   Result Value Ref Range    WBC, Urine 1-5 1-5, NONE /HPF    RBC, Urine >20 (A) NONE, 1-2, 3-5 /HPF    Mucus, Urine FEW Reference range not established. /LPF   Troponin I, High Sensitivity, Initial   Result Value Ref Range    Troponin I, High Sensitivity 7 0 - 13 ng/L        Assessment/Plan   Assessment & Plan  Pneumonia, unspecified organism    Abnormal gait    Acute exacerbation of COPD with asthma    Benign essential hypertension    Arteriosclerosis of coronary artery    Dementia    Fatigue    GERD (gastroesophageal reflux disease)    Hypothyroidism    Hyperlipidemia    Impaired functional mobility, balance, gait, and endurance    Rheumatoid arthritis    Decreased appetite    History of Clostridioides difficile infection      Principal Problems  #Pneumonia, unspecified organism  #Acute exacerbation of COPD with asthma  -WBC 16.9  -Procalcitonin Ordered  -Respiratory Culture if Able  -BC x 2 Ordered  -CXR  IMPRESSION:  Right lower lobe infiltrate.  -DuoNebs  -Solu Medrol 20 mg IV Q 8 hours   -continue Rocephin 1 g IV Daily (Day 1)  -continue Azithromycin 500 mg IV Daily (Day 1)  -Home O2 RA  -Currently on 2L  NC continuously     Active Problems  #Abnormal gait  #Dementia  #Fatigue  #Impaired functional mobility, balance, gait, and endurance  -UA Negative  -PT/OT Eval and Treat    #Benign essential hypertension  #Arteriosclerosis of coronary artery  #Hyperlipidemia  -continue Norvasc 5 mg PO Daily   -continue ASA 81 mg PO Daily   -continue Lipitor 40 mg PO HS  -continue Lisinopril 20 mg PO Daily   -continue Lopressor 25 mg PO BID    #Hypomagnesemia   -Magnesium Ordered  -continue Mag-Ox 400 mg PO Daily     #Hypothyroidism  -TSH 2.100: 8/12/24  -continue Synthroid 50 mcg PO Daily     #Rheumatoid arthritis  -continue Prednisone 2.5 mg PO Daily (Held while on Solu Medrol)    #History of Bowel Perforation   #Decreased Appetite  #History of C Diff  #GERD (gastroesophageal reflux disease)  -CT Abd/Pelvis  IMPRESSION:  Markedly abnormally thickened distal ileum.  There is infiltration  into the surrounding fat.  The previously described focal perforation  has improved.  There are diverticula present in this most likely  represent small bowel diverticulitis.  Ischemia cannot be excluded.  Colonic diverticulosis.  Multiple pancreatic cysts with dilated pancreatic duct.  There is  dilatation of the common bile duct as well as intrahepatic ducts but  this may be related to the patient's prior cholecystectomy.  Thickening of the wall the stomach.  Gastritis cannot be excluded.  Dilated uterine veins.  There is fluid in the pelvis which may  represent a distended endometrium.  This is best evaluated with  ultrasound.  There are bibasilar pulmonary infiltrates.  -continue Questran 4 g PO BID   -Protonix Daily   -Miralax Daily   -Acute Care Surgery Consult, Appreciate Recs    DVT Prophylaxis: Lovenox   Fluids: N/A  Nutrition: Cardiac    Code Status: DNR and NO intubation    Pt requires inpatient stay at this time.  Total accumulated time spent face to face and not face to face preparing to see the patient, obtaining and reviewing  separately obtained history; performing a medically appropriate examination and/or evaluation; counseling and educating the patient, family; ordering medications, tests, or procedures; referring and communicating with other health care professionals; documenting clinical information in the patient's medical record; independently interpreting results and communicating the results to the patient, family; and care coordination was 45 minutes.      USAMA Chowdhury  Attending Attestation:    Patient was seen and examined face to face, history and physical was taken personally at bedside the APRN-CNP, was present for the whole duration of the exam who participated in the documentation of this note. I performed the medical decision-making components (assessment and plan of care). I have reviewed the documentation and verified the findings in the note as written with additions or exceptions as stated in the body of this note.   Mildly confused, waking up not very happy does not know that she is in the hospital, in somehow she may does understand that she was coughing, no chest pain.  No significant overnight event.  Lungs are rhonchi and crackles, no peripheral edema, abdomen soft.  Community-acquired pneumonia we will continue with antibiotics bronchodilators    Dr. Rigo Burnett MD  Internal Medicine

## 2024-09-29 VITALS
HEIGHT: 61 IN | RESPIRATION RATE: 17 BRPM | DIASTOLIC BLOOD PRESSURE: 49 MMHG | WEIGHT: 86.86 LBS | HEART RATE: 54 BPM | OXYGEN SATURATION: 97 % | BODY MASS INDEX: 16.4 KG/M2 | SYSTOLIC BLOOD PRESSURE: 94 MMHG | TEMPERATURE: 97.3 F

## 2024-09-29 PROBLEM — J18.9 PNEUMONIA OF BOTH LOWER LOBES DUE TO INFECTIOUS ORGANISM: Status: ACTIVE | Noted: 2024-09-29

## 2024-09-29 LAB
ANION GAP SERPL CALC-SCNC: 10 MMOL/L (ref 10–20)
BACTERIA BLD CULT: NORMAL
BACTERIA BLD CULT: NORMAL
BUN SERPL-MCNC: 18 MG/DL (ref 6–23)
CALCIUM SERPL-MCNC: 8.9 MG/DL (ref 8.6–10.3)
CHLORIDE SERPL-SCNC: 101 MMOL/L (ref 98–107)
CO2 SERPL-SCNC: 31 MMOL/L (ref 21–32)
CREAT SERPL-MCNC: 0.7 MG/DL (ref 0.5–1.05)
EGFRCR SERPLBLD CKD-EPI 2021: 84 ML/MIN/1.73M*2
ERYTHROCYTE [DISTWIDTH] IN BLOOD BY AUTOMATED COUNT: 12.6 % (ref 11.5–14.5)
GLUCOSE SERPL-MCNC: 196 MG/DL (ref 74–99)
HCT VFR BLD AUTO: 38.3 % (ref 36–46)
HGB BLD-MCNC: 12.6 G/DL (ref 12–16)
MAGNESIUM SERPL-MCNC: 2.24 MG/DL (ref 1.6–2.4)
MCH RBC QN AUTO: 29.7 PG (ref 26–34)
MCHC RBC AUTO-ENTMCNC: 32.9 G/DL (ref 32–36)
MCV RBC AUTO: 90 FL (ref 80–100)
NRBC BLD-RTO: 0 /100 WBCS (ref 0–0)
PLATELET # BLD AUTO: 188 X10*3/UL (ref 150–450)
POTASSIUM SERPL-SCNC: 3.2 MMOL/L (ref 3.5–5.3)
PROCALCITONIN SERPL-MCNC: 0.03 NG/ML
RBC # BLD AUTO: 4.24 X10*6/UL (ref 4–5.2)
SODIUM SERPL-SCNC: 139 MMOL/L (ref 136–145)
WBC # BLD AUTO: 13.2 X10*3/UL (ref 4.4–11.3)

## 2024-09-29 PROCEDURE — 2500000002 HC RX 250 W HCPCS SELF ADMINISTERED DRUGS (ALT 637 FOR MEDICARE OP, ALT 636 FOR OP/ED): Mod: IPSPLIT | Performed by: NURSE PRACTITIONER

## 2024-09-29 PROCEDURE — 83735 ASSAY OF MAGNESIUM: CPT

## 2024-09-29 PROCEDURE — 94669 MECHANICAL CHEST WALL OSCILL: CPT | Mod: IPSPLIT

## 2024-09-29 PROCEDURE — 1200000002 HC GENERAL ROOM WITH TELEMETRY DAILY: Mod: IPSPLIT

## 2024-09-29 PROCEDURE — 80048 BASIC METABOLIC PNL TOTAL CA: CPT

## 2024-09-29 PROCEDURE — 2500000002 HC RX 250 W HCPCS SELF ADMINISTERED DRUGS (ALT 637 FOR MEDICARE OP, ALT 636 FOR OP/ED): Mod: IPSPLIT

## 2024-09-29 PROCEDURE — 99232 SBSQ HOSP IP/OBS MODERATE 35: CPT | Performed by: NURSE PRACTITIONER

## 2024-09-29 PROCEDURE — 36415 COLL VENOUS BLD VENIPUNCTURE: CPT

## 2024-09-29 PROCEDURE — 87075 CULTR BACTERIA EXCEPT BLOOD: CPT | Mod: GENLAB,59

## 2024-09-29 PROCEDURE — 2500000001 HC RX 250 WO HCPCS SELF ADMINISTERED DRUGS (ALT 637 FOR MEDICARE OP): Mod: IPSPLIT

## 2024-09-29 PROCEDURE — 2500000004 HC RX 250 GENERAL PHARMACY W/ HCPCS (ALT 636 FOR OP/ED): Mod: IPSPLIT

## 2024-09-29 PROCEDURE — 99222 1ST HOSP IP/OBS MODERATE 55: CPT | Performed by: SURGERY

## 2024-09-29 PROCEDURE — 84145 PROCALCITONIN (PCT): CPT | Mod: GENLAB

## 2024-09-29 PROCEDURE — 2500000004 HC RX 250 GENERAL PHARMACY W/ HCPCS (ALT 636 FOR OP/ED)

## 2024-09-29 PROCEDURE — 94667 MNPJ CHEST WALL 1ST: CPT | Mod: IPSPLIT

## 2024-09-29 PROCEDURE — 96376 TX/PRO/DX INJ SAME DRUG ADON: CPT | Mod: IPSPLIT

## 2024-09-29 PROCEDURE — 94640 AIRWAY INHALATION TREATMENT: CPT | Mod: IPSPLIT

## 2024-09-29 PROCEDURE — 2500000005 HC RX 250 GENERAL PHARMACY W/O HCPCS: Mod: IPSPLIT

## 2024-09-29 PROCEDURE — 87040 BLOOD CULTURE FOR BACTERIA: CPT | Mod: GENLAB

## 2024-09-29 PROCEDURE — 94760 N-INVAS EAR/PLS OXIMETRY 1: CPT | Mod: IPSPLIT

## 2024-09-29 PROCEDURE — 85027 COMPLETE CBC AUTOMATED: CPT

## 2024-09-29 PROCEDURE — 96375 TX/PRO/DX INJ NEW DRUG ADDON: CPT | Mod: IPSPLIT

## 2024-09-29 PROCEDURE — 2500000001 HC RX 250 WO HCPCS SELF ADMINISTERED DRUGS (ALT 637 FOR MEDICARE OP)

## 2024-09-29 RX ORDER — SODIUM CHLORIDE 9 MG/ML
10 INJECTION, SOLUTION INTRAVENOUS CONTINUOUS PRN
Status: DISCONTINUED | OUTPATIENT
Start: 2024-09-29 | End: 2024-10-05

## 2024-09-29 RX ORDER — ICOSAPENT ETHYL 1 G/1
1 CAPSULE ORAL DAILY
Status: DISCONTINUED | OUTPATIENT
Start: 2024-09-29 | End: 2024-10-07 | Stop reason: HOSPADM

## 2024-09-29 RX ORDER — IPRATROPIUM BROMIDE AND ALBUTEROL SULFATE 2.5; .5 MG/3ML; MG/3ML
3 SOLUTION RESPIRATORY (INHALATION) EVERY 2 HOUR PRN
Status: DISCONTINUED | OUTPATIENT
Start: 2024-09-29 | End: 2024-10-07 | Stop reason: HOSPADM

## 2024-09-29 RX ORDER — POTASSIUM CHLORIDE 20 MEQ/1
20 TABLET, EXTENDED RELEASE ORAL ONCE
Status: COMPLETED | OUTPATIENT
Start: 2024-09-29 | End: 2024-09-29

## 2024-09-29 ASSESSMENT — COGNITIVE AND FUNCTIONAL STATUS - GENERAL
WALKING IN HOSPITAL ROOM: A LOT
DRESSING REGULAR LOWER BODY CLOTHING: A LOT
DRESSING REGULAR UPPER BODY CLOTHING: A LOT
EATING MEALS: A LITTLE
TOILETING: A LOT
DAILY ACTIVITIY SCORE: 13
MOVING TO AND FROM BED TO CHAIR: A LOT
CLIMB 3 TO 5 STEPS WITH RAILING: TOTAL
TURNING FROM BACK TO SIDE WHILE IN FLAT BAD: A LITTLE
STANDING UP FROM CHAIR USING ARMS: A LOT
HELP NEEDED FOR BATHING: A LOT
PERSONAL GROOMING: A LOT
MOVING FROM LYING ON BACK TO SITTING ON SIDE OF FLAT BED WITH BEDRAILS: A LITTLE
MOBILITY SCORE: 13

## 2024-09-29 ASSESSMENT — PAIN SCALES - GENERAL
PAINLEVEL_OUTOF10: 0 - NO PAIN
PAINLEVEL_OUTOF10: 0 - NO PAIN

## 2024-09-29 ASSESSMENT — PAIN INTENSITY VAS: VAS_PAIN_BASICVITALS_IP: 0

## 2024-09-29 NOTE — PROGRESS NOTES
"Maisha Agosto is a 84 y.o. female on day 0 of admission presenting with Pneumonia, unspecified organism.      Subjective   \"Get out of here!\" Pt angry about waking up this am and lashing out - not oriented or answering questions        Objective     Last Recorded Vitals  /63 (BP Location: Right arm, Patient Position: Lying)   Pulse 51   Temp 36.3 °C (97.3 °F) (Temporal)   Resp 17   Wt (!) 39.4 kg (86 lb 13.8 oz)   SpO2 97%   Intake/Output last 3 Shifts:    Intake/Output Summary (Last 24 hours) at 9/29/2024 0910  Last data filed at 9/28/2024 2358  Gross per 24 hour   Intake 420 ml   Output --   Net 420 ml       Admission Weight  Weight: 47.7 kg (105 lb 2.6 oz) (09/28/24 1325)    Daily Weight  09/28/24 : (!) 39.4 kg (86 lb 13.8 oz)    Image Results  XR chest 1 view  Narrative: STUDY:  Chest Radiograph;  9/28/2024 15:29  INDICATION:  Confusion, cough.  COMPARISON:  6/23/2022 XR Chest  ACCESSION NUMBER(S):  [id number]  ORDERING CLINICIAN:  JAIRO SOTO  TECHNIQUE:  Frontal chest was obtained at 15:28 hours.  FINDINGS:  CARDIOMEDIASTINAL SILHOUETTE:  Cardiomediastinal silhouette is normal in size and configuration.     LUNGS:  There are findings consistent with COPD.  There is a patchy infiltrate  in the right costophrenic angle.     ABDOMEN:  No remarkable upper abdominal findings.     BONES:  No acute osseous changes.  Impression: Right lower lobe infiltrate.  Signed by Oracio Arnett MD  CT abdomen pelvis w IV contrast  Narrative: STUDY:  CT Abdomen and Pelvis with IV Contrast; 9/28/2024 3:04 PM  INDICATION:  Abdominal pain.  COMPARISON:  CT AP 6/5/2024.  ACCESSION NUMBER(S):  [id number]  ORDERING CLINICIAN:  JAIRO SOTO  TECHNIQUE:  CT of the abdomen and pelvis was performed.  Contiguous axial images  were obtained at 3 mm slice thickness through the abdomen and pelvis.   Coronal and sagittal reconstructions at 3 mm slice thickness were  performed.  Omnipaque 350 70 mL was administered intravenously. "    FINDINGS:  LOWER CHEST:  The heart is enlarged.  There is a small pericardial effusion which is  most likely physiologic.  There are infiltrates in both lung bases.   There are multiple pulmonary nodules in the right lung base.  These  are new since June 2024 and are most likely inflammatory.     ABDOMEN:     LIVER:  No hepatomegaly.  Smooth surface contour.  Normal attenuation.     BILE DUCTS:  There is biliary duct dilatation with the common bile duct measuring  up to 17 mm..     GALLBLADDER:  The gallbladder is absent.  STOMACH:  There is thickening of the wall the stomach.  This may be related to  lack of distention but can be seen with gastritis.  PANCREAS:  There are numerous cysts in the pancreas with dilatation of the  pancreatic duct.  The largest cyst measures approximately 23 mm.     SPLEEN:  No splenomegaly or focal splenic lesion.     ADRENAL GLANDS:  No thickening or nodules.     KIDNEYS AND URETERS:  Kidneys are normal in size and location.  No renal or ureteral  calculi.     PELVIS:     BLADDER:  No abnormalities identified.     REPRODUCTIVE ORGANS:  There are dilated uterine veins consistent with pelvic congestion  syndrome.  There is fluid in the pelvis and this may represent a  distended endometrium.  There is a calcification present.     BOWEL:  There is diverticulosis.  Is abnormal thickening of the wall the  terminal ileum.  There are diverticula involving the distal ileum.     VESSELS:  No abnormalities identified.  Abdominal aorta is normal in caliber.      PERITONEUM/RETROPERITONEUM/LYMPH NODES:  No free fluid.  No pneumoperitoneum.  No lymphadenopathy.     ABDOMINAL WALL:  No abnormalities identified.  SOFT TISSUES:   No abnormalities identified.     BONES:  No acute fracture or aggressive osseous lesion.  Impression: Markedly abnormally thickened distal ileum.  There is infiltration  into the surrounding fat.  The previously described focal perforation  has improved.  There are  diverticula present in this most likely  represent small bowel diverticulitis.  Ischemia cannot be excluded.  Colonic diverticulosis.  Multiple pancreatic cysts with dilated pancreatic duct.  There is  dilatation of the common bile duct as well as intrahepatic ducts but  this may be related to the patient's prior cholecystectomy.  Thickening of the wall the stomach.  Gastritis cannot be excluded.  Dilated uterine veins.  There is fluid in the pelvis which may  represent a distended endometrium.  This is best evaluated with  ultrasound.  There are bibasilar pulmonary infiltrates.  Signed by Oracio Arnett MD      Physical Exam  Constitutional:       Appearance: She is ill-appearing.   HENT:      Head: Normocephalic.      Mouth/Throat:      Mouth: Mucous membranes are moist.   Eyes:      Extraocular Movements: Extraocular movements intact.   Cardiovascular:      Rate and Rhythm: Regular rhythm. Bradycardia present.      Pulses: Normal pulses.      Heart sounds: Normal heart sounds.   Pulmonary:      Breath sounds: Rhonchi and rales present.      Comments: RML and RLL  Abdominal:      General: Bowel sounds are normal.      Palpations: Abdomen is soft.   Musculoskeletal:         General: Normal range of motion.      Cervical back: Normal range of motion.   Skin:     General: Skin is warm and dry.      Capillary Refill: Capillary refill takes less than 2 seconds.   Neurological:      General: No focal deficit present.      Mental Status: She is alert. She is disoriented.   Psychiatric:         Mood and Affect: Mood normal.         Behavior: Behavior normal.      Comments: Very angry and disoriented          Relevant Results             Scheduled medications  amLODIPine, 5 mg, oral, Daily  aspirin, 81 mg, oral, Daily  atorvastatin, 40 mg, oral, Nightly  azithromycin, 500 mg, intravenous, q24h  calcium carbonate-vitamin D3, 1 tablet, oral, Daily  cefTRIAXone, 1 g, intravenous, q24h  cholestyramine, 4 g, oral, BID  colestipol,  1 g, oral, BID after meals  enoxaparin, 40 mg, subcutaneous, q24h  icosapent ethyL, 1 g, oral, Daily  ipratropium-albuteroL, 3 mL, nebulization, TID  levothyroxine, 50 mcg, oral, Daily before breakfast  lisinopril, 20 mg, oral, Daily  magnesium oxide, 400 mg, oral, Daily  methylPREDNISolone sodium succinate (PF), 20 mg, intravenous, q8h  metoprolol tartrate, 25 mg, oral, BID  pantoprazole, 40 mg, oral, Daily before breakfast   Or  pantoprazole, 40 mg, intravenous, Daily before breakfast  polyethylene glycol, 17 g, oral, Daily  potassium chloride CR, 20 mEq, oral, Once  [Held by provider] predniSONE, 2.5 mg, oral, Daily  traZODone, 50 mg, oral, Nightly      Continuous medications     PRN medications  PRN medications: acetaminophen **OR** [DISCONTINUED] acetaminophen **OR** [DISCONTINUED] acetaminophen, benzocaine-menthol, dextromethorphan-guaifenesin, guaiFENesin, hydrOXYzine HCL, loperamide, melatonin, ondansetron ODT **OR** ondansetron, oxygen  Results for orders placed or performed during the hospital encounter of 09/28/24 (from the past 24 hour(s))   Comprehensive metabolic panel   Result Value Ref Range    Glucose 127 (H) 74 - 99 mg/dL    Sodium 137 136 - 145 mmol/L    Potassium 3.7 3.5 - 5.3 mmol/L    Chloride 100 98 - 107 mmol/L    Bicarbonate 32 21 - 32 mmol/L    Anion Gap 9 (L) 10 - 20 mmol/L    Urea Nitrogen 21 6 - 23 mg/dL    Creatinine 0.69 0.50 - 1.05 mg/dL    eGFR 85 >60 mL/min/1.73m*2    Calcium 9.0 8.6 - 10.3 mg/dL    Albumin 3.7 3.4 - 5.0 g/dL    Alkaline Phosphatase 56 33 - 136 U/L    Total Protein 7.0 6.4 - 8.2 g/dL    AST 23 9 - 39 U/L    Bilirubin, Total 0.9 0.0 - 1.2 mg/dL    ALT 24 7 - 45 U/L   Urinalysis with Reflex Culture and Microscopic   Result Value Ref Range    Color, Urine Yellow Light-Yellow, Yellow, Dark-Yellow    Appearance, Urine Clear Clear    Specific Gravity, Urine 1.021 1.005 - 1.035    pH, Urine 6.0 5.0, 5.5, 6.0, 6.5, 7.0, 7.5, 8.0    Protein, Urine 10 (TRACE) NEGATIVE, 10  (TRACE), 20 (TRACE) mg/dL    Glucose, Urine Normal Normal mg/dL    Blood, Urine 1.0 (3+) (A) NEGATIVE    Ketones, Urine NEGATIVE NEGATIVE mg/dL    Bilirubin, Urine NEGATIVE NEGATIVE    Urobilinogen, Urine Normal Normal mg/dL    Nitrite, Urine NEGATIVE NEGATIVE    Leukocyte Esterase, Urine NEGATIVE NEGATIVE   CBC   Result Value Ref Range    WBC 16.9 (H) 4.4 - 11.3 x10*3/uL    nRBC 0.0 0.0 - 0.0 /100 WBCs    RBC 4.31 4.00 - 5.20 x10*6/uL    Hemoglobin 12.8 12.0 - 16.0 g/dL    Hematocrit 39.7 36.0 - 46.0 %    MCV 92 80 - 100 fL    MCH 29.7 26.0 - 34.0 pg    MCHC 32.2 32.0 - 36.0 g/dL    RDW 12.6 11.5 - 14.5 %    Platelets 206 150 - 450 x10*3/uL   Urinalysis Microscopic   Result Value Ref Range    WBC, Urine 1-5 1-5, NONE /HPF    RBC, Urine >20 (A) NONE, 1-2, 3-5 /HPF    Mucus, Urine FEW Reference range not established. /LPF   Troponin I, High Sensitivity, Initial   Result Value Ref Range    Troponin I, High Sensitivity 7 0 - 13 ng/L   CBC   Result Value Ref Range    WBC 13.2 (H) 4.4 - 11.3 x10*3/uL    nRBC 0.0 0.0 - 0.0 /100 WBCs    RBC 4.24 4.00 - 5.20 x10*6/uL    Hemoglobin 12.6 12.0 - 16.0 g/dL    Hematocrit 38.3 36.0 - 46.0 %    MCV 90 80 - 100 fL    MCH 29.7 26.0 - 34.0 pg    MCHC 32.9 32.0 - 36.0 g/dL    RDW 12.6 11.5 - 14.5 %    Platelets 188 150 - 450 x10*3/uL   Basic metabolic panel   Result Value Ref Range    Glucose 196 (H) 74 - 99 mg/dL    Sodium 139 136 - 145 mmol/L    Potassium 3.2 (L) 3.5 - 5.3 mmol/L    Chloride 101 98 - 107 mmol/L    Bicarbonate 31 21 - 32 mmol/L    Anion Gap 10 10 - 20 mmol/L    Urea Nitrogen 18 6 - 23 mg/dL    Creatinine 0.70 0.50 - 1.05 mg/dL    eGFR 84 >60 mL/min/1.73m*2    Calcium 8.9 8.6 - 10.3 mg/dL   Magnesium   Result Value Ref Range    Magnesium 2.24 1.60 - 2.40 mg/dL     XR chest 1 view    Result Date: 9/28/2024  STUDY: Chest Radiograph;  9/28/2024 15:29 INDICATION: Confusion, cough. COMPARISON: 6/23/2022 XR Chest ACCESSION NUMBER(S): [id number] ORDERING CLINICIAN: JAIRO PRITCHETT  CHARLES TECHNIQUE:  Frontal chest was obtained at 15:28 hours. FINDINGS: CARDIOMEDIASTINAL SILHOUETTE: Cardiomediastinal silhouette is normal in size and configuration.  LUNGS: There are findings consistent with COPD.  There is a patchy infiltrate in the right costophrenic angle.  ABDOMEN: No remarkable upper abdominal findings.  BONES: No acute osseous changes.    Right lower lobe infiltrate. Signed by Oracio Arnett MD    CT abdomen pelvis w IV contrast    Result Date: 9/28/2024  STUDY: CT Abdomen and Pelvis with IV Contrast; 9/28/2024 3:04 PM INDICATION: Abdominal pain. COMPARISON: CT AP 6/5/2024. ACCESSION NUMBER(S): [id number] ORDERING CLINICIAN: JAIRO SOTO TECHNIQUE: CT of the abdomen and pelvis was performed.  Contiguous axial images were obtained at 3 mm slice thickness through the abdomen and pelvis. Coronal and sagittal reconstructions at 3 mm slice thickness were performed.  Omnipaque 350 70 mL was administered intravenously.  FINDINGS: LOWER CHEST: The heart is enlarged.  There is a small pericardial effusion which is most likely physiologic.  There are infiltrates in both lung bases. There are multiple pulmonary nodules in the right lung base.  These are new since June 2024 and are most likely inflammatory.  ABDOMEN:  LIVER: No hepatomegaly.  Smooth surface contour.  Normal attenuation.  BILE DUCTS: There is biliary duct dilatation with the common bile duct measuring up to 17 mm..  GALLBLADDER: The gallbladder is absent. STOMACH: There is thickening of the wall the stomach.  This may be related to lack of distention but can be seen with gastritis. PANCREAS: There are numerous cysts in the pancreas with dilatation of the pancreatic duct.  The largest cyst measures approximately 23 mm.  SPLEEN: No splenomegaly or focal splenic lesion.  ADRENAL GLANDS: No thickening or nodules.  KIDNEYS AND URETERS: Kidneys are normal in size and location.  No renal or ureteral calculi.  PELVIS:  BLADDER: No  abnormalities identified.  REPRODUCTIVE ORGANS: There are dilated uterine veins consistent with pelvic congestion syndrome.  There is fluid in the pelvis and this may represent a distended endometrium.  There is a calcification present.  BOWEL: There is diverticulosis.  Is abnormal thickening of the wall the terminal ileum.  There are diverticula involving the distal ileum.  VESSELS: No abnormalities identified.  Abdominal aorta is normal in caliber.  PERITONEUM/RETROPERITONEUM/LYMPH NODES: No free fluid.  No pneumoperitoneum. No lymphadenopathy.  ABDOMINAL WALL: No abnormalities identified. SOFT TISSUES: No abnormalities identified.  BONES: No acute fracture or aggressive osseous lesion.    Markedly abnormally thickened distal ileum.  There is infiltration into the surrounding fat.  The previously described focal perforation has improved.  There are diverticula present in this most likely represent small bowel diverticulitis.  Ischemia cannot be excluded. Colonic diverticulosis. Multiple pancreatic cysts with dilated pancreatic duct.  There is dilatation of the common bile duct as well as intrahepatic ducts but this may be related to the patient's prior cholecystectomy. Thickening of the wall the stomach.  Gastritis cannot be excluded. Dilated uterine veins.  There is fluid in the pelvis which may represent a distended endometrium.  This is best evaluated with ultrasound.  There are bibasilar pulmonary infiltrates. Signed by Oracio Arnett MD     Assessment/Plan      Assessment & Plan  Pneumonia, unspecified organism    Abnormal gait    Acute exacerbation of COPD with asthma    Benign essential hypertension    Arteriosclerosis of coronary artery    Dementia    Fatigue    GERD (gastroesophageal reflux disease)    Hypothyroidism    Hyperlipidemia    Impaired functional mobility, balance, gait, and endurance    Rheumatoid arthritis    Decreased appetite    History of Clostridioides difficile infection    Pneumonia of  both lower lobes due to infectious organism    #Pneumonia, unspecified organism  #Acute exacerbation of COPD with asthma  -WBC 16.9>13.2  -Procalcitonin Ordered  -Respiratory Culture if Able  -BC x 2 Ordered  -CXR  IMPRESSION:  Right lower lobe infiltrate.  -DuoNebs  -Solu Medrol 20 mg IV Q 8 hours   -continue Rocephin 1 g IV Daily (Day 1)  -continue Azithromycin 500 mg IV Daily (Day 1)  -Home O2 RA  -Currently on 2L NC continuously   - robitussin and mucinex prn     Active Problems  #Abnormal gait  #Dementia  #Fatigue  #Impaired functional mobility, balance, gait, and endurance  -UA Negative  -PT/OT Eval and Treat     #Benign essential hypertension  #Arteriosclerosis of coronary artery  #Hyperlipidemia  -continue Norvasc 5 mg PO Daily   -continue ASA 81 mg PO Daily   -continue Lipitor 40 mg PO HS  -continue Lisinopril 20 mg PO Daily   -continue Lopressor 25 mg PO BID     #Hypomagnesemia   -Magnesium Ordered  -continue Mag-Ox 400 mg PO Daily      #Hypothyroidism  -TSH 2.100: 8/12/24  -continue Synthroid 50 mcg PO Daily      #Rheumatoid arthritis  -continue Prednisone 2.5 mg PO Daily (Held while on Solu Medrol)     #History of Bowel Perforation   #Decreased Appetite  #History of C Diff  #GERD (gastroesophageal reflux disease)  -CT Abd/Pelvis  IMPRESSION:  Markedly abnormally thickened distal ileum.  There is infiltration  into the surrounding fat.  The previously described focal perforation  has improved.  There are diverticula present in this most likely  represent small bowel diverticulitis.  Ischemia cannot be excluded.  Colonic diverticulosis.  Multiple pancreatic cysts with dilated pancreatic duct.  There is  dilatation of the common bile duct as well as intrahepatic ducts but  this may be related to the patient's prior cholecystectomy.  Thickening of the wall the stomach.  Gastritis cannot be excluded.  Dilated uterine veins.  There is fluid in the pelvis which may  represent a distended endometrium.  This is  best evaluated with  ultrasound.  There are bibasilar pulmonary infiltrates.  -continue Questran 4 g PO BID   -Protonix Daily   -Miralax Daily   -Acute Care Surgery Consult, Appreciate Recs - chronic and stable - no intervention at this time      DVT Prophylaxis: Lovenox   Fluids: N/A  Nutrition: Cardiac     Code Status: DNR and NO intubation    Pt requires inpatient stay at this time.  Total accumulated time spent face to face and not face to face preparing to see the patient, obtaining and reviewing separately obtained history; performing a medically appropriate examination and/or evaluation; counseling and educating the patient, family; ordering medications, tests, or procedures; referring and communicating with other health care professionals; documenting clinical information in the patient's medical record; independently interpreting results and communicating the results to the patient, family; and care coordination was 45 minutes.                    ELIO Casey-CNP

## 2024-09-29 NOTE — NURSING NOTE
Assumed care of pt.  BSSR received.  Pt. Sleeping, respirations even and unlabored with no S/S of distress.  Bed low, brake on, bed alarm active, belongings and call light in reach.    0826:  Pt assisted to BSC. Voided clear yellow urine. Assisted back to bed.  HOB elevated.  Pt. Denies pain, denies needs.  Bed low, brake on, bed alarm active, belongings and call light in reach.    0900:  Pt remains in bed. Refusing to take medications, refusing to allow this RN to assess.

## 2024-09-29 NOTE — CARE PLAN
The patient's goals for the shift include      The clinical goals for the shift include stay safe and without injury this shift

## 2024-09-29 NOTE — NURSING NOTE
Morning meds administered with daughter at bedside. Meds administered whole, large pills in applesauce.

## 2024-09-29 NOTE — CARE PLAN
The patient's goals for the shift include      The clinical goals for the shift include Remain free of injury.

## 2024-09-29 NOTE — CONSULTS
Weakness, Gen      This a consult quested for patient with abnormal CT.  The patient presented with confusion and recently had a urinary tract infection which was treated antibiotics.  The patient indicated having some diarrhea.  She had no abdominal pain.  She was brought to the emergency room because of increasing confusion.  Repeat CT scan confirmed a thickened abnormal ileum without any obvious perforation.  She has multiple known pancreatic cysts.  Past Medical History:   Diagnosis Date    Ataxia     CAD (coronary artery disease)     Chronic insomnia 06/05/2024    COPD (chronic obstructive pulmonary disease) (Multi)     Cough 06/05/2024    Deficiency of other specified B group vitamins 08/17/2016    Vitamin B12 deficiency    Dementia (Multi)     Dementia (Multi)     Diabetes mellitus (Multi)     Edema 06/05/2024    Elevated ALT measurement 05/05/2023    Encounter for examination of eyes and vision without abnormal findings     Diabetic eye exam    Fracture of sacrum (Multi) 06/05/2024    Frequent episodes of pneumonia 06/05/2024    GERD (gastroesophageal reflux disease)     Hiatal hernia     HLD (hyperlipidemia)     Hypernatremia 06/05/2024    Hypertension     Hypomagnesemia 05/22/2020    Hypotension 06/05/2024    Hypothyroidism     Injury of head 06/05/2024    Insomnia     Occlusion and stenosis of bilateral carotid arteries 08/17/2016    Atherosclerosis of both carotid arteries    Personal history of other medical treatment     History of mammogram    Pneumonia 06/05/2024    Rheumatoid arthritis (Multi)     Transient ischemic attack 06/05/2024          Current Facility-Administered Medications:     acetaminophen (Tylenol) tablet 650 mg, 650 mg, oral, q4h PRN **OR** [DISCONTINUED] acetaminophen (Tylenol) oral liquid 650 mg, 650 mg, oral, q4h PRN **OR** [DISCONTINUED] acetaminophen (Tylenol) suppository 650 mg, 650 mg, rectal, q4h PRN, Zhane Spears PA-C    amLODIPine (Norvasc) tablet 5 mg, 5 mg, oral, Daily,  Kianna S West, APRN-CNP, 5 mg at 09/28/24 2056    aspirin EC tablet 81 mg, 81 mg, oral, Daily, USAMA Chowdhury, 81 mg at 09/28/24 2102    atorvastatin (Lipitor) tablet 40 mg, 40 mg, oral, Nightly, USAMA Chowdhury, 40 mg at 09/28/24 2052    azithromycin 500 mg in dextrose 5% 250 mL IV, 500 mg, intravenous, q24h, Zhane Spears PA-C, Stopped at 09/28/24 2224    benzocaine-menthol (Cepastat Sore Throat) lozenge 1 lozenge, 1 lozenge, Mouth/Throat, q2h PRN, Zhane Spears PA-C    calcium carbonate-vitamin D3 500 mg-5 mcg (200 unit) per tablet 1 tablet, 1 tablet, oral, Daily, USAMA Chowdhury, 1 tablet at 09/28/24 2052    cefTRIAXone (Rocephin) 1 g in dextrose (iso) IV 50 mL, 1 g, intravenous, q24h, Zhane Spears PA-C, Stopped at 09/28/24 2358    cholestyramine (Questran) 4 gram packet 4 g, 4 g, oral, BID, USAMA Chowdhury, 4 g at 09/28/24 2052    colestipol (Colestid) tablet 1 g, 1 g, oral, BID after meals, USAMA Chowdhury    dextromethorphan-guaifenesin (Robitussin DM)  mg/5 mL oral liquid 5 mL, 5 mL, oral, q4h PRN, Zhane Spears PA-C    enoxaparin (Lovenox) syringe 40 mg, 40 mg, subcutaneous, q24h, Zhane Spears PA-C, 40 mg at 09/28/24 2053    guaiFENesin (Mucinex) 12 hr tablet 600 mg, 600 mg, oral, q12h PRN, Zhane Spears PA-C    hydrOXYzine HCL (Atarax) tablet 25 mg, 25 mg, oral, TID PRN, USAMA Chowdhury, 25 mg at 09/28/24 2102    ipratropium-albuteroL (Duo-Neb) 0.5-2.5 mg/3 mL nebulizer solution 3 mL, 3 mL, nebulization, TID, Zhane Spears PA-C, 3 mL at 09/28/24 2105    levothyroxine (Synthroid, Levoxyl) tablet 50 mcg, 50 mcg, oral, Daily before breakfast, USAMA Chowdhury, 50 mcg at 09/29/24 0600    lisinopril tablet 20 mg, 20 mg, oral, Daily, USAMA Chowdhury, 20 mg at 09/28/24 2052    loperamide (Imodium) capsule 2 mg, 2 mg, oral, 4x daily PRN, USAMA Chowdhury, 2 mg at 09/28/24 2108    magnesium oxide (Mag-Ox) tablet 400 mg, 400  mg, oral, Daily, USAMA Chowdhury, 400 mg at 09/28/24 2102    melatonin tablet 6 mg, 6 mg, oral, Nightly PRN, Zhane Spears PA-C    methylPREDNISolone sod succinate (SOLU-Medrol) 40 mg/mL injection 20 mg, 20 mg, intravenous, q8h, USAMA Chowdhury, 20 mg at 09/29/24 0444    metoprolol tartrate (Lopressor) tablet 25 mg, 25 mg, oral, BID, USAMA Chowdhury    omega 3-dha-epa-fish oil 1,000 (120-180) mg capsule 1,000 mg, 1,000 mg, oral, Daily, USAMA Chowdhury    ondansetron ODT (Zofran-ODT) disintegrating tablet 4 mg, 4 mg, oral, q8h PRN **OR** ondansetron (Zofran) injection 4 mg, 4 mg, intravenous, q8h PRN, Zhane Spears PA-C    oxygen (O2) therapy, , inhalation, Continuous PRN - O2/gases, USAMA Chowdhury, 2 L/min at 09/28/24 2106    pantoprazole (ProtoNix) EC tablet 40 mg, 40 mg, oral, Daily before breakfast **OR** pantoprazole (ProtoNix) injection 40 mg, 40 mg, intravenous, Daily before breakfast, Zhane Spears PA-C, 40 mg at 09/29/24 0600    polyethylene glycol (Glycolax, Miralax) packet 17 g, 17 g, oral, Daily, Zhane Spears PA-C    potassium chloride CR (Klor-Con M20) ER tablet 20 mEq, 20 mEq, oral, Once, USAMA Casey    [Held by provider] predniSONE (Deltasone) tablet 2.5 mg, 2.5 mg, oral, Daily, USAMA Chowdhury    traZODone (Desyrel) tablet 50 mg, 50 mg, oral, Nightly, USAMA Chowdhury, 50 mg at 09/28/24 2052     Allergies   Allergen Reactions    Acthar [Corticotropin] Psychosis    Amoxicillin-Pot Clavulanate Diarrhea    Chloroquine Phosphate Unknown     Tremors/ weakness    Ciprofloxacin Unknown    Humira [Adalimumab] Unknown    Levaquin [Levofloxacin] Unknown    Methotrexate Unknown    Sulfamethoxazole-Trimethoprim Unknown    Xeljanz [Tofacitinib] Psychosis    Enbrel [Etanercept] Rash     Legs and arms        Review of Systems  Review of Systems   Unable to perform ROS: Dementia       Objective     Vital signs for last 24 hours:  Temp:  [36.3  °C (97.3 °F)-37.5 °C (99.5 °F)] 36.3 °C (97.3 °F)  Heart Rate:  [45-60] 51  Resp:  [17-19] 17  BP: (123-184)/(63-90) 139/63    Intake/Output this shift:  I/O this shift:  In: 420 [P.O.:120; IV Piggyback:300]  Out: -     Physical Exam  Physical Exam  Vitals reviewed. Exam conducted with a chaperone present.   Constitutional:       General: She is sleeping.      Appearance: She is underweight.   HENT:      Head: Normocephalic.   Cardiovascular:      Rate and Rhythm: Normal rate and regular rhythm.      Heart sounds: Normal heart sounds.   Pulmonary:      Effort: Pulmonary effort is normal.      Breath sounds: Normal breath sounds.   Abdominal:      General: Abdomen is flat.      Palpations: Abdomen is soft. There is no mass.      Tenderness: There is no abdominal tenderness. There is no guarding.   Musculoskeletal:         General: Normal range of motion.   Skin:     General: Skin is warm.   Psychiatric:         Mood and Affect: Mood normal.         Cognition and Memory: Cognition is impaired.         Labs & Radiology      Labs Reviewed   COMPREHENSIVE METABOLIC PANEL - Abnormal       Result Value    Glucose 127 (*)     Sodium 137      Potassium 3.7      Chloride 100      Bicarbonate 32      Anion Gap 9 (*)     Urea Nitrogen 21      Creatinine 0.69      eGFR 85      Calcium 9.0      Albumin 3.7      Alkaline Phosphatase 56      Total Protein 7.0      AST 23      Bilirubin, Total 0.9      ALT 24     URINALYSIS WITH REFLEX CULTURE AND MICROSCOPIC - Abnormal    Color, Urine Yellow      Appearance, Urine Clear      Specific Gravity, Urine 1.021      pH, Urine 6.0      Protein, Urine 10 (TRACE)      Glucose, Urine Normal      Blood, Urine 1.0 (3+) (*)     Ketones, Urine NEGATIVE      Bilirubin, Urine NEGATIVE      Urobilinogen, Urine Normal      Nitrite, Urine NEGATIVE      Leukocyte Esterase, Urine NEGATIVE     CBC - Abnormal    WBC 16.9 (*)     nRBC 0.0      RBC 4.31      Hemoglobin 12.8      Hematocrit 39.7      MCV 92       MCH 29.7      MCHC 32.2      RDW 12.6      Platelets 206     CBC - Abnormal    WBC 13.2 (*)     nRBC 0.0      RBC 4.24      Hemoglobin 12.6      Hematocrit 38.3      MCV 90      MCH 29.7      MCHC 32.9      RDW 12.6      Platelets 188     BASIC METABOLIC PANEL - Abnormal    Glucose 196 (*)     Sodium 139      Potassium 3.2 (*)     Chloride 101      Bicarbonate 31      Anion Gap 10      Urea Nitrogen 18      Creatinine 0.70      eGFR 84      Calcium 8.9     URINALYSIS MICROSCOPIC WITH REFLEX CULTURE - Abnormal    WBC, Urine 1-5      RBC, Urine >20 (*)     Mucus, Urine FEW     SERIAL TROPONIN-INITIAL - Normal    Troponin I, High Sensitivity 7      Narrative:     Less than 99th percentile of normal range cutoff-                  Female and children under 18 years old <14 ng/L; Male <21 ng/L: Negative                  Repeat testing should be performed if clinically indicated.                                     Female and children under 18 years old 14-50 ng/L; Male 21-50 ng/L:                  Consistent with possible cardiac damage and possible increased clinical                   risk. Serial measurements may help to assess extent of myocardial damage.                                     >50 ng/L: Consistent with cardiac damage, increased clinical risk and                  myocardial infarction. Serial measurements may help assess extent of                   myocardial damage.                                      NOTE: Children less than 1 year old may have higher baseline troponin                   levels and results should be interpreted in conjunction with the overall                   clinical context.                                     NOTE: Troponin I testing is performed using a different                   testing methodology at [facility] than at other                   system hospitals. Direct result comparisons should only                   be made within the same method.   MAGNESIUM -  Normal    Magnesium 2.24     RESPIRATORY CULTURE/SMEAR   BLOOD CULTURE   BLOOD CULTURE   TROPONIN SERIES- (INITIAL, 1 HR)    Narrative:     The following orders were created for panel order Troponin Series, (0, 1 HR).                  Procedure                               Abnormality         Status                                     ---------                               -----------         ------                                     Troponin I, High Sensiti...[[id number]]  Normal              Final result                                                 Please view results for these tests on the individual orders.   URINALYSIS WITH REFLEX CULTURE AND MICROSCOPIC    Narrative:     The following orders were created for panel order Urinalysis with Reflex Culture and Microscopic.                  Procedure                               Abnormality         Status                                     ---------                               -----------         ------                                     Urinalysis with Reflex C...[[id number]]  Abnormal            Final result                               Extra Urine Gray Tube[[id number]]                                                                                         Please view results for these tests on the individual orders.   EXTRA URINE GRAY TUBE   PROCALCITONIN     CT abdomen pelvis w IV contrast 09/28/2024    Narrative  STUDY:  CT Abdomen and Pelvis with IV Contrast; 9/28/2024 3:04 PM  INDICATION:  Abdominal pain.  COMPARISON:  CT AP 6/5/2024.  ACCESSION NUMBER(S):  [id number]  ORDERING CLINICIAN:  JAIRO SOTO  TECHNIQUE:  CT of the abdomen and pelvis was performed.  Contiguous axial images  were obtained at 3 mm slice thickness through the abdomen and pelvis.  Coronal and sagittal reconstructions at 3 mm slice thickness were  performed.  Omnipaque 350 70 mL was administered intravenously.  FINDINGS:  LOWER CHEST:  The heart is enlarged.  There is a small  pericardial effusion which is  most likely physiologic.  There are infiltrates in both lung bases.  There are multiple pulmonary nodules in the right lung base.  These  are new since June 2024 and are most likely inflammatory.    ABDOMEN:    LIVER:  No hepatomegaly.  Smooth surface contour.  Normal attenuation.    BILE DUCTS:  There is biliary duct dilatation with the common bile duct measuring  up to 17 mm..    GALLBLADDER:  The gallbladder is absent.  STOMACH:  There is thickening of the wall the stomach.  This may be related to  lack of distention but can be seen with gastritis.  PANCREAS:  There are numerous cysts in the pancreas with dilatation of the  pancreatic duct.  The largest cyst measures approximately 23 mm.    SPLEEN:  No splenomegaly or focal splenic lesion.    ADRENAL GLANDS:  No thickening or nodules.    KIDNEYS AND URETERS:  Kidneys are normal in size and location.  No renal or ureteral  calculi.    PELVIS:    BLADDER:  No abnormalities identified.    REPRODUCTIVE ORGANS:  There are dilated uterine veins consistent with pelvic congestion  syndrome.  There is fluid in the pelvis and this may represent a  distended endometrium.  There is a calcification present.    BOWEL:  There is diverticulosis.  Is abnormal thickening of the wall the  terminal ileum.  There are diverticula involving the distal ileum.    VESSELS:  No abnormalities identified.  Abdominal aorta is normal in caliber.    PERITONEUM/RETROPERITONEUM/LYMPH NODES:  No free fluid.  No pneumoperitoneum.  No lymphadenopathy.    ABDOMINAL WALL:  No abnormalities identified.  SOFT TISSUES:  No abnormalities identified.    BONES:  No acute fracture or aggressive osseous lesion.    Impression  Markedly abnormally thickened distal ileum.  There is infiltration  into the surrounding fat.  The previously described focal perforation  has improved.  There are diverticula present in this most likely  represent small bowel diverticulitis.  Ischemia  cannot be excluded.  Colonic diverticulosis.  Multiple pancreatic cysts with dilated pancreatic duct.  There is  dilatation of the common bile duct as well as intrahepatic ducts but  this may be related to the patient's prior cholecystectomy.  Thickening of the wall the stomach.  Gastritis cannot be excluded.  Dilated uterine veins.  There is fluid in the pelvis which may  represent a distended endometrium.  This is best evaluated with  ultrasound.  There are bibasilar pulmonary infiltrates.  Signed by Oracio Arnett MD      Impression  Chronic ileal thickening, no obvious abdominal pathology related to her confusion.  Plan   Follow conservatively.  Agree with DNR status.  Conservative management.  No indication for antibiotics

## 2024-09-29 NOTE — CARE PLAN
Problem: Pain - Adult  Goal: Verbalizes/displays adequate comfort level or baseline comfort level  Outcome: Progressing     Problem: Safety - Adult  Goal: Free from fall injury  Outcome: Progressing     Problem: Discharge Planning  Goal: Discharge to home or other facility with appropriate resources  Outcome: Progressing     Problem: Chronic Conditions and Co-morbidities  Goal: Patient's chronic conditions and co-morbidity symptoms are monitored and maintained or improved  Outcome: Progressing     Problem: Fall/Injury  Goal: Be free from injury by end of the shift  Outcome: Progressing     Problem: Skin  Goal: Decreased wound size/increased tissue granulation at next dressing change  Flowsheets (Taken 9/29/2024 0527)  Decreased wound size/increased tissue granulation at next dressing change: Promote sleep for wound healing  Goal: Participates in plan/prevention/treatment measures  Flowsheets (Taken 9/29/2024 0527)  Participates in plan/prevention/treatment measures:   Discuss with provider PT/OT consult   Elevate heels   Increase activity/out of bed for meals  Goal: Prevent/manage excess moisture  Flowsheets (Taken 9/29/2024 0527)  Prevent/manage excess moisture:   Cleanse incontinence/protect with barrier cream   Moisturize dry skin  Goal: Prevent/minimize sheer/friction injuries  Flowsheets (Taken 9/29/2024 0527)  Prevent/minimize sheer/friction injuries:   Use pull sheet   Turn/reposition every 2 hours/use positioning/transfer devices   The patient's goals for the shift include      The clinical goals for the shift include stay safe    Over the shift, the patient did make progress toward the following goals.

## 2024-09-30 LAB
ANION GAP SERPL CALC-SCNC: 9 MMOL/L (ref 10–20)
BUN SERPL-MCNC: 29 MG/DL (ref 6–23)
CALCIUM SERPL-MCNC: 9.1 MG/DL (ref 8.6–10.3)
CHLORIDE SERPL-SCNC: 105 MMOL/L (ref 98–107)
CO2 SERPL-SCNC: 30 MMOL/L (ref 21–32)
CREAT SERPL-MCNC: 0.59 MG/DL (ref 0.5–1.05)
EGFRCR SERPLBLD CKD-EPI 2021: 88 ML/MIN/1.73M*2
ERYTHROCYTE [DISTWIDTH] IN BLOOD BY AUTOMATED COUNT: 12.4 % (ref 11.5–14.5)
GLUCOSE SERPL-MCNC: 172 MG/DL (ref 74–99)
HCT VFR BLD AUTO: 36.9 % (ref 36–46)
HGB BLD-MCNC: 12 G/DL (ref 12–16)
MCH RBC QN AUTO: 30 PG (ref 26–34)
MCHC RBC AUTO-ENTMCNC: 32.5 G/DL (ref 32–36)
MCV RBC AUTO: 92 FL (ref 80–100)
NRBC BLD-RTO: 0 /100 WBCS (ref 0–0)
PLATELET # BLD AUTO: 211 X10*3/UL (ref 150–450)
POTASSIUM SERPL-SCNC: 3.8 MMOL/L (ref 3.5–5.3)
RBC # BLD AUTO: 4 X10*6/UL (ref 4–5.2)
SODIUM SERPL-SCNC: 140 MMOL/L (ref 136–145)
WBC # BLD AUTO: 18.2 X10*3/UL (ref 4.4–11.3)

## 2024-09-30 PROCEDURE — 2500000005 HC RX 250 GENERAL PHARMACY W/O HCPCS: Mod: IPSPLIT | Performed by: NURSE PRACTITIONER

## 2024-09-30 PROCEDURE — 97161 PT EVAL LOW COMPLEX 20 MIN: CPT | Mod: GP,IPSPLIT | Performed by: PHYSICAL THERAPIST

## 2024-09-30 PROCEDURE — 97166 OT EVAL MOD COMPLEX 45 MIN: CPT | Mod: GO,IPSPLIT | Performed by: OCCUPATIONAL THERAPIST

## 2024-09-30 PROCEDURE — 94668 MNPJ CHEST WALL SBSQ: CPT | Mod: IPSPLIT

## 2024-09-30 PROCEDURE — 1200000002 HC GENERAL ROOM WITH TELEMETRY DAILY: Mod: IPSPLIT

## 2024-09-30 PROCEDURE — 80048 BASIC METABOLIC PNL TOTAL CA: CPT | Mod: IPSPLIT

## 2024-09-30 PROCEDURE — 2500000005 HC RX 250 GENERAL PHARMACY W/O HCPCS: Mod: IPSPLIT

## 2024-09-30 PROCEDURE — 99233 SBSQ HOSP IP/OBS HIGH 50: CPT | Performed by: NURSE PRACTITIONER

## 2024-09-30 PROCEDURE — 99232 SBSQ HOSP IP/OBS MODERATE 35: CPT | Performed by: SURGERY

## 2024-09-30 PROCEDURE — 94760 N-INVAS EAR/PLS OXIMETRY 1: CPT | Mod: IPSPLIT

## 2024-09-30 PROCEDURE — 2500000001 HC RX 250 WO HCPCS SELF ADMINISTERED DRUGS (ALT 637 FOR MEDICARE OP): Mod: IPSPLIT

## 2024-09-30 PROCEDURE — 2500000004 HC RX 250 GENERAL PHARMACY W/ HCPCS (ALT 636 FOR OP/ED): Mod: IPSPLIT

## 2024-09-30 PROCEDURE — 94669 MECHANICAL CHEST WALL OSCILL: CPT | Mod: IPSPLIT

## 2024-09-30 PROCEDURE — 2500000004 HC RX 250 GENERAL PHARMACY W/ HCPCS (ALT 636 FOR OP/ED): Mod: IPSPLIT | Performed by: NURSE PRACTITIONER

## 2024-09-30 PROCEDURE — 85027 COMPLETE CBC AUTOMATED: CPT | Mod: IPSPLIT

## 2024-09-30 PROCEDURE — 36415 COLL VENOUS BLD VENIPUNCTURE: CPT | Mod: IPSPLIT

## 2024-09-30 ASSESSMENT — COGNITIVE AND FUNCTIONAL STATUS - GENERAL
MOVING TO AND FROM BED TO CHAIR: A LITTLE
TURNING FROM BACK TO SIDE WHILE IN FLAT BAD: A LITTLE
EATING MEALS: A LITTLE
PERSONAL GROOMING: A LOT
TOILETING: A LOT
HELP NEEDED FOR BATHING: A LOT
HELP NEEDED FOR BATHING: A LOT
DRESSING REGULAR UPPER BODY CLOTHING: A LOT
TOILETING: A LOT
PERSONAL GROOMING: A LOT
STANDING UP FROM CHAIR USING ARMS: A LITTLE
STANDING UP FROM CHAIR USING ARMS: A LOT
MOVING FROM LYING ON BACK TO SITTING ON SIDE OF FLAT BED WITH BEDRAILS: A LITTLE
CLIMB 3 TO 5 STEPS WITH RAILING: TOTAL
TURNING FROM BACK TO SIDE WHILE IN FLAT BAD: A LITTLE
DRESSING REGULAR LOWER BODY CLOTHING: A LITTLE
TOILETING: A LOT
DRESSING REGULAR UPPER BODY CLOTHING: A LITTLE
CLIMB 3 TO 5 STEPS WITH RAILING: TOTAL
WALKING IN HOSPITAL ROOM: A LOT
DAILY ACTIVITIY SCORE: 14
TURNING FROM BACK TO SIDE WHILE IN FLAT BAD: A LITTLE
DRESSING REGULAR LOWER BODY CLOTHING: A LITTLE
DRESSING REGULAR LOWER BODY CLOTHING: A LITTLE
PERSONAL GROOMING: A LITTLE
MOBILITY SCORE: 13
MOBILITY SCORE: 17
STANDING UP FROM CHAIR USING ARMS: A LOT
CLIMB 3 TO 5 STEPS WITH RAILING: A LOT
HELP NEEDED FOR BATHING: A LOT
MOVING FROM LYING ON BACK TO SITTING ON SIDE OF FLAT BED WITH BEDRAILS: A LITTLE
WALKING IN HOSPITAL ROOM: A LOT
MOBILITY SCORE: 13
DAILY ACTIVITIY SCORE: 14
EATING MEALS: A LITTLE
EATING MEALS: A LITTLE
DAILY ACTIVITIY SCORE: 16
WALKING IN HOSPITAL ROOM: A LITTLE
DRESSING REGULAR UPPER BODY CLOTHING: A LOT
MOVING FROM LYING ON BACK TO SITTING ON SIDE OF FLAT BED WITH BEDRAILS: A LITTLE
MOVING TO AND FROM BED TO CHAIR: A LOT
MOVING TO AND FROM BED TO CHAIR: A LOT

## 2024-09-30 ASSESSMENT — PAIN - FUNCTIONAL ASSESSMENT
PAIN_FUNCTIONAL_ASSESSMENT: 0-10
PAIN_FUNCTIONAL_ASSESSMENT: 0-10

## 2024-09-30 ASSESSMENT — ACTIVITIES OF DAILY LIVING (ADL)
BATHING_ASSISTANCE: MAXIMAL
ADL_ASSISTANCE: NEEDS ASSISTANCE
LACK_OF_TRANSPORTATION: NO
BATHING_ASSISTANCE: MAXIMAL
GROOMING_ASSISTANCE: STAND BY

## 2024-09-30 ASSESSMENT — PAIN SCALES - GENERAL
PAINLEVEL_OUTOF10: 0 - NO PAIN
PAINLEVEL_OUTOF10: 3

## 2024-09-30 ASSESSMENT — PAIN DESCRIPTION - LOCATION: LOCATION: GENERALIZED

## 2024-09-30 NOTE — PROGRESS NOTES
09/30/24 1427   Discharge Planning   Living Arrangements Children  (Daughter Eli)   Support Systems Children   Assistance Needed Patient with some confusion at times; Patient lives with her daughter in a 1 story house.  There is always family present; when daughter works, she stays with her son.  She has a private duty aide 4 days a week for 8 hours.  She uses either the walker or wheelchair.  She can do ADLS but does have assistance.   Type of Residence Private residence   Number of Stairs to Enter Residence 2   Number of Stairs Within Residence 0   Do you have animals or pets at home? Yes   Type of Animals or Pets dog   Home or Post Acute Services Other (Comment)  (HC v SNF)   Expected Discharge Disposition Home H   Does the patient need discharge transport arranged? No   Financial Resource Strain   How hard is it for you to pay for the very basics like food, housing, medical care, and heating? Not hard   Housing Stability   In the last 12 months, was there a time when you were not able to pay the mortgage or rent on time? N   In the past 12 months, how many times have you moved where you were living? 0   At any time in the past 12 months, were you homeless or living in a shelter (including now)? N   Transportation Needs   In the past 12 months, has lack of transportation kept you from medical appointments or from getting medications? no   In the past 12 months, has lack of transportation kept you from meetings, work, or from getting things needed for daily living? No   Patient Choice   Provider Choice list and CMS website (https://medicare.gov/care-compare#search) for post-acute Quality and Resource Measure Data were provided and reviewed with: Family     TCC spoke with patient regarding discharge planning and home going needs. Patient lives  with her daughter Eli.  Confirmed with patient PCP is  [facility] and [facility] is [facility].  Discharge Plan to be determined by therapy's recommendation.   TCC will continue to follow for changes in discharge planning needs.      2:45 pm  Daughter Eli informed of recommendation for MOD level of therapy/Skilled Nursing Facility upon discharge.  Printed insurance choice list for patient.  SNF choices to be determined-she will review with her  and brother.

## 2024-09-30 NOTE — CARE PLAN
Problem: Pain - Adult  Goal: Verbalizes/displays adequate comfort level or baseline comfort level  Outcome: Progressing     Problem: Safety - Adult  Goal: Free from fall injury  Outcome: Progressing     Problem: Discharge Planning  Goal: Discharge to home or other facility with appropriate resources  Outcome: Progressing     Problem: Chronic Conditions and Co-morbidities  Goal: Patient's chronic conditions and co-morbidity symptoms are monitored and maintained or improved  Outcome: Progressing     Problem: Fall/Injury  Goal: Not fall by end of shift  Outcome: Progressing  Goal: Be free from injury by end of the shift  Outcome: Progressing  Goal: Verbalize understanding of personal risk factors for fall in the hospital  Outcome: Progressing  Goal: Use assistive devices by end of the shift  Outcome: Progressing     Problem: Skin  Goal: Decreased wound size/increased tissue granulation at next dressing change  Flowsheets (Taken 9/30/2024 0518)  Decreased wound size/increased tissue granulation at next dressing change: Promote sleep for wound healing  Goal: Participates in plan/prevention/treatment measures  Flowsheets (Taken 9/30/2024 0518)  Participates in plan/prevention/treatment measures:   Discuss with provider PT/OT consult   Elevate heels  Goal: Prevent/minimize sheer/friction injuries  Flowsheets (Taken 9/30/2024 0518)  Prevent/minimize sheer/friction injuries:   Use pull sheet   Turn/reposition every 2 hours/use positioning/transfer devices  Goal: Promote/optimize nutrition  Flowsheets (Taken 9/30/2024 0518)  Promote/optimize nutrition:   Monitor/record intake including meals   Offer water/supplements/favorite foods  Goal: Promote skin healing  Flowsheets (Taken 9/30/2024 0518)  Promote skin healing: Assess skin/pad under line(s)/device(s)   The patient's goals for the shift include      The clinical goals for the shift include Pt will have no falls this shift.    Over the shift, the patient did make  progress toward the following goals.

## 2024-09-30 NOTE — PROGRESS NOTES
Maisha Agosto is a 84 y.o. female on day 1 of admission presenting with Pneumonia, unspecified organism.    Subjective   She is resting, in bed, awake and alert, oriented to self only. She is asking for her daughter. She states she does not feel week but her daughter and staff report that she needs max assist for mobility. Will continue to monitor progress and discharge needs.     Objective     Last Recorded Vitals  /64 (BP Location: Right arm, Patient Position: Lying)   Pulse 57   Temp 36.3 °C (97.3 °F) (Temporal)   Resp 16   Wt (!) 39.4 kg (86 lb 13.8 oz)   SpO2 98%   Intake/Output last 3 Shifts:    Intake/Output Summary (Last 24 hours) at 9/30/2024 1214  Last data filed at 9/30/2024 0500  Gross per 24 hour   Intake 540 ml   Output --   Net 540 ml     Admission Weight  Weight: 47.7 kg (105 lb 2.6 oz) (09/28/24 1325)    Daily Weight  09/28/24 : (!) 39.4 kg (86 lb 13.8 oz)    Physical Exam  Constitutional:       Appearance: She is ill-appearing.   HENT:      Head: Normocephalic.      Mouth/Throat:      Mouth: Mucous membranes are moist.   Eyes:      Extraocular Movements: Extraocular movements intact.   Cardiovascular:      Rate and Rhythm: Regular rhythm. Bradycardia present.      Pulses: Normal pulses.      Heart sounds: Normal heart sounds.   Pulmonary:      Breath sounds: Rhonchi present.      Comments: RML and RLL  Abdominal:      General: Bowel sounds are normal.      Palpations: Abdomen is soft.   Musculoskeletal:         General: Normal range of motion.      Cervical back: Normal range of motion.   Skin:     General: Skin is warm and dry.      Capillary Refill: Capillary refill takes less than 2 seconds.   Neurological:      General: No focal deficit present.      Mental Status: She is alert. Mental status is at baseline. She is disoriented.   Psychiatric:         Mood and Affect: Mood normal.        Scheduled medications  amLODIPine, 5 mg, oral, Daily  aspirin, 81 mg, oral, Daily  atorvastatin,  40 mg, oral, Nightly  azithromycin, 500 mg, intravenous, q24h  calcium carbonate-vitamin D3, 1 tablet, oral, Daily  cefTRIAXone, 1 g, intravenous, q24h  cholestyramine, 4 g, oral, BID  colestipol, 1 g, oral, BID after meals  enoxaparin, 40 mg, subcutaneous, q24h  icosapent ethyL, 1 g, oral, Daily  levothyroxine, 50 mcg, oral, Daily before breakfast  lisinopril, 20 mg, oral, Daily  magnesium oxide, 400 mg, oral, Daily  methylPREDNISolone sodium succinate (PF), 20 mg, intravenous, q12h  [Held by provider] metoprolol tartrate, 25 mg, oral, BID  pantoprazole, 40 mg, oral, Daily before breakfast   Or  pantoprazole, 40 mg, intravenous, Daily before breakfast  polyethylene glycol, 17 g, oral, Daily  [Held by provider] predniSONE, 2.5 mg, oral, Daily  traZODone, 50 mg, oral, Nightly    Continuous medications  sodium chloride 0.9%, 10 mL/hr    PRN medications  PRN medications: acetaminophen **OR** [DISCONTINUED] acetaminophen **OR** [DISCONTINUED] acetaminophen, benzocaine-menthol, dextromethorphan-guaifenesin, guaiFENesin, hydrOXYzine HCL, ipratropium-albuteroL, loperamide, lubricating eye drops, melatonin, ondansetron ODT **OR** ondansetron, oxygen, sodium chloride 0.9%    Relevant Results:  XR chest 1 view  Result Date: 9/28/2024  Right lower lobe infiltrate. Signed by Oracio Arnett MD    CT abdomen pelvis w IV contrast  Result Date: 9/28/2024  Markedly abnormally thickened distal ileum.  There is infiltration into the surrounding fat.  The previously described focal perforation has improved.  There are diverticula present in this most likely represent small bowel diverticulitis.  Ischemia cannot be excluded. Colonic diverticulosis. Multiple pancreatic cysts with dilated pancreatic duct.  There is dilatation of the common bile duct as well as intrahepatic ducts but this may be related to the patient's prior cholecystectomy. Thickening of the wall the stomach.  Gastritis cannot be excluded. Dilated uterine veins.  There is  fluid in the pelvis which may represent a distended endometrium.  This is best evaluated with ultrasound.  There are bibasilar pulmonary infiltrates. Signed by Oracio Arnett MD      Latest Reference Range & Units 09/29/24 05:08 09/30/24 05:55   GLUCOSE 74 - 99 mg/dL 196 (H) 172 (H)   SODIUM 136 - 145 mmol/L 139 140   POTASSIUM 3.5 - 5.3 mmol/L 3.2 (L) 3.8   CHLORIDE 98 - 107 mmol/L 101 105   Bicarbonate 21 - 32 mmol/L 31 30   Anion Gap 10 - 20 mmol/L 10 9 (L)   Blood Urea Nitrogen 6 - 23 mg/dL 18 29 (H)   Creatinine 0.50 - 1.05 mg/dL 0.70 0.59   EGFR >60 mL/min/1.73m*2 84 88   Calcium 8.6 - 10.3 mg/dL 8.9 9.1   Procalcitonin <=0.07 ng/mL 0.03    MAGNESIUM 1.60 - 2.40 mg/dL 2.24    WBC 4.4 - 11.3 x10*3/uL 13.2 (H) 18.2 (H)   nRBC 0.0 - 0.0 /100 WBCs 0.0 0.0   RBC 4.00 - 5.20 x10*6/uL 4.24 4.00   HEMOGLOBIN 12.0 - 16.0 g/dL 12.6 12.0   HEMATOCRIT 36.0 - 46.0 % 38.3 36.9   MCV 80 - 100 fL 90 92   MCH 26.0 - 34.0 pg 29.7 30.0   MCHC 32.0 - 36.0 g/dL 32.9 32.5   RED CELL DISTRIBUTION WIDTH 11.5 - 14.5 % 12.6 12.4   Platelets 150 - 450 x10*3/uL 188 211     Assessment/Plan      Assessment & Plan  Pneumonia, unspecified organism    Abnormal gait    Acute exacerbation of COPD with asthma    Benign essential hypertension    Arteriosclerosis of coronary artery    Dementia    Fatigue    GERD (gastroesophageal reflux disease)    Hypothyroidism    Hyperlipidemia    Impaired functional mobility, balance, gait, and endurance    Rheumatoid arthritis    Decreased appetite    History of Clostridioides difficile infection    Pneumonia of both lower lobes due to infectious organism    #Pneumonia, unspecified organism  #Acute exacerbation of COPD with asthma  -WBC 16.9 >13.2 > 18.2  -Procalcitonin 0.03  -Respiratory Culture if Able  -BC x 2 in process  -CXR : Right lower lobe infiltrate.  -DuoNebs  -Solu Medrol 20 mg IV Q 12 hours, transition to oral Tuesday  -continue Rocephin 1 g IV Daily (Day 3)  -continue Azithromycin 500 mg IV Daily  (Day 3)  -Home O2 RA  -Currently on 2L NC continuously   - robitussin and mucinex prn     Active Problems  #Abnormal gait  #Dementia  #Fatigue  #Impaired functional mobility, balance, gait, and endurance  -UA Negative  -PT/OT Eval and Treat  -CM for DC planning     #Benign essential hypertension  #Arteriosclerosis of coronary artery  #Hyperlipidemia  -continue Norvasc 5 mg PO Daily   -continue ASA 81 mg PO Daily   -continue Lipitor 40 mg PO HS  -continue Lisinopril 20 mg PO Daily   -continue Lopressor 25 mg PO BID     #Hypomagnesemia   -Magnesium 2.24  -continue Mag-Ox 400 mg PO Daily      #Hypothyroidism  -TSH 2.100: 8/12/24  -continue Synthroid 50 mcg PO Daily      #Rheumatoid arthritis  -continue Prednisone 2.5 mg PO Daily (Held while on Solu Medrol)     #History of Bowel Perforation   #Decreased Appetite  #History of C Diff  #GERD  -CT Abd/Pelvis: Markedly abnormally thickened distal ileum.  There is infiltration into the surrounding fat.  The previously described focal perforation has improved.  There are diverticula present in this most likely represent small bowel diverticulitis.  Ischemia cannot be excluded. Colonic diverticulosis. Multiple pancreatic cysts with dilated pancreatic duct.  There is dilatation of the common bile duct as well as intrahepatic ducts but this may be related to the patient's prior cholecystectomy. Thickening of the wall the stomach.  Gastritis cannot be excluded.  Dilated uterine veins.  There is fluid in the pelvis which may represent a distended endometrium.  This is best evaluated with ultrasound.  There are bibasilar pulmonary infiltrates.  -continue Questran 4 g PO BID   -Protonix Daily   -Miralax Daily   -Acute Care Surgery Consult, Appreciate Recs - chronic and stable - no intervention at this time      DVT Prophylaxis: Lovenox   Fluids: N/A  Nutrition: Cardiac   Code Status: DNR and NO intubation  Pt requires inpatient stay at this time.     Ekta Martin,  APRN-CNP

## 2024-09-30 NOTE — CARE PLAN
Problem: Pain - Adult  Goal: Verbalizes/displays adequate comfort level or baseline comfort level  Outcome: Progressing     Problem: Safety - Adult  Goal: Free from fall injury  Outcome: Progressing     Problem: Discharge Planning  Goal: Discharge to home or other facility with appropriate resources  Outcome: Progressing     Problem: Chronic Conditions and Co-morbidities  Goal: Patient's chronic conditions and co-morbidity symptoms are monitored and maintained or improved  Outcome: Progressing     Problem: Skin  Goal: Decreased wound size/increased tissue granulation at next dressing change  Outcome: Progressing  Goal: Participates in plan/prevention/treatment measures  Outcome: Progressing  Goal: Prevent/manage excess moisture  Outcome: Progressing  Goal: Prevent/minimize sheer/friction injuries  Outcome: Progressing  Goal: Promote/optimize nutrition  Outcome: Progressing  Goal: Promote skin healing  Outcome: Progressing     Problem: Fall/Injury  Goal: Not fall by end of shift  Outcome: Progressing  Goal: Be free from injury by end of the shift  Outcome: Progressing  Goal: Verbalize understanding of personal risk factors for fall in the hospital  Outcome: Progressing  Goal: Verbalize understanding of risk factor reduction measures to prevent injury from fall in the home  Outcome: Progressing  Goal: Use assistive devices by end of the shift  Outcome: Progressing  Goal: Pace activities to prevent fatigue by end of the shift  Outcome: Progressing   The patient's goals for the shift include  not be in pain    The clinical goals for the shift include Ambulate without falling

## 2024-09-30 NOTE — PROGRESS NOTES
"Maisha Agosto is a 84 y.o. female on day 1 of admission presenting with Pneumonia, unspecified organism.    Subjective   No further abdominal plain       Objective     Physical Exam  Constitutional:       General: She is sleeping.      Appearance: Normal appearance.   Abdominal:      Palpations: Abdomen is soft. There is no mass.      Tenderness: There is no abdominal tenderness. There is no guarding.   Neurological:      Mental Status: She is lethargic.         Last Recorded Vitals  Blood pressure 131/64, pulse 56, temperature 36.1 °C (97 °F), temperature source Oral, resp. rate 17, height 1.549 m (5' 1\"), weight (!) 39.4 kg (86 lb 13.8 oz), SpO2 97%.  Intake/Output last 3 Shifts:  I/O last 3 completed shifts:  In: 660 (16.8 mL/kg) [P.O.:360; IV Piggyback:300]  Out: - (0 mL/kg)   Weight: 39.4 kg     Relevant Results                              Assessment/Plan   Assessment & Plan  Pneumonia, unspecified organism    Impaired functional mobility, balance, gait, and endurance    Arteriosclerosis of coronary artery    Abnormal gait    Fatigue    GERD (gastroesophageal reflux disease)    Hyperlipidemia    Dementia    Rheumatoid arthritis    Hypothyroidism    Acute exacerbation of COPD with asthma    Benign essential hypertension    Decreased appetite    History of Clostridioides difficile infection    Pneumonia of both lower lobes due to infectious organism      Plan-follow conservatively, stable from abdominal point of view,  plans per medicine      Rosales Drake MD      "

## 2024-09-30 NOTE — DISCHARGE INSTR - OTHER ORDERS
Thank you for choosing [facility] for your Health Care needs.  Also, thank you for allowing us to take you and your families preferences into account when determining your discharge plan.  Stay well!     You may receive a survey in the mail within the next couple weeks. Please take the time to complete it and return it. Your input is ALWAYS important to us. Thank you!  Your Care Transition Team - Aviva Carter & Angie - [phone]      For questions about your medications listed on your discharge instructions, please call the Nurses Station at [phone].

## 2024-09-30 NOTE — PROGRESS NOTES
Occupational Therapy    Evaluation    Patient Name: Maisha Agosto  MRN: [id number]  Department: [facility]  Room: [address] [place] [address]  Today's Date: 9/30/2024  Time Calculation  Start Time: 1033  Stop Time: 1045  Time Calculation (min): 12 min    Assessment  IP OT Assessment  OT Assessment: Pt. is a 84 y.o. female referred to occupational therapy for imapried self-care 2/2 admisison for pneumonia. Pt. displays decreased balance, self-care, functional mobility and transfers. Pt. would benefit from skilled OT at MOD intensity to address above deficits and return to PLOF in LRE.  Prognosis: Good  Barriers to Discharge: None  Evaluation/Treatment Tolerance: Patient tolerated treatment well  Medical Staff Made Aware: Yes  End of Session Communication: Bedside nurse  End of Session Patient Position: Up in chair, Alarm on  Plan:  Treatment Interventions: ADL retraining, Functional transfer training, Patient/family training, Neuromuscular reeducation, Compensatory technique education  OT Frequency: 3 times per week  OT Discharge Recommendations: Moderate intensity level of continued care  OT - OK to Discharge: Yes (Based on completed evaluation and care plan recommendations, no barriers to discharge to next site of care)    Subjective   Current Problem:  1. Pneumonia of both lower lobes due to infectious organism        2. Hypoxia          General:  General  Reason for Referral: impaired self-care d/t admission for pneumonia.  Referred By: JAY Perez  Past Medical History Relevant to Rehab: dementia, COPD, DM, HLD, hypothyroidism, RA, TIA, ataxia, CAD, chronic insomnia, cough, GERD, HLD, hypernatremia, TIA, HTN  Family/Caregiver Present: No  Prior to Session Communication: Bedside nurse  Patient Position Received: Up in chair, Alarm on  General Comment: [place] CASAS, pt. is a poor historian (discussed PLOF with daughter)  Precautions:  Hearing/Visual Limitations: glasses; closing L eye at times  Medical Precautions: Fall  precautions    Pain:  Pain Assessment  Pain Assessment: 0-10  0-10 (Numeric) Pain Score: 0 - No pain    Objective   Cognition:  Overall Cognitive Status: Impaired at baseline  Orientation Level: Disoriented to time, Disoriented to situation  Home Living:  Type of Home: House (Per daughter- pt. has 24 hr care other then at night (pt. gets up on her own IND in the middle of the night multiple times))  Lives With: Adult children (daughter)  Home Adaptive Equipment: Walker rolling or standard  Home Layout: One level  Home Access: Stairs to enter with rails  Entrance Stairs-Rails: Right  Entrance Stairs-Number of Steps: 2  Bathroom Shower/Tub: Walk-in shower  Bathroom Toilet: Standard   Prior Function:  Level of [place]: Needs assistance with ADLs, Needs assistance with homemaking ((IND with mobility, wheelchair for long distance, IND dressing/toileting, has MAX A bathing,)  Receives Help From: Family  ADL Assistance: Needs assistance  Bath: Maximal  Toileting:  (IND)  Dressing: Stand by  Grooming: Stand by  Feeding:  (IND)  Homemaking Assistance: Independent  Ambulatory Assistance: Independent  IADL History:  Homemaking Responsibilities: No  ADL:  Eating Assistance: Stand by (anticipated)  Grooming Assistance: Stand by (anticipated)  Bathing Assistance: Maximal (anticipated)  UE Dressing Assistance: Minimal (anticipated)  LE Dressing Assistance: Stand by (anticipated)  Toileting Assistance with Device: Moderate (anticipated)  Activity Tolerance:  Endurance: Endurance does not limit participation in activity  Bed Mobility/Transfers:      Transfers  Transfer: Yes  Transfer 1  Transfer From 1: Chair with arms to  Transfer to 1: Sit, Stand  Technique 1: Sit to stand, Stand to sit  Transfer Device 1: Walker  Transfer Level of Assistance 1: Contact guard      Functional Mobility:  Functional Mobility  Functional Mobility Performed: Yes  Functional Mobility 1  Surface 1: Level tile  Device 1: Rolling walker  Functional  Mobility Support Devices: Gait belt  Assistance 1: Minimum assistance (MIN A with heavy R lateral lean;)  Sitting Balance:  Static Sitting Balance  Static Sitting-Balance Support: Feet supported  Static Sitting-Level of Assistance: Independent  Dynamic Sitting Balance  Dynamic Sitting-Balance Support: Feet supported  Dynamic Sitting-Level of Assistance: Independent  Dynamic Sitting-Balance: Forward lean  Standing Balance:  Static Standing Balance  Static Standing-Balance Support: Bilateral upper extremity supported  Static Standing-Level of Assistance: Minimum assistance  Dynamic Standing Balance  Dynamic Standing-Balance Support: Bilateral upper extremity supported  Dynamic Standing-Level of Assistance: Minimum assistance  Dynamic Standing-Balance: Turning  IADL's:   Homemaking Responsibilities: No  Sensation:  Sensation Comment: denies deficits  Strength:  Strength Comments: BUE: 4-/5 grossly throughout  Coordination:  Movements are Fluid and Coordinated: Yes   Hand Function:  Hand Function  Gross Grasp: Functional  Coordination: Functional  Extremities: RUE   RUE : Within Functional Limits and LUE   LUE: Within Functional Limits    Outcome Measures: [facility] Daily Activity  Putting on and taking off regular lower body clothing: A little  Bathing (including washing, rinsing, drying): A lot  Putting on and taking off regular upper body clothing: A little  Toileting, which includes using toilet, bedpan or urinal: A lot  Taking care of personal grooming such as brushing teeth: A little  Eating Meals: A little  Daily Activity - Total Score: 16      Education Documentation  Body Mechanics, taught by Di Valente OT at 9/30/2024  1:41 PM.  Learner: Patient  Readiness: Acceptance  Method: Explanation  Response: Verbalizes Understanding  Comment: Edu on POC and DC rec. Edu on safety with balance. Edu on call wayne use    Precautions, taught by Di Valente OT at 9/30/2024  1:41 PM.  Learner: Patient  Readiness:  Acceptance  Method: Explanation  Response: Verbalizes Understanding  Comment: Edu on POC and DC rec. Edu on safety with balance. Edu on call bell use    ADL Training, taught by Di Valente OT at 9/30/2024  1:41 PM.  Learner: Patient  Readiness: Acceptance  Method: Explanation  Response: Verbalizes Understanding  Comment: Edu on POC and DC rec. Edu on safety with balance. Edu on call bell use    Education Comments  No comments found.      Goals:   Encounter Problems       Encounter Problems (Active)       ADLs       Patient with complete upper body dressing with set-up and supervision level of assistance donning and doffing all UE clothes with PRN adaptive equipment while supported sitting       Start:  09/30/24    Expected End:  10/14/24            Patient with complete lower body dressing with set-up and supervision level of assistance donning and doffing all LE clothes  with PRN adaptive equipment while supported sitting       Start:  09/30/24    Expected End:  10/14/24            Patient will complete daily grooming tasks brushing teeth and washing face/hair with set-up and supervision level of assistance and PRN adaptive equipment while standing.       Start:  09/30/24    Expected End:  10/14/24            Patient will complete toileting including hygiene clothing management/hygiene with set-up and supervision level of assistance.       Start:  09/30/24    Expected End:  10/14/24               MOBILITY       Patient will perform Functional mobility mod  Household distances/Community Distances with set-up and supervision level of assistance and least restrictive device in order to improve safety and functional mobility.       Start:  09/30/24    Expected End:  10/14/24               TRANSFERS       Patient will complete sit to stand transfer with set-up and supervision level of assistance and least restrictive device in order to improve safety and prepare for out of bed mobility.       Start:  09/30/24     Expected End:  10/14/24

## 2024-09-30 NOTE — PROGRESS NOTES
Physical Therapy    Physical Therapy Evaluation    Patient Name: Maisha Agosto  MRN: [id number]  Department: [facility]  Room: [address] [place], [place] [address]  Today's Date: 9/30/2024   Time Calculation  Start Time: 0945  Stop Time: 1000  Time Calculation (min): 15 min    Assessment/Plan   PT Assessment  PT Assessment Results: Decreased strength, Decreased endurance, Impaired balance, Decreased mobility, Decreased cognition, Impaired judgement, Decreased safety awareness, Impaired vision  Rehab Prognosis: Good  Barriers to Discharge: n/a  Evaluation/Treatment Tolerance:  (pt. desatted to 88% when amb. on room air. Notified RN)  Medical Staff Made Aware: Yes  Strengths: Support of Caregivers  Barriers to Participation: Comorbidities  End of Session Communication: Bedside nurse  Assessment Comment: Pleasant 84 y.o presents with weakness and impaired gait. Pt. normally is able to amb. with SUP (other then at night pt. gets up multiple times to use restroom). Pt. currently requires Zeinab when amb. (with gait belt) and would benefit from additional PT to address above noted limitations and prevent further decline.  End of Session Patient Position: Up in chair, Alarm on  IP OR SWING BED PT PLAN  Inpatient or Swing Bed: Inpatient  PT Plan  Treatment/Interventions: Bed mobility, Transfer training, Gait training, Stair training, Balance training, Strengthening, Endurance training, Therapeutic exercise, Therapeutic activity, Home exercise program  PT Plan: Ongoing PT  PT Frequency: 3 times per week  PT Discharge Recommendations: Moderate intensity level of continued care  PT Recommended Transfer Status: Assist x1  PT - OK to Discharge: Yes Based on completed evaluation and care plan recommendations, no barriers to discharge to next site of care        Subjective   General Visit Information:  General  Reason for Referral: impaired mobility, pneumonia  Referred By: Zhane Spears PA-C  Past Medical History Relevant to Rehab: dementia, COPD, DM, HLD,  hypothyroidism, RA, TIA  Prior to Session Communication: Bedside nurse  Patient Position Received: Up in chair, Alarm on  General Comment: 2L 02, mami, pt. is a poor historian (discussed PLOF with daughter)  Home Living:  Home Living  Type of Home: House (Per daughter- pt. has 24 hr care other then at night (pt. gets up on her own IND in the middle of the night multiple times))  Lives With:  (daughter)  Home Adaptive Equipment: Walker rolling or standard  Home Layout: One level  Home Access: Stairs to enter with rails  Entrance Stairs-Rails: Right  Entrance Stairs-Number of Steps: 2  Bathroom Shower/Tub: Walk-in shower  Bathroom Toilet: Standard  Prior Level of Function:  Prior Function Per Pt/Caregiver Report  Level of Person:  ((IND with mobility, IND dressing/toileting, has supervision when bathing))  Homemaking Assistance: Needs assistance  Ambulatory Assistance: Independent (at night with WW)  Precautions:  Precautions  Hearing/Visual Limitations: glasses; closing L eye at times  Medical Precautions: Fall precautions    Vital Signs (Past 2hrs)        Date/Time Vitals Session Patient Position Pulse Resp SpO2 BP MAP (mmHg)    09/30/24 0945 --  --  57  --  98 %  --  --                         Objective   Pain:  Pain Assessment  0-10 (Numeric) Pain Score: 0 - No pain  Cognition:  Cognition  Overall Cognitive Status: Impaired at baseline  Orientation Level: Disoriented to time, Disoriented to situation    General Assessments:     Activity Tolerance  Endurance: Endurance does not limit participation in activity    Sensation  Sensation Comment: denies sensation loss    Strength  Strength Comments: BLE: grossly 4-/5  Coordination  Movements are Fluid and Coordinated: Yes    Static Standing Balance  Static Standing-Comment/Number of Minutes: fair+; with BUE support  Dynamic Standing Balance  Dynamic Standing-Comments: fair; Zeinab when amb. (Recommend gait belt when amb. At risk for falls)    Functional  Assessments:  Transfers  Transfer: Yes  Transfer 1  Technique 1: Sit to stand, Stand to sit  Transfer Device 1: Walker  Transfer Level of Assistance 1: Contact guard    Ambulation/Gait Training  Ambulation/Gait Training Performed: Yes  Ambulation/Gait Training 1  Gait Support Devices: Gait belt  Assistance 1: Minimum assistance (slightly unsteady at times; required assist when backing up/turning to chair)  Quality of Gait 1: Inconsistent stride length  Comments/Distance (ft) 1: 50  Extremity/Trunk Assessments:  Defer to OT for UE detail   RLE   RLE : Within Functional Limits  LLE   LLE : Within Functional Limits  Outcome Measures:  [facility] Basic Mobility  Turning from your back to your side while in a flat bed without using bedrails: A little  Moving from lying on your back to sitting on the side of a flat bed without using bedrails: A little  Moving to and from bed to chair (including a wheelchair): A little  Standing up from a chair using your arms (e.g. wheelchair or bedside chair): A little  To walk in hospital room: A little  Climbing 3-5 steps with railing: A lot  Basic Mobility - Total Score: 17    Encounter Problems       Encounter Problems (Active)       Balance       STG - Maintains dynamic standing balance without upper extremity support with good- balance to decrease fall risk        Start:  09/30/24    Expected End:  10/08/24               Mobility       LTG - Patient will ambulate household distance with SUP and WW        Start:  09/30/24    Expected End:  10/08/24            LTG - Patient will navigate 2 steps with 1 rails/device with CGA        Start:  09/30/24    Expected End:  10/08/24               PT Transfers       STG - Patient will perform bed mobility IND       Start:  09/30/24    Expected End:  10/08/24            STG - Patient will transfer sit to and from stand MALLORIE with WW       Start:  09/30/24    Expected End:  10/08/24               Pain - Adult              Education Documentation  No  documentation found.  Education Comments  No comments found.

## 2024-10-01 ENCOUNTER — APPOINTMENT (OUTPATIENT)
Dept: CARDIOLOGY | Facility: HOSPITAL | Age: 84
End: 2024-10-01
Payer: MEDICARE

## 2024-10-01 LAB
ANION GAP SERPL CALC-SCNC: 11 MMOL/L (ref 10–20)
ATRIAL RATE: 55 BPM
BUN SERPL-MCNC: 25 MG/DL (ref 6–23)
CALCIUM SERPL-MCNC: 8.6 MG/DL (ref 8.6–10.3)
CARDIAC TROPONIN I PNL SERPL HS: 10 NG/L (ref 0–13)
CHLORIDE SERPL-SCNC: 104 MMOL/L (ref 98–107)
CO2 SERPL-SCNC: 29 MMOL/L (ref 21–32)
CREAT SERPL-MCNC: 0.6 MG/DL (ref 0.5–1.05)
EGFRCR SERPLBLD CKD-EPI 2021: 88 ML/MIN/1.73M*2
ERYTHROCYTE [DISTWIDTH] IN BLOOD BY AUTOMATED COUNT: 12.5 % (ref 11.5–14.5)
GLUCOSE SERPL-MCNC: 161 MG/DL (ref 74–99)
HCT VFR BLD AUTO: 40.3 % (ref 36–46)
HGB BLD-MCNC: 13 G/DL (ref 12–16)
HOLD SPECIMEN: NORMAL
MCH RBC QN AUTO: 29.3 PG (ref 26–34)
MCHC RBC AUTO-ENTMCNC: 32.3 G/DL (ref 32–36)
MCV RBC AUTO: 91 FL (ref 80–100)
NRBC BLD-RTO: 0 /100 WBCS (ref 0–0)
P AXIS: 53 DEGREES
P OFFSET: 197 MS
P ONSET: 131 MS
PLATELET # BLD AUTO: 236 X10*3/UL (ref 150–450)
POTASSIUM SERPL-SCNC: 4.1 MMOL/L (ref 3.5–5.3)
PR INTERVAL: 176 MS
Q ONSET: 219 MS
QRS COUNT: 9 BEATS
QRS DURATION: 80 MS
QT INTERVAL: 484 MS
QTC CALCULATION(BAZETT): 463 MS
QTC FREDERICIA: 470 MS
R AXIS: 10 DEGREES
RBC # BLD AUTO: 4.44 X10*6/UL (ref 4–5.2)
SODIUM SERPL-SCNC: 140 MMOL/L (ref 136–145)
T AXIS: 63 DEGREES
T OFFSET: 461 MS
VENTRICULAR RATE: 55 BPM
WBC # BLD AUTO: 16.6 X10*3/UL (ref 4.4–11.3)

## 2024-10-01 PROCEDURE — 85027 COMPLETE CBC AUTOMATED: CPT | Mod: IPSPLIT

## 2024-10-01 PROCEDURE — 82374 ASSAY BLOOD CARBON DIOXIDE: CPT | Mod: IPSPLIT

## 2024-10-01 PROCEDURE — 97530 THERAPEUTIC ACTIVITIES: CPT | Mod: GO,IPSPLIT | Performed by: OCCUPATIONAL THERAPIST

## 2024-10-01 PROCEDURE — 9420000001 HC RT PATIENT EDUCATION 5 MIN: Mod: IPSPLIT

## 2024-10-01 PROCEDURE — 99232 SBSQ HOSP IP/OBS MODERATE 35: CPT | Performed by: NURSE PRACTITIONER

## 2024-10-01 PROCEDURE — 2500000001 HC RX 250 WO HCPCS SELF ADMINISTERED DRUGS (ALT 637 FOR MEDICARE OP): Mod: IPSPLIT | Performed by: NURSE PRACTITIONER

## 2024-10-01 PROCEDURE — 2500000004 HC RX 250 GENERAL PHARMACY W/ HCPCS (ALT 636 FOR OP/ED): Mod: IPSPLIT | Performed by: INTERNAL MEDICINE

## 2024-10-01 PROCEDURE — 2500000001 HC RX 250 WO HCPCS SELF ADMINISTERED DRUGS (ALT 637 FOR MEDICARE OP): Mod: IPSPLIT

## 2024-10-01 PROCEDURE — 97110 THERAPEUTIC EXERCISES: CPT | Mod: GP,CQ,IPSPLIT

## 2024-10-01 PROCEDURE — 1200000002 HC GENERAL ROOM WITH TELEMETRY DAILY: Mod: IPSPLIT

## 2024-10-01 PROCEDURE — 97116 GAIT TRAINING THERAPY: CPT | Mod: GP,CQ,IPSPLIT

## 2024-10-01 PROCEDURE — 36415 COLL VENOUS BLD VENIPUNCTURE: CPT | Mod: IPSPLIT | Performed by: NURSE PRACTITIONER

## 2024-10-01 PROCEDURE — 2500000004 HC RX 250 GENERAL PHARMACY W/ HCPCS (ALT 636 FOR OP/ED): Mod: IPSPLIT

## 2024-10-01 PROCEDURE — 94668 MNPJ CHEST WALL SBSQ: CPT | Mod: IPSPLIT

## 2024-10-01 PROCEDURE — 84484 ASSAY OF TROPONIN QUANT: CPT | Mod: IPSPLIT | Performed by: NURSE PRACTITIONER

## 2024-10-01 PROCEDURE — 94760 N-INVAS EAR/PLS OXIMETRY 1: CPT | Mod: IPSPLIT

## 2024-10-01 PROCEDURE — 36415 COLL VENOUS BLD VENIPUNCTURE: CPT | Mod: IPSPLIT

## 2024-10-01 PROCEDURE — 97535 SELF CARE MNGMENT TRAINING: CPT | Mod: GO,IPSPLIT | Performed by: OCCUPATIONAL THERAPIST

## 2024-10-01 PROCEDURE — 2500000004 HC RX 250 GENERAL PHARMACY W/ HCPCS (ALT 636 FOR OP/ED): Mod: IPSPLIT | Performed by: NURSE PRACTITIONER

## 2024-10-01 PROCEDURE — 93005 ELECTROCARDIOGRAM TRACING: CPT | Mod: IPSPLIT

## 2024-10-01 PROCEDURE — 2500000002 HC RX 250 W HCPCS SELF ADMINISTERED DRUGS (ALT 637 FOR MEDICARE OP, ALT 636 FOR OP/ED): Mod: IPSPLIT | Performed by: NURSE PRACTITIONER

## 2024-10-01 RX ORDER — CEFUROXIME AXETIL 250 MG/1
500 TABLET ORAL 2 TIMES DAILY
Status: COMPLETED | OUTPATIENT
Start: 2024-10-01 | End: 2024-10-05

## 2024-10-01 RX ORDER — AZITHROMYCIN 250 MG/1
500 TABLET, FILM COATED ORAL
Status: COMPLETED | OUTPATIENT
Start: 2024-10-01 | End: 2024-10-02

## 2024-10-01 ASSESSMENT — COGNITIVE AND FUNCTIONAL STATUS - GENERAL
DRESSING REGULAR LOWER BODY CLOTHING: A LITTLE
TOILETING: A LITTLE
PERSONAL GROOMING: A LITTLE
CLIMB 3 TO 5 STEPS WITH RAILING: A LITTLE
WALKING IN HOSPITAL ROOM: A LITTLE
DRESSING REGULAR UPPER BODY CLOTHING: A LITTLE
MOBILITY SCORE: 20
DAILY ACTIVITIY SCORE: 17
TURNING FROM BACK TO SIDE WHILE IN FLAT BAD: A LITTLE
MOVING TO AND FROM BED TO CHAIR: A LITTLE
CLIMB 3 TO 5 STEPS WITH RAILING: TOTAL
MOVING FROM LYING ON BACK TO SITTING ON SIDE OF FLAT BED WITH BEDRAILS: A LITTLE
TOILETING: A LITTLE
HELP NEEDED FOR BATHING: A LOT
DAILY ACTIVITIY SCORE: 18
MOBILITY SCORE: 16
STANDING UP FROM CHAIR USING ARMS: A LITTLE
EATING MEALS: A LITTLE
MOVING TO AND FROM BED TO CHAIR: A LITTLE
WALKING IN HOSPITAL ROOM: A LITTLE
DRESSING REGULAR LOWER BODY CLOTHING: A LITTLE
PERSONAL GROOMING: A LITTLE
HELP NEEDED FOR BATHING: A LITTLE
DRESSING REGULAR UPPER BODY CLOTHING: A LITTLE
EATING MEALS: A LITTLE
STANDING UP FROM CHAIR USING ARMS: A LITTLE

## 2024-10-01 ASSESSMENT — PAIN SCALES - GENERAL
PAINLEVEL_OUTOF10: 0 - NO PAIN
PAINLEVEL_OUTOF10: 3
PAINLEVEL_OUTOF10: 0 - NO PAIN
PAINLEVEL_OUTOF10: 0 - NO PAIN

## 2024-10-01 ASSESSMENT — PAIN - FUNCTIONAL ASSESSMENT
PAIN_FUNCTIONAL_ASSESSMENT: 0-10

## 2024-10-01 ASSESSMENT — PAIN DESCRIPTION - LOCATION: LOCATION: GENERALIZED

## 2024-10-01 ASSESSMENT — ACTIVITIES OF DAILY LIVING (ADL): HOME_MANAGEMENT_TIME_ENTRY: 20

## 2024-10-01 NOTE — PROGRESS NOTES
Occupational Therapy    Occupational Therapy Treatment    Name: Maisha Agosto  MRN: [id number]  Department: [facility]  Room: [address] [place] [address]  Date: 10/01/24  Time Calculation  Start Time: 1129  Stop Time: 1219  Time Calculation (min): 50 min    Assessment:  OT Assessment: Pt. pleasant and cooperative today. Good engagement throughout therapy session. Decreased R lateral lean noted in standing but still prevalent. Noted while sitting also. Pt. educated on safety with ambulation walker maneuverability, transfers. Pt. improved safety awareness throughout session with increased repetitions. Edu on importance of therapy for weakness at this time. Improved awareness noted. Continue to recommend MOD intensity to maximize pt.'s outcome/.  Prognosis: Good  Barriers to Discharge: None  Evaluation/Treatment Tolerance: Patient tolerated treatment well  Medical Staff Made Aware: Yes  End of Session Communication: Bedside nurse  End of Session Patient Position: Up in chair, Alarm on  Plan:  Treatment Interventions: ADL retraining, Functional transfer training, Patient/family training, Compensatory technique education, Neuromuscular reeducation  OT Frequency: 3 times per week  OT Discharge Recommendations: Moderate intensity level of continued care  OT - OK to Discharge: Yes (Based on completed evaluation and care plan recommendations, no barriers to discharge to next site of care)    Subjective   Previous Visit Info:  OT Last Visit  OT Received On: 10/01/24  General:  General  Reason for Referral: impaired self-care d/t admission for pneumonia.  Referred By: JAY Perez  Past Medical History Relevant to Rehab: dementia, COPD, DM, HLD, hypothyroidism, RA, TIA, ataxia, CAD, chronic insomnia, cough, GERD, HLD, hypernatremia, TIA, HTN  Family/Caregiver Present: No  Prior to Session Communication: Bedside nurse  Patient Position Received: Bed, 2 rail up, Alarm on  General Comment: 2L 02, silaso,  Precautions:  Hearing/Visual Limitations:  glasses; closing L eye at times  Medical Precautions: Fall precautions    Vital Signs (Past 2hrs)        Date/Time Vitals Session Patient Position Pulse Resp SpO2 BP MAP (mmHg)    10/01/24 1226 --  --  65  16  93 %  134/65  --                   Pain Assessment:  Pain Assessment  Pain Assessment: 0-10  0-10 (Numeric) Pain Score: 0 - No pain     Objective   Cognition:  Overall Cognitive Status: Impaired at baseline  Arousal/Alertness: Appropriate responses to stimuli  Orientation Level: Disoriented to situation  Activities of Daily Living: Grooming  Grooming Level of Assistance: Setup, Contact guard  Grooming Where Assessed: Standing sinkside  Grooming Comments: oral and facial hygiene and hair care completed standing at the sink. Required CGA for balance. Very thorough. No LOB. Stood for 9 minutes    LE Dressing  LE Dressing: Yes  Adult Briefs Level of Assistance: Minimum assistance  LE Dressing Where Assessed: Toilet  LE Dressing Comments: forward flexed on commode; min A for balance when pulling around hips; very unsteady without UE support    Toileting  Toileting Level of Assistance: Minimum assistance  Where Assessed: Toilet  Toileting Comments: assistance for transfers and cueing for hand placement; very flexed when completing pericare, assistance to pull brief around hips    Functional Standing Tolerance:  Functional Standing Tolerance  Time: 9 minutes  Activity: grooming  Functional Standing Tolerance Comments: no LOB CGA provided  Bed Mobility/Transfers: Bed Mobility  Bed Mobility: Yes  Bed Mobility 1  Bed Mobility 1: Supine to sitting  Level of Assistance 1: Close supervision  Bed Mobility Comments 1: pt. with increased trunk control noted today with bed mobilty.    Transfers  Transfer: Yes  Transfer 1  Transfer From 1: Bed to  Transfer to 1: Chair with arms  Technique 1: Sit to stand, Stand to sit  Transfer Device 1: Gait belt, Walker  Transfer Level of Assistance 1: Minimum assistance, Minimal verbal  cues  Trials/Comments 1: poor safety awareness with turn to chair; cueing for hand placement to promote safety and square herself to the chair  Transfers 2  Transfer From 2: Chair with arms to  Transfer to 2: Sit, Stand  Technique 2: Sit to stand, Stand to sit  Transfer Device 2: Walker, Gait belt  Transfer Level of Assistance 2: Contact guard  Trials/Comments 2: x4 trials to ensure carryover with handplacement; improved throughout session in consistency with safety  Transfers 3  Transfer From 3: Chair with arms to  Transfer to 3: Sit, Stand, Toilet  Technique 3: Sit to stand, Stand to sit  Transfer Device 3: Walker, Gait belt  Transfer Level of Assistance 3: Contact guard  Transfers 4  Transfer From 4: Toilet to  Transfer to 4: Chair with arms  Technique 4: Sit to stand, Stand to sit  Transfer Device 4: Walker, Gait belt  Transfer Level of Assistance 4: Contact guard  Trials/Comments 4: cueing for hand placement on grab bar    Toilet Transfers  Toilet Transfer From: Chair  Toilet Transfer Type: To and from  Toilet Transfer to: Raised toilet seat with rails  Toilet Transfer Technique: Ambulating  Toilet Transfers: Contact guard    Functional Mobility:  Functional Mobility  Functional Mobility Performed: Yes  Functional Mobility 1  Surface 1: Level tile  Device 1: Rolling walker  Functional Mobility Support Devices: Gait belt  Assistance 1: Minimum assistance  Comments 1: increased lean to R side, difficulty with dragging RLE, cueing for large steps to clear foot; impaired walker awareness, pt. would push walker too far infront of her or not keep it squared wtih her body with turns increasing her risk of falls. Edu on this with improvements druing session noted  Sitting Balance:  Static Sitting Balance  Static Sitting-Balance Support: Feet supported  Static Sitting-Level of Assistance: Independent  Dynamic Sitting Balance  Dynamic Sitting-Balance Support: Feet supported  Dynamic Sitting-Level of Assistance: Close  supervision  Dynamic Sitting-Balance: Forward lean  Standing Balance:  Static Standing Balance  Static Standing-Balance Support: Bilateral upper extremity supported  Static Standing-Level of Assistance: Contact guard  Dynamic Standing Balance  Dynamic Standing-Balance Support: Bilateral upper extremity supported  Dynamic Standing-Level of Assistance: Minimum assistance, Moderate assistance  Dynamic Standing-Balance: Turning  Dynamic Standing-Comments: noted LOB x1 with ambulation with MAX A to correct; Edu on importance of rehab to strengthen to prevent falls and injury; Edu and 2 trials completed with turning safely while maintaining in walker d/t poor balance, improved with increased reps.    Outcome Measures:  [facility] Daily Activity  Putting on and taking off regular lower body clothing: A little  Bathing (including washing, rinsing, drying): A lot  Putting on and taking off regular upper body clothing: A little  Toileting, which includes using toilet, bedpan or urinal: A little  Taking care of personal grooming such as brushing teeth: A little  Eating Meals: A little  Daily Activity - Total Score: 17      Education Documentation  Body Mechanics, taught by Di Valente OT at 10/1/2024  2:08 PM.  Learner: Patient  Readiness: Acceptance  Method: Explanation  Response: Verbalizes Understanding, Needs Reinforcement  Comment: Edu on transfer safety, safety with walker mobilty, turns, and hand placement. Edu on strengthening for improved balance. Edu on righting technique for heavy R lateral lean.    Precautions, taught by Di Valente OT at 10/1/2024  2:08 PM.  Learner: Patient  Readiness: Acceptance  Method: Explanation  Response: Verbalizes Understanding, Needs Reinforcement  Comment: Edu on transfer safety, safety with walker mobilty, turns, and hand placement. Edu on strengthening for improved balance. Edu on righting technique for heavy R lateral lean.    ADL Training, taught by Di Valente OT at 10/1/2024  2:08  PM.  Learner: Patient  Readiness: Acceptance  Method: Explanation  Response: Verbalizes Understanding, Needs Reinforcement  Comment: Edu on transfer safety, safety with walker mobilty, turns, and hand placement. Edu on strengthening for improved balance. Edu on righting technique for heavy R lateral lean.    Education Comments  No comments found.      Goals:  Encounter Problems       Encounter Problems (Active)       ADLs       Patient with complete upper body dressing with set-up and supervision level of assistance donning and doffing all UE clothes with PRN adaptive equipment while supported sitting       Start:  09/30/24    Expected End:  10/14/24            Patient with complete lower body dressing with set-up and supervision level of assistance donning and doffing all LE clothes  with PRN adaptive equipment while supported sitting (Progressing)       Start:  09/30/24    Expected End:  10/14/24            Patient will complete daily grooming tasks brushing teeth and washing face/hair with set-up and supervision level of assistance and PRN adaptive equipment while standing. (Progressing)       Start:  09/30/24    Expected End:  10/14/24            Patient will complete toileting including hygiene clothing management/hygiene with set-up and supervision level of assistance. (Progressing)       Start:  09/30/24    Expected End:  10/14/24               MOBILITY       Patient will perform Functional mobility mod  Household distances/Community Distances with set-up and supervision level of assistance and least restrictive device in order to improve safety and functional mobility. (Progressing)       Start:  09/30/24    Expected End:  10/14/24               TRANSFERS       Patient will complete sit to stand transfer with set-up and supervision level of assistance and least restrictive device in order to improve safety and prepare for out of bed mobility. (Progressing)       Start:  09/30/24    Expected End:  10/14/24

## 2024-10-01 NOTE — PROGRESS NOTES
"Maisha Agosto is a 84 y.o. female on day 2 of admission presenting with Pneumonia, unspecified organism.      Subjective   \"Im feeling pretty good\" denies chest pain, sob n/v/c/d       Objective     Last Recorded Vitals  /65 (BP Location: Right arm, Patient Position: Sitting)   Pulse 65   Temp 36.3 °C (97.3 °F) (Temporal)   Resp 16   Wt (!) 39.4 kg (86 lb 13.8 oz)   SpO2 93%   Intake/Output last 3 Shifts:    Intake/Output Summary (Last 24 hours) at 10/1/2024 1446  Last data filed at 10/1/2024 1040  Gross per 24 hour   Intake 660 ml   Output --   Net 660 ml       Admission Weight  Weight: 47.7 kg (105 lb 2.6 oz) (09/28/24 1325)    Daily Weight  09/28/24 : (!) 39.4 kg (86 lb 13.8 oz)    Image Results  ECG 12 lead  Sinus bradycardia  Otherwise normal ECG  When compared with ECG of 05-JUN-2024 09:29,  No significant change was found  See ED provider note for full interpretation and clinical correlation  Confirmed by Jelly Garcia ([id number]) on 10/1/2024 11:44:40 AM      Physical Exam    Relevant Results               Assessment/Plan                  Assessment & Plan  Pneumonia, unspecified organism    Abnormal gait    Acute exacerbation of COPD with asthma    Benign essential hypertension    Arteriosclerosis of coronary artery    Dementia    Fatigue    GERD (gastroesophageal reflux disease)    Hypothyroidism    Hyperlipidemia    Impaired functional mobility, balance, gait, and endurance    Rheumatoid arthritis    Decreased appetite    History of Clostridioides difficile infection    Pneumonia of both lower lobes due to infectious organism    #Pneumonia, unspecified organism  #Acute exacerbation of COPD with asthma  -WBC 16.9 >13.2 > 18.2>16.6  -Procalcitonin 0.03  -Respiratory Culture if Able  -BC x 2 in process  -CXR : Right lower lobe infiltrate.  -DuoNebs  -Solu Medrol 20 mg IV Q 12 hours, transition to oral Tuesday  -continue Rocephin 1 g IV Daily (Day 3) - to cefuroxime   -continue Azithromycin 500 " mg IV Daily (Day 3) - to cefuroxime  -Home O2 RA  -Currently on 2L NC continuously   - robitussin and mucinex prn     Active Problems  #Abnormal gait  #Dementia  #Fatigue  #Impaired functional mobility, balance, gait, and endurance  -UA Negative  -PT/OT Eval and Treat  -CM for DC planning     #Benign essential hypertension  #Arteriosclerosis of coronary artery  #Hyperlipidemia  -continue Norvasc 5 mg PO Daily   -continue ASA 81 mg PO Daily   -continue Lipitor 40 mg PO HS  -continue Lisinopril 20 mg PO Daily   -continue Lopressor 25 mg PO BID     #Hypomagnesemia   -Magnesium 2.24  -continue Mag-Ox 400 mg PO Daily      #Hypothyroidism  -TSH 2.100: 8/12/24  -continue Synthroid 50 mcg PO Daily      #Rheumatoid arthritis  -continue Prednisone 2.5 mg PO Daily      #History of Bowel Perforation   #Decreased Appetite  #History of C Diff  #GERD  -CT Abd/Pelvis: Markedly abnormally thickened distal ileum.  There is infiltration into the surrounding fat.  The previously described focal perforation has improved.  There are diverticula present in this most likely represent small bowel diverticulitis.  Ischemia cannot be excluded. Colonic diverticulosis. Multiple pancreatic cysts with dilated pancreatic duct.  There is dilatation of the common bile duct as well as intrahepatic ducts but this may be related to the patient's prior cholecystectomy. Thickening of the wall the stomach.  Gastritis cannot be excluded.  Dilated uterine veins.  There is fluid in the pelvis which may represent a distended endometrium.  This is best evaluated with ultrasound.  There are bibasilar pulmonary infiltrates.  -continue Questran 4 g PO BID   -Protonix Daily   -Miralax Daily   -Acute Care Surgery Consult, Appreciate Recs - chronic and stable - no intervention at this time               ELIO Casey-CNP

## 2024-10-01 NOTE — CARE PLAN
The patient's goals for the shift include Pt will tolerate sitting in chair with meals today.      The clinical goals for the shift include Pt will tolerate abx today    Patient remained safe and stable throughout shift. VS were stable and pt received tylenol for pain. Pt sat in chair throughout shift and tolerated well. No other complaints at this time. Pt resting in chair with call light within reach.

## 2024-10-01 NOTE — PROGRESS NOTES
Physical Therapy    Physical Therapy Treatment    Patient Name: Maisha Agosto  MRN: [id number]  Department: [facility]  Room: [address] [place] [address]  Today's Date: 10/1/2024  Time Calculation  Start Time: 0848  Stop Time: 0930  Time Calculation (min): 42 min     Assessment/Plan   PT Assessment  PT Assessment Results: Decreased strength, Decreased endurance, Impaired balance, Decreased mobility, Decreased cognition, Impaired judgement, Decreased safety awareness  Rehab Prognosis: Good  Barriers to Discharge: n/a  Barriers to Participation:  (none)  End of Session Communication: Bedside nurse  Assessment Comment:   James 84 y.o presents with weakness, impaired gait, and impaired balance. Pt. currently requires assist with transfers and ambulation due to weakness and would benefit from additional PT to address above noted limitations and prevent further decline.     PT Plan  Inpatient/Swing Bed or Outpatient: Inpatient  PT Plan  Treatment/Interventions: Bed mobility, Transfer training, Gait training, Therapeutic exercise, Balance training  PT Plan: Ongoing PT  PT Frequency: 3 times per week  PT Discharge Recommendations: Moderate intensity level of continued care  PT Recommended Transfer Status: Assist x1  PT - OK to Discharge: Yes    General Visit Information:   PT  Visit  PT Received On: 10/01/24  General  Prior to Session Communication: Bedside nurse  Patient Position Received: Bed, 2 rail up, Alarm off, not on at start of session    Subjective   Patient pleasantly confused, agreeable to session.  Precautions:  Precautions  Medical Precautions: Fall precautions    Objective   Pain:  Pain Assessment  Pain Assessment: 0-10  0-10 (Numeric) Pain Score: 0 - No pain  Cognition:  Cognition  Overall Cognitive Status: Impaired  Coordination:  Movements are Fluid and Coordinated: Yes     reatments:  Therapeutic Exercise  Therapeutic Exercise Performed: Yes  Therapeutic Exercise Activity 1: quad sets x 20  Therapeutic Exercise Activity 2:  ankle pumps x 20  Therapeutic Exercise Activity 3: slr x 10 with vcs required to eliminate quad lag  Therapeutic Exercise Activity 4: laqs x 20 with vcs required to use full available ROM  Therapeutic Exercise Activity 5: hip flexion seated x 20 with vcs required to use full available ROM  Therapeutic Exercise Activity 6: iso hip abduction/adduction x 20    Bed Mobility  Bed Mobility: Yes  Bed Mobility 1  Bed Mobility 1: Supine to sitting  Level of Assistance 1: Moderate assistance (Patient able to initiate supine to sit, however, kept falling backwards and required mod assist to come into full sitting postion at the edge of the bed. Difficulty maintaining sitting balance requiring intermittent external support to maintain balance.)    Ambulation/Gait Training  Ambulation/Gait Training Performed: Yes  Ambulation/Gait Training 1  Surface 1: Level tile  Device 1: Rolling walker  Gait Support Devices: Gait belt  Assistance 1: Minimum assistance, Minimal verbal cues, Minimal tactile cues (Verbal and tactile cues required for keeping proper positioning with fww and for obstacle negotiation. Patient required min assist for safety due to weakness and difficulty placing walker properly.)  Quality of Gait 1: Antalgic, Inconsistent stride length  Comments/Distance (ft) 1: 5, 50 (First attempt at ambulation patient was unable to tolerate, stating that she needed to sit down. After a few minutes seated rest, patient able to tolerate short distance of ambulation.)  Transfers  Transfer: Yes  Transfer 1  Technique 1: Sit to stand, Stand to sit  Transfer Device 1: Gait belt, Walker  Transfer Level of Assistance 1: Minimum assistance, Minimal tactile cues, Minimal verbal cues (Verbal and tactile cues required for proper hand placement, min assist required for push off and for eccentric control with stand to sit.)  Outcome Measures:  [facility] Basic Mobility  Turning from your back to your side while in a flat bed without using bedrails:  A little  Moving from lying on your back to sitting on the side of a flat bed without using bedrails: A little  Moving to and from bed to chair (including a wheelchair): A little  Standing up from a chair using your arms (e.g. wheelchair or bedside chair): A little  To walk in hospital room: A little  Climbing 3-5 steps with railing: Total  Basic Mobility - Total Score: 16    Education Documentation  Mobility Training, taught by Tonya Ruano PTA at 10/1/2024 10:43 AM.  Learner: Patient  Readiness: Acceptance  Method: Explanation, Demonstration  Response: Needs Reinforcement  Comment: Educated patient on proper hand placement with transfers and proper positioning with fww, fair follow through with cues.    Education Comments  No comments found.      Encounter Problems       Encounter Problems (Active)       Balance       STG - Maintains dynamic standing balance without upper extremity support with good- balance to decrease fall risk  (Progressing)       Start:  09/30/24    Expected End:  10/08/24               Mobility       LTG - Patient will ambulate household distance with SUP and WW  (Progressing)       Start:  09/30/24    Expected End:  10/08/24            LTG - Patient will navigate 2 steps with 1 rails/device with CGA        Start:  09/30/24    Expected End:  10/08/24               PT Transfers       STG - Patient will perform bed mobility IND (Progressing)       Start:  09/30/24    Expected End:  10/08/24            STG - Patient will transfer sit to and from stand MALLORIE with WW (Progressing)       Start:  09/30/24    Expected End:  10/08/24               Pain - Adult

## 2024-10-01 NOTE — CARE PLAN
The patient's goals for the shift include  Resting    The clinical goals for the shift include Pt will tolerate IV ATB    Over the shift, the patient did make progress toward these following goals. Patient able to rest and tolerated antibiotics without s/s of adverse reactions.

## 2024-10-02 LAB
ANION GAP SERPL CALC-SCNC: <7 MMOL/L (ref 10–20)
BUN SERPL-MCNC: 25 MG/DL (ref 6–23)
CALCIUM SERPL-MCNC: 8.7 MG/DL (ref 8.6–10.3)
CHLORIDE SERPL-SCNC: 108 MMOL/L (ref 98–107)
CO2 SERPL-SCNC: 29 MMOL/L (ref 21–32)
CREAT SERPL-MCNC: 0.57 MG/DL (ref 0.5–1.05)
EGFRCR SERPLBLD CKD-EPI 2021: 89 ML/MIN/1.73M*2
ERYTHROCYTE [DISTWIDTH] IN BLOOD BY AUTOMATED COUNT: 12.4 % (ref 11.5–14.5)
GLUCOSE SERPL-MCNC: 113 MG/DL (ref 74–99)
HCT VFR BLD AUTO: 42.1 % (ref 36–46)
HGB BLD-MCNC: 13.5 G/DL (ref 12–16)
MCH RBC QN AUTO: 29.7 PG (ref 26–34)
MCHC RBC AUTO-ENTMCNC: 32.1 G/DL (ref 32–36)
MCV RBC AUTO: 93 FL (ref 80–100)
NRBC BLD-RTO: 0 /100 WBCS (ref 0–0)
PLATELET # BLD AUTO: 235 X10*3/UL (ref 150–450)
POTASSIUM SERPL-SCNC: 3.4 MMOL/L (ref 3.5–5.3)
RBC # BLD AUTO: 4.55 X10*6/UL (ref 4–5.2)
SODIUM SERPL-SCNC: 140 MMOL/L (ref 136–145)
WBC # BLD AUTO: 13.3 X10*3/UL (ref 4.4–11.3)

## 2024-10-02 PROCEDURE — 80048 BASIC METABOLIC PNL TOTAL CA: CPT | Mod: IPSPLIT

## 2024-10-02 PROCEDURE — 97116 GAIT TRAINING THERAPY: CPT | Mod: GP,CQ,IPSPLIT

## 2024-10-02 PROCEDURE — 2500000001 HC RX 250 WO HCPCS SELF ADMINISTERED DRUGS (ALT 637 FOR MEDICARE OP): Mod: IPSPLIT

## 2024-10-02 PROCEDURE — 94760 N-INVAS EAR/PLS OXIMETRY 1: CPT | Mod: IPSPLIT

## 2024-10-02 PROCEDURE — 85027 COMPLETE CBC AUTOMATED: CPT | Mod: IPSPLIT

## 2024-10-02 PROCEDURE — 2500000004 HC RX 250 GENERAL PHARMACY W/ HCPCS (ALT 636 FOR OP/ED): Mod: IPSPLIT | Performed by: NURSE PRACTITIONER

## 2024-10-02 PROCEDURE — 2500000004 HC RX 250 GENERAL PHARMACY W/ HCPCS (ALT 636 FOR OP/ED): Mod: IPSPLIT

## 2024-10-02 PROCEDURE — 2500000001 HC RX 250 WO HCPCS SELF ADMINISTERED DRUGS (ALT 637 FOR MEDICARE OP): Mod: IPSPLIT | Performed by: NURSE PRACTITIONER

## 2024-10-02 PROCEDURE — 2500000002 HC RX 250 W HCPCS SELF ADMINISTERED DRUGS (ALT 637 FOR MEDICARE OP, ALT 636 FOR OP/ED): Mod: IPSPLIT | Performed by: NURSE PRACTITIONER

## 2024-10-02 PROCEDURE — 94668 MNPJ CHEST WALL SBSQ: CPT | Mod: IPSPLIT

## 2024-10-02 PROCEDURE — 97110 THERAPEUTIC EXERCISES: CPT | Mod: GP,CQ,IPSPLIT

## 2024-10-02 PROCEDURE — 36415 COLL VENOUS BLD VENIPUNCTURE: CPT | Mod: IPSPLIT

## 2024-10-02 PROCEDURE — 1200000002 HC GENERAL ROOM WITH TELEMETRY DAILY: Mod: IPSPLIT

## 2024-10-02 PROCEDURE — 99232 SBSQ HOSP IP/OBS MODERATE 35: CPT | Performed by: NURSE PRACTITIONER

## 2024-10-02 RX ORDER — METOPROLOL SUCCINATE 25 MG/1
25 TABLET, EXTENDED RELEASE ORAL NIGHTLY
Status: DISCONTINUED | OUTPATIENT
Start: 2024-10-02 | End: 2024-10-07 | Stop reason: HOSPADM

## 2024-10-02 RX ORDER — POTASSIUM CHLORIDE 20 MEQ/1
40 TABLET, EXTENDED RELEASE ORAL ONCE
Status: COMPLETED | OUTPATIENT
Start: 2024-10-02 | End: 2024-10-02

## 2024-10-02 ASSESSMENT — PAIN - FUNCTIONAL ASSESSMENT
PAIN_FUNCTIONAL_ASSESSMENT: 0-10

## 2024-10-02 ASSESSMENT — COGNITIVE AND FUNCTIONAL STATUS - GENERAL
TOILETING: A LITTLE
TURNING FROM BACK TO SIDE WHILE IN FLAT BAD: A LITTLE
HELP NEEDED FOR BATHING: A LITTLE
DRESSING REGULAR LOWER BODY CLOTHING: A LITTLE
DRESSING REGULAR UPPER BODY CLOTHING: A LITTLE
CLIMB 3 TO 5 STEPS WITH RAILING: A LITTLE
MOVING FROM LYING ON BACK TO SITTING ON SIDE OF FLAT BED WITH BEDRAILS: A LITTLE
WALKING IN HOSPITAL ROOM: A LOT
TURNING FROM BACK TO SIDE WHILE IN FLAT BAD: A LITTLE
MOVING TO AND FROM BED TO CHAIR: A LITTLE
MOVING TO AND FROM BED TO CHAIR: A LITTLE
MOBILITY SCORE: 16
STANDING UP FROM CHAIR USING ARMS: A LITTLE
STANDING UP FROM CHAIR USING ARMS: A LITTLE
DAILY ACTIVITIY SCORE: 19
CLIMB 3 TO 5 STEPS WITH RAILING: TOTAL
PERSONAL GROOMING: A LITTLE
MOBILITY SCORE: 18
WALKING IN HOSPITAL ROOM: A LITTLE

## 2024-10-02 ASSESSMENT — PAIN SCALES - GENERAL
PAINLEVEL_OUTOF10: 0 - NO PAIN

## 2024-10-02 NOTE — PROGRESS NOTES
10/02/24 1207   Discharge Planning   Home or Post Acute Services Post acute facilities (Rehab/SNF/etc)   Type of Post Acute Facility Services Skilled nursing   Expected Discharge Disposition SNF   Does the patient need discharge transport arranged? Yes   RoundTrip coordination needed? Yes   Has discharge transport been arranged? No     Updates sent to [facility]- precert authorization started Tuesday.  Patient is medically cleared and now is waiting on precert.  Direct submission team has expedited precert.

## 2024-10-02 NOTE — CARE PLAN
The patient's goals for the shift include  Pt will tolerate sitting in chair with meals today.     The clinical goals for the shift include Pt will tolerate oral abx throughout shift.    Patient remained safe and stable throughout shift. VS were stable and pt denied pain. Pt worked with PT/OT today and sat in chair. Pt tolerated PO abx. Pt will discharge to [facility] when medically ready. No other complaints at this time. Pt resting in bed with call light within reach.

## 2024-10-02 NOTE — PROGRESS NOTES
Maisha Agosto is a 84 y.o. female on day 3 of admission presenting with Pneumonia, unspecified organism.      Subjective   Patient assessed at bedside; lying in bed. She is alert and oriented x 2 and pleasant. She denies pain, fever, chills, N/V/D/C.        Objective     Last Recorded Vitals  /58 (BP Location: Right arm, Patient Position: Lying)   Pulse 54   Temp 35.9 °C (96.7 °F) (Temporal)   Resp 17   Wt (!) 39.4 kg (86 lb 13.8 oz)   SpO2 91%   Intake/Output last 3 Shifts:    Intake/Output Summary (Last 24 hours) at 10/2/2024 1401  Last data filed at 10/2/2024 1325  Gross per 24 hour   Intake 480 ml   Output --   Net 480 ml       Admission Weight  Weight: 47.7 kg (105 lb 2.6 oz) (09/28/24 1325)    Daily Weight  09/28/24 : (!) 39.4 kg (86 lb 13.8 oz)    Image Results  ECG 12 lead  Sinus bradycardia  Otherwise normal ECG  When compared with ECG of 05-JUN-2024 09:29,  No significant change was found  See ED provider note for full interpretation and clinical correlation  Confirmed by Jelly Garcia ([id number]) on 10/1/2024 11:44:40 AM      Physical Exam  Vitals reviewed.   Constitutional:       Appearance: Normal appearance. She is normal weight.   HENT:      Head: Normocephalic and atraumatic.      Right Ear: External ear normal.      Left Ear: External ear normal.      Nose: Nose normal.      Mouth/Throat:      Mouth: Mucous membranes are moist.      Pharynx: Oropharynx is clear.   Eyes:      Conjunctiva/sclera: Conjunctivae normal.      Pupils: Pupils are equal, round, and reactive to light.   Cardiovascular:      Rate and Rhythm: Normal rate and regular rhythm.      Pulses: Normal pulses.      Heart sounds: Normal heart sounds.   Pulmonary:      Effort: Pulmonary effort is normal.      Breath sounds: Normal breath sounds.   Abdominal:      General: Bowel sounds are normal.      Palpations: Abdomen is soft.   Musculoskeletal:         General: Normal range of motion.      Cervical back: Normal range of  motion and neck supple.   Skin:     General: Skin is warm and dry.   Neurological:      General: No focal deficit present.      Mental Status: She is alert. She is disoriented.         Relevant Results  Scheduled medications  amLODIPine, 5 mg, oral, Daily  aspirin, 81 mg, oral, Daily  atorvastatin, 40 mg, oral, Nightly  azithromycin, 500 mg, oral, q24h MINDY  calcium carbonate-vitamin D3, 1 tablet, oral, Daily  cefuroxime, 500 mg, oral, BID  cholestyramine, 4 g, oral, BID  colestipol, 1 g, oral, BID after meals  enoxaparin, 40 mg, subcutaneous, q24h  icosapent ethyL, 1 g, oral, Daily  levothyroxine, 50 mcg, oral, Daily before breakfast  lisinopril, 20 mg, oral, Daily  magnesium oxide, 400 mg, oral, Daily  metoprolol succinate XL, 25 mg, oral, Nightly  pantoprazole, 40 mg, oral, Daily before breakfast   Or  pantoprazole, 40 mg, intravenous, Daily before breakfast  polyethylene glycol, 17 g, oral, Daily  predniSONE, 2.5 mg, oral, Daily  traZODone, 50 mg, oral, Nightly      Continuous medications  sodium chloride 0.9%, 10 mL/hr, Last Rate: 10 mL/hr (10/01/24 0529)      PRN medications  PRN medications: acetaminophen **OR** [DISCONTINUED] acetaminophen **OR** [DISCONTINUED] acetaminophen, benzocaine-menthol, dextromethorphan-guaifenesin, guaiFENesin, hydrOXYzine HCL, ipratropium-albuteroL, loperamide, lubricating eye drops, melatonin, ondansetron ODT **OR** ondansetron, oxygen, sodium chloride 0.9%    Results for orders placed or performed during the hospital encounter of 09/28/24 (from the past 24 hour(s))   Troponin I, High Sensitivity   Result Value Ref Range    Troponin I, High Sensitivity 10 0 - 13 ng/L   Basic metabolic panel   Result Value Ref Range    Glucose 113 (H) 74 - 99 mg/dL    Sodium 140 136 - 145 mmol/L    Potassium 3.4 (L) 3.5 - 5.3 mmol/L    Chloride 108 (H) 98 - 107 mmol/L    Bicarbonate 29 21 - 32 mmol/L    Anion Gap <7 (L) 10 - 20 mmol/L    Urea Nitrogen 25 (H) 6 - 23 mg/dL    Creatinine 0.57 0.50 -  1.05 mg/dL    eGFR 89 >60 mL/min/1.73m*2    Calcium 8.7 8.6 - 10.3 mg/dL   CBC   Result Value Ref Range    WBC 13.3 (H) 4.4 - 11.3 x10*3/uL    nRBC 0.0 0.0 - 0.0 /100 WBCs    RBC 4.55 4.00 - 5.20 x10*6/uL    Hemoglobin 13.5 12.0 - 16.0 g/dL    Hematocrit 42.1 36.0 - 46.0 %    MCV 93 80 - 100 fL    MCH 29.7 26.0 - 34.0 pg    MCHC 32.1 32.0 - 36.0 g/dL    RDW 12.4 11.5 - 14.5 %    Platelets 235 150 - 450 x10*3/uL                   Assessment/Plan      Assessment & Plan  Pneumonia, unspecified organism    Abnormal gait    Acute exacerbation of COPD with asthma    Benign essential hypertension    Arteriosclerosis of coronary artery    Dementia    Fatigue    GERD (gastroesophageal reflux disease)    Hypothyroidism    Hyperlipidemia    Impaired functional mobility, balance, gait, and endurance    Rheumatoid arthritis    Decreased appetite    History of Clostridioides difficile infection    Pneumonia of both lower lobes due to infectious organism    Pneumonia, community acquired  Acute exacerbation of COPD with asthma  -WBC 16.9 >13.2 > 18.2>16.6  -Procalcitonin 0.03  -Respiratory Culture if Able  -Blood culture negative to date  -CXR : Right lower lobe infiltrate.  -continue DuoNebs  -discontinued Solu Medrol 20 mg IV Q 12 hours,  -continue PO prednisone 2.5 mg daily  -discontinued ceftriaxone 1 g IV Daily   -cefuroxime  500 mg BID; day 4  -completed Azithromycin 500 mg IV Daily  -Home O2 RA  -Currently on 2L NC continuously   - continue robitussin and mucinex prn     Abnormal gait  Dementia  Fatigue  Impaired functional mobility, balance, gait, and endurance  -UA Negative  -PT/OT Eval and Treat; recommending moderate level therapy  -CM for DC planning     Benign essential hypertension  Arteriosclerosis of coronary artery  Hyperlipidemia  -continue Norvasc 5 mg PO Daily   -continue ASA 81 mg PO Daily   -continue Lipitor 40 mg PO HS  -continue Lisinopril 20 mg PO Daily   -continue Lopressor 25 mg PO BID     Hypomagnesemia    -Magnesium 2.24  -continue Mag-Ox 400 mg PO Daily      Hypothyroidism  -TSH 2.100: 8/12/24  -continue Synthroid 50 mcg PO Daily      Rheumatoid arthritis  -continue Prednisone 2.5 mg PO Daily      History of Bowel Perforation   Decreased Appetite  History of C Diff  GERD  -CT Abd/Pelvis: Markedly abnormally thickened distal ileum.  There is infiltration into the surrounding fat.  The previously described focal perforation has improved.  There are diverticula present in this most likely represent small bowel diverticulitis.  Ischemia cannot be excluded. Colonic diverticulosis. Multiple pancreatic cysts with dilated pancreatic duct.  There is dilatation of the common bile duct as well as intrahepatic ducts but this may be related to the patient's prior cholecystectomy. Thickening of the wall the stomach.  Gastritis cannot be excluded. Dilated uterine veins.  There is fluid in the pelvis which may represent a distended endometrium.  This is best evaluated with ultrasound.  There are bibasilar pulmonary infiltrates.  -continue Questran 4 g PO BID   -continue Protonix Daily   -continue Miralax Daily   -Acute Care Surgery Consult, Appreciate Recs - chronic and stable - no intervention at this time     DVT ppx  - continue enoxaparin    Code status: DNRCC-Arrest/DNI    Disposition: Patient requires inpatient stay. Discharge pending precert to SNF.              Petra Campa, APRN-CNP

## 2024-10-02 NOTE — CARE PLAN
The patient's goals for the shift include  Resting    The clinical goals for the shift include Pt will tolerate oral antibiotics this shift.    Over the shift, the patient did make progress toward these goals. Patient able to tolerate IV antibiotics and rested comfortably this shift. Patient up to bedside commode at 0500 and had continent void. Patient denies pain this shift and VSS.

## 2024-10-02 NOTE — CONSULTS
Inpatient consult to Cardiology  Consult performed by: USAMA Stevens  Consult ordered by: USAMA Casey  Reason for consult: bradycardia and tachycardia          History Of Present Illness  Maisha Agosto is a 84 y.o. female is a poor historian and history was obtained from the chart. She presented with a painful urination and increased confusion.     Lab work in the ER showed glucose 127, sodium 137, potassium 3.7, BUN/creatinine 21/0.69, troponin negative, WBC 16.9, H&H 12.8/39.7, UA negative, CT of the abdomen pelvis showed markedly abnormal thickened distal ileum previous focal perforation has improved, small bowel diverticulitis, possible gastritis, chest x-ray showed right lower lobe infiltrate.    EKG showed SB no acute ischemic changes.    She was started on antibiotics and admitted for further care. According to the nurse, she was having bradycardia with HR down to the 40's. Her metoprolol was held and then she would go tachycardic. Pt denies any symptoms.       Past Medical History  Past Medical History:   Diagnosis Date    Ataxia     CAD (coronary artery disease)     Chronic insomnia 06/05/2024    COPD (chronic obstructive pulmonary disease) (Multi)     Cough 06/05/2024    Deficiency of other specified B group vitamins 08/17/2016    Vitamin B12 deficiency    Dementia (Multi)     Dementia (Multi)     Diabetes mellitus (Multi)     Edema 06/05/2024    Elevated ALT measurement 05/05/2023    Encounter for examination of eyes and vision without abnormal findings     Diabetic eye exam    Fracture of sacrum (Multi) 06/05/2024    Frequent episodes of pneumonia 06/05/2024    GERD (gastroesophageal reflux disease)     Hiatal hernia     HLD (hyperlipidemia)     Hypernatremia 06/05/2024    Hypertension     Hypomagnesemia 05/22/2020    Hypotension 06/05/2024    Hypothyroidism     Injury of head 06/05/2024    Insomnia     Occlusion and stenosis of bilateral carotid arteries 08/17/2016     Atherosclerosis of both carotid arteries    Personal history of other medical treatment     History of mammogram    Pneumonia 2024    Rheumatoid arthritis (Multi)     Transient ischemic attack 2024   Lone episode of atrial fibrillation with pneumonia, not on AC    Surgical History  Past Surgical History:   Procedure Laterality Date     SECTION, CLASSIC  2016     Section     SECTION, LOW TRANSVERSE      CHOLECYSTECTOMY  2014    Cholecystectomy    COLONOSCOPY  2014    Colonoscopy (Fiberoptic)    CT HEAD ANGIO W AND WO IV CONTRAST  2021    CT HEAD ANGIO W AND WO IV CONTRAST 2021 [facility]    ESOPHAGOGASTRODUODENOSCOPY  2014    Diagnostic Esophagogastroduodenoscopy    EYE SURGERY      GALLBLADDER SURGERY  2016    Gallbladder Surgery    MR HEAD ANGIO WO IV CONTRAST  2021    MR HEAD ANGIO WO IV CONTRAST 2021 [facility]    MR NECK ANGIO WO IV CONTRAST  2021    MR NECK ANGIO WO IV CONTRAST 2021 [facility]        Social History  She reports that she has never smoked. She has never used smokeless tobacco. She reports that she does not drink alcohol and does not use drugs.    Family History  Family History   Problem Relation Name Age of Onset    Diabetes Mother          Allergies  Acthar [corticotropin], Chloroquine phosphate, Ciprofloxacin, Humira [adalimumab], Levaquin [levofloxacin], Methotrexate, Sulfamethoxazole-trimethoprim, Xeljanz [tofacitinib], Amoxicillin-pot clavulanate, and Enbrel [etanercept]    Review of Systems  Review of Systems   Unable to perform ROS: Dementia          Physical Exam  Constitutional: Well developed, awake/alert/oriented to self, no distress, alert and cooperative  Eyes: PERRL, EOMI, clear sclera  ENMT: mucous membranes moist, no apparent injury, no lesions seen  Head/Neck: Neck supple, no apparent injury, thyroid without mass or tenderness, No JVD, trachea midline,  "no bruits  Respiratory/Thorax: Patent airways, CTAB, normal breath sounds with good chest expansion, thorax symmetric  Cardiovascular: Regular, rate and rhythm, no murmurs, 2+ equal pulses of the extremities, normal S 1and S 2  Gastrointestinal: Nondistended, soft, non-tender, no rebound tenderness or guarding, no masses palpable, no organomegaly, +BS, no bruits  Musculoskeletal: ROM intact, no joint swelling, normal strength  Extremities: normal extremities, no cyanosis edema, contusions or wounds, no clubbing  Neurological: alert and oriented to self, intact senses, motor, response and reflexes, normal strength  Lymphatic: No significant lymphadenopathy  Psychological: Appropriate mood and behavior  Skin: Warm and dry, no lesions, no rashes       Last Recorded Vitals  Blood pressure 106/58, pulse 54, temperature 35.9 °C (96.7 °F), temperature source Temporal, resp. rate 17, height 1.549 m (5' 1\"), weight (!) 39.4 kg (86 lb 13.8 oz), SpO2 91%.    Medications  Scheduled medications  amLODIPine, 5 mg, oral, Daily  aspirin, 81 mg, oral, Daily  atorvastatin, 40 mg, oral, Nightly  azithromycin, 500 mg, oral, q24h MINDY  calcium carbonate-vitamin D3, 1 tablet, oral, Daily  cefuroxime, 500 mg, oral, BID  cholestyramine, 4 g, oral, BID  colestipol, 1 g, oral, BID after meals  enoxaparin, 40 mg, subcutaneous, q24h  icosapent ethyL, 1 g, oral, Daily  levothyroxine, 50 mcg, oral, Daily before breakfast  lisinopril, 20 mg, oral, Daily  magnesium oxide, 400 mg, oral, Daily  metoprolol succinate XL, 25 mg, oral, Nightly  pantoprazole, 40 mg, oral, Daily before breakfast   Or  pantoprazole, 40 mg, intravenous, Daily before breakfast  polyethylene glycol, 17 g, oral, Daily  predniSONE, 2.5 mg, oral, Daily  traZODone, 50 mg, oral, Nightly      Continuous medications  sodium chloride 0.9%, 10 mL/hr, Last Rate: 10 mL/hr (10/01/24 4894)      PRN medications  PRN medications: acetaminophen **OR** [DISCONTINUED] acetaminophen **OR** " [DISCONTINUED] acetaminophen, benzocaine-menthol, dextromethorphan-guaifenesin, guaiFENesin, hydrOXYzine HCL, ipratropium-albuteroL, loperamide, lubricating eye drops, melatonin, ondansetron ODT **OR** ondansetron, oxygen, sodium chloride 0.9%    Relevant Results  Results for orders placed or performed during the hospital encounter of 09/28/24 (from the past 24 hour(s))   Troponin I, High Sensitivity   Result Value Ref Range    Troponin I, High Sensitivity 10 0 - 13 ng/L   Basic metabolic panel   Result Value Ref Range    Glucose 113 (H) 74 - 99 mg/dL    Sodium 140 136 - 145 mmol/L    Potassium 3.4 (L) 3.5 - 5.3 mmol/L    Chloride 108 (H) 98 - 107 mmol/L    Bicarbonate 29 21 - 32 mmol/L    Anion Gap <7 (L) 10 - 20 mmol/L    Urea Nitrogen 25 (H) 6 - 23 mg/dL    Creatinine 0.57 0.50 - 1.05 mg/dL    eGFR 89 >60 mL/min/1.73m*2    Calcium 8.7 8.6 - 10.3 mg/dL   CBC   Result Value Ref Range    WBC 13.3 (H) 4.4 - 11.3 x10*3/uL    nRBC 0.0 0.0 - 0.0 /100 WBCs    RBC 4.55 4.00 - 5.20 x10*6/uL    Hemoglobin 13.5 12.0 - 16.0 g/dL    Hematocrit 42.1 36.0 - 46.0 %    MCV 93 80 - 100 fL    MCH 29.7 26.0 - 34.0 pg    MCHC 32.1 32.0 - 36.0 g/dL    RDW 12.4 11.5 - 14.5 %    Platelets 235 150 - 450 x10*3/uL       XR chest 1 view   Final Result   Right lower lobe infiltrate.   Signed by Oracio Arnett MD      CT abdomen pelvis w IV contrast   Final Result   Markedly abnormally thickened distal ileum.  There is infiltration   into the surrounding fat.  The previously described focal perforation   has improved.  There are diverticula present in this most likely   represent small bowel diverticulitis.  Ischemia cannot be excluded.   Colonic diverticulosis.   Multiple pancreatic cysts with dilated pancreatic duct.  There is   dilatation of the common bile duct as well as intrahepatic ducts but   this may be related to the patient's prior cholecystectomy.   Thickening of the wall the stomach.  Gastritis cannot be excluded.   Dilated uterine  veins.  There is fluid in the pelvis which may   represent a distended endometrium.  This is best evaluated with   ultrasound.  There are bibasilar pulmonary infiltrates.   Signed by Oracio Arnett MD          No echocardiogram results found for the past 12 months       Assessment/Plan   Echocardiogram from 2022, LVEF 65%, LVH with diastolic dysfunction, mild basal septal hypertrophy, mild AAS, mild to moderate AI, trace MR/TR, mild pulmonary hypertension    Pneumonia, COPD exac  -CXR showed RLL infiltrate  -WBC elevated  -antibiotics  -bronchodilators  -Steroids  -Procal 0.03  -BC negative  -oxygen support  -sputum culture  -blood cultures    2. Bradycardia, paroxysmal atrial fibrillation  -Pt with lone episode of AF with pneumonia years ago  -Pt not on tele  -Strips from chart reviewed, no significant bradycardia or arrhythmias noted  -Nurse reports bradycardia in the 40's->metoprolol held, pt became tachycardic  -Change metoprolol tartrate to Toprol XL 25mg daily  -Monitor on tele    3. Hypertension  -stable  -2gm na diet  -cont meds  -monitor    4. Hyperlipidemia  -Cont statin        ELIO Stevens-CNP

## 2024-10-02 NOTE — PROGRESS NOTES
Physical Therapy    Physical Therapy Treatment    Patient Name: Maisha Agosto  MRN: [id number]  Department: [facility]  Room: [address] [place] [address]  Today's Date: 10/2/2024  Time Calculation  Start Time: 0830  Stop Time: 0853  Time Calculation (min): 23 min     Assessment/Plan   PT Assessment  PT Assessment Results: Decreased strength, Decreased endurance, Impaired balance, Decreased mobility, Decreased cognition  Rehab Prognosis: Good  Barriers to Discharge: n/a  Barriers to Participation:  (none)  End of Session Communication: Bedside nurse  Assessment Comment: James 84 y.o presents with weakness, impaired gait, and impaired balance.  Pt. currently requires assist with transfers and short distances of ambulation due to weakness and  would benefit from additional PT to address above noted limitations and prevent further decline.  End of Session Patient Position: Up in chair, Alarm on  PT Plan  Inpatient/Swing Bed or Outpatient: Inpatient  PT Plan  Treatment/Interventions: Transfer training, Gait training, Therapeutic exercise  PT Plan: Ongoing PT  PT Frequency: 3 times per week  PT Discharge Recommendations: Moderate intensity level of continued care  PT Recommended Transfer Status: Assist x1  PT - OK to Discharge: Yes    General Visit Information:   PT  Visit  PT Received On: 10/02/24  General  Prior to Session Communication: Bedside nurse  Patient Position Received: Up in chair, Alarm on    Subjective   Patient pleasant and agreeable to session.  Precautions:  Precautions  Medical Precautions: Fall precautions    Vital Signs (Past 2hrs)        Date/Time Vitals Session Patient Position Pulse Resp SpO2 BP MAP (mmHg)    10/02/24 0830 --  --  --  --  96 %  --  --                 Objective   Pain:  Pain Assessment  Pain Assessment: 0-10  0-10 (Numeric) Pain Score: 0 - No pain  Cognition:  Cognition  Overall Cognitive Status: Impaired  Coordination:  Movements are Fluid and Coordinated: Yes   Treatments:  Therapeutic  Exercise  Therapeutic Exercise Performed: Yes  Therapeutic Exercise Activity 1: quad sets x 20  Therapeutic Exercise Activity 2: slr x 20 with vcs required for eccentric control  Therapeutic Exercise Activity 3: ankle pumps x 20  Therapeutic Exercise Activity 4: hip abduction/adduction supine x 20  Therapeutic Exercise Activity 5: laqs x 20 with vcs required to slow pace  Therapeutic Exercise Activity 6: hip flexion seated x 20 with vcs required to slow pace and to use full available ROM  Therapeutic Exercise Activity 7: iso hip abduction/adduction seated x 20    Ambulation/Gait Training  Ambulation/Gait Training Performed: Yes  Ambulation/Gait Training 1  Surface 1: Level tile  Device 1: Rolling walker  Gait Support Devices: Gait belt  Assistance 1: Minimum assistance (Min assist required to prevent LOB due to weakness and fatigue, vcs required to keep proper positioning with fww.)  Quality of Gait 1: Antalgic, Knee(s) buckle, Decreased step length  Comments/Distance (ft) 1: 30x2 (Ambulation distance limited by weakness and fatigue with knees buckling x 2.)  Transfers  Transfer: Yes  Transfer 1  Technique 1: Sit to stand, Stand to sit  Transfer Device 1: Walker, Gait belt  Transfer Level of Assistance 1: Contact guard, Minimal verbal cues (Vcs required for proper hand placement, cga required due to being mildly unsteady upon rising, however, no LOB.)    Outcome Measures:  [facility] Basic Mobility  Turning from your back to your side while in a flat bed without using bedrails: None  Moving from lying on your back to sitting on the side of a flat bed without using bedrails: A little  Moving to and from bed to chair (including a wheelchair): A little  Standing up from a chair using your arms (e.g. wheelchair or bedside chair): A little  To walk in hospital room: A lot  Climbing 3-5 steps with railing: Total  Basic Mobility - Total Score: 16    Education Documentation  Mobility Training, taught by Tonya Ruano, PTA  at 10/2/2024 10:19 AM.  Learner: Patient  Readiness: Acceptance  Method: Explanation  Response: Verbalizes Understanding, Needs Reinforcement  Comment: Educated patient on proper hand placement with transfers as well as proper use of fww, fair follow through with cues.    Education Comments  No comments found.      Encounter Problems       Encounter Problems (Active)       Balance       STG - Maintains dynamic standing balance without upper extremity support with good- balance to decrease fall risk  (Progressing)       Start:  09/30/24    Expected End:  10/08/24               Mobility       LTG - Patient will ambulate household distance with SUP and WW  (Progressing)       Start:  09/30/24    Expected End:  10/08/24            LTG - Patient will navigate 2 steps with 1 rails/device with CGA        Start:  09/30/24    Expected End:  10/08/24               PT Transfers       STG - Patient will perform bed mobility IND (Progressing)       Start:  09/30/24    Expected End:  10/08/24            STG - Patient will transfer sit to and from stand MALLORIE with WW (Progressing)       Start:  09/30/24    Expected End:  10/08/24               Pain - Adult

## 2024-10-03 LAB
ATRIAL RATE: 115 BPM
BACTERIA BLD CULT: NORMAL
BACTERIA BLD CULT: NORMAL
P AXIS: 65 DEGREES
P OFFSET: 210 MS
P ONSET: 144 MS
PR INTERVAL: 152 MS
Q ONSET: 220 MS
QRS COUNT: 19 BEATS
QRS DURATION: 76 MS
QT INTERVAL: 334 MS
QTC CALCULATION(BAZETT): 462 MS
QTC FREDERICIA: 414 MS
R AXIS: 5 DEGREES
T AXIS: 204 DEGREES
T OFFSET: 387 MS
VENTRICULAR RATE: 115 BPM

## 2024-10-03 PROCEDURE — 2500000004 HC RX 250 GENERAL PHARMACY W/ HCPCS (ALT 636 FOR OP/ED): Mod: IPSPLIT | Performed by: NURSE PRACTITIONER

## 2024-10-03 PROCEDURE — 2500000001 HC RX 250 WO HCPCS SELF ADMINISTERED DRUGS (ALT 637 FOR MEDICARE OP): Mod: IPSPLIT | Performed by: NURSE PRACTITIONER

## 2024-10-03 PROCEDURE — 99233 SBSQ HOSP IP/OBS HIGH 50: CPT | Performed by: NURSE PRACTITIONER

## 2024-10-03 PROCEDURE — 2500000001 HC RX 250 WO HCPCS SELF ADMINISTERED DRUGS (ALT 637 FOR MEDICARE OP): Mod: IPSPLIT

## 2024-10-03 PROCEDURE — 94760 N-INVAS EAR/PLS OXIMETRY 1: CPT | Mod: IPSPLIT

## 2024-10-03 PROCEDURE — 1200000002 HC GENERAL ROOM WITH TELEMETRY DAILY: Mod: IPSPLIT

## 2024-10-03 PROCEDURE — 2500000004 HC RX 250 GENERAL PHARMACY W/ HCPCS (ALT 636 FOR OP/ED): Mod: IPSPLIT

## 2024-10-03 PROCEDURE — 97535 SELF CARE MNGMENT TRAINING: CPT | Mod: GO,IPSPLIT | Performed by: OCCUPATIONAL THERAPIST

## 2024-10-03 PROCEDURE — 94668 MNPJ CHEST WALL SBSQ: CPT | Mod: IPSPLIT

## 2024-10-03 ASSESSMENT — COGNITIVE AND FUNCTIONAL STATUS - GENERAL
DRESSING REGULAR LOWER BODY CLOTHING: A LITTLE
DAILY ACTIVITIY SCORE: 19
MOVING FROM LYING ON BACK TO SITTING ON SIDE OF FLAT BED WITH BEDRAILS: A LITTLE
DAILY ACTIVITIY SCORE: 19
PERSONAL GROOMING: A LITTLE
TOILETING: A LITTLE
TOILETING: A LITTLE
DRESSING REGULAR LOWER BODY CLOTHING: A LITTLE
MOVING TO AND FROM BED TO CHAIR: A LITTLE
STANDING UP FROM CHAIR USING ARMS: A LITTLE
STANDING UP FROM CHAIR USING ARMS: A LITTLE
TURNING FROM BACK TO SIDE WHILE IN FLAT BAD: A LITTLE
WALKING IN HOSPITAL ROOM: A LITTLE
MOVING TO AND FROM BED TO CHAIR: A LITTLE
HELP NEEDED FOR BATHING: A LITTLE
TOILETING: A LITTLE
WALKING IN HOSPITAL ROOM: A LITTLE
CLIMB 3 TO 5 STEPS WITH RAILING: TOTAL
HELP NEEDED FOR BATHING: A LITTLE
DRESSING REGULAR LOWER BODY CLOTHING: A LITTLE
PERSONAL GROOMING: A LITTLE
MOBILITY SCORE: 16
MOBILITY SCORE: 16
DRESSING REGULAR UPPER BODY CLOTHING: A LITTLE
TURNING FROM BACK TO SIDE WHILE IN FLAT BAD: A LITTLE
PERSONAL GROOMING: A LITTLE
CLIMB 3 TO 5 STEPS WITH RAILING: TOTAL
DAILY ACTIVITIY SCORE: 19
MOVING FROM LYING ON BACK TO SITTING ON SIDE OF FLAT BED WITH BEDRAILS: A LITTLE
HELP NEEDED FOR BATHING: A LITTLE

## 2024-10-03 ASSESSMENT — ACTIVITIES OF DAILY LIVING (ADL)
HOME_MANAGEMENT_TIME_ENTRY: 54
BATHING_WHERE_ASSESSED: SITTING SINKSIDE
BATHING_LEVEL_OF_ASSISTANCE: CLOSE SUPERVISION;SETUP

## 2024-10-03 ASSESSMENT — PAIN SCALES - GENERAL
PAINLEVEL_OUTOF10: 0 - NO PAIN

## 2024-10-03 ASSESSMENT — PAIN - FUNCTIONAL ASSESSMENT
PAIN_FUNCTIONAL_ASSESSMENT: 0-10
PAIN_FUNCTIONAL_ASSESSMENT: 0-10

## 2024-10-03 NOTE — PROGRESS NOTES
Maisha Agosto is a 84 y.o. female on day 4 of admission presenting with Pneumonia, unspecified organism.    Subjective   Patient assessed while sitting up in a chair. She is currently working with therapy. She is alert and oriented x 2 and pleasant. She denies pain, fever, chills, N/V/D/C. I spoke with her daughter Eli about pending DC, all questions answered.     Objective     Last Recorded Vitals  /71 (BP Location: Left arm, Patient Position: Lying)   Pulse 75   Temp 35.6 °C (96.1 °F) (Temporal)   Resp 16   Wt (!) 39.4 kg (86 lb 13.8 oz)   SpO2 94%   Intake/Output last 3 Shifts:    Intake/Output Summary (Last 24 hours) at 10/3/2024 1129  Last data filed at 10/3/2024 0830  Gross per 24 hour   Intake 480 ml   Output --   Net 480 ml     Admission Weight  Weight: 47.7 kg (105 lb 2.6 oz) (09/28/24 1325)    Daily Weight  09/28/24 : (!) 39.4 kg (86 lb 13.8 oz)    Physical Exam  Vitals reviewed.   Constitutional:       Appearance: Normal appearance. She is normal weight.   HENT:      Head: Normocephalic and atraumatic.      Right Ear: External ear normal.      Left Ear: External ear normal.      Nose: Nose normal.      Mouth/Throat:      Mouth: Mucous membranes are moist.      Pharynx: Oropharynx is clear.   Eyes:      Conjunctiva/sclera: Conjunctivae normal.      Pupils: Pupils are equal, round, and reactive to light.   Cardiovascular:      Rate and Rhythm: Normal rate and regular rhythm.      Pulses: Normal pulses.      Heart sounds: Normal heart sounds.   Pulmonary:      Effort: Pulmonary effort is normal.      Breath sounds: Normal breath sounds.   Abdominal:      General: Bowel sounds are normal.      Palpations: Abdomen is soft.   Musculoskeletal:         General: Normal range of motion.      Cervical back: Normal range of motion and neck supple.   Skin:     General: Skin is warm and dry.   Neurological:      General: No focal deficit present.      Mental Status: She is alert. She is disoriented.        Scheduled medications  amLODIPine, 5 mg, oral, Daily  aspirin, 81 mg, oral, Daily  atorvastatin, 40 mg, oral, Nightly  calcium carbonate-vitamin D3, 1 tablet, oral, Daily  cefuroxime, 500 mg, oral, BID  cholestyramine, 4 g, oral, BID  colestipol, 1 g, oral, BID after meals  enoxaparin, 40 mg, subcutaneous, q24h  icosapent ethyL, 1 g, oral, Daily  levothyroxine, 50 mcg, oral, Daily before breakfast  lisinopril, 20 mg, oral, Daily  magnesium oxide, 400 mg, oral, Daily  metoprolol succinate XL, 25 mg, oral, Nightly  pantoprazole, 40 mg, oral, Daily before breakfast  polyethylene glycol, 17 g, oral, Daily  predniSONE, 2.5 mg, oral, Daily  traZODone, 50 mg, oral, Nightly    Continuous medications  sodium chloride 0.9%, 10 mL/hr, Last Rate: 10 mL/hr (10/01/24 0529)    PRN medications  PRN medications: acetaminophen **OR** [DISCONTINUED] acetaminophen **OR** [DISCONTINUED] acetaminophen, benzocaine-menthol, dextromethorphan-guaifenesin, guaiFENesin, hydrOXYzine HCL, ipratropium-albuteroL, loperamide, lubricating eye drops, melatonin, ondansetron ODT **OR** ondansetron, oxygen, sodium chloride 0.9%    Relevant Results:  ECG 12 lead  Result Date: 10/1/2024  Sinus bradycardia Otherwise normal ECG When compared with ECG of 05-JUN-2024 09:29, No significant change was found See ED provider note for full interpretation and clinical correlation Confirmed by Jelly Garcia ([id number]) on 10/1/2024 11:44:40 AM    XR chest 1 view  Result Date: 9/28/2024  Right lower lobe infiltrate. Signed by Oracio Arnett MD    CT abdomen pelvis w IV contrast  Result Date: 9/28/2024  Markedly abnormally thickened distal ileum.  There is infiltration into the surrounding fat.  The previously described focal perforation has improved.  There are diverticula present in this most likely represent small bowel diverticulitis.  Ischemia cannot be excluded. Colonic diverticulosis. Multiple pancreatic cysts with dilated pancreatic duct.  There is  dilatation of the common bile duct as well as intrahepatic ducts but this may be related to the patient's prior cholecystectomy. Thickening of the wall the stomach.  Gastritis cannot be excluded. Dilated uterine veins.  There is fluid in the pelvis which may represent a distended endometrium.  This is best evaluated with ultrasound.  There are bibasilar pulmonary infiltrates. Signed by Oracio Arnett MD      Latest Reference Range & Units 09/30/24 05:55 10/01/24 06:08 10/01/24 15:40 10/02/24 06:25 10/02/24 06:26   GLUCOSE 74 - 99 mg/dL 172 (H) 161 (H)  113 (H)    SODIUM 136 - 145 mmol/L 140 140  140    POTASSIUM 3.5 - 5.3 mmol/L 3.8 4.1  3.4 (L)    CHLORIDE 98 - 107 mmol/L 105 104  108 (H)    Bicarbonate 21 - 32 mmol/L 30 29  29    Anion Gap 10 - 20 mmol/L 9 (L) 11  <7 (L)    Blood Urea Nitrogen 6 - 23 mg/dL 29 (H) 25 (H)  25 (H)    Creatinine 0.50 - 1.05 mg/dL 0.59 0.60  0.57    EGFR >60 mL/min/1.73m*2 88 88  89    Calcium 8.6 - 10.3 mg/dL 9.1 8.6  8.7    Troponin I, High Sensitivity 0 - 13 ng/L   10     WBC 4.4 - 11.3 x10*3/uL 18.2 (H) 16.6 (H)   13.3 (H)   nRBC 0.0 - 0.0 /100 WBCs 0.0 0.0   0.0   RBC 4.00 - 5.20 x10*6/uL 4.00 4.44   4.55   HEMOGLOBIN 12.0 - 16.0 g/dL 12.0 13.0   13.5   HEMATOCRIT 36.0 - 46.0 % 36.9 40.3   42.1   MCV 80 - 100 fL 92 91   93   MCH 26.0 - 34.0 pg 30.0 29.3   29.7   MCHC 32.0 - 36.0 g/dL 32.5 32.3   32.1   RED CELL DISTRIBUTION WIDTH 11.5 - 14.5 % 12.4 12.5   12.4   Platelets 150 - 450 x10*3/uL 211 236   235     Assessment/Plan      Assessment & Plan  Pneumonia, unspecified organism    Abnormal gait    Acute exacerbation of COPD with asthma    Benign essential hypertension    Arteriosclerosis of coronary artery    Dementia    Fatigue    GERD (gastroesophageal reflux disease)    Hypothyroidism    Hyperlipidemia    Impaired functional mobility, balance, gait, and endurance    Rheumatoid arthritis    Decreased appetite    History of Clostridioides difficile infection    Pneumonia of both  lower lobes due to infectious organism    Pneumonia, community acquired  Acute exacerbation of COPD with asthma  -WBC 16.9 >13.2 > 18.2 > 16.6 > 13.3  -Procalcitonin 0.03  -Respiratory Culture if Able  -Blood culture negative to date  -CXR : Right lower lobe infiltrate.  -continue DuoNebs  -discontinued Solu Medrol 20 mg IV Q 12 hours,  -continue PO prednisone 2.5 mg daily  -discontinued ceftriaxone 1 g IV Daily   -cefuroxime  500 mg BID; day 5  -completed Azithromycin 500 mg IV Daily  -Home O2 RA, currently on RA  - continue robitussin and mucinex prn     Abnormal gait  Dementia  Fatigue  Impaired functional mobility, balance, gait, and endurance  -UA Negative  -PT/OT Eval and Treat; recommending moderate level therapy  -CM for DC planning     Benign essential hypertension  Arteriosclerosis of coronary artery  Hyperlipidemia  -continue Norvasc 5 mg PO Daily   -continue ASA 81 mg PO Daily   -continue Lipitor 40 mg PO HS  -continue Lisinopril 20 mg PO Daily   -continue Lopressor 25 mg PO BID     Hypomagnesemia   -Magnesium 2.24  -continue Mag-Ox 400 mg PO Daily      Hypothyroidism  -TSH 2.100: 8/12/24  -continue Synthroid 50 mcg PO Daily      Rheumatoid arthritis  -continue Prednisone 2.5 mg PO Daily      History of Bowel Perforation   Decreased Appetite  History of C Diff  GERD  -CT Abd/Pelvis: Markedly abnormally thickened distal ileum.  There is infiltration into the surrounding fat.  The previously described focal perforation has improved.  There are diverticula present in this most likely represent small bowel diverticulitis.  Ischemia cannot be excluded. Colonic diverticulosis. Multiple pancreatic cysts with dilated pancreatic duct.  There is dilatation of the common bile duct as well as intrahepatic ducts but this may be related to the patient's prior cholecystectomy. Thickening of the wall the stomach.  Gastritis cannot be excluded. Dilated uterine veins.  There is fluid in the pelvis which may represent a  distended endometrium.  This is best evaluated with ultrasound.  There are bibasilar pulmonary infiltrates.  -continue Questran 4 g PO BID   -continue Protonix Daily   -continue Miralax Daily   -Acute Care Surgery Consult, Appreciate Recs - chronic and stable - no intervention at this time     DVT ppx: continue enoxaparin  Code status: DNRCC-Arrest/DNI  Disposition: Patient requires inpatient stay. Discharge pending precert to SNF.      Ekta Martin, ELIO-CNP

## 2024-10-03 NOTE — PROGRESS NOTES
10/03/24 1148   Discharge Planning   Assistance Needed PatientPatient with some confusion at times; Patient lives with her daughter in a 1 story house. There is always family present; when daughter works, she stays with her son. She has a private duty aide 4 days a week for 8 hours. She uses either the walker or wheelchair. She can do ADLS but does have assistance.   Home or Post Acute Services Post acute facilities (Rehab/SNF/etc)   Type of Post Acute Facility Services Skilled nursing   Expected Discharge Disposition SNF     Precert authorization initiated 10/1- still pending.  Updates sent to [facility].  Patient is medically cleared.  Patient will be discharged once authorization is received.     3:00 PM Per Direct submission team, no precert authorization given at this time.

## 2024-10-03 NOTE — CARE PLAN
The patient's goals for the shift include  To get some sleep    The clinical goals for the shift include Patient will tolerate care    1950 Assumed care of patient. Assessment completed as charted. Patient laying in bed. Denies pain or complaints at this time. Patient repositioned. Call light within reach.    2130 Incontinent of loose tan stool. Patient changed and cleaned. Repositioned. Call light within reach.    0000 Patient sleeping, no signs or symptoms of distress. Call light within reach.    0240 Patient frequently trying to get out of bed. Feeling a little anxious. Reoriented. Medicated with Atarax as ordered.    0530 Patient awake, laying in bed. Call light within reach. Bed alarm on.

## 2024-10-03 NOTE — CARE PLAN
The patient's goals for the shift include  Pt will tolerate sitting in chair today.     The clinical goals for the shift include Ambulate more frequently    Care was resumed at 1500. Pt remained safe and stable and resting comfortably. No other complaints at this time. Pt resting in bed with call light within reach. Family at bedside.

## 2024-10-03 NOTE — PROGRESS NOTES
Occupational Therapy    Occupational Therapy Treatment    Name: Maisha Agosto  MRN: [id number]  Department: [facility]  Room: [address] [place] [address]  Date: 10/03/24  Time Calculation  Start Time: 0751  Stop Time: 0845  Time Calculation (min): 54 min    Assessment:  OT Assessment: Good overall participation with self-care routine. Improved ambulation, balance, transfers, and endurance noted. Pt. required continuous cueing for transfers however, with poor carryover of previous learning. Pt. would benefit from skilled care to maximize outcomes and return to [facility].  Prognosis: Good  Barriers to Discharge: None  Evaluation/Treatment Tolerance: Patient tolerated treatment well  Medical Staff Made Aware: Yes  End of Session Communication: Bedside nurse  End of Session Patient Position: Up in chair, Alarm on  Plan:  Treatment Interventions: ADL retraining, Functional transfer training, Patient/family training, Neuromuscular reeducation, Compensatory technique education  OT Frequency: 3 times per week  OT Discharge Recommendations: Moderate intensity level of continued care  OT Recommended Transfer Status: Minimal assist, Assist of 1  OT - OK to Discharge: Yes (Based on completed evaluation and care plan recommendations, no barriers to discharge to next site of care)    Subjective   Previous Visit Info:  OT Last Visit  OT Received On: 10/03/24  General:  General  Reason for Referral: impaired self-care d/t admission for pneumonia.  Referred By: JAY Perez  Past Medical History Relevant to Rehab: dementia, COPD, DM, HLD, hypothyroidism, RA, TIA, ataxia, CAD, chronic insomnia, cough, GERD, HLD, hypernatremia, TIA, HTN  Prior to Session Communication: Bedside nurse  Patient Position Received: Up in chair, Alarm on  General Comment: berta bolaños  Precautions:  Hearing/Visual Limitations: glasses; closing L eye at times  Medical Precautions: Fall precautions    Vital Signs (Past 2hrs)        Date/Time Vitals Session Patient Position Pulse  Resp SpO2 BP MAP (mmHg)    10/03/24 0944 --  --  --  --  94 %  --  --                   Pain Assessment:  Pain Assessment  Pain Assessment: 0-10  0-10 (Numeric) Pain Score: 0 - No pain     Objective   Cognition:  Overall Cognitive Status: Impaired at baseline  Arousal/Alertness: Appropriate responses to stimuli  Orientation Level: Disoriented to time  Activities of Daily Living: Grooming  Grooming Level of Assistance: Setup, Close supervision  Grooming Where Assessed: Standing sinkside  Grooming Comments: oral and facial hygiene completed standing at the sink, SBA. Hair care completed as well.    UE Bathing  UE Bathing Level of Assistance: Minimum assistance  UE Bathing Where Assessed: Standing sinkside  UE Bathing Comments: assistance to wash back; cueing to wash body.    LE Bathing  LE Bathing Level of Assistance: Close supervision, Setup  LE Bathing Where Assessed: Sitting sinkside (chair)  LE Bathing Comments: Close sup for balance with LE hygiene and pericare; MOD cueing to wash whole body. Pt. improved with balance today and was able to stand without UE support    UE Dressing  UE Dressing Level of Assistance: Setup, Contact guard  UE Dressing Where Assessed: Chair  UE Dressing Comments: assistance to tie gown    LE Dressing  LE Dressing: Yes  Pants Level of Assistance: Close supervision  Sock Level of Assistance: Close supervision  Adult Briefs Level of Assistance: Close supervision  LE Dressing Where Assessed: Chair  LE Dressing Comments: able to release walker to pull up pants today while maintaining balance.    Toileting  Toileting Level of Assistance: Contact guard  Where Assessed: Toilet  Toileting Comments: assistance for hand placement, cueing. able to complete pericare and clothing mgmt    Functional Standing Tolerance:  Functional Standing Tolerance  Time: 18 minutes  Activity: grooming, UE dressing  Functional Standing Tolerance Comments: increased balance and endurance noted today  Bed  Mobility/Transfers: Bed Mobility  Bed Mobility: Yes  Bed Mobility 1  Bed Mobility 1: Supine to sitting  Level of Assistance 1: Close supervision  Bed Mobility Comments 1: use of bed rail today    Transfers  Transfer: Yes  Transfer 1  Transfer From 1: Bed to  Transfer to 1: Toilet  Technique 1: Sit to stand, Stand to sit  Transfer Device 1: Walker, Gait belt  Transfer Level of Assistance 1: Contact guard, Minimal verbal cues  Trials/Comments 1: poor hand placement without cueing. verbal cueing to ensure safety with transfers.  Transfers 2  Transfer From 2: Toilet to  Transfer to 2: Stand  Technique 2: Stand to sit  Transfer Device 2: Walker, Gait belt  Transfer Level of Assistance 2: Contact guard  Trials/Comments 2: cueing for hand placement, poor carryover noted today.  Transfers 3  Transfer From 3: Stand to  Transfer to 3: Chair with arms, Sit  Technique 3: Stand to sit  Transfer Device 3: Walker, Gait belt  Transfer Level of Assistance 3: Contact guard  Transfers 4  Transfer From 4: Sit to, Stand to, Chair with arms to  Transfer to 4: Chair with arms, Sit, Stand  Technique 4: Sit to stand, Stand to sit  Transfer Device 4: Walker  Transfer Level of Assistance 4: Contact guard, Close supervision  Trials/Comments 4: cuieng for hand placement    Toilet Transfers  Toilet Transfer From: Bed  Toilet Transfer Type: To  Toilet Transfer to: Raised toilet seat with rails  Toilet Transfer Technique: Ambulating  Toilet Transfers: Contact guard    Functional Mobility:  Functional Mobility  Functional Mobility Performed: Yes  Functional Mobility 1  Surface 1: Level tile  Device 1: Rolling walker  Functional Mobility Support Devices: Gait belt  Assistance 1: Contact guard  Comments 1: decreased lateral lean noted today  Sitting Balance:  Static Sitting Balance  Static Sitting-Balance Support: Feet supported  Static Sitting-Level of Assistance: Independent  Dynamic Sitting Balance  Dynamic Sitting-Balance Support: Feet  supported  Dynamic Sitting-Level of Assistance: Independent  Dynamic Sitting-Balance: Forward lean  Standing Balance:  Static Standing Balance  Static Standing-Balance Support: Bilateral upper extremity supported  Static Standing-Level of Assistance: Contact guard  Dynamic Standing Balance  Dynamic Standing-Balance Support: Bilateral upper extremity supported  Dynamic Standing-Level of Assistance: Contact guard  Dynamic Standing-Balance: Turning  Dynamic Standing-Comments: no LOB noted today.    Outcome Measures:  [facility] Daily Activity  Putting on and taking off regular lower body clothing: A little  Bathing (including washing, rinsing, drying): A little  Putting on and taking off regular upper body clothing: A little  Toileting, which includes using toilet, bedpan or urinal: A little  Taking care of personal grooming such as brushing teeth: A little  Eating Meals: None  Daily Activity - Total Score: 19    Education Documentation  Body Mechanics, taught by Di Valente OT at 10/3/2024 11:28 AM.  Learner: Patient  Readiness: Acceptance  Method: Explanation  Response: Verbalizes Understanding, Needs Reinforcement  Comment: Edu on POC and DC rec. Edu on transfers and hand placement.    Precautions, taught by Di Valente OT at 10/3/2024 11:28 AM.  Learner: Patient  Readiness: Acceptance  Method: Explanation  Response: Verbalizes Understanding, Needs Reinforcement  Comment: Edu on POC and DC rec. Edu on transfers and hand placement.    ADL Training, taught by Di Valente OT at 10/3/2024 11:28 AM.  Learner: Patient  Readiness: Acceptance  Method: Explanation  Response: Verbalizes Understanding, Needs Reinforcement  Comment: Edu on POC and DC rec. Edu on transfers and hand placement.    Education Comments  No comments found.      Goals:  Encounter Problems       Encounter Problems (Active)       ADLs       Patient with complete upper body dressing with set-up and supervision level of assistance donning and doffing all UE  clothes with PRN adaptive equipment while supported sitting (Progressing)       Start:  09/30/24    Expected End:  10/14/24            Patient with complete lower body dressing with set-up and supervision level of assistance donning and doffing all LE clothes  with PRN adaptive equipment while supported sitting (Progressing)       Start:  09/30/24    Expected End:  10/14/24            Patient will complete daily grooming tasks brushing teeth and washing face/hair with set-up and supervision level of assistance and PRN adaptive equipment while standing. (Progressing)       Start:  09/30/24    Expected End:  10/14/24            Patient will complete toileting including hygiene clothing management/hygiene with set-up and supervision level of assistance. (Progressing)       Start:  09/30/24    Expected End:  10/14/24               MOBILITY       Patient will perform Functional mobility mod  Household distances/Community Distances with set-up and supervision level of assistance and least restrictive device in order to improve safety and functional mobility. (Progressing)       Start:  09/30/24    Expected End:  10/14/24               TRANSFERS       Patient will complete sit to stand transfer with set-up and supervision level of assistance and least restrictive device in order to improve safety and prepare for out of bed mobility. (Progressing)       Start:  09/30/24    Expected End:  10/14/24

## 2024-10-04 PROCEDURE — 2500000004 HC RX 250 GENERAL PHARMACY W/ HCPCS (ALT 636 FOR OP/ED): Mod: IPSPLIT | Performed by: NURSE PRACTITIONER

## 2024-10-04 PROCEDURE — 2500000004 HC RX 250 GENERAL PHARMACY W/ HCPCS (ALT 636 FOR OP/ED): Mod: IPSPLIT

## 2024-10-04 PROCEDURE — 2500000001 HC RX 250 WO HCPCS SELF ADMINISTERED DRUGS (ALT 637 FOR MEDICARE OP): Mod: IPSPLIT

## 2024-10-04 PROCEDURE — 94760 N-INVAS EAR/PLS OXIMETRY 1: CPT | Mod: IPSPLIT

## 2024-10-04 PROCEDURE — 2500000001 HC RX 250 WO HCPCS SELF ADMINISTERED DRUGS (ALT 637 FOR MEDICARE OP): Mod: IPSPLIT | Performed by: NURSE PRACTITIONER

## 2024-10-04 PROCEDURE — 94668 MNPJ CHEST WALL SBSQ: CPT | Mod: IPSPLIT

## 2024-10-04 PROCEDURE — 94669 MECHANICAL CHEST WALL OSCILL: CPT | Mod: IPSPLIT

## 2024-10-04 PROCEDURE — 97530 THERAPEUTIC ACTIVITIES: CPT | Mod: GO,IPSPLIT | Performed by: OCCUPATIONAL THERAPIST

## 2024-10-04 PROCEDURE — 97116 GAIT TRAINING THERAPY: CPT | Mod: GP,CQ,IPSPLIT

## 2024-10-04 PROCEDURE — 97535 SELF CARE MNGMENT TRAINING: CPT | Mod: GO,IPSPLIT | Performed by: OCCUPATIONAL THERAPIST

## 2024-10-04 PROCEDURE — 1200000002 HC GENERAL ROOM WITH TELEMETRY DAILY: Mod: IPSPLIT

## 2024-10-04 PROCEDURE — 99233 SBSQ HOSP IP/OBS HIGH 50: CPT | Performed by: NURSE PRACTITIONER

## 2024-10-04 ASSESSMENT — COGNITIVE AND FUNCTIONAL STATUS - GENERAL
PERSONAL GROOMING: A LITTLE
WALKING IN HOSPITAL ROOM: A LITTLE
CLIMB 3 TO 5 STEPS WITH RAILING: A LITTLE
DRESSING REGULAR LOWER BODY CLOTHING: A LITTLE
DRESSING REGULAR UPPER BODY CLOTHING: A LITTLE
DRESSING REGULAR LOWER BODY CLOTHING: A LITTLE
MOVING TO AND FROM BED TO CHAIR: A LITTLE
TOILETING: A LITTLE
DRESSING REGULAR UPPER BODY CLOTHING: A LITTLE
MOBILITY SCORE: 17
DAILY ACTIVITIY SCORE: 19
STANDING UP FROM CHAIR USING ARMS: A LITTLE
CLIMB 3 TO 5 STEPS WITH RAILING: A LOT
TURNING FROM BACK TO SIDE WHILE IN FLAT BAD: A LITTLE
MOVING TO AND FROM BED TO CHAIR: A LITTLE
PERSONAL GROOMING: A LITTLE
WALKING IN HOSPITAL ROOM: A LITTLE
DAILY ACTIVITIY SCORE: 19
TURNING FROM BACK TO SIDE WHILE IN FLAT BAD: A LITTLE
HELP NEEDED FOR BATHING: A LITTLE
MOVING FROM LYING ON BACK TO SITTING ON SIDE OF FLAT BED WITH BEDRAILS: A LITTLE
HELP NEEDED FOR BATHING: A LITTLE
STANDING UP FROM CHAIR USING ARMS: A LITTLE
TOILETING: A LITTLE
MOBILITY SCORE: 19

## 2024-10-04 ASSESSMENT — PAIN - FUNCTIONAL ASSESSMENT: PAIN_FUNCTIONAL_ASSESSMENT: 0-10

## 2024-10-04 ASSESSMENT — PAIN SCALES - GENERAL
PAINLEVEL_OUTOF10: 0 - NO PAIN

## 2024-10-04 ASSESSMENT — ACTIVITIES OF DAILY LIVING (ADL): HOME_MANAGEMENT_TIME_ENTRY: 15

## 2024-10-04 NOTE — PROGRESS NOTES
Maisha Agosto is a 84 y.o. female on day 5 of admission presenting with Pneumonia, unspecified organism.    Subjective   Patient assessed while sitting up in a chair. She is alert and oriented x 2 and pleasant. She denies pain, fever, chills, N/V/D/C. I spoke with her daughter Eli about pending DC, all questions answered.     Objective     Last Recorded Vitals  /64 (BP Location: Right arm, Patient Position: Lying)   Pulse 51   Temp 35.9 °C (96.6 °F) (Temporal)   Resp 17   Wt (!) 39.4 kg (86 lb 13.8 oz)   SpO2 92%   Intake/Output last 3 Shifts:    Intake/Output Summary (Last 24 hours) at 10/4/2024 0630  Last data filed at 10/3/2024 0830  Gross per 24 hour   Intake 240 ml   Output --   Net 240 ml     Admission Weight  Weight: 47.7 kg (105 lb 2.6 oz) (09/28/24 1325)    Daily Weight  09/28/24 : (!) 39.4 kg (86 lb 13.8 oz)    Physical Exam  Vitals reviewed.   Constitutional:       Appearance: Normal appearance. She is normal weight.   HENT:      Head: Normocephalic and atraumatic.      Right Ear: External ear normal.      Left Ear: External ear normal.      Nose: Nose normal.      Mouth/Throat:      Mouth: Mucous membranes are moist.      Pharynx: Oropharynx is clear.   Eyes:      Conjunctiva/sclera: Conjunctivae normal.      Pupils: Pupils are equal, round, and reactive to light.   Cardiovascular:      Rate and Rhythm: Normal rate and regular rhythm.      Pulses: Normal pulses.      Heart sounds: Normal heart sounds.   Pulmonary:      Effort: Pulmonary effort is normal.      Breath sounds: Normal breath sounds.   Abdominal:      General: Bowel sounds are normal.      Palpations: Abdomen is soft.   Musculoskeletal:         General: Normal range of motion.      Cervical back: Normal range of motion and neck supple.   Skin:     General: Skin is warm and dry.   Neurological:      General: No focal deficit present.      Mental Status: She is alert. She is disoriented.     Scheduled medications  amLODIPine, 5  mg, oral, Daily  aspirin, 81 mg, oral, Daily  atorvastatin, 40 mg, oral, Nightly  calcium carbonate-vitamin D3, 1 tablet, oral, Daily  cefuroxime, 500 mg, oral, BID  cholestyramine, 4 g, oral, BID  [Held by provider] colestipol, 1 g, oral, BID after meals  enoxaparin, 40 mg, subcutaneous, q24h  icosapent ethyL, 1 g, oral, Daily  levothyroxine, 50 mcg, oral, Daily before breakfast  lisinopril, 20 mg, oral, Daily  magnesium oxide, 400 mg, oral, Daily  metoprolol succinate XL, 25 mg, oral, Nightly  pantoprazole, 40 mg, oral, Daily before breakfast  polyethylene glycol, 17 g, oral, Daily  predniSONE, 2.5 mg, oral, Daily  traZODone, 50 mg, oral, Nightly    Continuous medications  sodium chloride 0.9%, 10 mL/hr, Last Rate: 10 mL/hr (10/01/24 0529)    PRN medications  PRN medications: acetaminophen **OR** [DISCONTINUED] acetaminophen **OR** [DISCONTINUED] acetaminophen, benzocaine-menthol, dextromethorphan-guaifenesin, guaiFENesin, hydrOXYzine HCL, ipratropium-albuteroL, loperamide, lubricating eye drops, melatonin, ondansetron ODT **OR** ondansetron, oxygen, sodium chloride 0.9%    Relevant Results:  ECG 12 lead  Result Date: 10/1/2024  Sinus bradycardia Otherwise normal ECG When compared with ECG of 05-JUN-2024 09:29, No significant change was found See ED provider note for full interpretation and clinical correlation Confirmed by Jelly Garcia ([id number]) on 10/1/2024 11:44:40 AM    XR chest 1 view  Result Date: 9/28/2024  Right lower lobe infiltrate. Signed by Oracio Arnett MD    CT abdomen pelvis w IV contrast  Result Date: 9/28/2024  Markedly abnormally thickened distal ileum.  There is infiltration into the surrounding fat.  The previously described focal perforation has improved.  There are diverticula present in this most likely represent small bowel diverticulitis.  Ischemia cannot be excluded. Colonic diverticulosis. Multiple pancreatic cysts with dilated pancreatic duct.  There is dilatation of the common bile  duct as well as intrahepatic ducts but this may be related to the patient's prior cholecystectomy. Thickening of the wall the stomach.  Gastritis cannot be excluded. Dilated uterine veins.  There is fluid in the pelvis which may represent a distended endometrium.  This is best evaluated with ultrasound.  There are bibasilar pulmonary infiltrates. Signed by Oracio Arnett MD      Latest Reference Range & Units 10/01/24 06:08 10/01/24 15:40 10/02/24 06:25 10/02/24 06:26   GLUCOSE 74 - 99 mg/dL 161 (H)  113 (H)    SODIUM 136 - 145 mmol/L 140  140    POTASSIUM 3.5 - 5.3 mmol/L 4.1  3.4 (L)    CHLORIDE 98 - 107 mmol/L 104  108 (H)    Bicarbonate 21 - 32 mmol/L 29  29    Anion Gap 10 - 20 mmol/L 11  <7 (L)    Blood Urea Nitrogen 6 - 23 mg/dL 25 (H)  25 (H)    Creatinine 0.50 - 1.05 mg/dL 0.60  0.57    EGFR >60 mL/min/1.73m*2 88  89    Calcium 8.6 - 10.3 mg/dL 8.6  8.7    Troponin I, High Sensitivity 0 - 13 ng/L  10     WBC 4.4 - 11.3 x10*3/uL 16.6 (H)   13.3 (H)   nRBC 0.0 - 0.0 /100 WBCs 0.0   0.0   RBC 4.00 - 5.20 x10*6/uL 4.44   4.55   HEMOGLOBIN 12.0 - 16.0 g/dL 13.0   13.5   HEMATOCRIT 36.0 - 46.0 % 40.3   42.1   MCV 80 - 100 fL 91   93   MCH 26.0 - 34.0 pg 29.3   29.7   MCHC 32.0 - 36.0 g/dL 32.3   32.1   RED CELL DISTRIBUTION WIDTH 11.5 - 14.5 % 12.5   12.4   Platelets 150 - 450 x10*3/uL 236   235     Assessment/Plan      Assessment & Plan  Pneumonia, unspecified organism    Abnormal gait    Acute exacerbation of COPD with asthma    Benign essential hypertension    Arteriosclerosis of coronary artery    Dementia    Fatigue    GERD (gastroesophageal reflux disease)    Hypothyroidism    Hyperlipidemia    Impaired functional mobility, balance, gait, and endurance    Rheumatoid arthritis    Decreased appetite    History of Clostridioides difficile infection    Pneumonia of both lower lobes due to infectious organism    Pneumonia, community acquired  Acute exacerbation of COPD with asthma  - WBC 16.9 >13.2 > 18.2 > 16.6 >  13.3  - Procalcitonin 0.03  - Respiratory Culture if Able  - Blood culture negative to date  - CXR : Right lower lobe infiltrate.  - continue DuoNebs  - discontinued Solu Medrol 20 mg IV Q 12 hours,  - continue PO prednisone 2.5 mg daily  - discontinued ceftriaxone 1 g IV Daily   - cefuroxime  500 mg BID; day 5  - completed Azithromycin 500 mg IV Daily  - Home O2 RA, currently on RA  - continue robitussin and mucinex prn     Abnormal gait  Dementia  Fatigue  Impaired functional mobility, balance, gait, and endurance  -UA Negative  -PT/OT Eval and Treat; recommending moderate level therapy  -CM for DC planning  - awaiting precert     Benign essential hypertension  Arteriosclerosis of coronary artery  Hyperlipidemia  -continue Norvasc 5 mg PO Daily   -continue ASA 81 mg PO Daily   -continue Lipitor 40 mg PO HS  -continue Lisinopril 20 mg PO Daily   -continue Lopressor 25 mg PO BID     Hypomagnesemia   -Magnesium 2.24  -continue Mag-Ox 400 mg PO Daily      Hypothyroidism  -TSH 2.100: 8/12/24  -continue Synthroid 50 mcg PO Daily      Rheumatoid arthritis  -continue Prednisone 2.5 mg PO Daily      History of Bowel Perforation   Decreased Appetite  History of C Diff  GERD  -CT Abd/Pelvis: Markedly abnormally thickened distal ileum.  There is infiltration into the surrounding fat.  The previously described focal perforation has improved.  There are diverticula present in this most likely represent small bowel diverticulitis.  Ischemia cannot be excluded. Colonic diverticulosis. Multiple pancreatic cysts with dilated pancreatic duct.  There is dilatation of the common bile duct as well as intrahepatic ducts but this may be related to the patient's prior cholecystectomy. Thickening of the wall the stomach.  Gastritis cannot be excluded. Dilated uterine veins.  There is fluid in the pelvis which may represent a distended endometrium.  This is best evaluated with ultrasound.  There are bibasilar pulmonary  infiltrates.  -continue Questran 4 g PO BID   -continue Protonix Daily   -continue Miralax Daily   -Acute Care Surgery Consult, Appreciate Recs - chronic and stable - no intervention at this time     DVT ppx: continue enoxaparin  Code status: DNRCC-Arrest/DNI  Disposition: Patient requires inpatient stay. Discharge pending precert to SNF.      Ekta Martin, APRN-CNP

## 2024-10-04 NOTE — PROGRESS NOTES
10/04/24 0818   Discharge Planning   Assistance Needed Patient with some confusion at times; Patient lives with her daughter in a 1 story house. There is always family present; when daughter works, she stays with her son. She has a private duty aide 4 days a week for 8 hours. She uses either the walker or wheelchair. She can do ADLS but does have assistance.  Per direct submission precert is still pending and escalated again this morning.   Who is requesting discharge planning? Provider   Home or Post Acute Services Post acute facilities (Rehab/SNF/etc)   Type of Post Acute Facility Services Skilled nursing   Expected Discharge Disposition SNF   Patient Choice   Provider Choice list and CMS website (https://medicare.gov/care-compare#search) for post-acute Quality and Resource Measure Data were provided and reviewed with: Family   Patient / Family choosing to utilize agency / facility established prior to hospitalization No     10/4/24 1120 IMM obtained at this time.    10/4/24 1526 Per direct submission team, precert is still pending.

## 2024-10-04 NOTE — PROGRESS NOTES
Occupational Therapy    Occupational Therapy Treatment    Name: Maisha Agosto  MRN: [id number]  Department: [facility]  Room: [address] [place] [address]  Date: 10/04/24  Time Calculation  Start Time: 1340  Stop Time: 1410  Time Calculation (min): 30 min    Assessment:  OT Assessment: Good participation in OT session. Improved confidence with amabulation today AEB walker abandonment and standing without assist. Edu on safety with transfers and hand placement. Edu on walker use. Increased ambulation noted today. Improved LB dressing and toileitng as well. Pt. continues to be confused during sessions.  Prognosis: Good  Barriers to Discharge: None  Evaluation/Treatment Tolerance: Patient tolerated treatment well  Medical Staff Made Aware: Yes  End of Session Communication: Bedside nurse  End of Session Patient Position: Bed, 2 rail up, Alarm on  Plan:  Treatment Interventions: ADL retraining, Functional transfer training, Patient/family training, Compensatory technique education, Neuromuscular reeducation  OT Frequency: 3 times per week  OT Discharge Recommendations: Moderate intensity level of continued care  OT Recommended Transfer Status: Stand by assist, Assist of 1  OT - OK to Discharge: Yes (Based on completed evaluation and care plan recommendations, no barriers to discharge to next site of care)    Subjective   Previous Visit Info:  OT Last Visit  OT Received On: 10/04/24  General:  General  Reason for Referral: impaired self-care d/t admission for pneumonia.  Referred By: AJY Perez  Past Medical History Relevant to Rehab: dementia, COPD, DM, HLD, hypothyroidism, RA, TIA, ataxia, CAD, chronic insomnia, cough, GERD, HLD, hypernatremia, TIA, HTN  Family/Caregiver Present: No  Prior to Session Communication: Bedside nurse  Patient Position Received: Bed, 2 rail up, Alarm on  General Comment: berta bolaños,  Precautions:  Hearing/Visual Limitations: glasses; closing L eye at times, [place]  Medical Precautions: Fall  precautions    Vital Signs (Past 2hrs)        Date/Time Vitals Session Patient Position Pulse Resp SpO2 BP MAP (mmHg)    10/04/24 1340 Pre OT  Sitting  70  --  92 %  --  --                   Pain Assessment:  Pain Assessment  Pain Assessment: 0-10  0-10 (Numeric) Pain Score: 0 - No pain     Objective   Cognition:  Overall Cognitive Status: Impaired at baseline  Arousal/Alertness: Appropriate responses to stimuli  Orientation Level: Disoriented to time, Disoriented to situation  Activities of Daily Living: Grooming  Grooming Level of Assistance: Distant supervision  Grooming Where Assessed: Standing sinkside  Grooming Comments: oral hygiene and hair care completed; much better balance today    LE Dressing  LE Dressing: Yes  Pants Level of Assistance: Distant supervision  LE Dressing Where Assessed: Edge of bed  LE Dressing Comments: distant supervision for balance    Toileting  Toileting Level of Assistance: Close supervision  Where Assessed: Toilet  Toileting Comments: pt. demo'ing increased confidence in attempted to stand and ambulate today. no assistance for clothing mgmt or hygiene    Functional Standing Tolerance:  Functional Standing Tolerance  Time: 7 minutes  Activity: grooming  Functional Standing Tolerance Comments: increased balance  Bed Mobility/Transfers: Bed Mobility  Bed Mobility: Yes  Bed Mobility 1  Bed Mobility 1: Sitting to supine, Scooting  Level of Assistance 1: Modified independent  Bed Mobility Comments 1: use of hand rails    Transfers  Transfer: Yes  Transfer 1  Transfer From 1: Chair with arms to  Transfer to 1: Stand  Technique 1: Sit to stand  Transfer Device 1: Walker, Gait belt  Transfer Level of Assistance 1: Close supervision, Minimal verbal cues  Trials/Comments 1: verbal cueing for hand  placement for safety with transfers  Transfers 2  Transfer From 2: Stand to  Transfer to 2: Toilet  Technique 2: Stand to sit  Transfer Device 2: Walker, Gait belt  Transfer Level of Assistance 2:  Close supervision  Trials/Comments 2: cuieng for hand placement on the commode  Transfers 3  Transfer From 3: Toilet to  Transfer to 3: Bed  Technique 3: Sit to stand, Stand to sit  Transfer Device 3: Walker, Gait belt  Transfer Level of Assistance 3: Close supervision  Transfers 4  Transfer From 4: Bed to  Transfer to 4: Sit, Stand  Technique 4: Sit to stand, Stand to sit  Transfer Device 4: Gait belt  Transfer Level of Assistance 4: Close supervision  Trials/Comments 4: ema betancur fair carryover of hand placment cuieng AEB asking therapist where hands should be placed.    Toilet Transfers  Toilet Transfer From: Other (Comment) (walking)  Toilet Transfer Type: To  Toilet Transfer to: Raised toilet seat with rails  Toilet Transfer Technique: Ambulating  Toilet Transfers: Supervision    Functional Mobility:  Functional Mobility  Functional Mobility Performed: Yes  Functional Mobility 1  Surface 1: Level tile  Device 1: Rolling walker  Functional Mobility Support Devices: Gait belt  Assistance 1: Close supervision  Comments 1: increased balance, increased walker abandonment noted today; verbal cuieng for safety with turns and safety with walker mobility  Sitting Balance:  Static Sitting Balance  Static Sitting-Balance Support: Feet supported  Static Sitting-Level of Assistance: Independent  Dynamic Sitting Balance  Dynamic Sitting-Balance Support: Feet supported  Dynamic Sitting-Level of Assistance: Independent  Dynamic Sitting-Balance: Forward lean  Standing Balance:  Static Standing Balance  Static Standing-Balance Support: Bilateral upper extremity supported  Static Standing-Level of Assistance: Close supervision  Dynamic Standing Balance  Dynamic Standing-Balance Support: Bilateral upper extremity supported  Dynamic Standing-Level of Assistance: Close supervision  Dynamic Standing-Balance: Turning  Dynamic Standing-Comments: no LOB noted today; increased confidence with movement noted.    Outcome Measures:  [facility]  Daily Activity  Putting on and taking off regular lower body clothing: A little  Bathing (including washing, rinsing, drying): A little  Putting on and taking off regular upper body clothing: A little  Toileting, which includes using toilet, bedpan or urinal: A little  Taking care of personal grooming such as brushing teeth: A little  Eating Meals: None  Daily Activity - Total Score: 19    Education Documentation  Body Mechanics, taught by Di Valente OT at 10/4/2024  2:28 PM.  Learner: Patient  Readiness: Acceptance  Method: Explanation  Response: Verbalizes Understanding, Needs Reinforcement  Comment: Edu on walker abandonment, safety with transfers and use of call bell.    Precautions, taught by Di Valente OT at 10/4/2024  2:28 PM.  Learner: Patient  Readiness: Acceptance  Method: Explanation  Response: Verbalizes Understanding, Needs Reinforcement  Comment: Edu on walker abandonment, safety with transfers and use of call bell.    ADL Training, taught by Di Valente OT at 10/4/2024  2:28 PM.  Learner: Patient  Readiness: Acceptance  Method: Explanation  Response: Verbalizes Understanding, Needs Reinforcement  Comment: Edu on walker abandonment, safety with transfers and use of call bell.    Education Comments  No comments found.      Goals:  Encounter Problems       Encounter Problems (Active)       ADLs       Patient with complete upper body dressing with set-up and supervision level of assistance donning and doffing all UE clothes with PRN adaptive equipment while supported sitting (Progressing)       Start:  09/30/24    Expected End:  10/14/24            Patient with complete lower body dressing with set-up and supervision level of assistance donning and doffing all LE clothes  with PRN adaptive equipment while supported sitting (Met)       Start:  09/30/24    Expected End:  10/14/24    Resolved:  10/04/24         Patient will complete daily grooming tasks brushing teeth and washing face/hair with set-up  and supervision level of assistance and PRN adaptive equipment while standing. (Met)       Start:  09/30/24    Expected End:  10/14/24    Resolved:  10/04/24         Patient will complete toileting including hygiene clothing management/hygiene with set-up and supervision level of assistance. (Progressing)       Start:  09/30/24    Expected End:  10/14/24               MOBILITY       Patient will perform Functional mobility mod  Household distances/Community Distances with set-up and supervision level of assistance and least restrictive device in order to improve safety and functional mobility. (Progressing)       Start:  09/30/24    Expected End:  10/14/24               TRANSFERS       Patient will complete sit to stand transfer with set-up and supervision level of assistance and least restrictive device in order to improve safety and prepare for out of bed mobility. (Progressing)       Start:  09/30/24    Expected End:  10/14/24

## 2024-10-04 NOTE — PROGRESS NOTES
"Physical Therapy    Physical Therapy Treatment    Patient Name: Maisha Agosto  MRN: [id number]  Department: [facility]  Room: [address] [place] [address]  Today's Date: 10/4/2024  Time Calculation  Start Time: 0855  Stop Time: 0918  Time Calculation (min): 23 min    Assessment/Plan   PT Assessment  End of Session Communication: Bedside nurse  Assessment Comment: Performed activities well, no SOB, LOB, knees buckling this day.  End of Session Patient Position: Up in chair, Alarm on  PT Plan  Inpatient/Swing Bed or Outpatient: Inpatient  PT Plan  Treatment/Interventions: Transfer training, Gait training, Therapeutic exercise  PT Plan: Ongoing PT  PT Frequency: 3 times per week  PT Discharge Recommendations: Moderate intensity level of continued care  PT Recommended Transfer Status: Assist x1  PT - OK to Discharge: Yes    General Visit Information:   PT  Visit  PT Received On: 10/04/24  Response to Previous Treatment: Patient with no complaints from previous session.  General  Past Medical History Relevant to Rehab: dementia, COPD, DM, HLD, hypothyroidism, RA, TIA, ataxia, CAD, chronic insomnia, cough, GERD, HLD, hypernatremia, TIA, HTN  Patient Position Received: Up in chair, Alarm off, not on at start of session  General Comment: Daughter present, agreeable to session    Subjective   Precautions:  Precautions  Medical Precautions: Fall precautions  Precautions Comment: berta logan    Objective   Pain:  Pain Assessment  0-10 (Numeric) Pain Score: 0 - No pain  Cognition:  Cognition  Overall Cognitive Status: Impaired at baseline (Dementia)  Orientation Level: Disoriented to time    Activity Tolerance:  Activity Tolerance  Endurance: Endurance does not limit participation in activity  Treatments:  Ambulation/Gait Training 1  Surface 1: Level tile  Device 1: Rolling walker  Gait Support Devices: Gait belt  Assistance 1: Close supervision  Quality of Gait 1: Decreased step length  Comments/Distance (ft) 1: 75' x 2; did occassional get \"hung up\" " with turns however no LOB, no episodes of knee buckling this session. Daughter reporting pt appears close to baseline  during this session.  Transfer 1  Transfer From 1: Sit to  Transfer to 1: Stand  Transfer Device 1: Walker, Gait belt  Transfer Level of Assistance 1: Close supervision, Minimal verbal cues  Trials/Comments 1: multiple performed throughout, minimal cues for proper hand placement.    Stairs  Stairs: Yes  Stairs 2  Curb Step 2: Yes  Device 2: Railing (Right ascending)  Support Devices 2: Gait belt  Assistance 2: Close supervision  Comment/Number of Steps 2: x 4 six inch steps, x 6 four inch steps, occ HHA during descending; daughter reports there is always someone with her during stairs to help her and maintain safety.    Outcome Measures:  [facility] Basic Mobility  Turning from your back to your side while in a flat bed without using bedrails: None  Moving from lying on your back to sitting on the side of a flat bed without using bedrails: A little  Moving to and from bed to chair (including a wheelchair): A little  Standing up from a chair using your arms (e.g. wheelchair or bedside chair): A little  To walk in hospital room: A little  Climbing 3-5 steps with railing: A little  Basic Mobility - Total Score: 19      Encounter Problems       Encounter Problems (Active)       Balance       STG - Maintains dynamic standing balance without upper extremity support with good- balance to decrease fall risk  (Progressing)       Start:  09/30/24    Expected End:  10/08/24               Mobility       LTG - Patient will ambulate household distance with SUP and WW  (Progressing)       Start:  09/30/24    Expected End:  10/08/24            LTG - Patient will navigate 2 steps with 1 rails/device with CGA        Start:  09/30/24    Expected End:  10/08/24               PT Transfers       STG - Patient will perform bed mobility IND (Progressing)       Start:  09/30/24    Expected End:  10/08/24            STG - Patient  will transfer sit to and from stand MALLORIE with WW (Progressing)       Start:  09/30/24    Expected End:  10/08/24               Pain - Adult

## 2024-10-04 NOTE — CARE PLAN
The patient's goals for the shift include  to rest tonight    The clinical goals for the shift include Patient will tolerate care.

## 2024-10-05 PROCEDURE — 1200000002 HC GENERAL ROOM WITH TELEMETRY DAILY: Mod: IPSPLIT

## 2024-10-05 PROCEDURE — 94669 MECHANICAL CHEST WALL OSCILL: CPT | Mod: IPSPLIT

## 2024-10-05 PROCEDURE — 94760 N-INVAS EAR/PLS OXIMETRY 1: CPT | Mod: IPSPLIT

## 2024-10-05 PROCEDURE — 2500000001 HC RX 250 WO HCPCS SELF ADMINISTERED DRUGS (ALT 637 FOR MEDICARE OP): Mod: IPSPLIT

## 2024-10-05 PROCEDURE — 2500000005 HC RX 250 GENERAL PHARMACY W/O HCPCS: Mod: IPSPLIT

## 2024-10-05 PROCEDURE — 2500000004 HC RX 250 GENERAL PHARMACY W/ HCPCS (ALT 636 FOR OP/ED): Mod: IPSPLIT

## 2024-10-05 PROCEDURE — 99233 SBSQ HOSP IP/OBS HIGH 50: CPT | Performed by: NURSE PRACTITIONER

## 2024-10-05 PROCEDURE — 2500000004 HC RX 250 GENERAL PHARMACY W/ HCPCS (ALT 636 FOR OP/ED): Mod: IPSPLIT | Performed by: NURSE PRACTITIONER

## 2024-10-05 PROCEDURE — 2500000001 HC RX 250 WO HCPCS SELF ADMINISTERED DRUGS (ALT 637 FOR MEDICARE OP): Mod: IPSPLIT | Performed by: NURSE PRACTITIONER

## 2024-10-05 PROCEDURE — 94668 MNPJ CHEST WALL SBSQ: CPT | Mod: IPSPLIT

## 2024-10-05 ASSESSMENT — COGNITIVE AND FUNCTIONAL STATUS - GENERAL
CLIMB 3 TO 5 STEPS WITH RAILING: TOTAL
TURNING FROM BACK TO SIDE WHILE IN FLAT BAD: A LITTLE
HELP NEEDED FOR BATHING: A LOT
DRESSING REGULAR LOWER BODY CLOTHING: A LOT
WALKING IN HOSPITAL ROOM: A LOT
MOVING TO AND FROM BED TO CHAIR: A LOT
DAILY ACTIVITIY SCORE: 13
TOILETING: A LOT
MOBILITY SCORE: 13
MOVING FROM LYING ON BACK TO SITTING ON SIDE OF FLAT BED WITH BEDRAILS: A LITTLE
STANDING UP FROM CHAIR USING ARMS: A LOT
DRESSING REGULAR UPPER BODY CLOTHING: A LOT
PERSONAL GROOMING: A LOT
EATING MEALS: A LITTLE

## 2024-10-05 ASSESSMENT — PAIN SCALES - GENERAL
PAINLEVEL_OUTOF10: 0 - NO PAIN
PAINLEVEL_OUTOF10: 0 - NO PAIN

## 2024-10-05 NOTE — CARE PLAN
The patient's goals for the shift include  To rest    The clinical goals for the shift include Patient will have no falls during shift.    0745 Assumed care of patient. Patient sleeping, no signs or symptoms of distress. Call light within reach.    0830 Assessment completed as charted. Patient assisted to bathroom and to chair. Denies pain or complaints at this time. Call light within reach.    1200 Patient ambulating in room. Denies complaints. Call light within reach.    1400 Patient showered with assistance.    1600 Sitting up in chair, eating dinner. Denies complaints at this time. Call light within reach.    1835 Patient up to bathroom, states she feels anxious. Patient given Atarax as ordered.     1845 Patient laying in bed, states she is feeling better. Call light within reach.

## 2024-10-05 NOTE — CARE PLAN
The patient's goals for the shift include      The clinical goals for the shift include pt will remain free from and injury throughout the shift    Problem: Safety - Adult  Goal: Free from fall injury  Outcome: Progressing

## 2024-10-05 NOTE — PROGRESS NOTES
Maisha Agosto is a 84 y.o. female on day 6 of admission presenting with Pneumonia, unspecified organism.    Subjective   Patient assessed while sitting up in a chair. She is alert and oriented and pleasant. She denies pain, fever, chills, N/V/D/C. She uses a walker in the room with supervision. She has no complaints at this time. I spoke with her daughter Eli about pending DC, all questions answered.     Objective     Last Recorded Vitals  /70   Pulse 60   Temp 35.5 °C (95.9 °F) (Temporal)   Resp 15   Wt (!) 39.4 kg (86 lb 13.8 oz)   SpO2 94%   Intake/Output last 3 Shifts:    Intake/Output Summary (Last 24 hours) at 10/5/2024 1129  Last data filed at 10/5/2024 1007  Gross per 24 hour   Intake 240 ml   Output --   Net 240 ml     Admission Weight  Weight: 47.7 kg (105 lb 2.6 oz) (09/28/24 1325)    Daily Weight  09/28/24 : (!) 39.4 kg (86 lb 13.8 oz)    Physical Exam  Vitals reviewed.   Constitutional:       Appearance: Normal appearance. She is normal weight.   HENT:      Head: Normocephalic and atraumatic.      Right Ear: External ear normal.      Left Ear: External ear normal.      Nose: Nose normal.      Mouth/Throat:      Mouth: Mucous membranes are moist.      Pharynx: Oropharynx is clear.   Eyes:      Conjunctiva/sclera: Conjunctivae normal.      Pupils: Pupils are equal, round, and reactive to light.   Cardiovascular:      Rate and Rhythm: Normal rate and regular rhythm.      Pulses: Normal pulses.      Heart sounds: Normal heart sounds.   Pulmonary:      Effort: Pulmonary effort is normal.      Breath sounds: Normal breath sounds.   Abdominal:      General: Bowel sounds are normal.      Palpations: Abdomen is soft.   Musculoskeletal:         General: Normal range of motion.      Cervical back: Normal range of motion and neck supple.   Skin:     General: Skin is warm and dry.   Neurological:      General: No focal deficit present.      Mental Status: She is alert. She is disoriented.     Scheduled  medications  amLODIPine, 5 mg, oral, Daily  aspirin, 81 mg, oral, Daily  atorvastatin, 40 mg, oral, Nightly  calcium carbonate-vitamin D3, 1 tablet, oral, Daily  cholestyramine, 4 g, oral, BID  [Held by provider] colestipol, 1 g, oral, BID after meals  enoxaparin, 40 mg, subcutaneous, q24h  icosapent ethyL, 1 g, oral, Daily  levothyroxine, 50 mcg, oral, Daily before breakfast  lisinopril, 20 mg, oral, Daily  magnesium oxide, 400 mg, oral, Daily  metoprolol succinate XL, 25 mg, oral, Nightly  pantoprazole, 40 mg, oral, Daily before breakfast  polyethylene glycol, 17 g, oral, Daily  predniSONE, 2.5 mg, oral, Daily  traZODone, 50 mg, oral, Nightly    Continuous medications  sodium chloride 0.9%, 10 mL/hr, Last Rate: 10 mL/hr (10/01/24 0529)    PRN medications  PRN medications: acetaminophen **OR** [DISCONTINUED] acetaminophen **OR** [DISCONTINUED] acetaminophen, benzocaine-menthol, dextromethorphan-guaifenesin, guaiFENesin, hydrOXYzine HCL, ipratropium-albuteroL, loperamide, lubricating eye drops, melatonin, ondansetron ODT **OR** ondansetron, oxygen, sodium chloride 0.9%    Relevant Results:  ECG 12 lead  Result Date: 10/1/2024  Sinus bradycardia Otherwise normal ECG When compared with ECG of 05-JUN-2024 09:29, No significant change was found See ED provider note for full interpretation and clinical correlation Confirmed by Jelly Garcia ([id number]) on 10/1/2024 11:44:40 AM    XR chest 1 view  Result Date: 9/28/2024  Right lower lobe infiltrate. Signed by Oracio Arnett MD    CT abdomen pelvis w IV contrast  Result Date: 9/28/2024  Markedly abnormally thickened distal ileum.  There is infiltration into the surrounding fat.  The previously described focal perforation has improved.  There are diverticula present in this most likely represent small bowel diverticulitis.  Ischemia cannot be excluded. Colonic diverticulosis. Multiple pancreatic cysts with dilated pancreatic duct.  There is dilatation of the common bile duct  as well as intrahepatic ducts but this may be related to the patient's prior cholecystectomy. Thickening of the wall the stomach.  Gastritis cannot be excluded. Dilated uterine veins.  There is fluid in the pelvis which may represent a distended endometrium.  This is best evaluated with ultrasound.  There are bibasilar pulmonary infiltrates. Signed by Oracio Arnett MD      Latest Reference Range & Units 09/30/24 05:55 10/01/24 06:08 10/02/24 06:25   GLUCOSE 74 - 99 mg/dL 172 (H) 161 (H) 113 (H)   SODIUM 136 - 145 mmol/L 140 140 140   POTASSIUM 3.5 - 5.3 mmol/L 3.8 4.1 3.4 (L)   CHLORIDE 98 - 107 mmol/L 105 104 108 (H)   Bicarbonate 21 - 32 mmol/L 30 29 29   Anion Gap 10 - 20 mmol/L 9 (L) 11 <7 (L)   Blood Urea Nitrogen 6 - 23 mg/dL 29 (H) 25 (H) 25 (H)   Creatinine 0.50 - 1.05 mg/dL 0.59 0.60 0.57   EGFR >60 mL/min/1.73m*2 88 88 89   Calcium 8.6 - 10.3 mg/dL 9.1 8.6 8.7      Latest Reference Range & Units 09/30/24 05:55 10/01/24 06:08 10/02/24 06:26   WBC 4.4 - 11.3 x10*3/uL 18.2 (H) 16.6 (H) 13.3 (H)   nRBC 0.0 - 0.0 /100 WBCs 0.0 0.0 0.0   RBC 4.00 - 5.20 x10*6/uL 4.00 4.44 4.55   HEMOGLOBIN 12.0 - 16.0 g/dL 12.0 13.0 13.5   HEMATOCRIT 36.0 - 46.0 % 36.9 40.3 42.1   MCV 80 - 100 fL 92 91 93   MCH 26.0 - 34.0 pg 30.0 29.3 29.7   MCHC 32.0 - 36.0 g/dL 32.5 32.3 32.1   RED CELL DISTRIBUTION WIDTH 11.5 - 14.5 % 12.4 12.5 12.4   Platelets 150 - 450 x10*3/uL 211 236 235     Assessment/Plan      Assessment & Plan  Pneumonia, unspecified organism    Abnormal gait    Acute exacerbation of COPD with asthma    Benign essential hypertension    Arteriosclerosis of coronary artery    Dementia    Fatigue    GERD (gastroesophageal reflux disease)    Hypothyroidism    Hyperlipidemia    Impaired functional mobility, balance, gait, and endurance    Rheumatoid arthritis    Decreased appetite    History of Clostridioides difficile infection    Pneumonia of both lower lobes due to infectious organism    Pneumonia, community  acquired  Acute exacerbation of COPD with asthma  - WBC 16.9 >13.2 > 18.2 > 16.6 > 13.3  - Procalcitonin 0.03  - Respiratory Culture if Able  - Blood culture negative to date  - CXR : Right lower lobe infiltrate.  - continue DuoNebs  - discontinued Solu Medrol 20 mg IV Q 12 hours,  - continue PO prednisone 2.5 mg daily  - discontinued ceftriaxone 1 g IV Daily, changed to cefuroxime  500 mg BID; complete  - completed Azithromycin 500 mg IV Daily, complete  - Home O2 RA, currently on RA  - continue robitussin and mucinex prn     Abnormal gait  Dementia  Fatigue  Impaired functional mobility, balance, gait, and endurance  - UA Negative  - PT/OT Eval and Treat; recommending moderate level therapy  - CM for DC planning  - awaiting precert     Benign essential hypertension  Arteriosclerosis of coronary artery  Hyperlipidemia  - continue Norvasc 5 mg PO Daily   - continue ASA 81 mg PO Daily   - continue Lipitor 40 mg PO HS  - continue Lisinopril 20 mg PO Daily   - continue Lopressor 25 mg PO BID     Hypomagnesemia   - Magnesium 2.24  - continue Mag-Ox 400 mg PO Daily      Hypothyroidism  -TSH 2.100: 8/12/24  - continue Synthroid 50 mcg PO Daily      Rheumatoid arthritis  - continue Prednisone 2.5 mg PO Daily      History of Bowel Perforation   Decreased Appetite  History of C Diff  GERD  - CT Abd/Pelvis: Markedly abnormally thickened distal ileum.  There is infiltration into the surrounding fat.  The previously described focal perforation has improved.  There are diverticula present in this most likely represent small bowel diverticulitis.  Ischemia cannot be excluded. Colonic diverticulosis. Multiple pancreatic cysts with dilated pancreatic duct.  There is dilatation of the common bile duct as well as intrahepatic ducts but this may be related to the patient's prior cholecystectomy. Thickening of the wall the stomach.  Gastritis cannot be excluded. Dilated uterine veins.  There is fluid in the pelvis which may represent a  distended endometrium.  This is best evaluated with ultrasound.  There are bibasilar pulmonary infiltrates.  - continue Questran 4 g PO BID   - continue Protonix Daily   - continue Miralax Daily   - Acute Care Surgery Consult, Appreciate Recs - chronic and stable - no intervention at this time     DVT ppx: continue enoxaparin  Code status: DNRCC-Arrest/DNI  Disposition: Patient requires inpatient stay. Discharge pending precert to SNF.      Ekta Martin, ELIO-CNP

## 2024-10-06 VITALS
BODY MASS INDEX: 16.4 KG/M2 | HEART RATE: 71 BPM | OXYGEN SATURATION: 95 % | RESPIRATION RATE: 18 BRPM | SYSTOLIC BLOOD PRESSURE: 129 MMHG | TEMPERATURE: 98.7 F | WEIGHT: 86.86 LBS | DIASTOLIC BLOOD PRESSURE: 59 MMHG | HEIGHT: 61 IN

## 2024-10-06 LAB
ANION GAP SERPL CALC-SCNC: 9 MMOL/L (ref 10–20)
BUN SERPL-MCNC: 27 MG/DL (ref 6–23)
CALCIUM SERPL-MCNC: 8.8 MG/DL (ref 8.6–10.3)
CHLORIDE SERPL-SCNC: 108 MMOL/L (ref 98–107)
CO2 SERPL-SCNC: 26 MMOL/L (ref 21–32)
CREAT SERPL-MCNC: 0.63 MG/DL (ref 0.5–1.05)
EGFRCR SERPLBLD CKD-EPI 2021: 87 ML/MIN/1.73M*2
ERYTHROCYTE [DISTWIDTH] IN BLOOD BY AUTOMATED COUNT: 12.4 % (ref 11.5–14.5)
GLUCOSE SERPL-MCNC: 150 MG/DL (ref 74–99)
HCT VFR BLD AUTO: 39.5 % (ref 36–46)
HGB BLD-MCNC: 12.8 G/DL (ref 12–16)
MCH RBC QN AUTO: 29.6 PG (ref 26–34)
MCHC RBC AUTO-ENTMCNC: 32.4 G/DL (ref 32–36)
MCV RBC AUTO: 91 FL (ref 80–100)
NRBC BLD-RTO: 0 /100 WBCS (ref 0–0)
PLATELET # BLD AUTO: 231 X10*3/UL (ref 150–450)
POTASSIUM SERPL-SCNC: 3.7 MMOL/L (ref 3.5–5.3)
RBC # BLD AUTO: 4.32 X10*6/UL (ref 4–5.2)
SODIUM SERPL-SCNC: 139 MMOL/L (ref 136–145)
WBC # BLD AUTO: 22 X10*3/UL (ref 4.4–11.3)

## 2024-10-06 PROCEDURE — 94760 N-INVAS EAR/PLS OXIMETRY 1: CPT | Mod: IPSPLIT

## 2024-10-06 PROCEDURE — 2500000001 HC RX 250 WO HCPCS SELF ADMINISTERED DRUGS (ALT 637 FOR MEDICARE OP): Mod: IPSPLIT | Performed by: NURSE PRACTITIONER

## 2024-10-06 PROCEDURE — 2500000004 HC RX 250 GENERAL PHARMACY W/ HCPCS (ALT 636 FOR OP/ED): Mod: IPSPLIT

## 2024-10-06 PROCEDURE — 94762 N-INVAS EAR/PLS OXIMTRY CONT: CPT | Mod: IPSPLIT

## 2024-10-06 PROCEDURE — 2500000005 HC RX 250 GENERAL PHARMACY W/O HCPCS: Mod: IPSPLIT

## 2024-10-06 PROCEDURE — 2500000004 HC RX 250 GENERAL PHARMACY W/ HCPCS (ALT 636 FOR OP/ED): Mod: IPSPLIT | Performed by: NURSE PRACTITIONER

## 2024-10-06 PROCEDURE — 85027 COMPLETE CBC AUTOMATED: CPT | Mod: IPSPLIT | Performed by: NURSE PRACTITIONER

## 2024-10-06 PROCEDURE — 2500000001 HC RX 250 WO HCPCS SELF ADMINISTERED DRUGS (ALT 637 FOR MEDICARE OP): Mod: IPSPLIT

## 2024-10-06 PROCEDURE — 1200000002 HC GENERAL ROOM WITH TELEMETRY DAILY: Mod: IPSPLIT

## 2024-10-06 PROCEDURE — 99232 SBSQ HOSP IP/OBS MODERATE 35: CPT | Performed by: NURSE PRACTITIONER

## 2024-10-06 PROCEDURE — 36415 COLL VENOUS BLD VENIPUNCTURE: CPT | Mod: IPSPLIT | Performed by: NURSE PRACTITIONER

## 2024-10-06 PROCEDURE — 80048 BASIC METABOLIC PNL TOTAL CA: CPT | Mod: IPSPLIT | Performed by: NURSE PRACTITIONER

## 2024-10-06 RX ORDER — MENTHOL AND ZINC OXIDE .44; 20.625 G/100G; G/100G
1 OINTMENT TOPICAL 4 TIMES DAILY PRN
Status: DISCONTINUED | OUTPATIENT
Start: 2024-10-06 | End: 2024-10-07 | Stop reason: HOSPADM

## 2024-10-06 ASSESSMENT — PAIN SCALES - WONG BAKER: WONGBAKER_NUMERICALRESPONSE: NO HURT

## 2024-10-06 ASSESSMENT — COGNITIVE AND FUNCTIONAL STATUS - GENERAL
CLIMB 3 TO 5 STEPS WITH RAILING: A LITTLE
WALKING IN HOSPITAL ROOM: A LITTLE
STANDING UP FROM CHAIR USING ARMS: A LITTLE
MOVING TO AND FROM BED TO CHAIR: A LITTLE
EATING MEALS: A LITTLE
DRESSING REGULAR LOWER BODY CLOTHING: A LITTLE
TOILETING: A LITTLE
DAILY ACTIVITIY SCORE: 18
HELP NEEDED FOR BATHING: A LITTLE
DRESSING REGULAR UPPER BODY CLOTHING: A LITTLE
MOBILITY SCORE: 20
PERSONAL GROOMING: A LITTLE

## 2024-10-06 ASSESSMENT — PAIN - FUNCTIONAL ASSESSMENT: PAIN_FUNCTIONAL_ASSESSMENT: 0-10

## 2024-10-06 ASSESSMENT — PAIN SCALES - GENERAL
PAINLEVEL_OUTOF10: 0 - NO PAIN
PAINLEVEL_OUTOF10: 0 - NO PAIN

## 2024-10-06 ASSESSMENT — PAIN INTENSITY VAS: VAS_PAIN_BASICVITALS_IP: 0

## 2024-10-06 NOTE — PROGRESS NOTES
"Maisha Agosto is a 84 y.o. female on day 7 of admission presenting with Pneumonia, unspecified organism.      Subjective   \"Im doing alright\" denies chest pain, sob, n/v/c/d  Pt sitting up in bed - alert, engaging in conversation - pleasant and cooperative        Objective     Last Recorded Vitals  /65 (BP Location: Right arm, Patient Position: Lying)   Pulse 63   Temp 36.4 °C (97.6 °F) (Temporal)   Resp 18   Wt (!) 39.4 kg (86 lb 13.8 oz)   SpO2 98%   Intake/Output last 3 Shifts:    Intake/Output Summary (Last 24 hours) at 10/6/2024 1303  Last data filed at 10/6/2024 1047  Gross per 24 hour   Intake 240 ml   Output --   Net 240 ml       Admission Weight  Weight: 47.7 kg (105 lb 2.6 oz) (09/28/24 1325)    Daily Weight  09/28/24 : (!) 39.4 kg (86 lb 13.8 oz)    Image Results  Electrocardiogram, 12-lead PRN ACS symptoms  Sinus tachycardia  Septal infarct , age undetermined  ST & T wave abnormality, consider inferior ischemia  ST & T wave abnormality, consider anterolateral ischemia  Abnormal ECG  When compared with ECG of 28-SEP-2024 13:27,  Significant changes have occurred      Physical Exam  Constitutional:       Appearance: She is ill-appearing.   HENT:      Head: Normocephalic.      Mouth/Throat:      Mouth: Mucous membranes are moist.   Eyes:      Extraocular Movements: Extraocular movements intact.   Cardiovascular:      Rate and Rhythm: Normal rate and regular rhythm.      Pulses: Normal pulses.      Heart sounds: Normal heart sounds.   Pulmonary:      Effort: Pulmonary effort is normal.      Breath sounds: Normal breath sounds.   Abdominal:      General: Bowel sounds are normal.      Palpations: Abdomen is soft.   Musculoskeletal:         General: Normal range of motion.      Cervical back: Normal range of motion.   Skin:     General: Skin is warm and dry.      Capillary Refill: Capillary refill takes less than 2 seconds.   Neurological:      General: No focal deficit present.      Mental Status: " She is alert and oriented to person, place, and time.   Psychiatric:         Mood and Affect: Mood normal.         Behavior: Behavior normal.         Relevant Results           Scheduled medications  amLODIPine, 5 mg, oral, Daily  aspirin, 81 mg, oral, Daily  atorvastatin, 40 mg, oral, Nightly  calcium carbonate-vitamin D3, 1 tablet, oral, Daily  cholestyramine, 4 g, oral, BID  [Held by provider] colestipol, 1 g, oral, BID after meals  enoxaparin, 40 mg, subcutaneous, q24h  icosapent ethyL, 1 g, oral, Daily  levothyroxine, 50 mcg, oral, Daily before breakfast  lisinopril, 20 mg, oral, Daily  magnesium oxide, 400 mg, oral, Daily  metoprolol succinate XL, 25 mg, oral, Nightly  pantoprazole, 40 mg, oral, Daily before breakfast  polyethylene glycol, 17 g, oral, Daily  predniSONE, 2.5 mg, oral, Daily  traZODone, 50 mg, oral, Nightly      Continuous medications     PRN medications  PRN medications: acetaminophen **OR** [DISCONTINUED] acetaminophen **OR** [DISCONTINUED] acetaminophen, benzocaine-menthol, dextromethorphan-guaifenesin, guaiFENesin, hydrOXYzine HCL, ipratropium-albuteroL, loperamide, lubricating eye drops, melatonin, ondansetron ODT **OR** ondansetron, oxygen  Results for orders placed or performed during the hospital encounter of 09/28/24 (from the past 24 hour(s))   CBC   Result Value Ref Range    WBC 22.0 (H) 4.4 - 11.3 x10*3/uL    nRBC 0.0 0.0 - 0.0 /100 WBCs    RBC 4.32 4.00 - 5.20 x10*6/uL    Hemoglobin 12.8 12.0 - 16.0 g/dL    Hematocrit 39.5 36.0 - 46.0 %    MCV 91 80 - 100 fL    MCH 29.6 26.0 - 34.0 pg    MCHC 32.4 32.0 - 36.0 g/dL    RDW 12.4 11.5 - 14.5 %    Platelets 231 150 - 450 x10*3/uL   Basic Metabolic Panel   Result Value Ref Range    Glucose 150 (H) 74 - 99 mg/dL    Sodium 139 136 - 145 mmol/L    Potassium 3.7 3.5 - 5.3 mmol/L    Chloride 108 (H) 98 - 107 mmol/L    Bicarbonate 26 21 - 32 mmol/L    Anion Gap 9 (L) 10 - 20 mmol/L    Urea Nitrogen 27 (H) 6 - 23 mg/dL    Creatinine 0.63 0.50 -  1.05 mg/dL    eGFR 87 >60 mL/min/1.73m*2    Calcium 8.8 8.6 - 10.3 mg/dL     Electrocardiogram, 12-lead PRN ACS symptoms    Result Date: 10/3/2024  Sinus tachycardia Septal infarct , age undetermined ST & T wave abnormality, consider inferior ischemia ST & T wave abnormality, consider anterolateral ischemia Abnormal ECG When compared with ECG of 28-SEP-2024 13:27, Significant changes have occurred    ECG 12 lead    Result Date: 10/1/2024  Sinus bradycardia Otherwise normal ECG When compared with ECG of 05-JUN-2024 09:29, No significant change was found See ED provider note for full interpretation and clinical correlation Confirmed by Jelly Garcia ([id number]) on 10/1/2024 11:44:40 AM    XR chest 1 view    Result Date: 9/28/2024  STUDY: Chest Radiograph;  9/28/2024 15:29 INDICATION: Confusion, cough. COMPARISON: 6/23/2022 XR Chest ACCESSION NUMBER(S): [id number] ORDERING CLINICIAN: JAIRO SOTO TECHNIQUE:  Frontal chest was obtained at 15:28 hours. FINDINGS: CARDIOMEDIASTINAL SILHOUETTE: Cardiomediastinal silhouette is normal in size and configuration.  LUNGS: There are findings consistent with COPD.  There is a patchy infiltrate in the right costophrenic angle.  ABDOMEN: No remarkable upper abdominal findings.  BONES: No acute osseous changes.    Right lower lobe infiltrate. Signed by Oracio Arnett MD    CT abdomen pelvis w IV contrast    Result Date: 9/28/2024  STUDY: CT Abdomen and Pelvis with IV Contrast; 9/28/2024 3:04 PM INDICATION: Abdominal pain. COMPARISON: CT AP 6/5/2024. ACCESSION NUMBER(S): [id number] ORDERING CLINICIAN: JAIRO SOTO TECHNIQUE: CT of the abdomen and pelvis was performed.  Contiguous axial images were obtained at 3 mm slice thickness through the abdomen and pelvis. Coronal and sagittal reconstructions at 3 mm slice thickness were performed.  Omnipaque 350 70 mL was administered intravenously.  FINDINGS: LOWER CHEST: The heart is enlarged.  There is a small pericardial effusion  which is most likely physiologic.  There are infiltrates in both lung bases. There are multiple pulmonary nodules in the right lung base.  These are new since June 2024 and are most likely inflammatory.  ABDOMEN:  LIVER: No hepatomegaly.  Smooth surface contour.  Normal attenuation.  BILE DUCTS: There is biliary duct dilatation with the common bile duct measuring up to 17 mm..  GALLBLADDER: The gallbladder is absent. STOMACH: There is thickening of the wall the stomach.  This may be related to lack of distention but can be seen with gastritis. PANCREAS: There are numerous cysts in the pancreas with dilatation of the pancreatic duct.  The largest cyst measures approximately 23 mm.  SPLEEN: No splenomegaly or focal splenic lesion.  ADRENAL GLANDS: No thickening or nodules.  KIDNEYS AND URETERS: Kidneys are normal in size and location.  No renal or ureteral calculi.  PELVIS:  BLADDER: No abnormalities identified.  REPRODUCTIVE ORGANS: There are dilated uterine veins consistent with pelvic congestion syndrome.  There is fluid in the pelvis and this may represent a distended endometrium.  There is a calcification present.  BOWEL: There is diverticulosis.  Is abnormal thickening of the wall the terminal ileum.  There are diverticula involving the distal ileum.  VESSELS: No abnormalities identified.  Abdominal aorta is normal in caliber.  PERITONEUM/RETROPERITONEUM/LYMPH NODES: No free fluid.  No pneumoperitoneum. No lymphadenopathy.  ABDOMINAL WALL: No abnormalities identified. SOFT TISSUES: No abnormalities identified.  BONES: No acute fracture or aggressive osseous lesion.    Markedly abnormally thickened distal ileum.  There is infiltration into the surrounding fat.  The previously described focal perforation has improved.  There are diverticula present in this most likely represent small bowel diverticulitis.  Ischemia cannot be excluded. Colonic diverticulosis. Multiple pancreatic cysts with dilated pancreatic duct.   There is dilatation of the common bile duct as well as intrahepatic ducts but this may be related to the patient's prior cholecystectomy. Thickening of the wall the stomach.  Gastritis cannot be excluded. Dilated uterine veins.  There is fluid in the pelvis which may represent a distended endometrium.  This is best evaluated with ultrasound.  There are bibasilar pulmonary infiltrates. Signed by Oracio Arnett MD       Assessment/Plan                  Assessment & Plan  Pneumonia, unspecified organism    Abnormal gait    Acute exacerbation of COPD with asthma    Benign essential hypertension    Arteriosclerosis of coronary artery    Dementia    Fatigue    GERD (gastroesophageal reflux disease)    Hypothyroidism    Hyperlipidemia    Impaired functional mobility, balance, gait, and endurance    Rheumatoid arthritis    Decreased appetite    History of Clostridioides difficile infection    Pneumonia of both lower lobes due to infectious organism    Pneumonia, community acquired  Acute exacerbation of COPD with asthma  - WBC 16.9 >13.2 > 18.2 > 16.6 > 13.3  - Procalcitonin 0.03  - Respiratory Culture if Able  - Blood culture negative to date  - CXR : Right lower lobe infiltrate.  - continue DuoNebs  - discontinued Solu Medrol 20 mg IV Q 12 hours,  - continue PO prednisone 2.5 mg daily  - discontinued ceftriaxone 1 g IV Daily, changed to cefuroxime  500 mg BID; complete  - completed Azithromycin 500 mg IV Daily, complete  - Home O2 RA, currently on RA  - continue robitussin and mucinex prn     Abnormal gait  Dementia  Fatigue  Impaired functional mobility, balance, gait, and endurance  - UA Negative  - PT/OT Eval and Treat; recommending moderate level therapy  - CM for DC planning  - awaiting precert     Benign essential hypertension  Arteriosclerosis of coronary artery  Hyperlipidemia  - continue Norvasc 5 mg PO Daily   - continue ASA 81 mg PO Daily   - continue Lipitor 40 mg PO HS  - continue Lisinopril 20 mg PO Daily   -  continue Lopressor 25 mg PO BID     Hypomagnesemia   - Magnesium 2.24  - continue Mag-Ox 400 mg PO Daily      Hypothyroidism  -TSH 2.100: 8/12/24  - continue Synthroid 50 mcg PO Daily      Rheumatoid arthritis  - continue Prednisone 2.5 mg PO Daily      History of Bowel Perforation   Decreased Appetite  History of C Diff  GERD  - CT Abd/Pelvis: Markedly abnormally thickened distal ileum.  There is infiltration into the surrounding fat.  The previously described focal perforation has improved.  There are diverticula present in this most likely represent small bowel diverticulitis.  Ischemia cannot be excluded. Colonic diverticulosis. Multiple pancreatic cysts with dilated pancreatic duct.  There is dilatation of the common bile duct as well as intrahepatic ducts but this may be related to the patient's prior cholecystectomy. Thickening of the wall the stomach.  Gastritis cannot be excluded. Dilated uterine veins.  There is fluid in the pelvis which may represent a distended endometrium.  This is best evaluated with ultrasound.  There are bibasilar pulmonary infiltrates.  - continue Questran 4 g PO BID   - continue Protonix Daily   - continue Miralax Daily   - Acute Care Surgery Consult, Appreciate Recs - chronic and stable - no intervention at this time      DVT ppx: continue enoxaparin  Code status: DNRCC-Arrest/DNI  Disposition: Patient requires inpatient stay. Discharge pending precert to SNF.      Total accumulated time spent face to face and not face to face preparing to see the patient, obtaining and reviewing separately obtained history; performing a medically appropriate examination and/or evaluation; counseling and educating the patient, family; ordering medications, tests, or procedures; referring and communicating with other health care professionals; documenting clinical information in the patient's medical record; independently interpreting results and communicating the results to the patient, family;  and care coordination was 45 minutes           Manjula Garvey, APRN-CNP

## 2024-10-06 NOTE — CARE PLAN
RT has completed the requested/ordered overnight trend on room air and patient does qualify for 2L of O2 at HS with her discharge.    Total Time Below: 50min 31sec  Longest Duration: 14min 58sec  Lowest SpO2: 84% at 10/05/24 @ 09:53:34 PM  Number of Events: 79

## 2024-10-06 NOTE — CARE PLAN
The clinical goals for the shift include Patient will maintain SPO2 >92% on 2LNCO2    Over the shift, the patient did make progress toward care plan goals. Sleep study completed this shift where patient's SPO2 dropped to 85% on RA, 2LNCO2 reapplied. Patient reading <92%, monitor moved to toe and patient up to 99%. Patient denies pain this shift. Patient resting in bed at this time with bed alarm functioning and call bell within reach.

## 2024-10-07 ENCOUNTER — NURSING HOME VISIT (OUTPATIENT)
Dept: POST ACUTE CARE | Facility: EXTERNAL LOCATION | Age: 84
End: 2024-10-07
Payer: MEDICARE

## 2024-10-07 VITALS
RESPIRATION RATE: 18 BRPM | HEIGHT: 61 IN | DIASTOLIC BLOOD PRESSURE: 51 MMHG | SYSTOLIC BLOOD PRESSURE: 96 MMHG | HEART RATE: 59 BPM | TEMPERATURE: 98.1 F | WEIGHT: 86.86 LBS | OXYGEN SATURATION: 97 % | BODY MASS INDEX: 16.4 KG/M2

## 2024-10-07 DIAGNOSIS — F03.90 DEMENTIA, UNSPECIFIED DEMENTIA SEVERITY, UNSPECIFIED DEMENTIA TYPE, UNSPECIFIED WHETHER BEHAVIORAL, PSYCHOTIC, OR MOOD DISTURBANCE OR ANXIETY (MULTI): ICD-10-CM

## 2024-10-07 DIAGNOSIS — J44.9 CHRONIC OBSTRUCTIVE PULMONARY DISEASE, UNSPECIFIED COPD TYPE (MULTI): ICD-10-CM

## 2024-10-07 DIAGNOSIS — R53.1 WEAKNESS: ICD-10-CM

## 2024-10-07 DIAGNOSIS — M06.9 RHEUMATOID ARTHRITIS, INVOLVING UNSPECIFIED SITE, UNSPECIFIED WHETHER RHEUMATOID FACTOR PRESENT (MULTI): ICD-10-CM

## 2024-10-07 DIAGNOSIS — Z91.81 AT RISK FOR FALLS: ICD-10-CM

## 2024-10-07 DIAGNOSIS — I25.10 CORONARY ARTERY DISEASE, UNSPECIFIED VESSEL OR LESION TYPE, UNSPECIFIED WHETHER ANGINA PRESENT, UNSPECIFIED WHETHER NATIVE OR TRANSPLANTED HEART: ICD-10-CM

## 2024-10-07 DIAGNOSIS — E78.2 MIXED HYPERLIPIDEMIA: ICD-10-CM

## 2024-10-07 DIAGNOSIS — K21.9 GASTROESOPHAGEAL REFLUX DISEASE WITHOUT ESOPHAGITIS: ICD-10-CM

## 2024-10-07 DIAGNOSIS — J18.9 PNEUMONIA DUE TO INFECTIOUS ORGANISM, UNSPECIFIED LATERALITY, UNSPECIFIED PART OF LUNG: Primary | ICD-10-CM

## 2024-10-07 DIAGNOSIS — I10 HYPERTENSION, UNSPECIFIED TYPE: ICD-10-CM

## 2024-10-07 DIAGNOSIS — E03.9 HYPOTHYROIDISM, UNSPECIFIED TYPE: ICD-10-CM

## 2024-10-07 LAB
APPEARANCE UR: CLEAR
ATRIAL RATE: 115 BPM
BACTERIA #/AREA URNS AUTO: ABNORMAL /HPF
BILIRUB UR STRIP.AUTO-MCNC: NEGATIVE MG/DL
COLOR UR: YELLOW
ERYTHROCYTE [DISTWIDTH] IN BLOOD BY AUTOMATED COUNT: 12.5 % (ref 11.5–14.5)
GLUCOSE UR STRIP.AUTO-MCNC: ABNORMAL MG/DL
HCT VFR BLD AUTO: 36.3 % (ref 36–46)
HGB BLD-MCNC: 11.7 G/DL (ref 12–16)
HOLD SPECIMEN: NORMAL
HOLD SPECIMEN: NORMAL
KETONES UR STRIP.AUTO-MCNC: NEGATIVE MG/DL
LEUKOCYTE ESTERASE UR QL STRIP.AUTO: ABNORMAL
MCH RBC QN AUTO: 29.5 PG (ref 26–34)
MCHC RBC AUTO-ENTMCNC: 32.2 G/DL (ref 32–36)
MCV RBC AUTO: 92 FL (ref 80–100)
MUCOUS THREADS #/AREA URNS AUTO: ABNORMAL /LPF
NITRITE UR QL STRIP.AUTO: NEGATIVE
NRBC BLD-RTO: 0 /100 WBCS (ref 0–0)
P AXIS: 65 DEGREES
P OFFSET: 210 MS
P ONSET: 144 MS
PH UR STRIP.AUTO: 5.5 [PH]
PLATELET # BLD AUTO: 208 X10*3/UL (ref 150–450)
PR INTERVAL: 152 MS
PROT UR STRIP.AUTO-MCNC: ABNORMAL MG/DL
Q ONSET: 220 MS
QRS COUNT: 19 BEATS
QRS DURATION: 76 MS
QT INTERVAL: 334 MS
QTC CALCULATION(BAZETT): 462 MS
QTC FREDERICIA: 414 MS
R AXIS: 5 DEGREES
RBC # BLD AUTO: 3.96 X10*6/UL (ref 4–5.2)
RBC # UR STRIP.AUTO: NEGATIVE /UL
RBC #/AREA URNS AUTO: ABNORMAL /HPF
SP GR UR STRIP.AUTO: 1.02
T AXIS: 204 DEGREES
T OFFSET: 387 MS
UROBILINOGEN UR STRIP.AUTO-MCNC: NORMAL MG/DL
VENTRICULAR RATE: 115 BPM
WBC # BLD AUTO: 20 X10*3/UL (ref 4.4–11.3)
WBC #/AREA URNS AUTO: ABNORMAL /HPF
WBC CLUMPS #/AREA URNS AUTO: ABNORMAL /HPF

## 2024-10-07 PROCEDURE — 2500000004 HC RX 250 GENERAL PHARMACY W/ HCPCS (ALT 636 FOR OP/ED): Mod: IPSPLIT

## 2024-10-07 PROCEDURE — 85027 COMPLETE CBC AUTOMATED: CPT | Mod: IPSPLIT | Performed by: NURSE PRACTITIONER

## 2024-10-07 PROCEDURE — 87086 URINE CULTURE/COLONY COUNT: CPT | Mod: GENLAB | Performed by: NURSE PRACTITIONER

## 2024-10-07 PROCEDURE — 36415 COLL VENOUS BLD VENIPUNCTURE: CPT | Mod: IPSPLIT | Performed by: NURSE PRACTITIONER

## 2024-10-07 PROCEDURE — 94760 N-INVAS EAR/PLS OXIMETRY 1: CPT | Mod: IPSPLIT

## 2024-10-07 PROCEDURE — 99239 HOSP IP/OBS DSCHRG MGMT >30: CPT | Performed by: NURSE PRACTITIONER

## 2024-10-07 PROCEDURE — 97530 THERAPEUTIC ACTIVITIES: CPT | Mod: GP,CQ,IPSPLIT

## 2024-10-07 PROCEDURE — 97110 THERAPEUTIC EXERCISES: CPT | Mod: GP,CQ,IPSPLIT

## 2024-10-07 PROCEDURE — 94762 N-INVAS EAR/PLS OXIMTRY CONT: CPT | Mod: IPSPLIT

## 2024-10-07 PROCEDURE — 99306 1ST NF CARE HIGH MDM 50: CPT | Performed by: INTERNAL MEDICINE

## 2024-10-07 PROCEDURE — 2500000001 HC RX 250 WO HCPCS SELF ADMINISTERED DRUGS (ALT 637 FOR MEDICARE OP): Mod: IPSPLIT

## 2024-10-07 PROCEDURE — 2500000004 HC RX 250 GENERAL PHARMACY W/ HCPCS (ALT 636 FOR OP/ED): Mod: IPSPLIT | Performed by: NURSE PRACTITIONER

## 2024-10-07 PROCEDURE — 97535 SELF CARE MNGMENT TRAINING: CPT | Mod: GO,IPSPLIT | Performed by: OCCUPATIONAL THERAPIST

## 2024-10-07 PROCEDURE — 81001 URINALYSIS AUTO W/SCOPE: CPT | Mod: IPSPLIT | Performed by: NURSE PRACTITIONER

## 2024-10-07 PROCEDURE — 94668 MNPJ CHEST WALL SBSQ: CPT | Mod: IPSPLIT

## 2024-10-07 PROCEDURE — 97116 GAIT TRAINING THERAPY: CPT | Mod: GP,CQ,IPSPLIT

## 2024-10-07 RX ORDER — NITROFURANTOIN 25; 75 MG/1; MG/1
100 CAPSULE ORAL 2 TIMES DAILY
Qty: 10 CAPSULE | Refills: 0 | Status: SHIPPED | OUTPATIENT
Start: 2024-10-07 | End: 2024-10-12

## 2024-10-07 RX ORDER — METOPROLOL TARTRATE 25 MG/1
25 TABLET, FILM COATED ORAL NIGHTLY
Qty: 30 TABLET | Refills: 0 | Status: SHIPPED | OUTPATIENT
Start: 2024-10-07 | End: 2024-11-06

## 2024-10-07 RX ORDER — NITROFURANTOIN 25; 75 MG/1; MG/1
100 CAPSULE ORAL 2 TIMES DAILY
Status: DISCONTINUED | OUTPATIENT
Start: 2024-10-07 | End: 2024-10-07 | Stop reason: HOSPADM

## 2024-10-07 ASSESSMENT — COGNITIVE AND FUNCTIONAL STATUS - GENERAL
MOVING TO AND FROM BED TO CHAIR: A LOT
STANDING UP FROM CHAIR USING ARMS: A LOT
PERSONAL GROOMING: A LITTLE
DAILY ACTIVITIY SCORE: 18
DRESSING REGULAR UPPER BODY CLOTHING: A LITTLE
MOVING FROM LYING ON BACK TO SITTING ON SIDE OF FLAT BED WITH BEDRAILS: A LITTLE
TURNING FROM BACK TO SIDE WHILE IN FLAT BAD: A LITTLE
DRESSING REGULAR LOWER BODY CLOTHING: A LITTLE
EATING MEALS: A LITTLE
HELP NEEDED FOR BATHING: A LITTLE
TOILETING: A LITTLE
TURNING FROM BACK TO SIDE WHILE IN FLAT BAD: A LITTLE
MOVING FROM LYING ON BACK TO SITTING ON SIDE OF FLAT BED WITH BEDRAILS: A LITTLE
MOVING TO AND FROM BED TO CHAIR: A LOT
PERSONAL GROOMING: A LITTLE
DRESSING REGULAR UPPER BODY CLOTHING: A LITTLE
CLIMB 3 TO 5 STEPS WITH RAILING: A LOT
TOILETING: A LITTLE
MOBILITY SCORE: 14
STANDING UP FROM CHAIR USING ARMS: A LOT
HELP NEEDED FOR BATHING: A LOT
WALKING IN HOSPITAL ROOM: A LOT
CLIMB 3 TO 5 STEPS WITH RAILING: TOTAL
WALKING IN HOSPITAL ROOM: A LOT
DAILY ACTIVITIY SCORE: 16
EATING MEALS: A LITTLE
MOBILITY SCORE: 13
DRESSING REGULAR LOWER BODY CLOTHING: A LOT

## 2024-10-07 ASSESSMENT — PAIN SCALES - GENERAL
PAINLEVEL_OUTOF10: 0 - NO PAIN
PAINLEVEL_OUTOF10: 0 - NO PAIN

## 2024-10-07 ASSESSMENT — ACTIVITIES OF DAILY LIVING (ADL)
HOME_MANAGEMENT_TIME_ENTRY: 34
BATHING_LEVEL_OF_ASSISTANCE: MINIMUM ASSISTANCE;MODERATE VERBAL CUES

## 2024-10-07 ASSESSMENT — PAIN - FUNCTIONAL ASSESSMENT
PAIN_FUNCTIONAL_ASSESSMENT: 0-10
PAIN_FUNCTIONAL_ASSESSMENT: 0-10

## 2024-10-07 NOTE — CARE PLAN
The patient's goals for the shift include  out of bed for meals  Problem: Pain - Adult  Goal: Verbalizes/displays adequate comfort level or baseline comfort level  Outcome: Met     Problem: Safety - Adult  Goal: Free from fall injury  Outcome: Met     Problem: Discharge Planning  Goal: Discharge to home or other facility with appropriate resources  Outcome: Met     Problem: Chronic Conditions and Co-morbidities  Goal: Patient's chronic conditions and co-morbidity symptoms are monitored and maintained or improved  Outcome: Met     Problem: Skin  Goal: Decreased wound size/increased tissue granulation at next dressing change  Outcome: Met  Goal: Participates in plan/prevention/treatment measures  Outcome: Met  Goal: Prevent/manage excess moisture  Outcome: Met  Goal: Prevent/minimize sheer/friction injuries  Outcome: Met  Goal: Promote/optimize nutrition  Outcome: Met  Goal: Promote skin healing  Outcome: Met     Problem: Fall/Injury  Goal: Not fall by end of shift  Outcome: Met  Goal: Be free from injury by end of the shift  Outcome: Met  Goal: Verbalize understanding of personal risk factors for fall in the hospital  Outcome: Met  Goal: Verbalize understanding of risk factor reduction measures to prevent injury from fall in the home  Outcome: Met  Goal: Use assistive devices by end of the shift  Outcome: Met  Goal: Pace activities to prevent fatigue by end of the shift  Outcome: Met       The clinical goals for the shift include Maintain sats above 92%

## 2024-10-07 NOTE — PROGRESS NOTES
Physical Therapy    Physical Therapy Treatment    Patient Name: Maisha Agosto  MRN: [id number]  Department: [facility]  Room: [address] [place] [address]  Today's Date: 10/7/2024  Time Calculation  Start Time: 0852  Stop Time: 0932  Time Calculation (min): 40 min     Assessment/Plan   PT Assessment  PT Assessment Results: Decreased strength, Decreased endurance, Impaired balance, Decreased mobility  Rehab Prognosis: Good  Barriers to Discharge: n/a  Barriers to Participation: Comorbidities  End of Session Communication: Bedside nurse (provider)  Assessment Comment: Patient presents with weakness, decreased mobility, and impaired balance. Patient required min assist for bed mobility today and mod assist for short distances of ambulation. Patient unable to tolerate further ambulation due to weakness and fatigue. Unable to ambulate household distance this date. Patient would benefit from further therapy to address these limitations and prevent further decline.  End of Session Patient Position: Up in chair, Alarm on  PT Plan  Inpatient/Swing Bed or Outpatient: Inpatient  PT Plan  Treatment/Interventions: Bed mobility, Transfer training, Gait training, Therapeutic exercise  PT Plan: Ongoing PT  PT Frequency: 3 times per week  PT Discharge Recommendations: Moderate intensity level of continued care  PT Recommended Transfer Status: Assist x1  PT - OK to Discharge: Yes  General Visit Information:      General  Prior to Session Communication: Bedside nurse, PCT/NA/CTA  Patient Position Received: Bed, 2 rail up, Alarm on    Subjective   Patient pleasant and agreeable to session.  Precautions:  Precautions  Medical Precautions: Fall precautions    Vital Signs (Past 2hrs)        Date/Time Vitals Session Patient Position Pulse Resp SpO2 BP MAP (mmHg)    10/07/24 0852 --  --  --  --  94 %  --  --                 Objective   Pain:  Pain Assessment  Pain Assessment: 0-10  0-10 (Numeric) Pain Score: 0 - No pain  Cognition:  Cognition  Orientation Level:  Disoriented to time  Coordination:  Movements are Fluid and Coordinated: Yes  Treatments:  Therapeutic Exercise  Therapeutic Exercise Performed: Yes  Therapeutic Exercise Activity 1: quad sets x 10  Therapeutic Exercise Activity 2: ankle pumps x 10  Therapeutic Exercise Activity 3: laqs x 10 with vcs required to use full available ROM  Therapeutic Exercise Activity 4: hip flexion seated x 10 with vcs required to slow pace  Therapeutic Exercise Activity 5: hip abduction/adduction 2x5    Bed Mobility  Bed Mobility: Yes  Bed Mobility 1  Bed Mobility 1: Supine to sitting  Level of Assistance 1: Minimum assistance    Ambulation/Gait Training  Ambulation/Gait Training Performed: Yes  Ambulation/Gait Training 1  Surface 1: Level tile  Device 1: Rolling walker  Gait Support Devices: Gait belt  Assistance 1: Moderate assistance (Patient required mod assist to maintain balance and to advance fww, verbal and tactile cues required to maintain erect posture.)  Quality of Gait 1: Decreased step length, Shuffling gait, Forward flexed posture, Antalgic, Soft knee(s)  Comments/Distance (ft) 1: 3x2, 5 x3,8  Transfers  Transfer: Yes  Transfer 1  Technique 1: Sit to stand, Stand to sit  Transfer Device 1: Walker, Gait belt  Transfer Level of Assistance 1: Moderate verbal cues, Moderate tactile cues, Moderate assistance (Verbal and tactile cues required for proper hand placement, mod assist required for push off and for eccentric control with stand to sit.)    Outcome Measures:  [facility] Basic Mobility  Turning from your back to your side while in a flat bed without using bedrails: A little  Moving from lying on your back to sitting on the side of a flat bed without using bedrails: A little  Moving to and from bed to chair (including a wheelchair): A lot  Standing up from a chair using your arms (e.g. wheelchair or bedside chair): A lot  To walk in hospital room: A lot  Climbing 3-5 steps with railing: A lot  Basic Mobility - Total Score:  14    Education Documentation  Mobility Training, taught by Tonya Ruano PTA at 10/7/2024 10:13 AM.  Learner: Patient  Readiness: Acceptance  Method: Explanation, Demonstration  Response: Needs Reinforcement  Comment: Educated patient on proper hand placement with transfers and proper use of fww, fair follow through with cues.    Education Comments  No comments found.      Encounter Problems       Encounter Problems (Active)       Balance       STG - Maintains dynamic standing balance without upper extremity support with good- balance to decrease fall risk  (Progressing)       Start:  09/30/24    Expected End:  10/08/24               Mobility       LTG - Patient will ambulate household distance with SUP and WW  (Progressing)       Start:  09/30/24    Expected End:  10/08/24            LTG - Patient will navigate 2 steps with 1 rails/device with CGA  (Progressing)       Start:  09/30/24    Expected End:  10/08/24               PT Transfers       STG - Patient will perform bed mobility IND (Progressing)       Start:  09/30/24    Expected End:  10/08/24            STG - Patient will transfer sit to and from stand MALLORIE with WW (Progressing)       Start:  09/30/24    Expected End:  10/08/24               Pain - Adult

## 2024-10-07 NOTE — PROGRESS NOTES
Occupational Therapy    Occupational Therapy Treatment    Name: Maisha Agosto  MRN: [id number]  Department: [facility]  Room: [address] [place] [address]  Date: 10/07/24  Time Calculation  Start Time: 1002  Stop Time: 1036  Time Calculation (min): 34 min    Assessment:  OT Assessment: Patient improved performance from previous session this date, still demonstrating increased confusion and needing assist for cues for safety and physical assist d/t decreased strength and balance. Continue to recommend moderate intensity.  Prognosis: Good  Barriers to Discharge: Other (Comment) (cognition)  End of Session Communication: Bedside nurse  End of Session Patient Position: Up in chair, Alarm on  Plan:  Treatment Interventions: ADL retraining, Functional transfer training, Patient/family training, Compensatory technique education, Neuromuscular reeducation  OT Frequency: 3 times per week  OT Discharge Recommendations: Moderate intensity level of continued care  OT Recommended Transfer Status: CGA, Assist of 1  OT - OK to Discharge: Yes (Based on completed evaluation and care plan recommendations, no barriers to discharge to next site of care)    Subjective   Previous Visit Info:  OT Received On: 10/07/24  General:  General  Family/Caregiver Present: Yes  Caregiver Feedback: daughter entered at end of session  Prior to Session Communication: Bedside nurse  Patient Position Received: Up in chair, Alarm on  General Comment: PTA reported difficulty this AM with treatment with balance, cognition  Precautions:  Medical Precautions: Fall precautions    Vital Signs (Past 2hrs)        Date/Time Vitals Session Patient Position Pulse Resp SpO2 BP MAP (mmHg)    10/07/24 1002 --  --  71  --  94 %  --  --     10/07/24 1050 --  --  --  --  96 %  --  --                   Pain Assessment:  Pain Assessment  Pain Assessment: 0-10  0-10 (Numeric) Pain Score: 0 - No pain     Objective   Cognition:  Orientation Level: Disoriented to time, Disoriented to situation  Memory:  Exceptions to WFL  Short-Term Memory: Impaired  Insight: Moderate  Activities of Daily Living: Feeding  Feeding Level of Assistance: Setup    Grooming  Grooming Level of Assistance: Contact guard, Minimal verbal cues    UE Bathing  UE Bathing Level of Assistance: Close supervision, Minimal verbal cues    LE Bathing  LE Bathing Level of Assistance: Minimum assistance, Moderate verbal cues    UE Dressing  UE Dressing Level of Assistance: Minimum assistance, Minimal verbal cues    LE Dressing  Sock Level of Assistance: Minimum assistance, Minimal verbal cues  Adult Briefs Level of Assistance: Contact guard, Minimal verbal cues    Toileting  Toileting Level of Assistance: Moderate assistance, Moderate verbal cues  Where Assessed: Toilet  Toileting Comments: assist for balance    Functional Standing Tolerance:  Functional Standing Tolerance  Time: 10 min  Activity: standing at sinkside  Bed Mobility/Transfers: Transfer 1  Technique 1: Sit to stand, Stand to sit  Transfer Device 1: Walker  Transfer Level of Assistance 1: Contact guard, Moderate assistance    Toilet Transfers  Toilet Transfer From: Chair  Toilet Transfer to: Raised toilet seat with rails  Toilet Transfer Technique: Ambulating  Toilet Transfers: Contact guard, Verbal cues    Functional Mobility:  Functional Mobility  Functional Mobility Performed: Yes  Functional Mobility 1  Surface 1: Level tile  Device 1: Rolling walker  Assistance 1: Minimum assistance  Sitting Balance:  Static Sitting Balance  Static Sitting-Level of Assistance: Close supervision  Static Sitting-Comment/Number of Minutes: leaning towards right  Dynamic Sitting Balance  Dynamic Sitting-Level of Assistance: Contact guard  Standing Balance:  Static Standing Balance  Static Standing-Level of Assistance: Contact guard  Dynamic Standing Balance  Dynamic Standing-Level of Assistance: Moderate assistance  Dynamic Standing-Comments: increased forward flexion with fatigue     Outcome  Measures:  [facility] Daily Activity  Putting on and taking off regular lower body clothing: A lot  Bathing (including washing, rinsing, drying): A lot  Putting on and taking off regular upper body clothing: A little  Toileting, which includes using toilet, bedpan or urinal: A little  Taking care of personal grooming such as brushing teeth: A little  Eating Meals: A little  Daily Activity - Total Score: 16    Goals:  Encounter Problems       Encounter Problems (Active)       ADLs       Patient with complete upper body dressing with set-up and supervision level of assistance donning and doffing all UE clothes with PRN adaptive equipment while supported sitting (Progressing)       Start:  09/30/24    Expected End:  10/14/24            Patient with complete lower body dressing with set-up and supervision level of assistance donning and doffing all LE clothes  with PRN adaptive equipment while supported sitting (Met)       Start:  09/30/24    Expected End:  10/14/24    Resolved:  10/04/24         Patient will complete daily grooming tasks brushing teeth and washing face/hair with set-up and supervision level of assistance and PRN adaptive equipment while standing. (Met)       Start:  09/30/24    Expected End:  10/14/24    Resolved:  10/04/24         Patient will complete toileting including hygiene clothing management/hygiene with set-up and supervision level of assistance. (Progressing)       Start:  09/30/24    Expected End:  10/14/24               MOBILITY       Patient will perform Functional mobility mod  Household distances/Community Distances with set-up and supervision level of assistance and least restrictive device in order to improve safety and functional mobility. (Progressing)       Start:  09/30/24    Expected End:  10/14/24               TRANSFERS       Patient will complete sit to stand transfer with set-up and supervision level of assistance and least restrictive device in order to improve safety and prepare  for out of bed mobility. (Progressing)       Start:  09/30/24    Expected End:  10/14/24

## 2024-10-07 NOTE — LETTER
Patient: Maisha Agosto  : 1940    Encounter Date: 10/07/2024    PLACE OF SERVICE:  [facility].    This is new/initial history and physical.    Subjective  Patient ID: Maisha Agosto is a 84 y.o. female who presents for New Patient Visit.    Ms. Portillo Agosto is an 84-year-old female with history of COPD and dementia.  She has developed recent bilateral pneumonia.  She continues on antibiotics and requires supportive care.    Review of Systems   Constitutional:  Negative for chills and fever.   Cardiovascular:  Negative for chest pain.   All other systems reviewed and are negative.    Objective  /72   Pulse 84   Temp 36.9 °C (98.4 °F)   Resp 18     Physical Exam  Vitals reviewed.   Constitutional:       Comments: This is a well-developed, well-nourished female, lying in bed, appearing weak.   HENT:      Right Ear: Tympanic membrane, ear canal and external ear normal.      Left Ear: Tympanic membrane, ear canal and external ear normal.   Eyes:      General: No scleral icterus.     Pupils: Pupils are equal, round, and reactive to light.   Neck:      Vascular: No carotid bruit.   Cardiovascular:      Heart sounds: Normal heart sounds, S1 normal and S2 normal. No murmur heard.     No friction rub.   Pulmonary:      Effort: Pulmonary effort is normal.      Breath sounds: Decreased breath sounds (throughout) present.   Abdominal:      Palpations: There is no hepatomegaly, splenomegaly or mass.   Musculoskeletal:         General: No swelling or deformity. Normal range of motion.      Cervical back: Neck supple.      Right lower leg: No edema.      Left lower leg: No edema.   Lymphadenopathy:      Cervical: No cervical adenopathy.      Upper Body:      Right upper body: No axillary adenopathy.      Left upper body: No axillary adenopathy.      Lower Body: No right inguinal adenopathy. No left inguinal adenopathy.   Neurological:      Mental Status: She is lethargic.       Cranial Nerves: Cranial nerves 2-12 are intact. No cranial nerve deficit.      Sensory: No sensory deficit.      Motor: Motor function is intact. No weakness.      Gait: Gait is intact.      Deep Tendon Reflexes: Reflexes normal.      Comments: The patient is alert and oriented x2.   Psychiatric:         Mood and Affect: Mood normal. Mood is not anxious or depressed. Affect is not angry.         Behavior: Behavior is not agitated.         Thought Content: Thought content normal.         Judgment: Judgment normal.     LAB WORK: Laboratory studies reviewed.    Assessment/Plan  Problem List Items Addressed This Visit             ICD-10-CM       Cardiac and Vasculature    Hyperlipidemia E78.5    HTN (hypertension) I10       Endocrine/Metabolic    Hypothyroidism E03.9       Gastrointestinal and Abdominal    GERD (gastroesophageal reflux disease) K21.9       Multi-system (Lupus, Sarcoid)    Rheumatoid arthritis M06.9       Neuro    Dementia F03.90       Pulmonary and Pneumonias    COPD (chronic obstructive pulmonary disease) (Multi) J44.9    Pneumonia, unspecified organism - Primary J18.9     Other Visit Diagnoses         Codes    Coronary artery disease, unspecified vessel or lesion type, unspecified whether angina present, unspecified whether native or transplanted heart     I25.10    Weakness     R53.1    At risk for falls     Z91.81        1. Pneumonia, on antibiotic.  2. COPD, on bronchodilator therapy.  3. Hypertension, med controlled.  4. Coronary artery disease, on aspirin.  5. Hypothyroidism, on levothyroxine.  6. Hyperlipidemia, on statin.  7. Rheumatoid arthritis, on medication.  8. Dementia, unchanged.  9. GERD, on PPI.  10. Weakness, on PT/OT.  11. Fall risk, fall precautions.    Scribe Attestation  By signing my name below, IPatito Scribe attest that this documentation has been prepared under the direction and in the presence of Ricky Small MD.     All medical record entries made by the murphyibquiana  were personally dictated by me I have reviewed the chart and agree the record accurately reflects my personal performance of his history physical examination and management      Electronically Signed By: Ricky Small MD   10/14/24 11:56 PM

## 2024-10-07 NOTE — CARE PLAN
RT has completed the requested/ordered overnight trend on room air and patient qualifies for 2L of O2 at HS PRN. NP was consulted and notified.    Total Time Below: 11min 2sec  Longest Duration: 1min 12sec  Lowest SpO2: 60% at 10/07/24 @ 03:54:25 AM  Number of Events: 30

## 2024-10-07 NOTE — NURSING NOTE
Called report to nursing home. Spoke to Manjula. Family is going to take patient to [facility] today.

## 2024-10-07 NOTE — PROGRESS NOTES
10/07/24 1136   Discharge Planning   Assistance Needed Patient with some confusion at times; Patient lives with her daughter in a 1 story house. There is always family present; when daughter works, she stays with her son. She has a private duty aide 4 days a week for 8 hours. She uses either the walker or wheelchair. She can do ADLS but does have assistance.   Type of Post Acute Facility Services Skilled nursing   Expected Discharge Disposition SNF   Does the patient need discharge transport arranged? Yes   RoundTrip coordination needed? Yes   Has discharge transport been arranged? Yes   What day is the transport expected? 10/07/24   Patient Choice   Provider Choice list and CMS website (https://medicare.gov/care-compare#search) for post-acute Quality and Resource Measure Data were provided and reviewed with: Family;Patient     Patient medically ready for discharge. Per direct submission team,  precert was authorized for patient to go to [facility] today.  Patient follow up information on discharge instructions. Daughter Eli would like patient to go by ambulance.  DSC     1:25 pm  Renan signed.  Setting up transportation and sending final orders.      DC PLAN:  [facility]

## 2024-10-07 NOTE — CARE PLAN
The patient's goals for the shift include      The clinical goals for the shift include Patient will maintain SPO2 >92%    Over the shift, the patient remained at 95-99% on room air.

## 2024-10-07 NOTE — DISCHARGE SUMMARY
Discharge Diagnosis  Pneumonia, unspecified organism    Discharge Meds     Medication List      START taking these medications     nitrofurantoin (macrocrystal-monohydrate) 100 mg capsule; Commonly known   as: Macrobid; Take 1 capsule (100 mg) by mouth 2 times a day for 5 days.   oxygen gas therapy; Commonly known as: O2; Inhale 1 each once every 24   hours. Wear nightly during sleep hours     CHANGE how you take these medications     metoprolol tartrate 25 mg tablet; Commonly known as: Lopressor; Take 1   tablet (25 mg) by mouth once daily at bedtime.; What changed: when to take   this     CONTINUE taking these medications     amLODIPine 5 mg tablet; Commonly known as: Norvasc   aspirin 81 mg EC tablet; TAKE ONE TABLET BY MOUTH EVERY DAY AS DIRECTED   atorvastatin 40 mg tablet; Commonly known as: Lipitor   calcium carbonate-vitamin D3 250 mg-3.125 mcg (125 unit) tablet;   Commonly known as: Oyster Shell   cholestyramine light 4 gram packet; Commonly known as: Prevalite; Take 1   packet (4 g) by mouth 2 times a day.   colestipol 1 gram tablet; Commonly known as: Colestid; Take 1 tablet (1   g) by mouth 2 times a day after meals. Take at least 1 hour after or 4   hours before other medications.   Fish OiL 1,000 (120-180) mg capsule; Generic drug: omega 3-dha-epa-fish   oil   hydrOXYzine HCL 25 mg tablet; Commonly known as: Atarax; Take 1 tablet   (25 mg) by mouth 3 times a day as needed for anxiety.   levothyroxine 50 mcg tablet; Commonly known as: Synthroid, Levoxyl   loperamide 2 mg capsule; Commonly known as: Imodium; Take 1 capsule (2   mg) by mouth 4 times a day as needed for diarrhea.   magnesium oxide 400 mg (241.3 mg magnesium) tablet; Commonly known as:   Mag-Ox   multivitamin tablet   predniSONE 1 mg tablet; Commonly known as: Deltasone   ramipril 10 mg capsule; Commonly known as: Altace   traZODone 50 mg tablet; Commonly known as: Desyrel       Test Results Pending At Discharge  Pending Labs       Order  Current Status    Extra Urine Gray Tube Collected (09/28/24 1401)    Extra Urine Gray Tube In process    Urinalysis with Reflex Culture and Microscopic In process    Urinalysis with Reflex Culture and Microscopic In process    Urine Culture In process            Hospital Course   Maisha Agosto is a 84 y.o. female who was admitted with confusion and weakness. She also had pain with urination but UA did not show infection. She was seen by PT/OT who recommended SNF for strengthening. While waiting for discharge she has some increased weakness and was found to have UTI, treated with oral antibiotics. She is discharging to SNF today in stable condition.     Problem List:  Pneumonia, community acquired  Acute exacerbation of COPD with asthma  - WBC 16.9 >13.2 > 18.2 > 16.6 > 13.3  - Procalcitonin 0.03  - Respiratory Culture if Able  - Blood culture negative to date  - CXR : Right lower lobe infiltrate.  - continue DuoNebs  - discontinued Solu Medrol 20 mg IV Q 12 hours,  - continue PO prednisone 2.5 mg daily  - discontinued ceftriaxone 1 g IV Daily, changed to cefuroxime  500 mg BID; complete  - completed Azithromycin 500 mg IV Daily, complete  - Home O2 RA, currently on RA  - continue robitussin and mucinex prn  ---resolved, stable     Abnormal gait  Dementia  Fatigue  Impaired functional mobility, balance, gait, and endurance  - UA Negative  - PT/OT Eval and Treat; recommending moderate level therapy  - CM for DC planning  - awaiting precert  ---DC to SNF with PT/OT     Benign essential hypertension  Arteriosclerosis of coronary artery  Hyperlipidemia  Hypothyroidism  Rheumatoid arthritis  ---Continue all chronic meds     Hypomagnesemia   - Magnesium 2.24  - continue Mag-Ox 400 mg PO Daily   ---stable, resolved      History of Bowel Perforation   Decreased Appetite  History of C Diff  GERD  - CT Abd/Pelvis: Markedly abnormally thickened distal ileum.  There is infiltration into the surrounding fat.  The previously  described focal perforation has improved.  There are diverticula present in this most likely represent small bowel diverticulitis.  Ischemia cannot be excluded. Colonic diverticulosis. Multiple pancreatic cysts with dilated pancreatic duct.  There is dilatation of the common bile duct as well as intrahepatic ducts but this may be related to the patient's prior cholecystectomy. Thickening of the wall the stomach.  Gastritis cannot be excluded. Dilated uterine veins.  There is fluid in the pelvis which may represent a distended endometrium.  This is best evaluated with ultrasound.  There are bibasilar pulmonary infiltrates.  - continue Questran 4 g PO BID   - continue Protonix Daily   - continue Miralax Daily   ---Acute Care Surgery Consult, Appreciate Recs - chronic and stable - no intervention at this time      Pertinent Physical Exam At Time of Discharge  Physical Exam  Constitutional:       Appearance: Normal appearance.   HENT:      Head: Normocephalic and atraumatic.      Nose: Nose normal.      Mouth/Throat:      Mouth: Mucous membranes are moist.   Eyes:      Extraocular Movements: Extraocular movements intact.      Pupils: Pupils are equal, round, and reactive to light.   Cardiovascular:      Rate and Rhythm: Regular rhythm. Bradycardia present.      Pulses: Normal pulses.      Heart sounds: Normal heart sounds.   Pulmonary:      Effort: Pulmonary effort is normal.      Breath sounds: Normal breath sounds.   Abdominal:      General: Bowel sounds are normal.      Palpations: Abdomen is soft.   Musculoskeletal:         General: Normal range of motion.      Cervical back: Normal range of motion.   Skin:     General: Skin is warm and dry.      Capillary Refill: Capillary refill takes less than 2 seconds.   Neurological:      Mental Status: She is alert. Mental status is at baseline.      Motor: Weakness present.      Gait: Gait abnormal.      Comments: AxOx2   Psychiatric:         Mood and Affect: Mood normal.          Behavior: Behavior normal.     Stable for discharge.  Total cumulative time spent in preparation of this discharge including documentation review, coordination of care with the medical team including PT/SW/care coordinators and treating consultants, discussion with patient and pertinent family members and finalization of prescriptions, follow-up appointments, and this discharge summary was approximately 45 minutes.    Outpatient Follow-Up  Future Appointments   Date Time Provider Department Center   10/22/2024  3:45 PM Mariah Schaeffer DPM [id number]       ELIO Jerry-CNP

## 2024-10-08 ENCOUNTER — NURSING HOME VISIT (OUTPATIENT)
Dept: POST ACUTE CARE | Facility: EXTERNAL LOCATION | Age: 84
End: 2024-10-08
Payer: MEDICARE

## 2024-10-08 DIAGNOSIS — R53.1 WEAKNESS: ICD-10-CM

## 2024-10-08 DIAGNOSIS — M06.9 RHEUMATOID ARTHRITIS, INVOLVING UNSPECIFIED SITE, UNSPECIFIED WHETHER RHEUMATOID FACTOR PRESENT (MULTI): ICD-10-CM

## 2024-10-08 DIAGNOSIS — E83.42 HYPOMAGNESEMIA: ICD-10-CM

## 2024-10-08 DIAGNOSIS — J44.1 ACUTE EXACERBATION OF COPD WITH ASTHMA: ICD-10-CM

## 2024-10-08 DIAGNOSIS — I10 HYPERTENSION, UNSPECIFIED TYPE: ICD-10-CM

## 2024-10-08 DIAGNOSIS — E78.2 MIXED HYPERLIPIDEMIA: ICD-10-CM

## 2024-10-08 DIAGNOSIS — E03.9 HYPOTHYROIDISM, UNSPECIFIED TYPE: ICD-10-CM

## 2024-10-08 DIAGNOSIS — J18.9 PNEUMONIA DUE TO INFECTIOUS ORGANISM, UNSPECIFIED LATERALITY, UNSPECIFIED PART OF LUNG: Primary | ICD-10-CM

## 2024-10-08 DIAGNOSIS — F03.90 DEMENTIA, UNSPECIFIED DEMENTIA SEVERITY, UNSPECIFIED DEMENTIA TYPE, UNSPECIFIED WHETHER BEHAVIORAL, PSYCHOTIC, OR MOOD DISTURBANCE OR ANXIETY (MULTI): ICD-10-CM

## 2024-10-08 DIAGNOSIS — R63.0 DECREASED APPETITE: ICD-10-CM

## 2024-10-08 DIAGNOSIS — R53.81 PHYSICAL DECONDITIONING: ICD-10-CM

## 2024-10-08 DIAGNOSIS — M85.80 OSTEOPENIA, UNSPECIFIED LOCATION: ICD-10-CM

## 2024-10-08 LAB — HOLD SPECIMEN: NORMAL

## 2024-10-08 PROCEDURE — 99309 SBSQ NF CARE MODERATE MDM 30: CPT | Performed by: NURSE PRACTITIONER

## 2024-10-08 NOTE — LETTER
Patient: Maisha Agosto  : 1940    Encounter Date: 10/08/2024    Chief Complaint:   SNF FU  -Pneumonia  -COPD exacerbation  -Metabolic encephalopathy  -Hypomagnesemia  -Physical deconditioning/weakness    HPI:   84 year-old female presenting to [facility] ER on 24 with generalized weakness, confusion, and decreased oral intake. She also c/o pain with urination. Work-up in ER: UA negative for infection, HR 37.3, HR 56, RR 19, /90, SpO2 99%. After arrival, SpO2 dropped between 88-91% and she was placed on 2 L of O2. CXR showed RLL infiltrates. She was started on IV Ceftriaxone and admitted for further evaluation and treatment. Hospital course:    Community-acquired pneumonia/COPD exacerbation-supplemental O2 prn, IV steroids, IV Azithromycin and IV Ceftriaxone --> PO Cefuroxine, completed course of ATB therapy prior to discharge, Duonebs, Mucinex/Robitussin prn  Impaired functional mobility/weakness-PT/OT evaluations, recommending SNF  Hypomagnesemia-c/w magox, mag monitored  Hx bowel perforation/decreased appetite/Hx of CDiff/GERD-CT A/P, c/w Questran 4 g BID, protonix daily, miralax daily, surgery consult, no intervention warranted  Bradycardia/tachycardia-episode of HR in the 40s --> metoprolol held and patient became tachycardic, metoprolol tartrate changed to Toprol XL 25 mg daily by cardiology, HR monitored     Pt. was HDS and discharged to [facility] on 10/7/24. Today, patient reports that she is feeling well. She denies dizziness, SOB, cough, chest pain or palpitations, abdominal pain, N/V/D/C, or dysuria. She reports appetite at baseline. Staff report no clinical concerns.     ROS:    As above in HPI. Otherwise, all other systems have been reviewed and are negative for complaint.    Medications reviewed and verified in NH chart.     Patient Active Problem List   Diagnosis   • Dehydration   • Impaired functional mobility, balance, gait, and endurance   • COPD (chronic obstructive pulmonary  disease) (Multi)   • Arteriosclerosis of coronary artery   • Cyst of [facility] ([facility])   • Frail elderly   • Abnormal gait   • Fatigue   • Generalized weakness   • GERD (gastroesophageal reflux disease)   • Hiatal hernia   • Hyperlipidemia   • Dementia   • Osteopenia   • Paroxysmal atrial fibrillation (Multi)   • HTN (hypertension)   • Rheumatoid arthritis   • Hypothyroidism   • Solitary pulmonary nodule   • Pneumonia, unspecified organism   • Acute exacerbation of COPD with asthma   • Type 2 diabetes mellitus   • Urinary incontinence   • PVD (peripheral vascular disease) (CMS-[facility])   • Decreased appetite   • Ataxia   • At risk for falls   • Anemia   • B12 deficiency        Past Medical History:   Diagnosis Date   • Bowel perforation (Multi) 2024   • Clostridium difficile colitis    • Colitis due to Clostridioides difficile 2024   • Heart murmur    • Hypernatremia    • Terminal ileitis with complication (Multi) 2024   • UTI (urinary tract infection) 2024       Past Surgical History:   Procedure Laterality Date   • CATARACT EXTRACTION     •  SECTION, CLASSIC  2016     Section   • CHOLECYSTECTOMY  2014    Cholecystectomy   • COLONOSCOPY  2014    Colonoscopy (Fiberoptic)   • CT HEAD ANGIO W AND WO IV CONTRAST  2021    CT HEAD ANGIO W AND WO IV CONTRAST 2021 [facility]   • ESOPHAGOGASTRODUODENOSCOPY  2014    Diagnostic Esophagogastroduodenoscopy   • MR HEAD ANGIO WO IV CONTRAST  2021    MR HEAD ANGIO WO IV CONTRAST 2021 [facility]   • MR NECK ANGIO WO IV CONTRAST  2021    MR NECK ANGIO WO IV CONTRAST 2021 [facility]       Family History   Problem Relation Name Age of Onset   • Diabetes Mother     • Heart disease Other     • Heart attack Other         Social History     Tobacco Use   Smoking Status Never   Smokeless Tobacco Never       Social History     Substance and Sexual Activity   Alcohol  Use Never       Social History     Substance and Sexual Activity   Drug Use Never       Allergies   Allergen Reactions   • Acthar [Corticotropin] Psychosis   • Chloroquine Phosphate Unknown     Tremors/ weakness   • Ciprofloxacin Unknown   • Humira [Adalimumab] Unknown   • Levaquin [Levofloxacin] Unknown   • Methotrexate Unknown   • Sulfamethoxazole-Trimethoprim Unknown   • Xeljanz [Tofacitinib] Psychosis   • Amoxicillin-Pot Clavulanate Diarrhea     Pt tolerated ceftriaxone during 9/28/24 admission. (DARREN Wallace, PharmD).    • Enbrel [Etanercept] Rash     Legs and arms        Vital Signs:   151/85-57-16-97.9-99% on RA    Physical Exam:  General: Sitting up in WC in NAD, alert   Head/Face: NCAT, symmetrical  Eyes: PERRLA, no injection, no discharge  ENT: Hearing not impaired, ears without scars or lesions, nasal mucosa and turbinates pink, septum midline, lips pink and moist  Neck: Supple, symmetrical  Respiratory: CTA but diminished without adventitious sounds, respirations even and nonlabored without use of accessory muscles, good air exchange  Cardio: Bradycardic (HR 57 bpm), regular rhythm without murmur or gallops, normal S1S2, no edema, pedal pulses 3+/4 bilaterally  Chest/Breast: Symmetrical  GI: BS x 4, normoactive, non-distended, abd round and soft, no masses or tenderness  : No suprapubic tenderness or distention  MSK: Gait not assessed, joints with full ROM without pain or contractures, + generalized weakness  Skin: Skin warm and dry, no induration  Neurologic: Cranial nerves II through XII intact, superficial touch and pain sensation intact  Psychiatric: Alert, oriented x 1-2, calm and cooperative    Results/Data:   10/7/24: WBC 20.0, Hgb 11.7  10/6/24: Glu 150, BUN 27, Cr 0.63, WBC 22.0    Assessment/Plan:  Metabolic encephalopathy-RESOLVED, continue to monitor  Pneumonia/COPD exacerbation-completed course of ATB, c/w supplemental O2 at HS, Duonebs prn, monitor respiratory status  closely  Hypomagnesemia-c/w magox, monitor mag level  Impaired functional mobility/weakness/physical deconditioning-c/w PT/OT, safety and fall precautions  Decreased appetite/Hx of CDiff/Hx bowel perforation-c/w Questran 4 gm BID and Colestipol 1 gm BID, F/U with general surgery as needed after discharge  Hx UTI-c/w macrobid, UCx pending, patient asymptomatic  RA-c/w prednisone  CAD/HTN/HLD-ARABELLA diet, c/w amlodipine, ASA, atorvastatin, metoprolol, omega-3, and ramipril, monitor BP and HR, F/U with cardiology after discharge  Osteopenia-WB exercises, c/w calcium/D supplement  Insomnia-c/w trazodone  Dementia/anxiety-supportive care, vistaril prn, consult Psych   Hypothyroidism-c/w levothyroxine  Anemia-c/w MVI, monitor CBC     Orders:  NNO    Code Status:   DNR-CCA, DNI            Electronically Signed By: ELIO Zabala-CNP   10/21/24  2:49 PM

## 2024-10-09 LAB — BACTERIA UR CULT: NO GROWTH

## 2024-10-10 VITALS
DIASTOLIC BLOOD PRESSURE: 72 MMHG | SYSTOLIC BLOOD PRESSURE: 120 MMHG | RESPIRATION RATE: 18 BRPM | HEART RATE: 84 BPM | TEMPERATURE: 98.4 F

## 2024-10-10 ASSESSMENT — ENCOUNTER SYMPTOMS
FEVER: 0
CHILLS: 0

## 2024-10-10 NOTE — PROGRESS NOTES
PLACE OF SERVICE:  [facility].    This is new/initial history and physical.    Subjective   Patient ID: Maisha Agosto is a 84 y.o. female who presents for New Patient Visit.    Ms. Portillo Agosto is an 84-year-old female with history of COPD and dementia.  She has developed recent bilateral pneumonia.  She continues on antibiotics and requires supportive care.    Review of Systems   Constitutional:  Negative for chills and fever.   Cardiovascular:  Negative for chest pain.   All other systems reviewed and are negative.    Objective   /72   Pulse 84   Temp 36.9 °C (98.4 °F)   Resp 18     Physical Exam  Vitals reviewed.   Constitutional:       Comments: This is a well-developed, well-nourished female, lying in bed, appearing weak.   HENT:      Right Ear: Tympanic membrane, ear canal and external ear normal.      Left Ear: Tympanic membrane, ear canal and external ear normal.   Eyes:      General: No scleral icterus.     Pupils: Pupils are equal, round, and reactive to light.   Neck:      Vascular: No carotid bruit.   Cardiovascular:      Heart sounds: Normal heart sounds, S1 normal and S2 normal. No murmur heard.     No friction rub.   Pulmonary:      Effort: Pulmonary effort is normal.      Breath sounds: Decreased breath sounds (throughout) present.   Abdominal:      Palpations: There is no hepatomegaly, splenomegaly or mass.   Musculoskeletal:         General: No swelling or deformity. Normal range of motion.      Cervical back: Neck supple.      Right lower leg: No edema.      Left lower leg: No edema.   Lymphadenopathy:      Cervical: No cervical adenopathy.      Upper Body:      Right upper body: No axillary adenopathy.      Left upper body: No axillary adenopathy.      Lower Body: No right inguinal adenopathy. No left inguinal adenopathy.   Neurological:      Mental Status: She is lethargic.      Cranial Nerves: Cranial nerves 2-12 are intact. No cranial nerve  deficit.      Sensory: No sensory deficit.      Motor: Motor function is intact. No weakness.      Gait: Gait is intact.      Deep Tendon Reflexes: Reflexes normal.      Comments: The patient is alert and oriented x2.   Psychiatric:         Mood and Affect: Mood normal. Mood is not anxious or depressed. Affect is not angry.         Behavior: Behavior is not agitated.         Thought Content: Thought content normal.         Judgment: Judgment normal.     LAB WORK: Laboratory studies reviewed.    Assessment/Plan   Problem List Items Addressed This Visit             ICD-10-CM       Cardiac and Vasculature    Hyperlipidemia E78.5    HTN (hypertension) I10       Endocrine/Metabolic    Hypothyroidism E03.9       Gastrointestinal and Abdominal    GERD (gastroesophageal reflux disease) K21.9       Multi-system (Lupus, Sarcoid)    Rheumatoid arthritis M06.9       Neuro    Dementia F03.90       Pulmonary and Pneumonias    COPD (chronic obstructive pulmonary disease) (Multi) J44.9    Pneumonia, unspecified organism - Primary J18.9     Other Visit Diagnoses         Codes    Coronary artery disease, unspecified vessel or lesion type, unspecified whether angina present, unspecified whether native or transplanted heart     I25.10    Weakness     R53.1    At risk for falls     Z91.81        1. Pneumonia, on antibiotic.  2. COPD, on bronchodilator therapy.  3. Hypertension, med controlled.  4. Coronary artery disease, on aspirin.  5. Hypothyroidism, on levothyroxine.  6. Hyperlipidemia, on statin.  7. Rheumatoid arthritis, on medication.  8. Dementia, unchanged.  9. GERD, on PPI.  10. Weakness, on PT/OT.  11. Fall risk, fall precautions.    Scribe Attestation  By signing my name below, I, Jose Guadalupe Mcdonough attest that this documentation has been prepared under the direction and in the presence of Ricky Small MD.     All medical record entries made by the scribquiana were personally dictated by me I have reviewed the chart and agree  the record accurately reflects my personal performance of his history physical examination and management

## 2024-10-13 ENCOUNTER — NURSING HOME VISIT (OUTPATIENT)
Dept: POST ACUTE CARE | Facility: EXTERNAL LOCATION | Age: 84
End: 2024-10-13
Payer: MEDICARE

## 2024-10-13 DIAGNOSIS — I10 HYPERTENSION, UNSPECIFIED TYPE: ICD-10-CM

## 2024-10-13 DIAGNOSIS — I73.9 PVD (PERIPHERAL VASCULAR DISEASE) (CMS-HCC): ICD-10-CM

## 2024-10-13 DIAGNOSIS — M06.9 RHEUMATOID ARTHRITIS, INVOLVING UNSPECIFIED SITE, UNSPECIFIED WHETHER RHEUMATOID FACTOR PRESENT (MULTI): ICD-10-CM

## 2024-10-13 DIAGNOSIS — K21.9 GASTROESOPHAGEAL REFLUX DISEASE WITHOUT ESOPHAGITIS: ICD-10-CM

## 2024-10-13 DIAGNOSIS — J18.9 PNEUMONIA DUE TO INFECTIOUS ORGANISM, UNSPECIFIED LATERALITY, UNSPECIFIED PART OF LUNG: ICD-10-CM

## 2024-10-13 DIAGNOSIS — E11.29 CONTROLLED TYPE 2 DIABETES MELLITUS WITH MICROALBUMINURIA, WITHOUT LONG-TERM CURRENT USE OF INSULIN (MULTI): ICD-10-CM

## 2024-10-13 DIAGNOSIS — E03.9 HYPOTHYROIDISM, UNSPECIFIED TYPE: ICD-10-CM

## 2024-10-13 DIAGNOSIS — J44.9 CHRONIC OBSTRUCTIVE PULMONARY DISEASE, UNSPECIFIED COPD TYPE (MULTI): Primary | ICD-10-CM

## 2024-10-13 DIAGNOSIS — R53.1 WEAKNESS: ICD-10-CM

## 2024-10-13 DIAGNOSIS — R80.9 CONTROLLED TYPE 2 DIABETES MELLITUS WITH MICROALBUMINURIA, WITHOUT LONG-TERM CURRENT USE OF INSULIN (MULTI): ICD-10-CM

## 2024-10-13 DIAGNOSIS — I48.91 ATRIAL FIBRILLATION, UNSPECIFIED TYPE (MULTI): ICD-10-CM

## 2024-10-13 DIAGNOSIS — E78.2 MIXED HYPERLIPIDEMIA: ICD-10-CM

## 2024-10-13 DIAGNOSIS — F03.90 DEMENTIA, UNSPECIFIED DEMENTIA SEVERITY, UNSPECIFIED DEMENTIA TYPE, UNSPECIFIED WHETHER BEHAVIORAL, PSYCHOTIC, OR MOOD DISTURBANCE OR ANXIETY (MULTI): ICD-10-CM

## 2024-10-13 DIAGNOSIS — Z91.81 AT RISK FOR FALLS: ICD-10-CM

## 2024-10-13 DIAGNOSIS — I25.10 CORONARY ARTERY DISEASE, UNSPECIFIED VESSEL OR LESION TYPE, UNSPECIFIED WHETHER ANGINA PRESENT, UNSPECIFIED WHETHER NATIVE OR TRANSPLANTED HEART: ICD-10-CM

## 2024-10-13 PROCEDURE — 99309 SBSQ NF CARE MODERATE MDM 30: CPT | Performed by: INTERNAL MEDICINE

## 2024-10-13 NOTE — LETTER
Patient: Maisha Agosto  : 1940    Encounter Date: 10/13/2024    PLACE OF SERVICE:  [facility]    This is a subsequent visit.    Subjective  Patient ID: Maisha Agosto is a 84 y.o. female who presents for Follow-up.    Ms. Portillo Agosto is an 84-year-old female with history of COPD and pneumonia.  She suffers from dementia and is unable to care for herself.  She requires supportive care.    Review of Systems   Constitutional:  Negative for chills and fever.   Cardiovascular:  Negative for chest pain.   All other systems reviewed and are negative.    Objective  /80   Pulse 84   Temp 36.6 °C (97.9 °F)   Resp 18     Physical Exam  Vitals reviewed.   Constitutional:       General: She is not in acute distress.     Comments: This is a well-developed, well-nourished female, sitting in a chair   HENT:      Right Ear: Tympanic membrane, ear canal and external ear normal.      Left Ear: Tympanic membrane, ear canal and external ear normal.   Eyes:      General: No scleral icterus.     Pupils: Pupils are equal, round, and reactive to light.   Neck:      Vascular: No carotid bruit.   Cardiovascular:      Heart sounds: Normal heart sounds, S1 normal and S2 normal. No murmur heard.     No friction rub.   Pulmonary:      Effort: Pulmonary effort is normal.      Breath sounds: Decreased breath sounds (throughout.) present.   Abdominal:      Palpations: There is no hepatomegaly, splenomegaly or mass.   Musculoskeletal:         General: No swelling or deformity. Normal range of motion.      Cervical back: Neck supple.      Right lower leg: No edema.      Left lower leg: No edema.   Lymphadenopathy:      Cervical: No cervical adenopathy.      Upper Body:      Right upper body: No axillary adenopathy.      Left upper body: No axillary adenopathy.      Lower Body: No right inguinal adenopathy. No left inguinal adenopathy.   Neurological:      Cranial Nerves: Cranial nerves 2-12  are intact. No cranial nerve deficit.      Sensory: No sensory deficit.      Motor: Motor function is intact. No weakness.      Gait: Gait is intact.      Deep Tendon Reflexes: Reflexes normal.      Comments: The patient is alert and oriented x2.   Psychiatric:         Mood and Affect: Mood normal. Mood is not anxious or depressed. Affect is not angry.         Behavior: Behavior is not agitated.         Thought Content: Thought content normal.         Judgment: Judgment normal.     LAB WORK:  Laboratory studies reviewed.    Assessment/Plan  Problem List Items Addressed This Visit             ICD-10-CM       Cardiac and Vasculature    Hyperlipidemia E78.5    HTN (hypertension) I10       Endocrine/Metabolic    Hypothyroidism E03.9       Gastrointestinal and Abdominal    GERD (gastroesophageal reflux disease) K21.9       Multi-system (Lupus, Sarcoid)    Rheumatoid arthritis M06.9       Neuro    Dementia F03.90       Pulmonary and Pneumonias    COPD (chronic obstructive pulmonary disease) (Multi) - Primary J44.9    Pneumonia, unspecified organism J18.9     Other Visit Diagnoses         Codes    Atrial fibrillation, unspecified type (Multi)     I48.91    Coronary artery disease, unspecified vessel or lesion type, unspecified whether angina present, unspecified whether native or transplanted heart     I25.10    Weakness     R53.1    At risk for falls     Z91.81        1. COPD, on bronchodilator therapy.  2. Dementia, unchanged.  3. Hypertension, med controlled..  4. Recent pneumonia, has finished antibiotic.  5. Hypothyroidism, on levothyroxine.  6. Hyperlipidemia, on statin.  7. Atrial fibrillation, rate controlled.  8. GERD, on PPI.  9. Rheumatoid arthritis, on medication.  10. Coronary artery disease, on aspirin.  11. Weakness, on PT/OT.  12. Fall risk, fall precautions.    Scribe Attestation  By signing my name below, Argenis MOON Scribe attest that this documentation has been prepared under the direction and in the  presence of Ricky Small MD.     All medical record entries made by the scribe were personally dictated by me I have reviewed the chart and agree the record accurately reflects my personal performance of his history physical examination and management      Electronically Signed By: Ricky Small MD   10/22/24  8:49 PM

## 2024-10-17 ENCOUNTER — NURSING HOME VISIT (OUTPATIENT)
Dept: POST ACUTE CARE | Facility: EXTERNAL LOCATION | Age: 84
End: 2024-10-17
Payer: MEDICARE

## 2024-10-17 DIAGNOSIS — R63.0 DECREASED APPETITE: ICD-10-CM

## 2024-10-17 DIAGNOSIS — E03.9 HYPOTHYROIDISM, UNSPECIFIED TYPE: ICD-10-CM

## 2024-10-17 DIAGNOSIS — E83.42 HYPOMAGNESEMIA: ICD-10-CM

## 2024-10-17 DIAGNOSIS — M06.9 RHEUMATOID ARTHRITIS, INVOLVING UNSPECIFIED SITE, UNSPECIFIED WHETHER RHEUMATOID FACTOR PRESENT (MULTI): ICD-10-CM

## 2024-10-17 DIAGNOSIS — J18.9 PNEUMONIA DUE TO INFECTIOUS ORGANISM, UNSPECIFIED LATERALITY, UNSPECIFIED PART OF LUNG: ICD-10-CM

## 2024-10-17 DIAGNOSIS — Z87.440 HX: UTI (URINARY TRACT INFECTION): ICD-10-CM

## 2024-10-17 DIAGNOSIS — J44.1 ACUTE EXACERBATION OF COPD WITH ASTHMA: ICD-10-CM

## 2024-10-17 DIAGNOSIS — I48.91 ATRIAL FIBRILLATION, UNSPECIFIED TYPE (MULTI): ICD-10-CM

## 2024-10-17 DIAGNOSIS — K21.9 GASTROESOPHAGEAL REFLUX DISEASE WITHOUT ESOPHAGITIS: ICD-10-CM

## 2024-10-17 DIAGNOSIS — G93.41 METABOLIC ENCEPHALOPATHY: Primary | ICD-10-CM

## 2024-10-17 PROCEDURE — 99309 SBSQ NF CARE MODERATE MDM 30: CPT | Performed by: NURSE PRACTITIONER

## 2024-10-17 NOTE — LETTER
Patient: Maisha Agosto  : 1940    Encounter Date: 10/17/2024    Chief Complaint:   SNF FU  -Pneumonia  -COPD exacerbation  -Metabolic encephalopathy  -Hypomagnesemia  -Physical deconditioning/weakness    HPI:   84 year-old female presenting to [facility] ER on 24 with generalized weakness, confusion, and decreased oral intake. She also c/o pain with urination. Work-up in ER: UA negative for infection, HR 37.3, HR 56, RR 19, /90, SpO2 99%. After arrival, SpO2 dropped between 88-91% and she was placed on 2 L of O2. CXR showed RLL infiltrates. She was started on IV Ceftriaxone and admitted for further evaluation and treatment. Hospital course:    Community-acquired pneumonia/COPD exacerbation-supplemental O2 prn, IV steroids, IV Azithromycin and IV Ceftriaxone --> PO Cefuroxine, completed course of ATB therapy prior to discharge, Duonebs, Mucinex/Robitussin prn  Impaired functional mobility/weakness-PT/OT evaluations, recommending SNF  Hypomagnesemia-c/w magox, mag monitored  Hx bowel perforation/decreased appetite/Hx of CDiff/GERD-CT A/P, c/w Questran 4 g BID, protonix daily, miralax daily, surgery consult, no intervention warranted  Bradycardia/tachycardia-episode of HR in the 40s --> metoprolol held and patient became tachycardic, metoprolol tartrate changed to Toprol XL 25 mg daily by cardiology, HR monitored     Pt. was HDS and discharged to [facility] on 10/7/24. Today, patient reports that she is feeling well. She denies dizziness, SOB, cough, chest pain or palpitations, abdominal pain, N/V/D/C, or dysuria. Staff report that patient's meal intakes have been variable with + weight loss. She was started on Medpass BID. No other clinical concerns noted at this time.     ROS:    As above in HPI. Otherwise, all other systems have been reviewed and are negative for complaint.    Medications reviewed and verified in NH chart.     Patient Active Problem List   Diagnosis   • Dehydration   • Impaired  functional mobility, balance, gait, and endurance   • COPD (chronic obstructive pulmonary disease) (Multi)   • Arteriosclerosis of coronary artery   • Cyst of pancreas ([facility])   • Frail elderly   • Abnormal gait   • Fatigue   • Generalized weakness   • GERD (gastroesophageal reflux disease)   • Hiatal hernia   • Hyperlipidemia   • Dementia   • Osteopenia   • Paroxysmal atrial fibrillation (Multi)   • HTN (hypertension)   • Rheumatoid arthritis   • Hypothyroidism   • Solitary pulmonary nodule   • Pneumonia, unspecified organism   • Acute exacerbation of COPD with asthma   • Controlled type 2 diabetes mellitus with microalbuminuria, without long-term current use of insulin (Multi)   • Urinary incontinence   • PVD (peripheral vascular disease) (CMS-HCC)   • Decreased appetite   • Ataxia   • At risk for falls   • Anemia   • B12 deficiency        Past Medical History:   Diagnosis Date   • Bowel perforation (Multi) 2024   • Clostridium difficile colitis    • Colitis due to Clostridioides difficile 2024   • Heart murmur    • Hypernatremia    • Terminal ileitis with complication (Multi) 2024   • UTI (urinary tract infection) 2024       Past Surgical History:   Procedure Laterality Date   • CATARACT EXTRACTION     •  SECTION, CLASSIC  2016     Section   • CHOLECYSTECTOMY  2014    Cholecystectomy   • COLONOSCOPY  2014    Colonoscopy (Fiberoptic)   • CT HEAD ANGIO W AND WO IV CONTRAST  2021    CT HEAD ANGIO W AND WO IV CONTRAST 2021 [facility]   • ESOPHAGOGASTRODUODENOSCOPY  2014    Diagnostic Esophagogastroduodenoscopy   • MR HEAD ANGIO WO IV CONTRAST  2021    MR HEAD ANGIO WO IV CONTRAST 2021 [facility]   • MR NECK ANGIO WO IV CONTRAST  2021    MR NECK ANGIO WO IV CONTRAST 2021 [facility]       Family History   Problem Relation Name Age of Onset   • Diabetes Mother     • Heart disease Other     •  Heart attack Other         Social History     Tobacco Use   Smoking Status Never   Smokeless Tobacco Never       Social History     Substance and Sexual Activity   Alcohol Use Never       Social History     Substance and Sexual Activity   Drug Use Never       Allergies   Allergen Reactions   • Acthar [Corticotropin] Psychosis   • Chloroquine Phosphate Unknown     Tremors/ weakness   • Ciprofloxacin Unknown   • Humira [Adalimumab] Unknown   • Levaquin [Levofloxacin] Unknown   • Methotrexate Unknown   • Sulfamethoxazole-Trimethoprim Unknown   • Xeljanz [Tofacitinib] Psychosis   • Amoxicillin-Pot Clavulanate Diarrhea     Pt tolerated ceftriaxone during 9/28/24 admission. (DARREN Wallace, PharmD).    • Enbrel [Etanercept] Rash     Legs and arms        Vital Signs:   144/69-56-17-99.5-92% on RA    Physical Exam:  General: Sitting up in WC in NAD, alert   Head/Face: NCAT, symmetrical  Eyes: PERRLA, no injection, no discharge  ENT: Hearing not impaired, ears without scars or lesions, nasal mucosa and turbinates pink, septum midline, lips pink and moist  Neck: Supple, symmetrical  Respiratory: CTA but diminished without adventitious sounds, respirations even and nonlabored without use of accessory muscles, good air exchange  Cardio: Bradycardic (HR 56 bpm), regular rhythm without murmur or gallops, normal S1S2, no edema, pedal pulses 3+/4 bilaterally  Chest/Breast: Symmetrical  GI: BS x 4, normoactive, non-distended, abd round and soft, no masses or tenderness  : No suprapubic tenderness or distention  MSK: Gait not assessed, joints with full ROM without pain or contractures, + generalized weakness  Skin: Skin warm and dry, no induration  Neurologic: Cranial nerves II through XII intact, superficial touch and pain sensation intact  Psychiatric: Alert, oriented x 1-2, calm and cooperative    Results/Data:   10/16/24: BUN 20, CBC w/o significant findings   10/9/24: HgbA1C 6.8, Chol 95, HDL 35, TSH 24.5, T4 5.0   10/7/24: WBC 20.0,  Hgb 11.7  10/6/24: Glu 150, BUN 27, Cr 0.63, WBC 22.0    Assessment/Plan:  Metabolic encephalopathy-RESOLVED, continue to monitor  Pneumonia/COPD exacerbation-completed course of ATB, c/w supplemental O2 at HS, Duonebs prn, monitor respiratory status closely  Hypomagnesemia-c/w magox, monitor mag level  Impaired functional mobility/weakness/physical deconditioning-c/w PT/OT, safety and fall precautions  Decreased appetite/Hx of CDiff/Hx bowel perforation-c/w Questran 4 gm BID and Colestipol 1 gm BID, F/U with general surgery as needed after discharge  Hx UTI-c/w macrobid, patient asymptomatic  RA-c/w prednisone  CAD/HTN/HLD-ARABELLA diet, c/w amlodipine, ASA, atorvastatin, metoprolol, omega-3, and ramipril, monitor BP and HR, F/U with cardiology after discharge  Osteopenia-WB exercises, c/w calcium/D supplement  Insomnia-c/w trazodone  Dementia/anxiety-supportive care, vistaril prn, consult Psych   Hypothyroidism-c/w levothyroxine (dose increased to 75 mg on 10/10, repeat TSH/T4 in 6 weeks)  Anemia-c/w MVI, monitor CBC     Orders:  NNO    Code Status:   DNR-CCA, DNI      Electronically Signed By: ELIO Zabala-CNP   12/2/24 11:16 PM

## 2024-10-18 VITALS
HEART RATE: 84 BPM | SYSTOLIC BLOOD PRESSURE: 126 MMHG | RESPIRATION RATE: 18 BRPM | TEMPERATURE: 97.9 F | DIASTOLIC BLOOD PRESSURE: 80 MMHG

## 2024-10-18 ASSESSMENT — ENCOUNTER SYMPTOMS
CHILLS: 0
FEVER: 0

## 2024-10-19 NOTE — PROGRESS NOTES
PLACE OF SERVICE:  [facility] [place]    This is a subsequent visit.    Subjective   Patient ID: Maisha Agosto is a 84 y.o. female who presents for Follow-up.    Ms. Portillo Agosto is an 84-year-old female with history of COPD and pneumonia.  She suffers from dementia and is unable to care for herself.  She requires supportive care.    Review of Systems   Constitutional:  Negative for chills and fever.   Cardiovascular:  Negative for chest pain.   All other systems reviewed and are negative.    Objective   /80   Pulse 84   Temp 36.6 °C (97.9 °F)   Resp 18     Physical Exam  Vitals reviewed.   Constitutional:       General: She is not in acute distress.     Comments: This is a well-developed, well-nourished female, sitting in a chair   HENT:      Right Ear: Tympanic membrane, ear canal and external ear normal.      Left Ear: Tympanic membrane, ear canal and external ear normal.   Eyes:      General: No scleral icterus.     Pupils: Pupils are equal, round, and reactive to light.   Neck:      Vascular: No carotid bruit.   Cardiovascular:      Heart sounds: Normal heart sounds, S1 normal and S2 normal. No murmur heard.     No friction rub.   Pulmonary:      Effort: Pulmonary effort is normal.      Breath sounds: Decreased breath sounds (throughout.) present.   Abdominal:      Palpations: There is no hepatomegaly, splenomegaly or mass.   Musculoskeletal:         General: No swelling or deformity. Normal range of motion.      Cervical back: Neck supple.      Right lower leg: No edema.      Left lower leg: No edema.   Lymphadenopathy:      Cervical: No cervical adenopathy.      Upper Body:      Right upper body: No axillary adenopathy.      Left upper body: No axillary adenopathy.      Lower Body: No right inguinal adenopathy. No left inguinal adenopathy.   Neurological:      Cranial Nerves: Cranial nerves 2-12 are intact. No cranial nerve deficit.      Sensory: No sensory  deficit.      Motor: Motor function is intact. No weakness.      Gait: Gait is intact.      Deep Tendon Reflexes: Reflexes normal.      Comments: The patient is alert and oriented x2.   Psychiatric:         Mood and Affect: Mood normal. Mood is not anxious or depressed. Affect is not angry.         Behavior: Behavior is not agitated.         Thought Content: Thought content normal.         Judgment: Judgment normal.     LAB WORK:  Laboratory studies reviewed.    Assessment/Plan   Problem List Items Addressed This Visit             ICD-10-CM       Cardiac and Vasculature    Hyperlipidemia E78.5    HTN (hypertension) I10       Endocrine/Metabolic    Hypothyroidism E03.9       Gastrointestinal and Abdominal    GERD (gastroesophageal reflux disease) K21.9       Multi-system (Lupus, Sarcoid)    Rheumatoid arthritis M06.9       Neuro    Dementia F03.90       Pulmonary and Pneumonias    COPD (chronic obstructive pulmonary disease) (Multi) - Primary J44.9    Pneumonia, unspecified organism J18.9     Other Visit Diagnoses         Codes    Atrial fibrillation, unspecified type (Multi)     I48.91    Coronary artery disease, unspecified vessel or lesion type, unspecified whether angina present, unspecified whether native or transplanted heart     I25.10    Weakness     R53.1    At risk for falls     Z91.81        1. COPD, on bronchodilator therapy.  2. Dementia, unchanged.  3. Hypertension, med controlled..  4. Recent pneumonia, has finished antibiotic.  5. Hypothyroidism, on levothyroxine.  6. Hyperlipidemia, on statin.  7. Atrial fibrillation, rate controlled.  8. GERD, on PPI.  9. Rheumatoid arthritis, on medication.  10. Coronary artery disease, on aspirin.  11. Weakness, on PT/OT.  12. Fall risk, fall precautions.    Scribe Attestation  By signing my name below, IArgenis Scribe attest that this documentation has been prepared under the direction and in the presence of Ricky Small MD.     All medical record  entries made by the scribe were personally dictated by me I have reviewed the chart and agree the record accurately reflects my personal performance of his history physical examination and management

## 2024-10-20 ENCOUNTER — NURSING HOME VISIT (OUTPATIENT)
Dept: POST ACUTE CARE | Facility: EXTERNAL LOCATION | Age: 84
End: 2024-10-20
Payer: MEDICARE

## 2024-10-20 DIAGNOSIS — I48.91 ATRIAL FIBRILLATION, UNSPECIFIED TYPE (MULTI): ICD-10-CM

## 2024-10-20 DIAGNOSIS — I25.10 CORONARY ARTERY DISEASE, UNSPECIFIED VESSEL OR LESION TYPE, UNSPECIFIED WHETHER ANGINA PRESENT, UNSPECIFIED WHETHER NATIVE OR TRANSPLANTED HEART: ICD-10-CM

## 2024-10-20 DIAGNOSIS — J44.9 CHRONIC OBSTRUCTIVE PULMONARY DISEASE, UNSPECIFIED COPD TYPE (MULTI): Primary | ICD-10-CM

## 2024-10-20 DIAGNOSIS — F03.90 DEMENTIA, UNSPECIFIED DEMENTIA SEVERITY, UNSPECIFIED DEMENTIA TYPE, UNSPECIFIED WHETHER BEHAVIORAL, PSYCHOTIC, OR MOOD DISTURBANCE OR ANXIETY (MULTI): ICD-10-CM

## 2024-10-20 DIAGNOSIS — E78.2 MIXED HYPERLIPIDEMIA: ICD-10-CM

## 2024-10-20 DIAGNOSIS — J18.9 PNEUMONIA DUE TO INFECTIOUS ORGANISM, UNSPECIFIED LATERALITY, UNSPECIFIED PART OF LUNG: ICD-10-CM

## 2024-10-20 DIAGNOSIS — R53.1 WEAKNESS: ICD-10-CM

## 2024-10-20 DIAGNOSIS — E03.9 HYPOTHYROIDISM, UNSPECIFIED TYPE: ICD-10-CM

## 2024-10-20 DIAGNOSIS — M06.9 RHEUMATOID ARTHRITIS, INVOLVING UNSPECIFIED SITE, UNSPECIFIED WHETHER RHEUMATOID FACTOR PRESENT (MULTI): ICD-10-CM

## 2024-10-20 DIAGNOSIS — I10 HYPERTENSION, UNSPECIFIED TYPE: ICD-10-CM

## 2024-10-20 DIAGNOSIS — Z91.81 AT RISK FOR FALLS: ICD-10-CM

## 2024-10-20 DIAGNOSIS — K21.9 GASTROESOPHAGEAL REFLUX DISEASE WITHOUT ESOPHAGITIS: ICD-10-CM

## 2024-10-20 PROCEDURE — 99309 SBSQ NF CARE MODERATE MDM 30: CPT | Performed by: INTERNAL MEDICINE

## 2024-10-20 NOTE — LETTER
Patient: Maisha Agosto  : 1940    Encounter Date: 10/20/2024    PLACE OF SERVICE:  [facility]    This is a subsequent visit.    Subjective  Patient ID: Maisha Agosto is a 84 y.o. female who presents for Follow-up.    Ms. Portillo Agosto is an 84-year-old female with history of COPD with dementia.  She has had recent pneumonia.  She is unable to care for herself and requires supportive care.    Review of Systems   Constitutional:  Negative for chills and fever.   Cardiovascular:  Negative for chest pain.   All other systems reviewed and are negative.    Objective  /78   Pulse 74   Temp 36.8 °C (98.2 °F)   Resp 18     Physical Exam  Vitals reviewed.   Constitutional:       General: She is not in acute distress.     Comments: This is a well-developed, well-nourished female, sitting in a chair   HENT:      Right Ear: Tympanic membrane, ear canal and external ear normal.      Left Ear: Tympanic membrane, ear canal and external ear normal.   Eyes:      General: No scleral icterus.     Pupils: Pupils are equal, round, and reactive to light.   Neck:      Vascular: No carotid bruit.   Cardiovascular:      Heart sounds: Normal heart sounds, S1 normal and S2 normal. No murmur heard.     No friction rub.   Pulmonary:      Breath sounds: Decreased breath sounds (throughout.) present.   Abdominal:      Palpations: There is no hepatomegaly, splenomegaly or mass.   Musculoskeletal:         General: No swelling or deformity. Normal range of motion.      Cervical back: Neck supple.      Right lower leg: No edema.      Left lower leg: No edema.   Lymphadenopathy:      Cervical: No cervical adenopathy.      Upper Body:      Right upper body: No axillary adenopathy.      Left upper body: No axillary adenopathy.      Lower Body: No right inguinal adenopathy. No left inguinal adenopathy.   Neurological:      Mental Status: She is alert.      Cranial Nerves: Cranial nerves 2-12 are  intact. No cranial nerve deficit.      Sensory: No sensory deficit.      Motor: Motor function is intact. No weakness.      Gait: Gait is intact.      Deep Tendon Reflexes: Reflexes normal.      Comments: The patient is oriented x2.   Psychiatric:         Mood and Affect: Mood normal. Mood is not anxious or depressed. Affect is not angry.         Behavior: Behavior is not agitated.         Thought Content: Thought content normal.         Judgment: Judgment normal.     LAB WORK:  Laboratory studies were reviewed.    Assessment/Plan  Problem List Items Addressed This Visit             ICD-10-CM       Cardiac and Vasculature    Hyperlipidemia E78.5    HTN (hypertension) I10       Endocrine/Metabolic    Hypothyroidism E03.9       Gastrointestinal and Abdominal    GERD (gastroesophageal reflux disease) K21.9       Multi-system (Lupus, Sarcoid)    Rheumatoid arthritis M06.9       Neuro    Dementia F03.90       Pulmonary and Pneumonias    COPD (chronic obstructive pulmonary disease) (Multi) - Primary J44.9    Pneumonia, unspecified organism J18.9     Other Visit Diagnoses         Codes    Atrial fibrillation, unspecified type (Multi)     I48.91    Coronary artery disease, unspecified vessel or lesion type, unspecified whether angina present, unspecified whether native or transplanted heart     I25.10    Weakness     R53.1    At risk for falls     Z91.81        1. COPD, on bronchodilator therapy.  2. Dementia, unchanged.  3. Hypertension, med controlled..  4. Atrial fibrillation, rate controlled.  5. Hyperlipidemia, on statin.  6. Hypothyroidism, on levothyroxine.  7. Rheumatoid arthritis, on medication.  8. Coronary artery disease, on aspirin.  9. GERD, on PPI.  10. Recent pneumonia, continue to monitor.  11. Weakness, on PT/OT.  12. Fall risk, on fall precaution.    Scribe Attestation  By signing my name below, Argenis MOON Scribe attest that this documentation has been prepared under the direction and in the presence of  Ricky Small MD.     All medical record entries made by the scribe were personally dictated by me I have reviewed the chart and agree the record accurately reflects my personal performance of his history physical examination and management      Electronically Signed By: Ricky Small MD   10/24/24 11:06 PM

## 2024-10-21 VITALS
DIASTOLIC BLOOD PRESSURE: 78 MMHG | RESPIRATION RATE: 18 BRPM | HEART RATE: 74 BPM | SYSTOLIC BLOOD PRESSURE: 126 MMHG | TEMPERATURE: 98.2 F

## 2024-10-21 PROBLEM — A04.72 COLITIS DUE TO CLOSTRIDIOIDES DIFFICILE: Status: RESOLVED | Noted: 2024-05-16 | Resolved: 2024-10-21

## 2024-10-21 PROBLEM — K50.019: Status: RESOLVED | Noted: 2024-06-06 | Resolved: 2024-10-21

## 2024-10-21 PROBLEM — N39.0 UTI (URINARY TRACT INFECTION): Status: RESOLVED | Noted: 2024-05-07 | Resolved: 2024-10-21

## 2024-10-21 PROBLEM — K63.1 BOWEL PERFORATION (MULTI): Status: RESOLVED | Noted: 2024-06-05 | Resolved: 2024-10-21

## 2024-10-21 PROBLEM — E53.8 B12 DEFICIENCY: Status: ACTIVE | Noted: 2024-10-21

## 2024-10-21 PROBLEM — D64.9 ANEMIA: Status: ACTIVE | Noted: 2024-10-21

## 2024-10-21 ASSESSMENT — ENCOUNTER SYMPTOMS
CHILLS: 0
FEVER: 0

## 2024-10-21 NOTE — PROGRESS NOTES
PLACE OF SERVICE:  [facility] [place]    This is a subsequent visit.    Subjective   Patient ID: Maisha Agosto is a 84 y.o. female who presents for Follow-up.    Ms. Portillo Agosto is an 84-year-old female with history of COPD with dementia.  She has had recent pneumonia.  She is unable to care for herself and requires supportive care.    Review of Systems   Constitutional:  Negative for chills and fever.   Cardiovascular:  Negative for chest pain.   All other systems reviewed and are negative.    Objective   /78   Pulse 74   Temp 36.8 °C (98.2 °F)   Resp 18     Physical Exam  Vitals reviewed.   Constitutional:       General: She is not in acute distress.     Comments: This is a well-developed, well-nourished female, sitting in a chair   HENT:      Right Ear: Tympanic membrane, ear canal and external ear normal.      Left Ear: Tympanic membrane, ear canal and external ear normal.   Eyes:      General: No scleral icterus.     Pupils: Pupils are equal, round, and reactive to light.   Neck:      Vascular: No carotid bruit.   Cardiovascular:      Heart sounds: Normal heart sounds, S1 normal and S2 normal. No murmur heard.     No friction rub.   Pulmonary:      Breath sounds: Decreased breath sounds (throughout.) present.   Abdominal:      Palpations: There is no hepatomegaly, splenomegaly or mass.   Musculoskeletal:         General: No swelling or deformity. Normal range of motion.      Cervical back: Neck supple.      Right lower leg: No edema.      Left lower leg: No edema.   Lymphadenopathy:      Cervical: No cervical adenopathy.      Upper Body:      Right upper body: No axillary adenopathy.      Left upper body: No axillary adenopathy.      Lower Body: No right inguinal adenopathy. No left inguinal adenopathy.   Neurological:      Mental Status: She is alert.      Cranial Nerves: Cranial nerves 2-12 are intact. No cranial nerve deficit.      Sensory: No sensory deficit.       Motor: Motor function is intact. No weakness.      Gait: Gait is intact.      Deep Tendon Reflexes: Reflexes normal.      Comments: The patient is oriented x2.   Psychiatric:         Mood and Affect: Mood normal. Mood is not anxious or depressed. Affect is not angry.         Behavior: Behavior is not agitated.         Thought Content: Thought content normal.         Judgment: Judgment normal.     LAB WORK:  Laboratory studies were reviewed.    Assessment/Plan   Problem List Items Addressed This Visit             ICD-10-CM       Cardiac and Vasculature    Hyperlipidemia E78.5    HTN (hypertension) I10       Endocrine/Metabolic    Hypothyroidism E03.9       Gastrointestinal and Abdominal    GERD (gastroesophageal reflux disease) K21.9       Multi-system (Lupus, Sarcoid)    Rheumatoid arthritis M06.9       Neuro    Dementia F03.90       Pulmonary and Pneumonias    COPD (chronic obstructive pulmonary disease) (Multi) - Primary J44.9    Pneumonia, unspecified organism J18.9     Other Visit Diagnoses         Codes    Atrial fibrillation, unspecified type (Multi)     I48.91    Coronary artery disease, unspecified vessel or lesion type, unspecified whether angina present, unspecified whether native or transplanted heart     I25.10    Weakness     R53.1    At risk for falls     Z91.81        1. COPD, on bronchodilator therapy.  2. Dementia, unchanged.  3. Hypertension, med controlled..  4. Atrial fibrillation, rate controlled.  5. Hyperlipidemia, on statin.  6. Hypothyroidism, on levothyroxine.  7. Rheumatoid arthritis, on medication.  8. Coronary artery disease, on aspirin.  9. GERD, on PPI.  10. Recent pneumonia, continue to monitor.  11. Weakness, on PT/OT.  12. Fall risk, on fall precaution.    Scribe Attestation  By signing my name below, IArgenis Scribe attest that this documentation has been prepared under the direction and in the presence of Ricky Small MD.     All medical record entries made by the  dayanna were personally dictated by me I have reviewed the chart and agree the record accurately reflects my personal performance of his history physical examination and management

## 2024-10-21 NOTE — PROGRESS NOTES
Chief Complaint:   SNF FU  -Pneumonia  -COPD exacerbation  -Metabolic encephalopathy  -Hypomagnesemia  -Physical deconditioning/weakness    HPI:   84 year-old female presenting to [facility] ER on 9/28/24 with generalized weakness, confusion, and decreased oral intake. She also c/o pain with urination. Work-up in ER: UA negative for infection, HR 37.3, HR 56, RR 19, /90, SpO2 99%. After arrival, SpO2 dropped between 88-91% and she was placed on 2 L of O2. CXR showed RLL infiltrates. She was started on IV Ceftriaxone and admitted for further evaluation and treatment. Hospital course:    Community-acquired pneumonia/COPD exacerbation-supplemental O2 prn, IV steroids, IV Azithromycin and IV Ceftriaxone --> PO Cefuroxine, completed course of ATB therapy prior to discharge, Duonebs, Mucinex/Robitussin prn  Impaired functional mobility/weakness-PT/OT evaluations, recommending SNF  Hypomagnesemia-c/w magox, mag monitored  Hx bowel perforation/decreased appetite/Hx of CDiff/GERD-CT A/P, c/w Questran 4 g BID, protonix daily, miralax daily, surgery consult, no intervention warranted  Bradycardia/tachycardia-episode of HR in the 40s --> metoprolol held and patient became tachycardic, metoprolol tartrate changed to Toprol XL 25 mg daily by cardiology, HR monitored     Pt. was HDS and discharged to [facility] on 10/7/24. Today, patient reports that she is feeling well. She denies dizziness, SOB, cough, chest pain or palpitations, abdominal pain, N/V/D/C, or dysuria. She reports appetite at baseline. Staff report no clinical concerns.     ROS:    As above in HPI. Otherwise, all other systems have been reviewed and are negative for complaint.    Medications reviewed and verified in NH chart.     Patient Active Problem List   Diagnosis    Dehydration    Impaired functional mobility, balance, gait, and endurance    COPD (chronic obstructive pulmonary disease) (Multi)    Arteriosclerosis of coronary artery    Cyst of  pancreas (HHS-HCC)    Frail elderly    Abnormal gait    Fatigue    Generalized weakness    GERD (gastroesophageal reflux disease)    Hiatal hernia    Hyperlipidemia    Dementia    Osteopenia    Paroxysmal atrial fibrillation (Multi)    HTN (hypertension)    Rheumatoid arthritis    Hypothyroidism    Solitary pulmonary nodule    Pneumonia, unspecified organism    Acute exacerbation of COPD with asthma    Type 2 diabetes mellitus    Urinary incontinence    PVD (peripheral vascular disease) (CMS-HCC)    Decreased appetite    Ataxia    At risk for falls    Anemia    B12 deficiency        Past Medical History:   Diagnosis Date    Bowel perforation (Multi) 2024    Clostridium difficile colitis     Colitis due to Clostridioides difficile 2024    Heart murmur     Hypernatremia     Terminal ileitis with complication (Multi) 2024    UTI (urinary tract infection) 2024       Past Surgical History:   Procedure Laterality Date    CATARACT EXTRACTION       SECTION, CLASSIC  2016     Section    CHOLECYSTECTOMY  2014    Cholecystectomy    COLONOSCOPY  2014    Colonoscopy (Fiberoptic)    CT HEAD ANGIO W AND WO IV CONTRAST  2021    CT HEAD ANGIO W AND WO IV CONTRAST 2021 [facility]    ESOPHAGOGASTRODUODENOSCOPY  2014    Diagnostic Esophagogastroduodenoscopy    MR HEAD ANGIO WO IV CONTRAST  2021    MR HEAD ANGIO WO IV CONTRAST 2021 [facility]    MR NECK ANGIO WO IV CONTRAST  2021    MR NECK ANGIO WO IV CONTRAST 2021 [facility]       Family History   Problem Relation Name Age of Onset    Diabetes Mother      Heart disease Other      Heart attack Other         Social History     Tobacco Use   Smoking Status Never   Smokeless Tobacco Never       Social History     Substance and Sexual Activity   Alcohol Use Never       Social History     Substance and Sexual Activity   Drug Use Never       Allergies   Allergen  Reactions    Acthar [Corticotropin] Psychosis    Chloroquine Phosphate Unknown     Tremors/ weakness    Ciprofloxacin Unknown    Humira [Adalimumab] Unknown    Levaquin [Levofloxacin] Unknown    Methotrexate Unknown    Sulfamethoxazole-Trimethoprim Unknown    Xeljanz [Tofacitinib] Psychosis    Amoxicillin-Pot Clavulanate Diarrhea     Pt tolerated ceftriaxone during 9/28/24 admission. (DARREN Wallace, PharmD).     Enbrel [Etanercept] Rash     Legs and arms        Vital Signs:   151/85-57-16-97.9-99% on RA    Physical Exam:  General: Sitting up in WC in NAD, alert   Head/Face: NCAT, symmetrical  Eyes: PERRLA, no injection, no discharge  ENT: Hearing not impaired, ears without scars or lesions, nasal mucosa and turbinates pink, septum midline, lips pink and moist  Neck: Supple, symmetrical  Respiratory: CTA but diminished without adventitious sounds, respirations even and nonlabored without use of accessory muscles, good air exchange  Cardio: Bradycardic (HR 57 bpm), regular rhythm without murmur or gallops, normal S1S2, no edema, pedal pulses 3+/4 bilaterally  Chest/Breast: Symmetrical  GI: BS x 4, normoactive, non-distended, abd round and soft, no masses or tenderness  : No suprapubic tenderness or distention  MSK: Gait not assessed, joints with full ROM without pain or contractures, + generalized weakness  Skin: Skin warm and dry, no induration  Neurologic: Cranial nerves II through XII intact, superficial touch and pain sensation intact  Psychiatric: Alert, oriented x 1-2, calm and cooperative    Results/Data:   10/7/24: WBC 20.0, Hgb 11.7  10/6/24: Glu 150, BUN 27, Cr 0.63, WBC 22.0    Assessment/Plan:  Metabolic encephalopathy-RESOLVED, continue to monitor  Pneumonia/COPD exacerbation-completed course of ATB, c/w supplemental O2 at HS, Duonebs prn, monitor respiratory status closely  Hypomagnesemia-c/w magox, monitor mag level  Impaired functional mobility/weakness/physical deconditioning-c/w PT/OT, safety and fall  precautions  Decreased appetite/Hx of CDiff/Hx bowel perforation-c/w Questran 4 gm BID and Colestipol 1 gm BID, F/U with general surgery as needed after discharge  Hx UTI-c/w macrobid, UCx pending, patient asymptomatic  RA-c/w prednisone  CAD/HTN/HLD-ARABELLA diet, c/w amlodipine, ASA, atorvastatin, metoprolol, omega-3, and ramipril, monitor BP and HR, F/U with cardiology after discharge  Osteopenia-WB exercises, c/w calcium/D supplement  Insomnia-c/w trazodone  Dementia/anxiety-supportive care, vistaril prn, consult Psych   Hypothyroidism-c/w levothyroxine  Anemia-c/w MVI, monitor CBC     Orders:  NNO    Code Status:   DNR-CCA, DNI

## 2024-10-22 ENCOUNTER — NURSING HOME VISIT (OUTPATIENT)
Dept: POST ACUTE CARE | Facility: EXTERNAL LOCATION | Age: 84
End: 2024-10-22

## 2024-10-22 ENCOUNTER — APPOINTMENT (OUTPATIENT)
Dept: PODIATRY | Facility: CLINIC | Age: 84
End: 2024-10-22
Payer: MEDICARE

## 2024-10-22 DIAGNOSIS — J44.1 ACUTE EXACERBATION OF COPD WITH ASTHMA: ICD-10-CM

## 2024-10-22 DIAGNOSIS — M79.672 PAIN IN BOTH FEET: ICD-10-CM

## 2024-10-22 DIAGNOSIS — R53.81 PHYSICAL DECONDITIONING: ICD-10-CM

## 2024-10-22 DIAGNOSIS — E03.9 HYPOTHYROIDISM, UNSPECIFIED TYPE: ICD-10-CM

## 2024-10-22 DIAGNOSIS — I48.91 ATRIAL FIBRILLATION, UNSPECIFIED TYPE (MULTI): ICD-10-CM

## 2024-10-22 DIAGNOSIS — J18.9 PNEUMONIA DUE TO INFECTIOUS ORGANISM, UNSPECIFIED LATERALITY, UNSPECIFIED PART OF LUNG: Primary | ICD-10-CM

## 2024-10-22 DIAGNOSIS — I10 HYPERTENSION, UNSPECIFIED TYPE: ICD-10-CM

## 2024-10-22 DIAGNOSIS — M79.671 PAIN IN BOTH FEET: ICD-10-CM

## 2024-10-22 DIAGNOSIS — G93.41 METABOLIC ENCEPHALOPATHY: ICD-10-CM

## 2024-10-22 DIAGNOSIS — R80.9 CONTROLLED TYPE 2 DIABETES MELLITUS WITH MICROALBUMINURIA, WITHOUT LONG-TERM CURRENT USE OF INSULIN (MULTI): Primary | ICD-10-CM

## 2024-10-22 DIAGNOSIS — I73.9 PVD (PERIPHERAL VASCULAR DISEASE) (CMS-HCC): ICD-10-CM

## 2024-10-22 DIAGNOSIS — E83.42 HYPOMAGNESEMIA: ICD-10-CM

## 2024-10-22 DIAGNOSIS — F03.90 DEMENTIA, UNSPECIFIED DEMENTIA SEVERITY, UNSPECIFIED DEMENTIA TYPE, UNSPECIFIED WHETHER BEHAVIORAL, PSYCHOTIC, OR MOOD DISTURBANCE OR ANXIETY (MULTI): ICD-10-CM

## 2024-10-22 DIAGNOSIS — E11.29 CONTROLLED TYPE 2 DIABETES MELLITUS WITH MICROALBUMINURIA, WITHOUT LONG-TERM CURRENT USE OF INSULIN (MULTI): Primary | ICD-10-CM

## 2024-10-22 DIAGNOSIS — R53.1 WEAKNESS: ICD-10-CM

## 2024-10-22 PROCEDURE — 1160F RVW MEDS BY RX/DR IN RCRD: CPT | Performed by: PODIATRIST

## 2024-10-22 PROCEDURE — 1111F DSCHRG MED/CURRENT MED MERGE: CPT | Performed by: PODIATRIST

## 2024-10-22 PROCEDURE — 99316 NF DSCHRG MGMT 30 MIN+: CPT | Performed by: NURSE PRACTITIONER

## 2024-10-22 PROCEDURE — 1157F ADVNC CARE PLAN IN RCRD: CPT | Performed by: PODIATRIST

## 2024-10-22 PROCEDURE — 1036F TOBACCO NON-USER: CPT | Performed by: PODIATRIST

## 2024-10-22 PROCEDURE — 1159F MED LIST DOCD IN RCRD: CPT | Performed by: PODIATRIST

## 2024-10-22 PROCEDURE — 99213 OFFICE O/P EST LOW 20 MIN: CPT | Performed by: PODIATRIST

## 2024-10-22 NOTE — LETTER
Patient: Maisha Agosto  : 1940    Encounter Date: 10/22/2024    Chief Complaint:   SNF FU  -Pneumonia  -COPD exacerbation  -Metabolic encephalopathy  -Hypomagnesemia  -Physical deconditioning/weakness    HPI:   84 year-old female presenting to [facility] ER on 24 with generalized weakness, confusion, and decreased oral intake. She also c/o pain with urination. Work-up in ER: UA negative for infection, HR 37.3, HR 56, RR 19, /90, SpO2 99%. After arrival, SpO2 dropped between 88-91% and she was placed on 2 L of O2. CXR showed RLL infiltrates. She was started on IV Ceftriaxone and admitted for further evaluation and treatment. Hospital course:    Community-acquired pneumonia/COPD exacerbation-supplemental O2 prn, IV steroids, IV Azithromycin and IV Ceftriaxone --> PO Cefuroxine, completed course of ATB therapy prior to discharge, Duonebs, Mucinex/Robitussin prn  Impaired functional mobility/weakness-PT/OT evaluations, recommending SNF  Hypomagnesemia-c/w magox, mag monitored  Hx bowel perforation/decreased appetite/Hx of CDiff/GERD-CT A/P, c/w Questran 4 g BID, protonix daily, miralax daily, surgery consult, no intervention warranted  Bradycardia/tachycardia-episode of HR in the 40s --> metoprolol held and patient became tachycardic, metoprolol tartrate changed to Toprol XL 25 mg daily by cardiology, HR monitored     Pt. was HDS and discharged to [facility] on 10/7/24. Today, patient reports that she is feeling well. She denies dizziness, SOB, cough, chest pain or palpitations, abdominal pain, N/V/D/C, or dysuria. Staff report that patient's meal intakes have been variable with + weight loss. She was started on Medpass BID. No other clinical concerns noted at this time.     ROS:    As above in HPI. Otherwise, all other systems have been reviewed and are negative for complaint.    Medications reviewed and verified in NH chart.     Patient Active Problem List   Diagnosis   • Dehydration   • Impaired  functional mobility, balance, gait, and endurance   • COPD (chronic obstructive pulmonary disease) (Multi)   • Arteriosclerosis of coronary artery   • Cyst of pancreas ([facility])   • Frail elderly   • Abnormal gait   • Fatigue   • Generalized weakness   • GERD (gastroesophageal reflux disease)   • Hiatal hernia   • Hyperlipidemia   • Dementia   • Osteopenia   • Paroxysmal atrial fibrillation (Multi)   • HTN (hypertension)   • Rheumatoid arthritis   • Hypothyroidism   • Solitary pulmonary nodule   • Pneumonia, unspecified organism   • Acute exacerbation of COPD with asthma   • Controlled type 2 diabetes mellitus with microalbuminuria, without long-term current use of insulin (Multi)   • Urinary incontinence   • PVD (peripheral vascular disease) (CMS-HCC)   • Decreased appetite   • Ataxia   • At risk for falls   • Anemia   • B12 deficiency        Past Medical History:   Diagnosis Date   • Bowel perforation (Multi) 2024   • Clostridium difficile colitis    • Colitis due to Clostridioides difficile 2024   • Heart murmur    • Hypernatremia    • Terminal ileitis with complication (Multi) 2024   • UTI (urinary tract infection) 2024       Past Surgical History:   Procedure Laterality Date   • CATARACT EXTRACTION     •  SECTION, CLASSIC  2016     Section   • CHOLECYSTECTOMY  2014    Cholecystectomy   • COLONOSCOPY  2014    Colonoscopy (Fiberoptic)   • CT HEAD ANGIO W AND WO IV CONTRAST  2021    CT HEAD ANGIO W AND WO IV CONTRAST 2021 [facility]   • ESOPHAGOGASTRODUODENOSCOPY  2014    Diagnostic Esophagogastroduodenoscopy   • MR HEAD ANGIO WO IV CONTRAST  2021    MR HEAD ANGIO WO IV CONTRAST 2021 [facility]   • MR NECK ANGIO WO IV CONTRAST  2021    MR NECK ANGIO WO IV CONTRAST 2021 [facility]       Family History   Problem Relation Name Age of Onset   • Diabetes Mother     • Heart disease Other     •  Heart attack Other         Social History     Tobacco Use   Smoking Status Never   Smokeless Tobacco Never       Social History     Substance and Sexual Activity   Alcohol Use Never       Social History     Substance and Sexual Activity   Drug Use Never       Allergies   Allergen Reactions   • Acthar [Corticotropin] Psychosis   • Chloroquine Phosphate Unknown     Tremors/ weakness   • Ciprofloxacin Unknown   • Humira [Adalimumab] Unknown   • Levaquin [Levofloxacin] Unknown   • Methotrexate Unknown   • Sulfamethoxazole-Trimethoprim Unknown   • Xeljanz [Tofacitinib] Psychosis   • Amoxicillin-Pot Clavulanate Diarrhea     Pt tolerated ceftriaxone during 9/28/24 admission. (DARREN Wallace, PharmD).    • Enbrel [Etanercept] Rash     Legs and arms        Vital Signs:   118/75-69-18-98.0-97% on RA    Physical Exam:  General: Sitting up in WC in NAD, alert   Head/Face: NCAT, symmetrical  Eyes: PERRLA, no injection, no discharge  ENT: Hearing not impaired, ears without scars or lesions, nasal mucosa and turbinates pink, septum midline, lips pink and moist  Neck: Supple, symmetrical  Respiratory: CTA but diminished without adventitious sounds, respirations even and nonlabored without use of accessory muscles, good air exchange  Cardio: RRR without murmur or gallops, normal S1S2, no edema, pedal pulses 3+/4 bilaterally  Chest/Breast: Symmetrical  GI: BS x 4, normoactive, non-distended, abd round and soft, no masses or tenderness  : No suprapubic tenderness or distention  MSK: Gait not assessed, joints with full ROM without pain or contractures, + generalized weakness  Skin: Skin warm and dry, no induration  Neurologic: Cranial nerves II through XII intact, superficial touch and pain sensation intact  Psychiatric: Alert, oriented x 1-2, calm and cooperative    Results/Data:   10/16/24: BUN 20, CBC w/o significant findings   10/9/24: HgbA1C 6.8, Chol 95, HDL 35, TSH 24.5, T4 5.0   10/7/24: WBC 20.0, Hgb 11.7  10/6/24: Glu 150, BUN 27,  Cr 0.63, WBC 22.0    Assessment/Plan:  Metabolic encephalopathy-RESOLVED, continue to monitor  Pneumonia/COPD exacerbation-completed course of ATB, c/w supplemental O2 at HS, Duonebs prn, monitor respiratory status closely  Hypomagnesemia-c/w magox, monitor mag level  Impaired functional mobility/weakness/physical deconditioning-c/w PT/OT, safety and fall precautions  Decreased appetite/Hx of CDiff/Hx bowel perforation-c/w Questran 4 gm BID and Colestipol 1 gm BID, F/U with general surgery as needed after discharge  Hx UTI-c/w macrobid, patient asymptomatic  RA-c/w prednisone  CAD/HTN/HLD-ARABELLA diet, c/w amlodipine, ASA, atorvastatin, metoprolol, omega-3, and ramipril, monitor BP and HR, F/U with cardiology after discharge  Osteopenia-WB exercises, c/w calcium/D supplement  Insomnia-c/w trazodone  Dementia/anxiety-supportive care, vistaril prn, consult Psych   Hypothyroidism-c/w levothyroxine (dose increased to 75 mg on 10/10, repeat TSH/T4 in 6 weeks)  Anemia-c/w MVI, monitor CBC     Orders:  NNO    Code Status:   DNR-CCA, DNI    Pt. is HDS to discharge home w/ [facility] on 10/24/24. Pt. to F/U with PCP in 1-2 weeks after discharge. Total cumulative time spent in preparation of this discharge, including documentation review, coordination of care with the medical team including PT/OT/SW/treating consultants, discussion with patient and/or pertinent family members, finalization of prescriptions, F/U appointments, and this discharge summary was approximately 40 minutes.    Electronically Signed By: USAMA Zabala   12/7/24 10:37 PM

## 2024-10-28 NOTE — PROGRESS NOTES
History of Present Illness:   Patient states they are here for Dm exam  Denies NTB to feet  Most recent A1C is 6.8  Unable to safely trim nails on own    Last visit March 2023      Past Medical History  Past Medical History:   Diagnosis Date    Bowel perforation (Multi) 06/05/2024    Clostridium difficile colitis     Colitis due to Clostridioides difficile 05/16/2024    Heart murmur     Hypernatremia     Terminal ileitis with complication (Multi) 06/06/2024    UTI (urinary tract infection) 05/07/2024       Medications and Allergies have been reviewed.    Review Of Systems:  GENERAL: No weight loss, malaise or fevers.  HEENT: Negative for frequent or significant headaches,   RESPIRATORY: Negative for cough, wheezing or shortness of breath.  CARDIOVASCULAR: Negative for chest pain, leg swelling or palpitations.    Physical Exam:  Vascular exam: Dorsalis pedis and Posterior Tibial pulses palpable 1/4 bilateral. Capillary refill time less than 3 seconds b/l. No Hair growth noted to digits. +1 bl LE edema noted. Skin temp warm to warm from proximal to distal b/l. + varicosities noted.     Neurologic exam: Vibratory, protective and proprioception intact b/l. No neurologic deficits noted.      Musculoskeletal exam: Muscle strength 5/5 for all major muscle groups tested in the lower extremity b/l. No gross deformities noted b/l.      Dermatologic exam: Nails 1-5 b/l are discolored and thick. Non elongated Webspaces 1-4 b/l are clean, dry and intact. No primary or secondary skin lesions noted. No open lesions or wounds noted at this time .    1. Controlled type 2 diabetes mellitus with microalbuminuria, without long-term current use of insulin (Multi)        2. PVD (peripheral vascular disease) ([facility])        3. Pain in both feet          Patient educated on proper diabetic foot care.  Nails 1-5 b/l were debrided in thickness and length with nail cutting forceps.  A1C revd. 6.8  All hpk tissue was debrided to smooth  skin  Patient to follow up in 6 mos or sooner if any problems arise.   Patient was in agreement to this plan. All questions answered.      Mariah Schaeffer DPM  [phone]  Option 2  Fax: [phone]

## 2024-12-03 NOTE — PROGRESS NOTES
Chief Complaint:   SNF FU  -Pneumonia  -COPD exacerbation  -Metabolic encephalopathy  -Hypomagnesemia  -Physical deconditioning/weakness    HPI:   84 year-old female presenting to [facility] ER on 9/28/24 with generalized weakness, confusion, and decreased oral intake. She also c/o pain with urination. Work-up in ER: UA negative for infection, HR 37.3, HR 56, RR 19, /90, SpO2 99%. After arrival, SpO2 dropped between 88-91% and she was placed on 2 L of O2. CXR showed RLL infiltrates. She was started on IV Ceftriaxone and admitted for further evaluation and treatment. Hospital course:    Community-acquired pneumonia/COPD exacerbation-supplemental O2 prn, IV steroids, IV Azithromycin and IV Ceftriaxone --> PO Cefuroxine, completed course of ATB therapy prior to discharge, Duonebs, Mucinex/Robitussin prn  Impaired functional mobility/weakness-PT/OT evaluations, recommending SNF  Hypomagnesemia-c/w magox, mag monitored  Hx bowel perforation/decreased appetite/Hx of CDiff/GERD-CT A/P, c/w Questran 4 g BID, protonix daily, miralax daily, surgery consult, no intervention warranted  Bradycardia/tachycardia-episode of HR in the 40s --> metoprolol held and patient became tachycardic, metoprolol tartrate changed to Toprol XL 25 mg daily by cardiology, HR monitored     Pt. was HDS and discharged to [facility] on 10/7/24. Today, patient reports that she is feeling well. She denies dizziness, SOB, cough, chest pain or palpitations, abdominal pain, N/V/D/C, or dysuria. Staff report that patient's meal intakes have been variable with + weight loss. She was started on Medpass BID. No other clinical concerns noted at this time.     ROS:    As above in HPI. Otherwise, all other systems have been reviewed and are negative for complaint.    Medications reviewed and verified in NH chart.     Patient Active Problem List   Diagnosis    Dehydration    Impaired functional mobility, balance, gait, and endurance    COPD (chronic  obstructive pulmonary disease) (Multi)    Arteriosclerosis of coronary artery    Cyst of pancreas (HHS-HCC)    Frail elderly    Abnormal gait    Fatigue    Generalized weakness    GERD (gastroesophageal reflux disease)    Hiatal hernia    Hyperlipidemia    Dementia    Osteopenia    Paroxysmal atrial fibrillation (Multi)    HTN (hypertension)    Rheumatoid arthritis    Hypothyroidism    Solitary pulmonary nodule    Pneumonia, unspecified organism    Acute exacerbation of COPD with asthma    Controlled type 2 diabetes mellitus with microalbuminuria, without long-term current use of insulin (Multi)    Urinary incontinence    PVD (peripheral vascular disease) (CMS-HCC)    Decreased appetite    Ataxia    At risk for falls    Anemia    B12 deficiency        Past Medical History:   Diagnosis Date    Bowel perforation (Multi) 2024    Clostridium difficile colitis     Colitis due to Clostridioides difficile 2024    Heart murmur     Hypernatremia     Terminal ileitis with complication (Multi) 2024    UTI (urinary tract infection) 2024       Past Surgical History:   Procedure Laterality Date    CATARACT EXTRACTION       SECTION, CLASSIC  2016     Section    CHOLECYSTECTOMY  2014    Cholecystectomy    COLONOSCOPY  2014    Colonoscopy (Fiberoptic)    CT HEAD ANGIO W AND WO IV CONTRAST  2021    CT HEAD ANGIO W AND WO IV CONTRAST 2021 [facility]    ESOPHAGOGASTRODUODENOSCOPY  2014    Diagnostic Esophagogastroduodenoscopy    MR HEAD ANGIO WO IV CONTRAST  2021    MR HEAD ANGIO WO IV CONTRAST 2021 [facility]    MR NECK ANGIO WO IV CONTRAST  2021    MR NECK ANGIO WO IV CONTRAST 2021 [facility]       Family History   Problem Relation Name Age of Onset    Diabetes Mother      Heart disease Other      Heart attack Other         Social History     Tobacco Use   Smoking Status Never   Smokeless Tobacco Never        Social History     Substance and Sexual Activity   Alcohol Use Never       Social History     Substance and Sexual Activity   Drug Use Never       Allergies   Allergen Reactions    Acthar [Corticotropin] Psychosis    Chloroquine Phosphate Unknown     Tremors/ weakness    Ciprofloxacin Unknown    Humira [Adalimumab] Unknown    Levaquin [Levofloxacin] Unknown    Methotrexate Unknown    Sulfamethoxazole-Trimethoprim Unknown    Xeljanz [Tofacitinib] Psychosis    Amoxicillin-Pot Clavulanate Diarrhea     Pt tolerated ceftriaxone during 9/28/24 admission. (DARREN Wallace, PharmD).     Enbrel [Etanercept] Rash     Legs and arms        Vital Signs:   144/69-56-17-99.5-92% on RA    Physical Exam:  General: Sitting up in WC in NAD, alert   Head/Face: NCAT, symmetrical  Eyes: PERRLA, no injection, no discharge  ENT: Hearing not impaired, ears without scars or lesions, nasal mucosa and turbinates pink, septum midline, lips pink and moist  Neck: Supple, symmetrical  Respiratory: CTA but diminished without adventitious sounds, respirations even and nonlabored without use of accessory muscles, good air exchange  Cardio: Bradycardic (HR 56 bpm), regular rhythm without murmur or gallops, normal S1S2, no edema, pedal pulses 3+/4 bilaterally  Chest/Breast: Symmetrical  GI: BS x 4, normoactive, non-distended, abd round and soft, no masses or tenderness  : No suprapubic tenderness or distention  MSK: Gait not assessed, joints with full ROM without pain or contractures, + generalized weakness  Skin: Skin warm and dry, no induration  Neurologic: Cranial nerves II through XII intact, superficial touch and pain sensation intact  Psychiatric: Alert, oriented x 1-2, calm and cooperative    Results/Data:   10/16/24: BUN 20, CBC w/o significant findings   10/9/24: HgbA1C 6.8, Chol 95, HDL 35, TSH 24.5, T4 5.0   10/7/24: WBC 20.0, Hgb 11.7  10/6/24: Glu 150, BUN 27, Cr 0.63, WBC 22.0    Assessment/Plan:  Metabolic encephalopathy-RESOLVED,  continue to monitor  Pneumonia/COPD exacerbation-completed course of ATB, c/w supplemental O2 at HS, Duonebs prn, monitor respiratory status closely  Hypomagnesemia-c/w magox, monitor mag level  Impaired functional mobility/weakness/physical deconditioning-c/w PT/OT, safety and fall precautions  Decreased appetite/Hx of CDiff/Hx bowel perforation-c/w Questran 4 gm BID and Colestipol 1 gm BID, F/U with general surgery as needed after discharge  Hx UTI-c/w macrobid, patient asymptomatic  RA-c/w prednisone  CAD/HTN/HLD-ARABELLA diet, c/w amlodipine, ASA, atorvastatin, metoprolol, omega-3, and ramipril, monitor BP and HR, F/U with cardiology after discharge  Osteopenia-WB exercises, c/w calcium/D supplement  Insomnia-c/w trazodone  Dementia/anxiety-supportive care, vistaril prn, consult Psych   Hypothyroidism-c/w levothyroxine (dose increased to 75 mg on 10/10, repeat TSH/T4 in 6 weeks)  Anemia-c/w MVI, monitor CBC     Orders:  NNO    Code Status:   DNR-CCA, DNI

## 2024-12-08 NOTE — PROGRESS NOTES
Chief Complaint:   SNF FU  -Pneumonia  -COPD exacerbation  -Metabolic encephalopathy  -Hypomagnesemia  -Physical deconditioning/weakness    HPI:   84 year-old female presenting to [facility] ER on 9/28/24 with generalized weakness, confusion, and decreased oral intake. She also c/o pain with urination. Work-up in ER: UA negative for infection, HR 37.3, HR 56, RR 19, /90, SpO2 99%. After arrival, SpO2 dropped between 88-91% and she was placed on 2 L of O2. CXR showed RLL infiltrates. She was started on IV Ceftriaxone and admitted for further evaluation and treatment. Hospital course:    Community-acquired pneumonia/COPD exacerbation-supplemental O2 prn, IV steroids, IV Azithromycin and IV Ceftriaxone --> PO Cefuroxine, completed course of ATB therapy prior to discharge, Duonebs, Mucinex/Robitussin prn  Impaired functional mobility/weakness-PT/OT evaluations, recommending SNF  Hypomagnesemia-c/w magox, mag monitored  Hx bowel perforation/decreased appetite/Hx of CDiff/GERD-CT A/P, c/w Questran 4 g BID, protonix daily, miralax daily, surgery consult, no intervention warranted  Bradycardia/tachycardia-episode of HR in the 40s --> metoprolol held and patient became tachycardic, metoprolol tartrate changed to Toprol XL 25 mg daily by cardiology, HR monitored     Pt. was HDS and discharged to [facility] on 10/7/24. Today, patient reports that she is feeling well. She denies dizziness, SOB, cough, chest pain or palpitations, abdominal pain, N/V/D/C, or dysuria. Staff report that patient's meal intakes have been variable with + weight loss. She was started on Medpass BID. No other clinical concerns noted at this time.     ROS:    As above in HPI. Otherwise, all other systems have been reviewed and are negative for complaint.    Medications reviewed and verified in NH chart.     Patient Active Problem List   Diagnosis    Dehydration    Impaired functional mobility, balance, gait, and endurance    COPD (chronic  obstructive pulmonary disease) (Multi)    Arteriosclerosis of coronary artery    Cyst of pancreas (HHS-HCC)    Frail elderly    Abnormal gait    Fatigue    Generalized weakness    GERD (gastroesophageal reflux disease)    Hiatal hernia    Hyperlipidemia    Dementia    Osteopenia    Paroxysmal atrial fibrillation (Multi)    HTN (hypertension)    Rheumatoid arthritis    Hypothyroidism    Solitary pulmonary nodule    Pneumonia, unspecified organism    Acute exacerbation of COPD with asthma    Controlled type 2 diabetes mellitus with microalbuminuria, without long-term current use of insulin (Multi)    Urinary incontinence    PVD (peripheral vascular disease) (CMS-HCC)    Decreased appetite    Ataxia    At risk for falls    Anemia    B12 deficiency        Past Medical History:   Diagnosis Date    Bowel perforation (Multi) 2024    Clostridium difficile colitis     Colitis due to Clostridioides difficile 2024    Heart murmur     Hypernatremia     Terminal ileitis with complication (Multi) 2024    UTI (urinary tract infection) 2024       Past Surgical History:   Procedure Laterality Date    CATARACT EXTRACTION       SECTION, CLASSIC  2016     Section    CHOLECYSTECTOMY  2014    Cholecystectomy    COLONOSCOPY  2014    Colonoscopy (Fiberoptic)    CT HEAD ANGIO W AND WO IV CONTRAST  2021    CT HEAD ANGIO W AND WO IV CONTRAST 2021 [facility]    ESOPHAGOGASTRODUODENOSCOPY  2014    Diagnostic Esophagogastroduodenoscopy    MR HEAD ANGIO WO IV CONTRAST  2021    MR HEAD ANGIO WO IV CONTRAST 2021 [facility]    MR NECK ANGIO WO IV CONTRAST  2021    MR NECK ANGIO WO IV CONTRAST 2021 [facility]       Family History   Problem Relation Name Age of Onset    Diabetes Mother      Heart disease Other      Heart attack Other         Social History     Tobacco Use   Smoking Status Never   Smokeless Tobacco Never        Social History     Substance and Sexual Activity   Alcohol Use Never       Social History     Substance and Sexual Activity   Drug Use Never       Allergies   Allergen Reactions    Acthar [Corticotropin] Psychosis    Chloroquine Phosphate Unknown     Tremors/ weakness    Ciprofloxacin Unknown    Humira [Adalimumab] Unknown    Levaquin [Levofloxacin] Unknown    Methotrexate Unknown    Sulfamethoxazole-Trimethoprim Unknown    Xeljanz [Tofacitinib] Psychosis    Amoxicillin-Pot Clavulanate Diarrhea     Pt tolerated ceftriaxone during 9/28/24 admission. (DARREN Wallace, PharmD).     Enbrel [Etanercept] Rash     Legs and arms        Vital Signs:   118/75-69-18-98.0-97% on RA    Physical Exam:  General: Sitting up in WC in NAD, alert   Head/Face: NCAT, symmetrical  Eyes: PERRLA, no injection, no discharge  ENT: Hearing not impaired, ears without scars or lesions, nasal mucosa and turbinates pink, septum midline, lips pink and moist  Neck: Supple, symmetrical  Respiratory: CTA but diminished without adventitious sounds, respirations even and nonlabored without use of accessory muscles, good air exchange  Cardio: RRR without murmur or gallops, normal S1S2, no edema, pedal pulses 3+/4 bilaterally  Chest/Breast: Symmetrical  GI: BS x 4, normoactive, non-distended, abd round and soft, no masses or tenderness  : No suprapubic tenderness or distention  MSK: Gait not assessed, joints with full ROM without pain or contractures, + generalized weakness  Skin: Skin warm and dry, no induration  Neurologic: Cranial nerves II through XII intact, superficial touch and pain sensation intact  Psychiatric: Alert, oriented x 1-2, calm and cooperative    Results/Data:   10/16/24: BUN 20, CBC w/o significant findings   10/9/24: HgbA1C 6.8, Chol 95, HDL 35, TSH 24.5, T4 5.0   10/7/24: WBC 20.0, Hgb 11.7  10/6/24: Glu 150, BUN 27, Cr 0.63, WBC 22.0    Assessment/Plan:  Metabolic encephalopathy-RESOLVED, continue to monitor  Pneumonia/COPD  exacerbation-completed course of ATB, c/w supplemental O2 at HS, Duonebs prn, monitor respiratory status closely  Hypomagnesemia-c/w magox, monitor mag level  Impaired functional mobility/weakness/physical deconditioning-c/w PT/OT, safety and fall precautions  Decreased appetite/Hx of CDiff/Hx bowel perforation-c/w Questran 4 gm BID and Colestipol 1 gm BID, F/U with general surgery as needed after discharge  Hx UTI-c/w macrobid, patient asymptomatic  RA-c/w prednisone  CAD/HTN/HLD-ARABELLA diet, c/w amlodipine, ASA, atorvastatin, metoprolol, omega-3, and ramipril, monitor BP and HR, F/U with cardiology after discharge  Osteopenia-WB exercises, c/w calcium/D supplement  Insomnia-c/w trazodone  Dementia/anxiety-supportive care, vistaril prn, consult Psych   Hypothyroidism-c/w levothyroxine (dose increased to 75 mg on 10/10, repeat TSH/T4 in 6 weeks)  Anemia-c/w MVI, monitor CBC     Orders:  NNO    Code Status:   DNR-CCA, DNI    Pt. is HDS to discharge home w/ [facility] on 10/24/24. Pt. to F/U with PCP in 1-2 weeks after discharge. Total cumulative time spent in preparation of this discharge, including documentation review, coordination of care with the medical team including PT/OT/SW/treating consultants, discussion with patient and/or pertinent family members, finalization of prescriptions, F/U appointments, and this discharge summary was approximately 40 minutes.

## 2025-01-28 ENCOUNTER — APPOINTMENT (OUTPATIENT)
Dept: PODIATRY | Facility: CLINIC | Age: 85
End: 2025-01-28
Payer: MEDICARE

## 2025-01-28 DIAGNOSIS — R80.9 CONTROLLED TYPE 2 DIABETES MELLITUS WITH MICROALBUMINURIA, WITHOUT LONG-TERM CURRENT USE OF INSULIN (MULTI): Primary | ICD-10-CM

## 2025-01-28 DIAGNOSIS — M79.672 PAIN IN BOTH FEET: ICD-10-CM

## 2025-01-28 DIAGNOSIS — I73.9 PVD (PERIPHERAL VASCULAR DISEASE) (CMS-HCC): ICD-10-CM

## 2025-01-28 DIAGNOSIS — E11.29 CONTROLLED TYPE 2 DIABETES MELLITUS WITH MICROALBUMINURIA, WITHOUT LONG-TERM CURRENT USE OF INSULIN (MULTI): Primary | ICD-10-CM

## 2025-01-28 DIAGNOSIS — M79.671 PAIN IN BOTH FEET: ICD-10-CM

## 2025-01-28 DIAGNOSIS — B35.1 ONYCHOMYCOSIS: ICD-10-CM

## 2025-01-28 PROCEDURE — 11721 DEBRIDE NAIL 6 OR MORE: CPT | Performed by: PODIATRIST

## 2025-01-30 NOTE — PROGRESS NOTES
History of Present Illness:   Patient states they are here for Dm exam  Denies NTB to feet  Most recent A1C is 6.3 - jan 2025  Unable to safely trim nails on own    Presents with daughter, assists in history and     Last visit Oct 2024      Past Medical History  Past Medical History:   Diagnosis Date    Bowel perforation (Multi) 06/05/2024    Clostridium difficile colitis     Colitis due to Clostridioides difficile 05/16/2024    Heart murmur     Hypernatremia     Terminal ileitis with complication (Multi) 06/06/2024    UTI (urinary tract infection) 05/07/2024       Medications and Allergies have been reviewed.    Review Of Systems:  GENERAL: No weight loss, malaise or fevers.  HEENT: Negative for frequent or significant headaches,   RESPIRATORY: Negative for cough, wheezing or shortness of breath.  CARDIOVASCULAR: Negative for chest pain, leg swelling or palpitations.    Physical Exam:  Vascular exam: Dorsalis pedis and Posterior Tibial pulses palpable 1/4 bilateral. Capillary refill time less than 3 seconds b/l. No Hair growth noted to digits. +1 bl LE edema noted. Skin temp warm to warm from proximal to distal b/l. + varicosities noted.     Neurologic exam: Vibratory, protective and proprioception intact b/l. No neurologic deficits noted.      Musculoskeletal exam: Muscle strength 5/5 for all major muscle groups tested in the lower extremity b/l. No gross deformities noted b/l.      Dermatologic exam: Nails 1-5 b/l are discolored and thick. Non elongated Webspaces 1-4 b/l are clean, dry and intact. No primary or secondary skin lesions noted. No open lesions or wounds noted at this time .    1. Controlled type 2 diabetes mellitus with microalbuminuria, without long-term current use of insulin (Multi)        2. PVD (peripheral vascular disease) (CMS-[facility])        3. Pain in both feet          Patient educated on proper diabetic foot care.  Nails 1-5 b/l were debrided in thickness and length with nail cutting  forceps.  A1C revd. 6.3 Jan 2025  All hpk tissue was debrided to smooth skin  Patient to follow up in 6 mos or sooner if any problems arise.   Patient was in agreement to this plan. All questions answered.      PCP Dr. Valente Last visit Nov 2024    Mariah Schaeffer DPM  [phone]  Option 2  Fax: [phone]

## 2025-02-25 ENCOUNTER — APPOINTMENT (OUTPATIENT)
Dept: CARDIOLOGY | Facility: HOSPITAL | Age: 85
End: 2025-02-25
Payer: MEDICARE

## 2025-02-25 ENCOUNTER — APPOINTMENT (OUTPATIENT)
Dept: RADIOLOGY | Facility: HOSPITAL | Age: 85
End: 2025-02-25
Payer: MEDICARE

## 2025-02-25 ENCOUNTER — HOSPITAL ENCOUNTER (EMERGENCY)
Facility: HOSPITAL | Age: 85
Discharge: ANOTHER HOSPITAL | End: 2025-02-25
Payer: MEDICARE

## 2025-02-25 VITALS
DIASTOLIC BLOOD PRESSURE: 80 MMHG | TEMPERATURE: 97.3 F | OXYGEN SATURATION: 95 % | RESPIRATION RATE: 20 BRPM | BODY MASS INDEX: 16.88 KG/M2 | HEART RATE: 69 BPM | SYSTOLIC BLOOD PRESSURE: 152 MMHG | HEIGHT: 60 IN | WEIGHT: 86 LBS

## 2025-02-25 DIAGNOSIS — R41.82 ALTERED MENTAL STATUS, UNSPECIFIED ALTERED MENTAL STATUS TYPE: Primary | ICD-10-CM

## 2025-02-25 LAB
ALBUMIN SERPL BCP-MCNC: 3.9 G/DL (ref 3.4–5)
ALP SERPL-CCNC: 66 U/L (ref 33–136)
ALT SERPL W P-5'-P-CCNC: 20 U/L (ref 7–45)
AMPHETAMINES UR QL SCN: NORMAL
ANION GAP BLDV CALCULATED.4IONS-SCNC: 5 MMOL/L (ref 10–25)
ANION GAP SERPL CALC-SCNC: 12 MMOL/L (ref 10–20)
APAP SERPL-MCNC: <10 UG/ML
APPEARANCE UR: CLEAR
APTT PPP: 30 SECONDS (ref 26–36)
AST SERPL W P-5'-P-CCNC: 22 U/L (ref 9–39)
BARBITURATES UR QL SCN: NORMAL
BASE EXCESS BLDV CALC-SCNC: 7.1 MMOL/L (ref -2–3)
BASOPHILS # BLD AUTO: 0.08 X10*3/UL (ref 0–0.1)
BASOPHILS NFR BLD AUTO: 0.8 %
BENZODIAZ UR QL SCN: NORMAL
BILIRUB SERPL-MCNC: 0.4 MG/DL (ref 0–1.2)
BILIRUB UR STRIP.AUTO-MCNC: NEGATIVE MG/DL
BODY TEMPERATURE: ABNORMAL
BUN SERPL-MCNC: 23 MG/DL (ref 6–23)
BZE UR QL SCN: NORMAL
CA-I BLDV-SCNC: 1.24 MMOL/L (ref 1.1–1.33)
CALCIUM SERPL-MCNC: 9.7 MG/DL (ref 8.6–10.3)
CANNABINOIDS UR QL SCN: NORMAL
CARDIAC TROPONIN I PNL SERPL HS: 6 NG/L (ref 0–13)
CHLORIDE BLDV-SCNC: 105 MMOL/L (ref 98–107)
CHLORIDE SERPL-SCNC: 103 MMOL/L (ref 98–107)
CO2 SERPL-SCNC: 28 MMOL/L (ref 21–32)
COLOR UR: COLORLESS
CREAT SERPL-MCNC: 0.74 MG/DL (ref 0.5–1.05)
EGFRCR SERPLBLD CKD-EPI 2021: 79 ML/MIN/1.73M*2
EOSINOPHIL # BLD AUTO: 0.14 X10*3/UL (ref 0–0.4)
EOSINOPHIL NFR BLD AUTO: 1.4 %
ERYTHROCYTE [DISTWIDTH] IN BLOOD BY AUTOMATED COUNT: 12.5 % (ref 11.5–14.5)
ETHANOL SERPL-MCNC: <10 MG/DL
FENTANYL+NORFENTANYL UR QL SCN: NORMAL
FLUAV RNA RESP QL NAA+PROBE: NOT DETECTED
FLUBV RNA RESP QL NAA+PROBE: NOT DETECTED
GLUCOSE BLD MANUAL STRIP-MCNC: 118 MG/DL (ref 74–99)
GLUCOSE BLDV-MCNC: 100 MG/DL (ref 74–99)
GLUCOSE SERPL-MCNC: 143 MG/DL (ref 74–99)
GLUCOSE UR STRIP.AUTO-MCNC: ABNORMAL MG/DL
HCO3 BLDV-SCNC: 32 MMOL/L (ref 22–26)
HCT VFR BLD AUTO: 41.8 % (ref 36–46)
HCT VFR BLD EST: 43 % (ref 36–46)
HGB BLD-MCNC: 12.6 G/DL (ref 12–16)
HGB BLDV-MCNC: 14.3 G/DL (ref 12–16)
IMM GRANULOCYTES # BLD AUTO: 0.03 X10*3/UL (ref 0–0.5)
IMM GRANULOCYTES NFR BLD AUTO: 0.3 % (ref 0–0.9)
INHALED O2 CONCENTRATION: 21 %
INR PPP: 1 (ref 0.9–1.1)
KETONES UR STRIP.AUTO-MCNC: NEGATIVE MG/DL
LACTATE BLDV-SCNC: 0.6 MMOL/L (ref 0.4–2)
LEUKOCYTE ESTERASE UR QL STRIP.AUTO: NEGATIVE
LYMPHOCYTES # BLD AUTO: 1.67 X10*3/UL (ref 0.8–3)
LYMPHOCYTES NFR BLD AUTO: 17.3 %
MAGNESIUM SERPL-MCNC: 2.1 MG/DL (ref 1.6–2.4)
MCH RBC QN AUTO: 29.7 PG (ref 26–34)
MCHC RBC AUTO-ENTMCNC: 30.1 G/DL (ref 32–36)
MCV RBC AUTO: 99 FL (ref 80–100)
METHADONE UR QL SCN: NORMAL
MONOCYTES # BLD AUTO: 0.88 X10*3/UL (ref 0.05–0.8)
MONOCYTES NFR BLD AUTO: 9.1 %
NEUTROPHILS # BLD AUTO: 6.86 X10*3/UL (ref 1.6–5.5)
NEUTROPHILS NFR BLD AUTO: 71.1 %
NITRITE UR QL STRIP.AUTO: NEGATIVE
NRBC BLD-RTO: 0 /100 WBCS (ref 0–0)
OPIATES UR QL SCN: NORMAL
OXYCODONE+OXYMORPHONE UR QL SCN: NORMAL
OXYHGB MFR BLDV: 89.1 % (ref 45–75)
PCO2 BLDV: 45 MM HG (ref 41–51)
PCP UR QL SCN: NORMAL
PH BLDV: 7.46 PH (ref 7.33–7.43)
PH UR STRIP.AUTO: 7.5 [PH]
PLATELET # BLD AUTO: 217 X10*3/UL (ref 150–450)
PO2 BLDV: 58 MM HG (ref 35–45)
POTASSIUM BLDV-SCNC: 4.3 MMOL/L (ref 3.5–5.3)
POTASSIUM SERPL-SCNC: 4.6 MMOL/L (ref 3.5–5.3)
PROT SERPL-MCNC: 7.4 G/DL (ref 6.4–8.2)
PROT UR STRIP.AUTO-MCNC: NEGATIVE MG/DL
PROTHROMBIN TIME: 11.1 SECONDS (ref 9.8–12.4)
RBC # BLD AUTO: 4.24 X10*6/UL (ref 4–5.2)
RBC # UR STRIP.AUTO: NEGATIVE MG/DL
SALICYLATES SERPL-MCNC: <3 MG/DL
SAO2 % BLDV: 90 % (ref 45–75)
SARS-COV-2 RNA RESP QL NAA+PROBE: NOT DETECTED
SODIUM BLDV-SCNC: 138 MMOL/L (ref 136–145)
SODIUM SERPL-SCNC: 138 MMOL/L (ref 136–145)
SP GR UR STRIP.AUTO: 1.01
UROBILINOGEN UR STRIP.AUTO-MCNC: NORMAL MG/DL
WBC # BLD AUTO: 9.7 X10*3/UL (ref 4.4–11.3)

## 2025-02-25 PROCEDURE — 83735 ASSAY OF MAGNESIUM: CPT

## 2025-02-25 PROCEDURE — 80307 DRUG TEST PRSMV CHEM ANLYZR: CPT

## 2025-02-25 PROCEDURE — 36415 COLL VENOUS BLD VENIPUNCTURE: CPT | Performed by: EMERGENCY MEDICINE

## 2025-02-25 PROCEDURE — 70450 CT HEAD/BRAIN W/O DYE: CPT

## 2025-02-25 PROCEDURE — 82947 ASSAY GLUCOSE BLOOD QUANT: CPT | Mod: 59

## 2025-02-25 PROCEDURE — 71045 X-RAY EXAM CHEST 1 VIEW: CPT

## 2025-02-25 PROCEDURE — 80053 COMPREHEN METABOLIC PANEL: CPT | Performed by: EMERGENCY MEDICINE

## 2025-02-25 PROCEDURE — 81003 URINALYSIS AUTO W/O SCOPE: CPT | Mod: 59

## 2025-02-25 PROCEDURE — 87636 SARSCOV2 & INF A&B AMP PRB: CPT

## 2025-02-25 PROCEDURE — 84484 ASSAY OF TROPONIN QUANT: CPT | Performed by: EMERGENCY MEDICINE

## 2025-02-25 PROCEDURE — 80320 DRUG SCREEN QUANTALCOHOLS: CPT

## 2025-02-25 PROCEDURE — 85610 PROTHROMBIN TIME: CPT | Performed by: EMERGENCY MEDICINE

## 2025-02-25 PROCEDURE — 99285 EMERGENCY DEPT VISIT HI MDM: CPT | Mod: 25

## 2025-02-25 PROCEDURE — G0426 INPT/ED TELECONSULT50: HCPCS | Performed by: STUDENT IN AN ORGANIZED HEALTH CARE EDUCATION/TRAINING PROGRAM

## 2025-02-25 PROCEDURE — 82947 ASSAY GLUCOSE BLOOD QUANT: CPT

## 2025-02-25 PROCEDURE — 85730 THROMBOPLASTIN TIME PARTIAL: CPT | Performed by: EMERGENCY MEDICINE

## 2025-02-25 PROCEDURE — 85025 COMPLETE CBC W/AUTO DIFF WBC: CPT | Performed by: EMERGENCY MEDICINE

## 2025-02-25 PROCEDURE — 71045 X-RAY EXAM CHEST 1 VIEW: CPT | Mod: FOREIGN READ | Performed by: RADIOLOGY

## 2025-02-25 PROCEDURE — 93005 ELECTROCARDIOGRAM TRACING: CPT

## 2025-02-25 RX ORDER — POTASSIUM CHLORIDE 750 MG/1
10 TABLET, FILM COATED, EXTENDED RELEASE ORAL DAILY
COMMUNITY

## 2025-02-25 ASSESSMENT — PAIN SCALES - GENERAL
PAINLEVEL_OUTOF10: 0 - NO PAIN
PAINLEVEL_OUTOF10: 0 - NO PAIN

## 2025-02-25 ASSESSMENT — COLUMBIA-SUICIDE SEVERITY RATING SCALE - C-SSRS
1. IN THE PAST MONTH, HAVE YOU WISHED YOU WERE DEAD OR WISHED YOU COULD GO TO SLEEP AND NOT WAKE UP?: NO
2. HAVE YOU ACTUALLY HAD ANY THOUGHTS OF KILLING YOURSELF?: NO
6. HAVE YOU EVER DONE ANYTHING, STARTED TO DO ANYTHING, OR PREPARED TO DO ANYTHING TO END YOUR LIFE?: NO

## 2025-02-25 ASSESSMENT — PAIN - FUNCTIONAL ASSESSMENT: PAIN_FUNCTIONAL_ASSESSMENT: 0-10

## 2025-02-25 NOTE — ED PROVIDER NOTES
HPI   Chief Complaint   Patient presents with    Altered Mental Status       Patient is an 84-year-old female with significant PMH of some dementia presents to the ED for altered mental status 10 minutes prior to arrival.  Patient's daughter states patient did not know her name and when trying to get to the bathroom she was ramming her walker into the wall multiple times.  Patient was also stuttering her speech.  Patient's daughter states this is not her baseline.  Patient's daughter states her baseline is ANO x 1 or 2.  Patient knows her name here.  Believes she is at home does not know the year.  Patient has no numbness or tingling anywhere.  Patient denies any pain anywhere.  No recent illnesses.  No known head injuries or falls.  No tobacco alcohol or street drugs.              Patient History   Past Medical History:   Diagnosis Date    Bowel perforation (Multi) 2024    Clostridium difficile colitis     Colitis due to Clostridioides difficile 2024    Heart murmur     Hypernatremia     Terminal ileitis with complication (Multi) 2024    UTI (urinary tract infection) 2024     Past Surgical History:   Procedure Laterality Date    CATARACT EXTRACTION       SECTION, CLASSIC  2016     Section    CHOLECYSTECTOMY  2014    Cholecystectomy    COLONOSCOPY  2014    Colonoscopy (Fiberoptic)    CT HEAD ANGIO W AND WO IV CONTRAST  2021    CT HEAD ANGIO W AND WO IV CONTRAST [facility]    ESOPHAGOGASTRODUODENOSCOPY  2014    Diagnostic Esophagogastroduodenoscopy    MR HEAD ANGIO WO IV CONTRAST  2021    MR HEAD ANGIO WO IV CONTRAST 2021 [facility]    MR NECK ANGIO WO IV CONTRAST  2021    MR NECK ANGIO WO IV CONTRAST 2021 [facility]     Family History   Problem Relation Name Age of Onset    Diabetes Mother      Heart disease Other      Heart attack Other       Social History     Tobacco Use    Smoking  status: Never    Smokeless tobacco: Never   Vaping Use    Vaping status: Never Used   Substance Use Topics    Alcohol use: Never    Drug use: Never       Physical Exam   ED Triage Vitals [02/25/25 1518]   Temperature Heart Rate Respirations BP   36.3 °C (97.3 °F) 66 16 153/72      Pulse Ox Temp Source Heart Rate Source Patient Position   100 % Temporal Monitor Sitting      BP Location FiO2 (%)     Right arm --       Physical Exam  HENT:      Head: Normocephalic.   Eyes:      General: No visual field deficit.     Extraocular Movements: Extraocular movements intact.      Pupils: Pupils are equal, round, and reactive to light.   Cardiovascular:      Rate and Rhythm: Normal rate and regular rhythm.   Abdominal:      Palpations: Abdomen is soft.   Musculoskeletal:      Cervical back: Normal range of motion.   Neurological:      Mental Status: She is alert. Mental status is at baseline.      Cranial Nerves: No cranial nerve deficit, dysarthria or facial asymmetry.      Sensory: No sensory deficit.      Motor: No weakness.           ED Course & MDM   Diagnoses as of 02/25/25 1957   Altered mental status, unspecified altered mental status type                 No data recorded     [place] Coma Scale Score: 15 (02/25/25 1521 : Amarilis Winchester, MAVIS)                           Medical Decision Making  Medical Decision Making:  Patient presented as described in HPI. Patient case including ROS, PE, and treatment and plan discussed with ED attending if attached as cosigner. Due to patients presentation orders completed include as documented.  Patient presents to the ED for altered mental status tenderness prior to arrival.  Patient daughter reports she was setting her words running her walker into the wall and did not recognize her daughter.  This is not patient's baseline.  Patient is nontoxic-appearing no aphasia no neurofocal deficits, ANO x 1.  Patient is back to baseline according to the daughter.  Pending labs and imaging.  "CT brain attack was negative I spoke with neurology stroke team who was able to evaluate the patient Dr. Aguilar She states \"she said she woke up from nap and felt lightheaded after standing up from bed. Now back to baseline. No focal deficits currently. Low suspicion for stroke.\" We have no beds here and patient will need transfer. I spoke to Dr. Caceres at [facility] who accepts patient. Patient remains stable pending transfer.        Patient care discussed with: N/A  Social Determinants affecting care: N/A    Final diagnosis and disposition as below.  See CI    Transfer. I discussed the differential; results and admit plan with the patient and/or family/friend/caregiver if present.  I emphasized the importance of hospitalization need for re-evaluation/continued monitoring/interventions.. They agreed that if they feel their condition is worsening or if they have any other concern they should alert staff immediately for further assistance. I gave the patient an opportunity to ask all questions they had and answered all of them accordingly. The patient and/or family/friend/caregiver expressed understanding verbally and that they would comply.       Disposition:  transfer    Transfer. Discussed findings and treatment done here in ED with admitting physician. It would be a risk to discharge the patient in their condition due to possibility of deterioration in their condition and the need for urgent interventions.    This note has been transcribed using voice recognition and may contain grammatical errors, misplaced words, incorrect words, incorrect phrases or other errors.        Labs Reviewed   CBC WITH AUTO DIFFERENTIAL - Abnormal       Result Value    WBC 9.7      nRBC 0.0      RBC 4.24      Hemoglobin 12.6      Hematocrit 41.8      MCV 99      MCH 29.7      MCHC 30.1 (*)     RDW 12.5      Platelets 217      Neutrophils % 71.1      Immature Granulocytes %, Automated 0.3      Lymphocytes % 17.3      Monocytes % 9.1  "     Eosinophils % 1.4      Basophils % 0.8      Neutrophils Absolute 6.86 (*)     Immature Granulocytes Absolute, Automated 0.03      Lymphocytes Absolute 1.67      Monocytes Absolute 0.88 (*)     Eosinophils Absolute 0.14      Basophils Absolute 0.08     COMPREHENSIVE METABOLIC PANEL - Abnormal    Glucose 143 (*)     Sodium 138      Potassium 4.6      Chloride 103      Bicarbonate 28      Anion Gap 12      Urea Nitrogen 23      Creatinine 0.74      eGFR 79      Calcium 9.7      Albumin 3.9      Alkaline Phosphatase 66      Total Protein 7.4      AST 22      Bilirubin, Total 0.4      ALT 20     BLOOD GAS VENOUS FULL PANEL - Abnormal    POCT pH, Venous 7.46 (*)     POCT pCO2, Venous 45      POCT pO2, Venous 58 (*)     POCT SO2, Venous 90 (*)     POCT Oxy Hemoglobin, Venous 89.1 (*)     POCT Hematocrit Calculated, Venous 43.0      POCT Sodium, Venous 138      POCT Potassium, Venous 4.3      POCT Chloride, Venous 105      POCT Ionized Calicum, Venous 1.24      POCT Glucose, Venous 100 (*)     POCT Lactate, Venous 0.6      POCT Base Excess, Venous 7.1 (*)     POCT HCO3 Calculated, Venous 32.0 (*)     POCT Hemoglobin, Venous 14.3      POCT Anion Gap, Venous 5.0 (*)     Patient Temperature        FiO2 21     URINALYSIS WITH REFLEX CULTURE AND MICROSCOPIC - Abnormal    Color, Urine Colorless (*)     Appearance, Urine Clear      Specific Gravity, Urine 1.011      pH, Urine 7.5      Protein, Urine NEGATIVE      Glucose, Urine 70 (1+) (*)     Blood, Urine NEGATIVE      Ketones, Urine NEGATIVE      Bilirubin, Urine NEGATIVE      Urobilinogen, Urine Normal      Nitrite, Urine NEGATIVE      Leukocyte Esterase, Urine NEGATIVE     POCT GLUCOSE - Abnormal    POCT Glucose 118 (*)    TROPONIN I, HIGH SENSITIVITY - Normal    Troponin I, High Sensitivity 6      Narrative:     Less than 99th percentile of normal range cutoff-  Female and children under 18 years old <14 ng/L; Male <21 ng/L: Negative  Repeat testing should be performed if  clinically indicated.     Female and children under 18 years old 14-50 ng/L; Male 21-50 ng/L:  Consistent with possible cardiac damage and possible increased clinical   risk. Serial measurements may help to assess extent of myocardial damage.     >50 ng/L: Consistent with cardiac damage, increased clinical risk and  myocardial infarction. Serial measurements may help assess extent of   myocardial damage.      NOTE: Children less than 1 year old may have higher baseline troponin   levels and results should be interpreted in conjunction with the overall   clinical context.     NOTE: Troponin I testing is performed using a different   testing methodology at [facility] than at other   [facility]. Direct result comparisons should only   be made within the same method.   PROTIME-INR - Normal    Protime 11.1      INR 1.0     APTT - Normal    aPTT 30      Narrative:     The APTT is no longer used for monitoring Unfractionated Heparin Therapy. For monitoring Heparin Therapy, use the Heparin Assay.   DRUG SCREEN,URINE - Normal    Amphetamine Screen, Urine Presumptive Negative      Barbiturate Screen, Urine Presumptive Negative      Benzodiazepines Screen, Urine Presumptive Negative      Cannabinoid Screen, Urine Presumptive Negative      Cocaine Metabolite Screen, Urine Presumptive Negative      Fentanyl Screen, Urine Presumptive Negative      Opiate Screen, Urine Presumptive Negative      Oxycodone Screen, Urine Presumptive Negative      PCP Screen, Urine Presumptive Negative      Methadone Screen, Urine Presumptive Negative      Narrative:     Drug screen results are presumptive and should not be used to assess   compliance with prescribed medication. Contact the performing [facility] laboratory   to add-on definitive confirmatory testing if clinically indicated.    Toxicology screening results are reported qualitatively. The concentration must   be greater than or equal to the cutoff to be reported as  positive. The concentration   at which the screening test can detect an individual drug or metabolite varies.   The absence of expected drug(s) and/or drug metabolite(s) may indicate non-compliance,   inappropriate timing of specimen collection relative to drug administration, poor drug   absorption, diluted/adulterated urine, or limitations of testing. For medical purposes   only; not valid for forensic use.    Interpretive questions should be directed to the laboratory medical directors.   ACUTE TOXICOLOGY PANEL, BLOOD - Normal    Acetaminophen <10.0      Salicylate  <3      Alcohol <10     MAGNESIUM - Normal    Magnesium 2.10     SARS-COV-2 AND INFLUENZA A/B PCR - Normal    Flu A Result Not Detected      Flu B Result Not Detected      Coronavirus 2019, PCR Not Detected      Narrative:     This assay is an FDA-cleared, in vitro diagnostic nucleic acid amplification test for the qualitative detection and differentiation of SARS CoV-2/ Influenza A/B from nasopharyngeal specimens collected from individuals with signs and symptoms of respiratory tract infections, and has been validated for use at [facility]. Negative results do not preclude COVID-19/ Influenza A/B infections and should not be used as the sole basis for diagnosis, treatment, or other management decisions. Testing for SARS CoV-2 is recommended only for patients who meet current clinical and/or epidemiological criteria defined by federal, state, or local public health directives.   URINALYSIS WITH REFLEX CULTURE AND MICROSCOPIC    Narrative:     The following orders were created for panel order Urinalysis with Reflex Culture and Microscopic.  Procedure                               Abnormality         Status                     ---------                               -----------         ------                     Urinalysis with Reflex C...[[id number]]  Abnormal            Final result               Extra Urine Gray Tube[[id number]]                             In process                   Please view results for these tests on the individual orders.   EXTRA URINE GRAY TUBE   POCT GLUCOSE METER      XR chest 1 view   Final Result   No focal consolidation or other acute abnormality.  Findings of   emphysema/COPD..   Signed by Ike San MD      CT brain attack head wo IV contrast   Final Result   No CT evidence of acute large vessel territory infarct, intracranial   hemorrhage or mass effect.        MACRO:   Phil Cordero discussed the significance and urgency of this   critical finding by telephone with  AVELINO MCINTYRE on 2/25/2025 at 3:36   pm.  (**-RCF-**) Findings:  See findings.        Signed by: Phil Cordero 2/25/2025 3:39 PM   Dictation workstation:   [id number]           Procedure  Procedures     Marsha Best PA-C  02/25/25 2003

## 2025-02-25 NOTE — CONSULTS
Inpatient consult to Neuro TeleStroke  Consult performed by: Lainey Aguilar MD  Consult ordered by: Saul Hall DO        Virtual Visit start time: 351 pm, cart connected at 359 PM    History Of Present Illness:  Historian: Patient and ED Provider   Maisha Agosto is a 84 y.o. female presenting with symptoms of lightheadedness, walking into walls. Pt reports that, she woke up from nap, did not know where she was, stood up and felt lightheaded and walked into window. Dementia at baseline, walks with walker. Back to baseline in ED  Last known well: this morning, unclear time  Had stroke symptoms resolved at time of presentation: Yes     Prior Functional Status (Modified Lowell Scale):  3 The patient has moderate disability; required some external help but able to walk without the assistance of another individual.     Stroke Risk Factors:  Hypertension    Last Recorded Vitals:  Blood pressure 153/72, pulse 66, temperature 36.3 °C (97.3 °F), temperature source Temporal, resp. rate 16, height 1.524 m (5'), weight (!) 39 kg (86 lb), SpO2 100%.      NIHSS:  1A. Level of Consciousness: 0  1B. Ask Month and Age: 2  1C. Blink Eyes & Squeeze Hands: 0  2. Best Gaze: 0  3. Visual: 0  4. Facial Palsy: 0  5A. Motor - Left Arm: 0  5B. Motor - Right Arm: 0  6A. Motor - Left Le  6B. Motor - Right Le  7. Limb Ataxia: 0  8. Sensory Loss: 0  9. Best Language: 0  10. Dysarthria: 0  11. Extinction and Inattention: 0  NIH Stroke Scale: 2     Baseline dementia      Relevant Results:  LABS:  Glucose   Date Value Ref Range Status   2025 143 (H) 74 - 99 mg/dL Final     INR   Date Value Ref Range Status   2025 1.0 0.9 - 1.1 Final      Results for orders placed or performed during the hospital encounter of 25 (from the past 24 hours)   ECG 12 lead   Result Value Ref Range    Ventricular Rate 64 BPM    Atrial Rate 64 BPM    TN Interval 164 ms    QRS Duration 72 ms    QT Interval 420 ms    QTC Calculation(Bazett)  433 ms    P Axis 46 degrees    R Axis 12 degrees    T Axis 75 degrees    QRS Count 11 beats    Q Onset 222 ms    P Onset 140 ms    P Offset 195 ms    T Offset 432 ms    QTC Fredericia 429 ms   POCT GLUCOSE   Result Value Ref Range    POCT Glucose 118 (H) 74 - 99 mg/dL   CBC and Auto Differential   Result Value Ref Range    WBC 9.7 4.4 - 11.3 x10*3/uL    nRBC 0.0 0.0 - 0.0 /100 WBCs    RBC 4.24 4.00 - 5.20 x10*6/uL    Hemoglobin 12.6 12.0 - 16.0 g/dL    Hematocrit 41.8 36.0 - 46.0 %    MCV 99 80 - 100 fL    MCH 29.7 26.0 - 34.0 pg    MCHC 30.1 (L) 32.0 - 36.0 g/dL    RDW 12.5 11.5 - 14.5 %    Platelets 217 150 - 450 x10*3/uL    Neutrophils % 71.1 40.0 - 80.0 %    Immature Granulocytes %, Automated 0.3 0.0 - 0.9 %    Lymphocytes % 17.3 13.0 - 44.0 %    Monocytes % 9.1 2.0 - 10.0 %    Eosinophils % 1.4 0.0 - 6.0 %    Basophils % 0.8 0.0 - 2.0 %    Neutrophils Absolute 6.86 (H) 1.60 - 5.50 x10*3/uL    Immature Granulocytes Absolute, Automated 0.03 0.00 - 0.50 x10*3/uL    Lymphocytes Absolute 1.67 0.80 - 3.00 x10*3/uL    Monocytes Absolute 0.88 (H) 0.05 - 0.80 x10*3/uL    Eosinophils Absolute 0.14 0.00 - 0.40 x10*3/uL    Basophils Absolute 0.08 0.00 - 0.10 x10*3/uL   Comprehensive metabolic panel   Result Value Ref Range    Glucose 143 (H) 74 - 99 mg/dL    Sodium 138 136 - 145 mmol/L    Potassium 4.6 3.5 - 5.3 mmol/L    Chloride 103 98 - 107 mmol/L    Bicarbonate 28 21 - 32 mmol/L    Anion Gap 12 10 - 20 mmol/L    Urea Nitrogen 23 6 - 23 mg/dL    Creatinine 0.74 0.50 - 1.05 mg/dL    eGFR 79 >60 mL/min/1.73m*2    Calcium 9.7 8.6 - 10.3 mg/dL    Albumin 3.9 3.4 - 5.0 g/dL    Alkaline Phosphatase 66 33 - 136 U/L    Total Protein 7.4 6.4 - 8.2 g/dL    AST 22 9 - 39 U/L    Bilirubin, Total 0.4 0.0 - 1.2 mg/dL    ALT 20 7 - 45 U/L   Troponin I, High Sensitivity   Result Value Ref Range    Troponin I, High Sensitivity 6 0 - 13 ng/L   Acute Toxicology Panel, Blood   Result Value Ref Range    Acetaminophen <10.0 10.0 - 30.0 ug/mL     Salicylate  <3 4 - 20 mg/dL    Alcohol <10 <=10 mg/dL   Magnesium   Result Value Ref Range    Magnesium 2.10 1.60 - 2.40 mg/dL   Drug Screen, Urine   Result Value Ref Range    Amphetamine Screen, Urine Presumptive Negative Presumptive Negative    Barbiturate Screen, Urine Presumptive Negative Presumptive Negative    Benzodiazepines Screen, Urine Presumptive Negative Presumptive Negative    Cannabinoid Screen, Urine Presumptive Negative Presumptive Negative    Cocaine Metabolite Screen, Urine Presumptive Negative Presumptive Negative    Fentanyl Screen, Urine Presumptive Negative Presumptive Negative    Opiate Screen, Urine Presumptive Negative Presumptive Negative    Oxycodone Screen, Urine Presumptive Negative Presumptive Negative    PCP Screen, Urine Presumptive Negative Presumptive Negative    Methadone Screen, Urine Presumptive Negative Presumptive Negative   Urinalysis with Reflex Culture and Microscopic   Result Value Ref Range    Color, Urine Colorless (N) Light-Yellow, Yellow, Dark-Yellow    Appearance, Urine Clear Clear    Specific Gravity, Urine 1.011 1.005 - 1.035    pH, Urine 7.5 5.0, 5.5, 6.0, 6.5, 7.0, 7.5, 8.0    Protein, Urine NEGATIVE NEGATIVE, 10 (TRACE), 20 (TRACE) mg/dL    Glucose, Urine 70 (1+) (A) Normal mg/dL    Blood, Urine NEGATIVE NEGATIVE mg/dL    Ketones, Urine NEGATIVE NEGATIVE mg/dL    Bilirubin, Urine NEGATIVE NEGATIVE mg/dL    Urobilinogen, Urine Normal Normal mg/dL    Nitrite, Urine NEGATIVE NEGATIVE    Leukocyte Esterase, Urine NEGATIVE NEGATIVE   Protime-INR   Result Value Ref Range    Protime 11.1 9.8 - 12.4 seconds    INR 1.0 0.9 - 1.1   APTT   Result Value Ref Range    aPTT 30 26 - 36 seconds        CT Head Imaging:  [facility] imaging personally reviewed, showed no acute ischemic / hemorrhagic changes     CTA Head and Neck Imaging:  CTA imaging not performed         Diagnosis:  Stroke not suspected, alternative etiology likely     IV Thrombolysis IV Thrombolysis Checklist        IV  Thrombolysis Given: No; Thrombolysis contraindication reason: Neurologic signs have spontaneously resolved           Patient is a candidate for thrombectomy:  yes/no: No; contraindication reason: NIHSS <6    Additional Recommendations:  Metabolic/ Infectious work up, orthostatics. Low suspicion for acute ischemic stroke given no focal deficits and pt denied any stroke symptoms.     Disposition:  Patient will remain at referring facility for further evaluation and management.    Virtual or Telephone Consent    An interactive audio and video telecommunication system which permits real time communications between the patient (at the originating site) and provider (at the distant site) was utilized to provide this telehealth service.   Verbal consent was requested and obtained from Maisha Agosto on this date, 02/25/25 for a telehealth visit.      Telestroke is covered in shift work. If there are further Neurological questions or concerns please contact your regional neurologist on call during daytime hours or contact the transfer center with an ADT20 order.    Lainey Aguilar MD

## 2025-02-25 NOTE — ED NOTES
Pt here with her daughter. Daughter states she got home from work and pt didn't know who she was, was running into the wall with her walker. Daughter states mom was stuttering words. Pt oriented to person and place. NP notified and stroke alert called to triage.      Amarilis Winchester RN  02/25/25 2187

## 2025-02-26 ENCOUNTER — APPOINTMENT (OUTPATIENT)
Dept: CARDIOLOGY | Facility: HOSPITAL | Age: 85
End: 2025-02-26
Payer: MEDICARE

## 2025-02-26 ENCOUNTER — APPOINTMENT (OUTPATIENT)
Dept: RADIOLOGY | Facility: HOSPITAL | Age: 85
End: 2025-02-26
Payer: MEDICARE

## 2025-02-26 ENCOUNTER — HOSPITAL ENCOUNTER (OUTPATIENT)
Facility: HOSPITAL | Age: 85
Setting detail: OBSERVATION
LOS: 1 days | Discharge: HOME | End: 2025-02-27
Attending: STUDENT IN AN ORGANIZED HEALTH CARE EDUCATION/TRAINING PROGRAM | Admitting: STUDENT IN AN ORGANIZED HEALTH CARE EDUCATION/TRAINING PROGRAM
Payer: MEDICARE

## 2025-02-26 DIAGNOSIS — G93.41 ACUTE METABOLIC ENCEPHALOPATHY: ICD-10-CM

## 2025-02-26 DIAGNOSIS — Z91.81 AT RISK FOR FALLS: ICD-10-CM

## 2025-02-26 DIAGNOSIS — R41.82 AMS (ALTERED MENTAL STATUS): Primary | ICD-10-CM

## 2025-02-26 DIAGNOSIS — E03.9 HYPOTHYROIDISM, UNSPECIFIED TYPE: ICD-10-CM

## 2025-02-26 DIAGNOSIS — I48.0 PAROXYSMAL ATRIAL FIBRILLATION (MULTI): ICD-10-CM

## 2025-02-26 DIAGNOSIS — M85.80 OSTEOPENIA, UNSPECIFIED LOCATION: ICD-10-CM

## 2025-02-26 DIAGNOSIS — I63.9 CEREBROVASCULAR ACCIDENT (CVA), UNSPECIFIED MECHANISM (MULTI): ICD-10-CM

## 2025-02-26 LAB — HOLD SPECIMEN: NORMAL

## 2025-02-26 PROCEDURE — 2500000002 HC RX 250 W HCPCS SELF ADMINISTERED DRUGS (ALT 637 FOR MEDICARE OP, ALT 636 FOR OP/ED): Mod: MUE | Performed by: STUDENT IN AN ORGANIZED HEALTH CARE EDUCATION/TRAINING PROGRAM

## 2025-02-26 PROCEDURE — 70551 MRI BRAIN STEM W/O DYE: CPT

## 2025-02-26 PROCEDURE — 99221 1ST HOSP IP/OBS SF/LOW 40: CPT | Performed by: STUDENT IN AN ORGANIZED HEALTH CARE EDUCATION/TRAINING PROGRAM

## 2025-02-26 PROCEDURE — 1200000002 HC GENERAL ROOM WITH TELEMETRY DAILY

## 2025-02-26 PROCEDURE — 97162 PT EVAL MOD COMPLEX 30 MIN: CPT | Mod: GP

## 2025-02-26 PROCEDURE — 70496 CT ANGIOGRAPHY HEAD: CPT

## 2025-02-26 PROCEDURE — G0427 INPT/ED TELECONSULT70: HCPCS | Performed by: STUDENT IN AN ORGANIZED HEALTH CARE EDUCATION/TRAINING PROGRAM

## 2025-02-26 PROCEDURE — 2550000001 HC RX 255 CONTRASTS: Performed by: STUDENT IN AN ORGANIZED HEALTH CARE EDUCATION/TRAINING PROGRAM

## 2025-02-26 PROCEDURE — 70551 MRI BRAIN STEM W/O DYE: CPT | Performed by: RADIOLOGY

## 2025-02-26 PROCEDURE — 99232 SBSQ HOSP IP/OBS MODERATE 35: CPT | Performed by: NURSE PRACTITIONER

## 2025-02-26 PROCEDURE — 2500000004 HC RX 250 GENERAL PHARMACY W/ HCPCS (ALT 636 FOR OP/ED): Performed by: STUDENT IN AN ORGANIZED HEALTH CARE EDUCATION/TRAINING PROGRAM

## 2025-02-26 PROCEDURE — 93306 TTE W/DOPPLER COMPLETE: CPT

## 2025-02-26 PROCEDURE — 93306 TTE W/DOPPLER COMPLETE: CPT | Performed by: INTERNAL MEDICINE

## 2025-02-26 PROCEDURE — 2500000001 HC RX 250 WO HCPCS SELF ADMINISTERED DRUGS (ALT 637 FOR MEDICARE OP): Performed by: STUDENT IN AN ORGANIZED HEALTH CARE EDUCATION/TRAINING PROGRAM

## 2025-02-26 PROCEDURE — 92610 EVALUATE SWALLOWING FUNCTION: CPT | Mod: GN

## 2025-02-26 PROCEDURE — 97165 OT EVAL LOW COMPLEX 30 MIN: CPT | Mod: GO

## 2025-02-26 RX ORDER — ACETAMINOPHEN 325 MG/1
650 TABLET ORAL EVERY 6 HOURS PRN
Status: DISCONTINUED | OUTPATIENT
Start: 2025-02-26 | End: 2025-02-27 | Stop reason: HOSPADM

## 2025-02-26 RX ORDER — LEVOTHYROXINE SODIUM 75 UG/1
75 TABLET ORAL DAILY
Status: DISCONTINUED | OUTPATIENT
Start: 2025-02-26 | End: 2025-02-27 | Stop reason: HOSPADM

## 2025-02-26 RX ORDER — ACETAMINOPHEN 500 MG
5 TABLET ORAL NIGHTLY PRN
Status: DISCONTINUED | OUTPATIENT
Start: 2025-02-26 | End: 2025-02-27 | Stop reason: HOSPADM

## 2025-02-26 RX ORDER — LISINOPRIL 20 MG/1
20 TABLET ORAL DAILY
Status: DISCONTINUED | OUTPATIENT
Start: 2025-02-26 | End: 2025-02-27 | Stop reason: HOSPADM

## 2025-02-26 RX ORDER — AMLODIPINE BESYLATE 5 MG/1
5 TABLET ORAL DAILY
Status: DISCONTINUED | OUTPATIENT
Start: 2025-02-26 | End: 2025-02-27 | Stop reason: HOSPADM

## 2025-02-26 RX ORDER — TRAZODONE HYDROCHLORIDE 50 MG/1
50 TABLET ORAL NIGHTLY
Status: DISCONTINUED | OUTPATIENT
Start: 2025-02-26 | End: 2025-02-27 | Stop reason: HOSPADM

## 2025-02-26 RX ORDER — ATORVASTATIN CALCIUM 40 MG/1
40 TABLET, FILM COATED ORAL NIGHTLY
Status: DISCONTINUED | OUTPATIENT
Start: 2025-02-26 | End: 2025-02-27 | Stop reason: HOSPADM

## 2025-02-26 RX ORDER — ASPIRIN 81 MG/1
81 TABLET ORAL DAILY
Status: DISCONTINUED | OUTPATIENT
Start: 2025-02-26 | End: 2025-02-27

## 2025-02-26 RX ORDER — METOPROLOL TARTRATE 25 MG/1
25 TABLET, FILM COATED ORAL NIGHTLY
Status: DISCONTINUED | OUTPATIENT
Start: 2025-02-26 | End: 2025-02-27 | Stop reason: HOSPADM

## 2025-02-26 RX ORDER — ENOXAPARIN SODIUM 100 MG/ML
30 INJECTION SUBCUTANEOUS DAILY
Status: DISCONTINUED | OUTPATIENT
Start: 2025-02-26 | End: 2025-02-26

## 2025-02-26 RX ORDER — PREDNISONE 5 MG/1
2.5 TABLET ORAL
Status: DISCONTINUED | OUTPATIENT
Start: 2025-02-26 | End: 2025-02-27 | Stop reason: HOSPADM

## 2025-02-26 RX ADMIN — ATORVASTATIN CALCIUM 40 MG: 40 TABLET, FILM COATED ORAL at 01:43

## 2025-02-26 RX ADMIN — LEVOTHYROXINE SODIUM 75 MCG: 0.07 TABLET ORAL at 06:05

## 2025-02-26 RX ADMIN — PREDNISONE 2.5 MG: 5 TABLET ORAL at 09:10

## 2025-02-26 RX ADMIN — ATORVASTATIN CALCIUM 40 MG: 40 TABLET, FILM COATED ORAL at 21:01

## 2025-02-26 RX ADMIN — IOHEXOL 75 ML: 350 INJECTION, SOLUTION INTRAVENOUS at 14:37

## 2025-02-26 RX ADMIN — ASPIRIN 81 MG: 81 TABLET, COATED ORAL at 09:08

## 2025-02-26 RX ADMIN — AMLODIPINE BESYLATE 5 MG: 5 TABLET ORAL at 09:08

## 2025-02-26 RX ADMIN — LISINOPRIL 20 MG: 20 TABLET ORAL at 09:08

## 2025-02-26 RX ADMIN — ENOXAPARIN SODIUM 30 MG: 100 INJECTION SUBCUTANEOUS at 06:05

## 2025-02-26 RX ADMIN — TRAZODONE HYDROCHLORIDE 50 MG: 50 TABLET ORAL at 21:01

## 2025-02-26 RX ADMIN — METOPROLOL TARTRATE 25 MG: 25 TABLET, FILM COATED ORAL at 21:01

## 2025-02-26 SDOH — SOCIAL STABILITY: SOCIAL INSECURITY: WERE YOU ABLE TO COMPLETE ALL THE BEHAVIORAL HEALTH SCREENINGS?: YES

## 2025-02-26 SDOH — SOCIAL STABILITY: SOCIAL INSECURITY: ABUSE: ADULT

## 2025-02-26 SDOH — ECONOMIC STABILITY: HOUSING INSECURITY: AT ANY TIME IN THE PAST 12 MONTHS, WERE YOU HOMELESS OR LIVING IN A SHELTER (INCLUDING NOW)?: NO

## 2025-02-26 SDOH — SOCIAL STABILITY: SOCIAL INSECURITY
WITHIN THE LAST YEAR, HAVE YOU BEEN RAPED OR FORCED TO HAVE ANY KIND OF SEXUAL ACTIVITY BY YOUR PARTNER OR EX-PARTNER?: NO

## 2025-02-26 SDOH — ECONOMIC STABILITY: FOOD INSECURITY: WITHIN THE PAST 12 MONTHS, YOU WORRIED THAT YOUR FOOD WOULD RUN OUT BEFORE YOU GOT THE MONEY TO BUY MORE.: NEVER TRUE

## 2025-02-26 SDOH — SOCIAL STABILITY: SOCIAL INSECURITY: HAS ANYONE EVER THREATENED TO HURT YOUR FAMILY OR YOUR PETS?: NO

## 2025-02-26 SDOH — SOCIAL STABILITY: SOCIAL INSECURITY: WITHIN THE LAST YEAR, HAVE YOU BEEN HUMILIATED OR EMOTIONALLY ABUSED IN OTHER WAYS BY YOUR PARTNER OR EX-PARTNER?: NO

## 2025-02-26 SDOH — SOCIAL STABILITY: SOCIAL INSECURITY: DO YOU FEEL ANYONE HAS EXPLOITED OR TAKEN ADVANTAGE OF YOU FINANCIALLY OR OF YOUR PERSONAL PROPERTY?: NO

## 2025-02-26 SDOH — ECONOMIC STABILITY: FOOD INSECURITY: WITHIN THE PAST 12 MONTHS, THE FOOD YOU BOUGHT JUST DIDN'T LAST AND YOU DIDN'T HAVE MONEY TO GET MORE.: NEVER TRUE

## 2025-02-26 SDOH — SOCIAL STABILITY: SOCIAL INSECURITY: ARE YOU OR HAVE YOU BEEN THREATENED OR ABUSED PHYSICALLY, EMOTIONALLY, OR SEXUALLY BY ANYONE?: NO

## 2025-02-26 SDOH — SOCIAL STABILITY: SOCIAL INSECURITY: WITHIN THE LAST YEAR, HAVE YOU BEEN AFRAID OF YOUR PARTNER OR EX-PARTNER?: NO

## 2025-02-26 SDOH — ECONOMIC STABILITY: HOUSING INSECURITY: IN THE PAST 12 MONTHS, HOW MANY TIMES HAVE YOU MOVED WHERE YOU WERE LIVING?: 0

## 2025-02-26 SDOH — ECONOMIC STABILITY: HOUSING INSECURITY: IN THE LAST 12 MONTHS, WAS THERE A TIME WHEN YOU WERE NOT ABLE TO PAY THE MORTGAGE OR RENT ON TIME?: NO

## 2025-02-26 SDOH — ECONOMIC STABILITY: TRANSPORTATION INSECURITY: IN THE PAST 12 MONTHS, HAS LACK OF TRANSPORTATION KEPT YOU FROM MEDICAL APPOINTMENTS OR FROM GETTING MEDICATIONS?: NO

## 2025-02-26 SDOH — ECONOMIC STABILITY: INCOME INSECURITY: IN THE PAST 12 MONTHS HAS THE ELECTRIC, GAS, OIL, OR WATER COMPANY THREATENED TO SHUT OFF SERVICES IN YOUR HOME?: NO

## 2025-02-26 SDOH — SOCIAL STABILITY: SOCIAL INSECURITY: DOES ANYONE TRY TO KEEP YOU FROM HAVING/CONTACTING OTHER FRIENDS OR DOING THINGS OUTSIDE YOUR HOME?: NO

## 2025-02-26 SDOH — SOCIAL STABILITY: SOCIAL INSECURITY: HAVE YOU HAD THOUGHTS OF HARMING ANYONE ELSE?: NO

## 2025-02-26 SDOH — ECONOMIC STABILITY: FOOD INSECURITY: HOW HARD IS IT FOR YOU TO PAY FOR THE VERY BASICS LIKE FOOD, HOUSING, MEDICAL CARE, AND HEATING?: NOT HARD AT ALL

## 2025-02-26 SDOH — SOCIAL STABILITY: SOCIAL INSECURITY: ARE THERE ANY APPARENT SIGNS OF INJURIES/BEHAVIORS THAT COULD BE RELATED TO ABUSE/NEGLECT?: NO

## 2025-02-26 SDOH — SOCIAL STABILITY: SOCIAL INSECURITY: HAVE YOU HAD ANY THOUGHTS OF HARMING ANYONE ELSE?: NO

## 2025-02-26 SDOH — SOCIAL STABILITY: SOCIAL INSECURITY: DO YOU FEEL UNSAFE GOING BACK TO THE PLACE WHERE YOU ARE LIVING?: NO

## 2025-02-26 ASSESSMENT — LIFESTYLE VARIABLES
AUDIT-C TOTAL SCORE: 0
HOW MANY STANDARD DRINKS CONTAINING ALCOHOL DO YOU HAVE ON A TYPICAL DAY: PATIENT DOES NOT DRINK
HOW OFTEN DO YOU HAVE A DRINK CONTAINING ALCOHOL: NEVER
HOW OFTEN DO YOU HAVE 6 OR MORE DRINKS ON ONE OCCASION: NEVER
AUDIT-C TOTAL SCORE: 0
SKIP TO QUESTIONS 9-10: 1

## 2025-02-26 ASSESSMENT — COGNITIVE AND FUNCTIONAL STATUS - GENERAL
TURNING FROM BACK TO SIDE WHILE IN FLAT BAD: A LITTLE
WALKING IN HOSPITAL ROOM: A LITTLE
MOBILITY SCORE: 17
STANDING UP FROM CHAIR USING ARMS: A LITTLE
HELP NEEDED FOR BATHING: A LITTLE
DAILY ACTIVITIY SCORE: 16
MOVING FROM LYING ON BACK TO SITTING ON SIDE OF FLAT BED WITH BEDRAILS: A LITTLE
MOVING TO AND FROM BED TO CHAIR: A LITTLE
MOBILITY SCORE: 19
DRESSING REGULAR UPPER BODY CLOTHING: A LITTLE
CLIMB 3 TO 5 STEPS WITH RAILING: A LOT
HELP NEEDED FOR BATHING: A LITTLE
WALKING IN HOSPITAL ROOM: A LITTLE
DRESSING REGULAR UPPER BODY CLOTHING: A LITTLE
TOILETING: A LITTLE
DAILY ACTIVITIY SCORE: 20
PATIENT BASELINE BEDBOUND: NO
STANDING UP FROM CHAIR USING ARMS: A LITTLE
CLIMB 3 TO 5 STEPS WITH RAILING: A LOT
WALKING IN HOSPITAL ROOM: A LITTLE
EATING MEALS: A LITTLE
PERSONAL GROOMING: A LITTLE
DRESSING REGULAR LOWER BODY CLOTHING: A LITTLE
STANDING UP FROM CHAIR USING ARMS: A LITTLE
TOILETING: A LITTLE
MOBILITY SCORE: 19
HELP NEEDED FOR BATHING: A LOT
DRESSING REGULAR LOWER BODY CLOTHING: A LOT
MOVING TO AND FROM BED TO CHAIR: A LITTLE
CLIMB 3 TO 5 STEPS WITH RAILING: A LITTLE
MOVING TO AND FROM BED TO CHAIR: A LITTLE
TURNING FROM BACK TO SIDE WHILE IN FLAT BAD: A LITTLE
DAILY ACTIVITIY SCORE: 21
DRESSING REGULAR LOWER BODY CLOTHING: A LITTLE
TOILETING: A LITTLE

## 2025-02-26 ASSESSMENT — ACTIVITIES OF DAILY LIVING (ADL)
ADL_ASSISTANCE: NEEDS ASSISTANCE
LACK_OF_TRANSPORTATION: NO
HEARING - LEFT EAR: FUNCTIONAL
ASSISTIVE_DEVICE: EYEGLASSES;DENTURES LOWER;DENTURES UPPER
JUDGMENT_ADEQUATE_SAFELY_COMPLETE_DAILY_ACTIVITIES: NO
DRESSING YOURSELF: INDEPENDENT
LACK_OF_TRANSPORTATION: NO
FEEDING YOURSELF: INDEPENDENT
BATHING: INDEPENDENT
ADEQUATE_TO_COMPLETE_ADL: YES
GROOMING: INDEPENDENT
BATHING_ASSISTANCE: MODERATE
ADL_ASSISTANCE: NEEDS ASSISTANCE
HEARING - RIGHT EAR: FUNCTIONAL
TOILETING: INDEPENDENT
WALKS IN HOME: INDEPENDENT
PATIENT'S MEMORY ADEQUATE TO SAFELY COMPLETE DAILY ACTIVITIES?: NO

## 2025-02-26 ASSESSMENT — PAIN - FUNCTIONAL ASSESSMENT
PAIN_FUNCTIONAL_ASSESSMENT: 0-10

## 2025-02-26 ASSESSMENT — PAIN SCALES - GENERAL
PAINLEVEL_OUTOF10: 0 - NO PAIN

## 2025-02-26 ASSESSMENT — PATIENT HEALTH QUESTIONNAIRE - PHQ9
SUM OF ALL RESPONSES TO PHQ9 QUESTIONS 1 & 2: 0
2. FEELING DOWN, DEPRESSED OR HOPELESS: NOT AT ALL
1. LITTLE INTEREST OR PLEASURE IN DOING THINGS: NOT AT ALL

## 2025-02-26 NOTE — PROGRESS NOTES
Speech-Language Pathology    Inpatient Speech-Language Pathology Clinical Swallow Evaluation       Patient Name: Maisha Agosto  MRN: [id number]  : 1940  Today's Date: 25  Time Calculation  Start Time: 1546  Stop Time: 1606  Time Calculation (min): 20 min       Current Problem:  1. AMS (altered mental status)  Transthoracic Echo (TTE) Complete    Transthoracic Echo (TTE) Complete      2. Cerebrovascular accident (CVA), unspecified mechanism (Multi)  Transthoracic Echo (TTE) Complete    Transthoracic Echo (TTE) Complete      3. Paroxysmal atrial fibrillation (Multi)  Transthoracic Echo (TTE) Complete    Transthoracic Echo (TTE) Complete      4. Acute metabolic encephalopathy            Past Medical History:  Past Medical History:   Diagnosis Date    Bowel perforation (Multi) 2024    Clostridium difficile colitis     Colitis due to Clostridioides difficile 2024    Heart murmur     Hypernatremia     Terminal ileitis with complication (Multi) 2024    UTI (urinary tract infection) 2024       Reason for Referral:  Pt presented to ED from home on 25 with altered mental status.  Pt admitted for further assessment due to possible stroke-like symptoms.  Pt referred to Speech-Language Pathology services for Clinical Swallow Evaluation in order to assess current swallow skills and determine plan of treatment.      Relevant Imaging Results:  CXR 25  LUNGS:  Lungs are clear.  Hyperexpanded lungs.  Pleural spaces are clear.  IMPRESSION:  No focal consolidation or other acute abnormality.  Findings of  emphysema/COPD..     MRI brain 25  IMPRESSION:  1. 2 punctate foci of signal abnormality located within the posterior  and superior right parietal lobe are most consistent with acute or  subacute infarcts. There is no hemorrhagic transformation or mass  effect.  2. Findings of probable chronic small vessel ischemic changes. Small  old infarcts in the right cerebellum.      Assessment    Impression: Pt presents with mild oral dysphagia characterized by prolonged but efficient mastication with soft and bite-sized solids.  Did not test regular solids due to effort noted with soft/bite size foods.   No pharyngeal phase dysphagia suspected.     Consistencies Trialed: Thin (IDDSI Level 0) - Cup, Soft & bite sized/chopped (IDDSI Level 6)   Oral Motor: Within Functional Limits  Dentition: Some Missing Teeth   Medical Staff Made Aware: Yes      Recommendations:  Risk for Aspiration: Yes   Solid Diet Recommendations : Soft & bite sized/chopped (IDDSI Level 6)   Liquid Diet Recommendations: Thin (IDDSI Level 0)        Is MBSS Recommended?: No, as no pharyngeal dysphagia is suspected per Clinical Swallow Evaluation.    Medication Intake Method: Whole with liquid  Compensatory Swallow Strategies:   - Upright positioning as close to 90º as possible  - Small bites/sips  - Small single sips    Recommendation for next level of care: Skilled dysphagia treatment is not warranted at next level of care as pt has achieved functional baseline and further intervention is not indicated.    Plan  SLP Plan: No skilled SLP   Discussed POC: Patient, Nursing   Discussed Risks/Benefits: Yes   Patient/Caregiver Agreeable: Yes     Further treatment in acute setting:  Skilled dysphagia treatment is not warranted as patient appears to be at functional baseline.    Subjective   Pt pleasant and cooperative, upright in bed for assessment with dinner tray present.    General Visit Information:  Prior to Session Communication: Bedside nurse   Self feeding: independent    Objective  Clinical Swallow Evaluation completed consisting of patient/caregiver interview, oral motor assessment, and analysis of swallow abilities with PO trials (2 oz water via cup, soft solids.)    ORAL PHASE:   Natural dentition in adequate condition with some missing in the back.   Oral mucosa were normal in color, moist, and free of obvious  lesions.    Mastication of soft/bite-size solids was prolonged and effortful, but efficient.  Bolus formation,  A-P transport, and oral clearance were also adequate.    PHARYNGEAL PHASE:   No suspected delay in onset of timeliness of swallow.  Laryngeal movement was visualized with all trials, however adequacy of hyolaryngeal elevation/excursion cannot be determined at bedside.   No overt (immediate or delayed) s/s aspiration were demonstrated with any consistencies.  Vocal quality clear post all swallows.    Pain:  Pain Assessment  Pain Assessment: 0-10  0-10 (Numeric) Pain Score: 0 - No pain     Oxygen Status:  Room air    Oral Motor Assessment:  Oral/Motor Assessment  Oral Hygiene: WFL  Dentition: Some Missing Teeth  Oral Motor: Within Functional Limits    Inpatient Education:   Individual(s) Educated: Patient  Verbal Education : Results, recommendations  Risk and Benefits Discussed with Patient/Caregiver/Other: yes  Patient/Caregiver Demonstrated Understanding: yes  Plan of Care Discussed and Agreed Upon: yes  Patient Response to Education: Patient/Caregiver Verbalized Understanding of Information    Communication with Medical Team:  SLP communicated with bedside nurse prior to evaluation to obtain clearance for patient participation.  Results of the clinical swallow evaluation were discussed verbally with nurse upon completion with verbal understanding noted.

## 2025-02-26 NOTE — PROGRESS NOTES
02/26/25 0912   Discharge Planning   Living Arrangements Children   Support Systems Children   Assistance Needed Patient reports she is independent at home but does have caregivers with her, when her daughter is at work. Patient utilizes a walker at home.   Type of Residence Private residence   Number of Stairs to Enter Residence 2   Number of Stairs Within Residence 0   Who is requesting discharge planning? Provider   Home or Post Acute Services None   Expected Discharge Disposition Home  (Patient declines home care stating she already has caregivers that come in and denies need for additional support/assistance at home.)   Does the patient need discharge transport arranged? No   Financial Resource Strain   How hard is it for you to pay for the very basics like food, housing, medical care, and heating? Not hard   Housing Stability   In the last 12 months, was there a time when you were not able to pay the mortgage or rent on time? N   In the past 12 months, how many times have you moved where you were living? 0   At any time in the past 12 months, were you homeless or living in a shelter (including now)? N   Transportation Needs   In the past 12 months, has lack of transportation kept you from medical appointments or from getting medications? no   In the past 12 months, has lack of transportation kept you from meetings, work, or from getting things needed for daily living? No   Stroke Family Assessment   Stroke Family Assessment Needed No   Intensity of Service   Intensity of Service 0-30 min     MSW met with patient at bedside. Patient reports she lives at home with her daughter, Eli and that Eli does work, but on the days she works she has caregivers that come in to look after the patient. Patient reports she does well at home with current services and denies need for home care or additional services at this time.     Patient plans to return home upon discharge with family to transport.

## 2025-02-26 NOTE — H&P
History of Present Illness  Maisha Agosto is a 84 y.o. female who presented initially to [place] with not recognizing her daughter, stuttering speech, and trying to walk into a wall with her walker, was worked up as a stroke alert and is admitted for AMS (altered mental status)    Subjective    No family at bedside thus history obtained from chart. Patient reports that it is currently Summer, about to be Fall and that she was not using her walker 3 days ago and fell and now walks everywhere with he daughter and that is why she is here. She does not know the year nor how old she is.    Per records, patients daughter got home from work and did not recognize her and was stuttering which is different from baseline. Baseline dementia with disorientation    Review of Systems   Unable to perform ROS: Dementia         History    Maisha Agosto  has a past medical history of Bowel perforation (Multi) (2024), Clostridium difficile colitis, Colitis due to Clostridioides difficile (2024), Heart murmur, Hypernatremia, Terminal ileitis with complication (Multi) (2024), and UTI (urinary tract infection) (2024).     Past Surgical History:   Procedure Laterality Date    CATARACT EXTRACTION       SECTION, CLASSIC  2016     Section    CHOLECYSTECTOMY  2014    Cholecystectomy    COLONOSCOPY  2014    Colonoscopy (Fiberoptic)    CT HEAD ANGIO W AND WO IV CONTRAST  2021    CT HEAD ANGIO W AND WO IV CONTRAST 2021 [facility]    ESOPHAGOGASTRODUODENOSCOPY  2014    Diagnostic Esophagogastroduodenoscopy    MR HEAD ANGIO WO IV CONTRAST  2021    MR HEAD ANGIO WO IV CONTRAST 2021 [facility]    MR NECK ANGIO WO IV CONTRAST  2021    MR NECK ANGIO WO IV CONTRAST 2021 [facility]      Patient  reports that she has never smoked. She has never used smokeless tobacco. She reports that she does not drink alcohol and does not use  drugs.     Patient's family history includes Diabetes in her mother; Heart attack in an other family member; Heart disease in an other family member.          Objective    Blood pressure 139/68, pulse 57, temperature 36.8 °C (98.2 °F), temperature source Temporal, resp. rate 16, height 1.524 m (5'), weight (!) 42.8 kg (94 lb 5.7 oz), SpO2 93%.  Vitals Reviewed: yes  Constitutional: frail, awake, alert, pleasantly demented  Eyes: EOMI, normal conjunctiva  HENT: moist mucus membranes, airway patent  Pulm: no wheezes, no rhonchi, no crackles, no coarse breath sounds  Cardiac: reg rate, regular rhythm, no murmur, equal pulses  GI: Soft, non tender, non-distended, normal bowel sounds  MSK: trace lower extremity edema  Neuro: No focal deficit, disoriented, follows commands, CN II-XII intact, no palmar drift  Psych: Cooperative, appropriate affect, impaired judgement    No intake or output data in the 24 hours ending 02/26/25 0352    Relevant Results  XR chest 1 view  Result Date: 2/25/2025  No focal consolidation or other acute abnormality.  Findings of emphysema/COPD.    CT brain attack head wo IV contrast  Result Date: 2/25/2025  No CT evidence of acute large vessel territory infarct, intracranial hemorrhage or mass effect.      Scheduled medications  amLODIPine, 5 mg, oral, Daily  aspirin, 81 mg, oral, Daily  atorvastatin, 40 mg, oral, Nightly  enoxaparin, 30 mg, subcutaneous, Daily  levothyroxine, 75 mcg, oral, Daily  lisinopril, 20 mg, oral, Daily  metoprolol tartrate, 25 mg, oral, Nightly  predniSONE, 2.5 mg, oral, Daily with breakfast  traZODone, 50 mg, oral, Nightly      Continuous medications     PRN medications  PRN medications: acetaminophen, melatonin        Assessment    Assessment & Plan  AMS (altered mental status)  -presented as a stroke alert however no focal deficit  -MRI brain to rule out acute CVA  -with her history of dementia, continued progression of her dementia is a likely  explanation  -PT/OT/Speech    DVT prophylaxis: Lovenox  Disposition: Inpatient, if MRI without acute findings, anticipate home following MRI      Faina Julian

## 2025-02-26 NOTE — ASSESSMENT & PLAN NOTE
MRI shows remote CVA as well as an acute/subacute CVA  Neurology following  Echo pending  CT of neck and head   PT OT and speech  Holter monitor at discharge  Plan of care  Patient lives at home with her daughter and plans to return there at discharge, full PT OT recommendations have not been given however she does move really well and will likely not need SNF.  Dissipate discharge in the next 24 hours  Plan of care discussed with the patient and collaborating physician.

## 2025-02-26 NOTE — ASSESSMENT & PLAN NOTE
-presented as a stroke alert however no focal deficit  -MRI brain abnormal 2 punctate foci in the posterior superior right.  Lobe most consistent with acute or subacute infarct.  No hemorrhagic transformation or mass effect.   -I did place neurology consult [place], spoke with neurologist stated common in patients who have A-fib for not on blood thinners.  Echocardiogram ordered I will contact daughter later to see if she was on anticoagulation in the past or why she is not currently.  -Echocardiogram pending  -Will consider starting DOAC after discussion with healthcare power of .    -PT/OT/Speech

## 2025-02-26 NOTE — PROGRESS NOTES
Physical Therapy    Physical Therapy Evaluation    Patient Name: Maisha Agosto  MRN: [id number]  Department: [address]  Room: 313/313-A  Today's Date: 2/26/2025   Time Calculation  Start Time: 1357  Stop Time: 1416  Time Calculation (min): 19 min    Services provided by Ismael WHITE under the direct supervision by a Licensed Physical Therapist.      Assessment/Plan   PT Assessment  PT Assessment Results: Decreased strength, Decreased range of motion, Decreased endurance, Impaired balance, Decreased mobility, Decreased coordination, Decreased cognition, Impaired judgement, Decreased safety awareness, Decreased skin integrity  Rehab Prognosis: Good  Barriers to Discharge Home: Physical needs, Cognition needs  Cognition Needs: 24hr supervision for safety awareness needed, Insight of patient limited regarding functional ability/needs  Physical Needs: 24hr mobility assistance needed, 24hr ADL assistance needed  Evaluation/Treatment Tolerance: Patient tolerated treatment well  Medical Staff Made Aware: Yes  Strengths: Premorbid level of function  Barriers to Participation: Comorbidities  End of Session Communication: Bedside nurse  Assessment Comment: Pt is an 83yo female who presents with AMS, rule out acute CVA, and PMH of baseline dementia, HLD, Afib, COPD, PVD, and HTN. MRI positive for 2 punctate foci of signal abnormality located within the posterior and superior right parietal lobe are most consistent with acute or subacute infarcts. MRI also showed Findings of probable chronic small vessel ischemic changes, small old infarcts in the right cerebellum.Upon eval, pt demonstrates strength and endurance deficits that impact her functional mobility and increase her risk for falls, warranting skilled PT care. Low intensity continuum of care with 24/7 supervision for safety recommended at NC.  End of Session Patient Position: Up in chair, Alarm off, not on at start of session (alarm off with family member present and  approval from RN)  IP OR SWING BED PT PLAN  Inpatient or Swing Bed: Inpatient  PT Plan  Treatment/Interventions: Bed mobility, Transfer training, Gait training, Stair training, Balance training, Strengthening, Endurance training, Range of motion, Therapeutic exercise, Therapeutic activity, Home exercise program, Postural re-education  PT Plan: Ongoing PT  PT Frequency: 4 times per week  PT Discharge Recommendations: Low intensity level of continued care, 24 hr supervision due to cognition  Equipment Recommended upon Discharge: Wheeled walker  PT Recommended Transfer Status: Assist x1, Assistive device  PT - OK to Discharge: Yes    Subjective   General Visit Information:  General  Reason for Referral: impaired mobility - rule out stroke  Referred By: Dr. Faina Julian  Past Medical History Relevant to Rehab: HTN, PVD, COPD, Afib, HLD, dementia  Family/Caregiver Present: Yes (DTR)  Prior to Session Communication: Bedside nurse  Patient Position Received: Up in chair, Alarm off, not on at start of session (on couch with DTR; RN aware)  Preferred Learning Style: verbal, visual  General Comment: Pt is an 85yo female who presents with AMS, rule out acute CVA, and PMH of baseline dementia, HLD, Afib, COPD, PVD, and HTN. MRI positive for 2 punctate foci of signal abnormality located within the posterior  and superior right parietal lobe are most consistent with acute or subacute infarcts. MRI also showed Findings of probable chronic small vessel ischemic changes, small old infarcts in the right cerebellum.  Home Living:  Home Living  Type of Home: House  Lives With: Adult children (DTR)  Home Adaptive Equipment: Walker rolling or standard  Home Layout: One level  Home Access: Stairs to enter with rails  Entrance Stairs-Rails:  (one rail)  Entrance Stairs-Number of Steps: 1  Bathroom Shower/Tub: Walk-in shower  Bathroom Toilet: Standard  Bathroom Equipment: Grab bars in shower  Prior Level of Function:  Prior Function  "Per Pt/Caregiver Report  Level of [place]: Needs assistance with ADLs, Needs assistance with homemaking  Receives Help From: Family, Home health (pt has 2 aides daily that help with bathing and dressing.)  ADL Assistance: Needs assistance  Homemaking Assistance: Needs assistance (DTR cooks and cleans)  Ambulatory Assistance: Needs assistance (with FWW)  Prior Function Comments: Pt and DTR report that she requires ADL and ambulatory assist prior to admission. Pt and DTR deny fall hx but DTR states, \"she has had multiple LOBs but because an aide is always with her, she has not truly fallen down.\"  Precautions:  Precautions  Medical Precautions: Fall precautions     Objective   Pain:  Pain Assessment  Pain Assessment: 0-10  0-10 (Numeric) Pain Score: 0 - No pain  Cognition:  Cognition  Overall Cognitive Status: Impaired at baseline  Orientation Level: Disoriented to time  Cognition Comments: mildly confused with baseline dementia. Able to follow simple commands with repeated cueing.  Insight: Moderate  Impulsive: Mildly    General Assessments:  Activity Tolerance  Endurance: Decreased tolerance for upright activites    Sensation  Light Touch: No apparent deficits  Sensation Comment: denies n/t    Strength  Strength Comments: BLE grossly 3+/5  Coordination  Finger to Nose: Impaired, Dysmetria (pt showing possible signs of dysmetria. Upon testing, she was unable to touch tip of PT finger but was able to brush the side of finger on every attempt. Returning finger to nose, she missed nose and touched bottom of mouth on one attempt with L hand.)  Rapid Alternating Movements: Other: (Comment) (cognition impacted accuracy of test. She was unable to follow command following for test despite max verbal and tactile cueing)  Heel to Martino: Intact  Functional Assessments:     Transfers  Transfer: Yes  Transfer 1  Transfer From 1: Chair without arms to (couch)  Transfer to 1: Stand  Technique 1: Sit to stand, Stand to " sit  Transfer Device 1: Walker  Transfer Level of Assistance 1: Contact guard  Trials/Comments 1: x3 trials; pulled from walker on 1st attempt and was able to complete transfer but with increased unsteadiness. PT corrected hand placement for second attempt but pt was unable to complete transfer. After further education on movement sequencing, pt able to complete transfer on third trial with improved pace and steadiness. When sitting down to surface, pt has tendency to leave walker behind, requiring education on keeping walker closer at all times throughout transfer, and technique on how to do so.    Ambulation/Gait Training  Ambulation/Gait Training Performed: Yes  Ambulation/Gait Training 1  Surface 1: Level tile  Device 1: Rolling walker  Assistance 1: Contact guard  Quality of Gait 1: Narrow base of support, Inconsistent stride length, Decreased step length, Shuffling gait, Scissoring, Forward flexed posture (tendency to keep FWW away from body and to pick it up when turning)  Comments/Distance (ft) 1: 30'+60'; pt mildly unsteady, resulting in shaking of walker. Cueing to keep walker close at all times and to turn with walker rather than picking up walker while turning. Further education on keeping head up in order to improve environmental awareness.    Outcome Measures:  [facility] Basic Mobility  Turning from your back to your side while in a flat bed without using bedrails: A little  Moving from lying on your back to sitting on the side of a flat bed without using bedrails: A little  Moving to and from bed to chair (including a wheelchair): A little  Standing up from a chair using your arms (e.g. wheelchair or bedside chair): A little  To walk in hospital room: A little  Climbing 3-5 steps with railing: A lot  Basic Mobility - Total Score: 17    Encounter Problems       Encounter Problems (Active)       Balance       standing (Progressing)       Start:  02/26/25    Expected End:  03/12/25       Pt will demonstrate  good static standing balance to promote safe participation with out of bed activity, transfers, and mobility              Mobility       ambulation (Progressing)       Start:  02/26/25    Expected End:  03/12/25       Pt will ambulate 150' independent assist with walker to promote safe home mobility           supine to sit (Progressing)       Start:  02/26/25    Expected End:  03/12/25       Pt will transfer supine to sitting at edge of bed with independent assist to promote acute care out of bed activity              Mobility       entry stair navigation (Progressing)       Start:  02/26/25    Expected End:  03/12/25       Pt will ascend/descend 2 stairs with rail(s) on R and independent assist to promote safe entry and exit in home environment                PT Transfers       sit to stand (Progressing)       Start:  02/26/25    Expected End:  03/12/25       Pt will transfer sit to standing position with independent assist and walker to promote safe out of bed activity           bed to chair (Progressing)       Start:  02/26/25    Expected End:  03/12/25       Pt will transfer from sitting edge of bed to the chair with independent assist and walker to promote out of bed activity and reduce the risks of prolonged acute care bedrest              Pain - Adult          Safety       safe mobility techniques (Progressing)       Start:  02/26/25    Expected End:  03/12/25       Pt will correctly identify and demonstrate safe mobility techniques to reduce their risks for falls during their acute care stay                   Education Documentation  Precautions, taught by KELLY Shane at 2/26/2025  3:38 PM.  Learner: Patient  Readiness: Acceptance  Method: Explanation  Response: Needs Reinforcement  Comment: safe mobility techniques, proper body mechanics, fall precautions    Body Mechanics, taught by KELLY Shane at 2/26/2025  3:38 PM.  Learner: Patient  Readiness: Acceptance  Method:  Explanation  Response: Needs Reinforcement  Comment: safe mobility techniques, proper body mechanics, fall precautions    Mobility Training, taught by KELLY Shane at 2/26/2025  3:38 PM.  Learner: Patient  Readiness: Acceptance  Method: Explanation  Response: Needs Reinforcement  Comment: safe mobility techniques, proper body mechanics, fall precautions    Education Comments  No comments found.

## 2025-02-26 NOTE — PROGRESS NOTES
Maisha Agosto is a 84 y.o. female on day 0 of admission presenting with AMS (altered mental status).      Subjective   Patient back from MRI when I saw her and she needed to use the bathroom badly.  I walked with her as a standby assist while she used a walker and she did very well.  She is alert and oriented and speaking clearly.  Denies any pain or shortness of breath       Objective     Last Recorded Vitals  /86 (BP Location: Left arm, Patient Position: Lying)   Pulse 69   Temp 36.8 °C (98.2 °F) (Temporal)   Resp 18   Wt (!) 42.8 kg (94 lb 5.7 oz)   SpO2 94%   Intake/Output last 3 Shifts:    Intake/Output Summary (Last 24 hours) at 2/26/2025 1307  Last data filed at 2/26/2025 0500  Gross per 24 hour   Intake 120 ml   Output --   Net 120 ml       Admission Weight  Weight: (!) 42.8 kg (94 lb 5.7 oz) (02/26/25 0043)    Daily Weight  02/26/25 : (!) 42.8 kg (94 lb 5.7 oz)    Image Results  MR brain wo IV contrast  Narrative: Interpreted By:  Ye Connor and Hooper Grayson   STUDY:  MR BRAIN WO IV CONTRAST;  2/26/2025 8:01 am      INDICATION:  Signs/Symptoms:Altered mentation, rule out acute CVA.          COMPARISON:  CT of the head obtained February 25, 2025      ACCESSION NUMBER(S):  [id number]      ORDERING CLINICIAN:  WILFREDO CELESTIN      TECHNIQUE:  Axial T2, FLAIR, DWI, gradient echo T2 and sagittal and coronal T1  weighted images of brain were acquired.  No contrast was administered.      FINDINGS:  Punctate foci of DWI signal hyperintensity are observed in the  posterior right parietal lobe (axial DWI series 4, images 28 and 29).  Lesion located more superiorly and posteriorly demonstrates signal  dropout on the ADC map while the other lesion does not definitively  demonstrate signal dropout on ADC. No suspicious intra-axial gradient  signal hypointensities are observed.      There is no evidence of extra-axial hemorrhage or fluid collection.      Subcortical and periventricular white matter  and torey FLAIR signal  hyperintensities are noted. There are small old infarcts located  within the right cerebellum. No mass effect. No midline shift.  Symmetric prominence of the ventricles are observed, especially the  atria and trigones. Normal appearance of the basilar cisterns. Mid  sagittal imaging demonstrates no evidence of cerebellar tonsillar  ectopia or mass.      T2 vascular flow voids appear normal.      Status post bilateral lens extractions. Paranasal sinuses are  essentially clear. Trace right mastoid effusion.      Impression: 1. 2 punctate foci of signal abnormality located within the posterior  and superior right parietal lobe are most consistent with acute or  subacute infarcts. There is no hemorrhagic transformation or mass  effect.  2. Findings of probable chronic small vessel ischemic changes. Small  old infarcts in the right cerebellum.      Comments: The above information was relayed to the ordering physician  via [employer] secure chat at 0825 hours on 26 February 2025.      MACRO:  No Critical Finding:  See findings. Notification was initiated on  2/26/2025 at 8:36 am by  Ye Connor.  (**-YCF-**)ne      Signed by: Ye Connor 2/26/2025 8:37 AM  Dictation workstation:   [id number]      Physical Exam  Constitutional:       Appearance: She is obese.   Cardiovascular:      Rate and Rhythm: Normal rate and regular rhythm.      Pulses: Normal pulses.      Heart sounds: Normal heart sounds.   Pulmonary:      Effort: Pulmonary effort is normal.      Breath sounds: Normal breath sounds.   Abdominal:      General: Bowel sounds are normal.      Palpations: Abdomen is soft.   Musculoskeletal:         General: Normal range of motion.   Skin:     General: Skin is warm and dry.   Neurological:      Mental Status: She is alert and oriented to person, place, and time.         Relevant Results             Results for orders placed or performed during the hospital encounter of 02/26/25 (from the past 24  hours)   Transthoracic Echo (TTE) Complete   Result Value Ref Range    BSA 1.35 m2       Assessment/Plan      Assessment & Plan  AMS (altered mental status)  MRI shows remote CVA as well as an acute/subacute CVA  Neurology following  Echo pending  CT of neck and head   PT OT and speech  Holter monitor at discharge  Plan of care  Patient lives at home with her daughter and plans to return there at discharge, full PT OT recommendations have not been given however she does move really well and will likely not need SNF.  Dissipate discharge in the next 24 hours  Plan of care discussed with the patient and collaborating physician.                  Sherly Parra, APRN-CNP

## 2025-02-26 NOTE — CONSULTS
HPI: Telestroke consult at [facility]  84 y.o. female with history of hypertension, peripheral vascular disease, COPD, paroxysmal A-fib, hyperlipidemia, reported dementia.  Presented to ED yesterday with altered mental status and stuttering of her speech.  Teleneurology service requested for MRI findings.    History from chart review.  Patient presented yesterday with an episode of lightheadedness, walking into walls, walking in the window.  This happened after waking up from a nap.  Patient has dementia at baseline and walks with a walker.  In the emergency room patient returned to baseline reportedly.    Attempted to obtain history from patient as well.  Patient states she was sitting in a recliner chair when it hit the window lip and she fell.  She states a mailman was nearby, she asked for help for the mailman.  She went up to the door and opened the door for the mailman.  She states she had no trouble walking to the mailman.  Unclear how much of this history is true.    Last known Well: Yesterday morning  NIH Stroke Scale Reported: 0    Prior Functional Status (Modified Alger Scale):  3 Moderate disability; requiring some help, but able to walk without assistance    In the ED, blood pressure 153/72, glucose 143.  NIHSS 0.  Patient was admitted for further evaluation.  Subsequently patient obtained an MRI brain.  It showed 2 punctate DWI signals, located in superior right parietal lobe, 1 cortical and 1 subcortical.  Also noted multiple microvascular ischemic changes on FLAIR.  Also noted right cerebellar old infarcts.    Patient evaluated by bedside virtually.  She states she is doing well.  Denies any headache, vision changes, dizziness, weakness or numbness of any particular arm or leg.    Patient is on aspirin and no other anticoagulation despite reported history of paroxysmal A-fib (fib with RVR from EKG 1/9/2020 noted in chart).  Though unclear for chart review, this may be because of history of GI bleeding  and/or fall risk.         Patient Active Problem List    Diagnosis Date Noted    AMS (altered mental status) 02/26/2025    Anemia 10/21/2024    B12 deficiency 10/21/2024    Ataxia     At risk for falls     Pneumonia, unspecified organism 09/28/2024    Controlled type 2 diabetes mellitus with microalbuminuria, without long-term current use of insulin (Multi) 09/28/2024    Urinary incontinence 09/28/2024    PVD (peripheral vascular disease) (CMS-HCC) 09/28/2024    Decreased appetite 09/28/2024    COPD (chronic obstructive pulmonary disease) (Multi) 06/05/2024    Arteriosclerosis of coronary artery 06/05/2024    Frail elderly 06/05/2024    Abnormal gait 06/05/2024    Fatigue 06/05/2024    GERD (gastroesophageal reflux disease) 06/05/2024    Hiatal hernia 06/05/2024    Hyperlipidemia 06/05/2024    Dementia 06/05/2024    Osteopenia 06/05/2024    HTN (hypertension) 06/05/2024    Rheumatoid arthritis 06/05/2024    Hypothyroidism 06/05/2024    Solitary pulmonary nodule 06/05/2024    Impaired functional mobility, balance, gait, and endurance 05/18/2024    Dehydration 05/16/2024    Acute exacerbation of COPD with asthma 02/03/2023    Generalized weakness 06/28/2022    Cyst of pancreas ([facility]) 01/24/2020    Paroxysmal atrial fibrillation (Multi) 01/24/2020     Current Facility-Administered Medications   Medication Dose Route Frequency Provider Last Rate Last Admin    acetaminophen (Tylenol) tablet 650 mg  650 mg oral q6h PRN Faina Julian MD        amLODIPine (Norvasc) tablet 5 mg  5 mg oral Daily Faina Julian MD   5 mg at 02/26/25 0908    aspirin EC tablet 81 mg  81 mg oral Daily Faina Julian MD   81 mg at 02/26/25 0908    atorvastatin (Lipitor) tablet 40 mg  40 mg oral Nightly Faina Julian MD   40 mg at 02/26/25 0143    enoxaparin (Lovenox) syringe 30 mg  30 mg subcutaneous Daily Faina Julian MD   30 mg at 02/26/25 0605    levothyroxine (Synthroid, Levoxyl) tablet 75 mcg  75 mcg oral Daily  Faina Julian MD   75 mcg at 25 0605    lisinopril tablet 20 mg  20 mg oral Daily Faina Julian MD   20 mg at 25 0908    melatonin tablet 5 mg  5 mg oral Nightly PRN Faina Julian MD        metoprolol tartrate (Lopressor) tablet 25 mg  25 mg oral Nightly Faina Julian MD        predniSONE (Deltasone) tablet 2.5 mg  2.5 mg oral Daily with breakfast Faina Julian MD   2.5 mg at 25 0910    traZODone (Desyrel) tablet 50 mg  50 mg oral Nightly Faina Julian MD         Allergies: Acthar [corticotropin], Chloroquine phosphate, Ciprofloxacin, Humira [adalimumab], Levaquin [levofloxacin], Methotrexate, Sulfamethoxazole-trimethoprim, Xeljanz [tofacitinib], Amoxicillin-pot clavulanate, and Enbrel [etanercept]  Past Medical History:   Diagnosis Date    Bowel perforation (Multi) 2024    Clostridium difficile colitis     Colitis due to Clostridioides difficile 2024    Heart murmur     Hypernatremia     Terminal ileitis with complication (Multi) 2024    UTI (urinary tract infection) 2024     Past Surgical History:   Procedure Laterality Date    CATARACT EXTRACTION       SECTION, CLASSIC  2016     Section    CHOLECYSTECTOMY  2014    Cholecystectomy    COLONOSCOPY  2014    Colonoscopy (Fiberoptic)    CT HEAD ANGIO W AND WO IV CONTRAST  2021    CT HEAD ANGIO W AND WO IV CONTRAST 2021 [facility]    ESOPHAGOGASTRODUODENOSCOPY  2014    Diagnostic Esophagogastroduodenoscopy    MR HEAD ANGIO WO IV CONTRAST  2021    MR HEAD ANGIO WO IV CONTRAST 2021 [facility]    MR NECK ANGIO WO IV CONTRAST  2021    MR NECK ANGIO WO IV CONTRAST 2021 [facility]     Family History   Problem Relation Name Age of Onset    Diabetes Mother      Heart disease Other      Heart attack Other       Social History     Tobacco Use    Smoking status: Never    Smokeless tobacco: Never   Substance  Use Topics    Alcohol use: Never       Exam:  Vitals:    02/26/25 0858   BP: 158/86   Pulse: 69   Resp: 18   Temp:    SpO2: 94%     Virtual, limited exam performed  Patient is laying in bed, in NAD  Oriented to [facility] and [facility], thinks it's April, season is fall, does not know the president's name but states she would like to punch him in his face   Language without dysarthria or aphasia  Gaze midline, EOM appears full range horizontally and vertically   Face is symmetric on eyeclosure, eyebrow raise and smile  Tongue midline   No drift in both arms and no drift in both legs  Sensation intact to light touch         NIHSS 1 LOC question     Imaging Results:  MRI: MR brain wo IV contrast    Result Date: 2/26/2025  1. 2 punctate foci of signal abnormality located within the posterior and superior right parietal lobe are most consistent with acute or subacute infarcts. There is no hemorrhagic transformation or mass effect. 2. Findings of probable chronic small vessel ischemic changes. Small old infarcts in the right cerebellum.   Comments: The above information was relayed to the ordering physician via [email or web address] secure chat at 0825 hours on 26 February 2025.   MACRO: No Critical Finding:  See findings. Notification was initiated on 2/26/2025 at 8:36 am by  Ye Connor.  (**-YCF-**)ne   Signed by: Ye Connor 2/26/2025 8:37 AM Dictation workstation:   [id number]   CT Head: CT brain attack head wo IV contrast    Result Date: 2/25/2025  No CT evidence of acute large vessel territory infarct, intracranial hemorrhage or mass effect.   MACRO: Phil Cordero discussed the significance and urgency of this critical finding by telephone with  AVELINO MCINTYRE on 2/25/2025 at 3:36 pm.  (**-RCF-**) Findings:  See findings.   Signed by: Phil Cordero 2/25/2025 3:39 PM Dictation workstation:   [id number]   CTA: CT brain attack head wo IV contrast    Result Date: 2/25/2025  No CT evidence of acute large vessel territory  infarct, intracranial hemorrhage or mass effect.   MACRO: Phil Cordero discussed the significance and urgency of this critical finding by telephone with  AVELINO MIGUELINA on 2/25/2025 at 3:36 pm.  (**-RCF-**) Findings:  See findings.   Signed by: Phil Cordero 2/25/2025 3:39 PM Dictation workstation:   [id number]   Echo: No echocardiogram results found for the past 14 days    ASSESSMENT:  84 y.o. female with history of hypertension, peripheral vascular disease, COPD, paroxysmal A-fib not on AC, hyperlipidemia, reported dementia.  Presented to ED yesterday with non-specific sx of altered mental status and stuttering of her speech. MRI subsequently showed 2 foci of punctate infarcts, 1 cortical and 1 near- cortical.  This may be due to her history of atrial fibrillation.     Note these lesions do not explain her presentation, but may represent underlying embolic process that has been chronically ongoing.     Etiology: Radiographic Ischemic Stroke: Cardioembolism    Diagnosis:   Ischemic Stroke        RECOMMENDATIONS:   - would need risk/benefit discussion for anticoagulation for pAfib, benefit may not outweigh the risks if pt has falls hx or GIB hx   - agree with TTE  - recommend CTA head and neck for cranial vessel evaluation   - could consider holter monitor to assess Afib burden   - rest of care per primary     Ny Claire MD   Neurology and Vascular Neurology       Consult completed by: TELEPHONE and VIDEO interactive communication was used to provide this telehealth service. Time includes consultation with ED provider and extensive review of data- history, medical records, lab/radiology/medical test results, neuroimaging studies:  70 minutes was spent in INITIAL telehealth consultation

## 2025-02-26 NOTE — PROGRESS NOTES
Speech-Language Pathology    Therapy Communication Note    Patient Name: Maisha Agosto  MRN: [id number]  Department: [facility] 3 S  Room: 313/313-A  Today's Date: 2/26/2025     Discipline: Speech Language Pathology          Missed Visit Reason:  Pt referred to SLP Services for Clinical Swallow Evaluation.  Unable to complete at this time due to pt participating in OT.     Missed Time: Attempt    Comment:  Will attempt later as pt status and schedule allows.

## 2025-02-26 NOTE — ASSESSMENT & PLAN NOTE
-presented as a stroke alert however no focal deficit  -MRI brain to rule out acute CVA  -with her history of dementia, continued progression of her dementia is a likely explanation  -PT/OT/Speech

## 2025-02-26 NOTE — CARE PLAN
The patient's goals for the shift include      The clinical goals for the shift include safety      Problem: Pain - Adult  Goal: Verbalizes/displays adequate comfort level or baseline comfort level  Outcome: Progressing     Problem: Safety - Adult  Goal: Free from fall injury  Outcome: Progressing     Problem: Discharge Planning  Goal: Discharge to home or other facility with appropriate resources  Outcome: Progressing     Problem: Chronic Conditions and Co-morbidities  Goal: Patient's chronic conditions and co-morbidity symptoms are monitored and maintained or improved  Outcome: Progressing     Problem: Nutrition  Goal: Nutrient intake appropriate for maintaining nutritional needs  Outcome: Progressing     Problem: Skin  Goal: Participates in plan/prevention/treatment measures  Outcome: Progressing  Goal: Prevent/manage excess moisture  Outcome: Progressing  Flowsheets (Taken 2/26/2025 0206)  Prevent/manage excess moisture: Cleanse incontinence/protect with barrier cream  Goal: Prevent/minimize sheer/friction injuries  Outcome: Progressing  Flowsheets (Taken 2/26/2025 0206)  Prevent/minimize sheer/friction injuries: Use pull sheet  Goal: Promote/optimize nutrition  Outcome: Progressing  Goal: Promote skin healing  Outcome: Progressing     Problem: Fall/Injury  Goal: Verbalize understanding of personal risk factors for fall in the hospital  Outcome: Progressing  Goal: Verbalize understanding of risk factor reduction measures to prevent injury from fall in the home  Outcome: Progressing  Goal: Use assistive devices by end of the shift  Outcome: Progressing  Goal: Pace activities to prevent fatigue by end of the shift  Outcome: Progressing     Problem: Fall/Injury  Goal: Not fall by end of shift  Outcome: Met  Goal: Be free from injury by end of the shift  Outcome: Met

## 2025-02-26 NOTE — PROGRESS NOTES
Occupational Therapy    Evaluation    Patient Name: Maisha Agosto  MRN: [id number]  Department: [facility] 3 S  Room: [facility]313-A  Today's Date: 2/26/2025  Time Calculation  Start Time: 0912  Stop Time: 0932  Time Calculation (min): 20 min    Assessment  IP OT Assessment  OT Assessment: Pt is 85 y/o female w/ history of baseline dementia admitted for AMS. Pt presents w/ decreased ADL skills/ functional mobility, impaired cognition, decreased safety awareness, decreased strength, decreased activity tolerance. Recommend OT services to address above deficits.  Prognosis: Good  Barriers to Discharge Home: Physical needs, Cognition needs  Cognition Needs: Insight of patient limited regarding functional ability/needs, Cognition-related high falls risk, 24hr supervision for safety awareness needed  Physical Needs: Intermittent ADL assistance needed, Intermittent mobility assistance needed  Evaluation/Treatment Tolerance: Patient tolerated treatment well  Medical Staff Made Aware: Yes  End of Session Communication: Bedside nurse  End of Session Patient Position: On cart, Alarm off, caregiver present  Plan:  Treatment Interventions: ADL retraining, Functional transfer training, Endurance training, Patient/family training, Compensatory technique education, Equipment evaluation/education  OT Frequency: 3 times per week  OT Discharge Recommendations: Low intensity level of continued care  Equipment Recommended upon Discharge: Wheeled walker  OT Recommended Transfer Status: Assist of 1  OT - OK to Discharge: Yes    Subjective   Current Problem:  1. AMS (altered mental status)  Transthoracic Echo (TTE) Complete    Transthoracic Echo (TTE) Complete      2. Cerebrovascular accident (CVA), unspecified mechanism (Multi)  Transthoracic Echo (TTE) Complete    Transthoracic Echo (TTE) Complete      3. Paroxysmal atrial fibrillation (Multi)  Transthoracic Echo (TTE) Complete    Transthoracic Echo (TTE) Complete        General:  General  Reason for  Referral: decreased ADL's d/t AMS  Referred By: Dr. Faina Julian  Past Medical History Relevant to Rehab: baseline dementia, C-diff, heart murmur, hypernatremia, UTI  Family/Caregiver Present: No  Prior to Session Communication: Bedside nurse  Patient Position Received: Bed, 3 rail up, Alarm on  Preferred Learning Style: verbal, visual  General Comment: Pt cleared for eval, pt agreeable to therapy  Precautions:  Hearing/Visual Limitations: glasses  Medical Precautions: Fall precautions           Pain:  Pain Assessment  Pain Assessment: 0-10  0-10 (Numeric) Pain Score: 0 - No pain    Objective   Cognition:  Overall Cognitive Status: Impaired at baseline  Orientation Level: Disoriented to place, Disoriented to time, Disoriented to situation  Safety/Judgement:  (decreased safety awareness)  Insight: Moderate  Impulsive: Mildly           Home Living:  Type of Home: House  Lives With: Adult children (dtr)  Home Adaptive Equipment: Walker rolling or standard  Home Layout: One level  Home Access: Stairs to enter with rails  Entrance Stairs-Rails:  (one rail)  Entrance Stairs-Number of Steps: 1  Bathroom Shower/Tub: Walk-in shower  Bathroom Toilet: Standard  Bathroom Equipment: Grab bars in shower   Prior Function:  Level of [place]: Needs assistance with ADLs, Needs assistance with homemaking  Receives Help From: Family, Home health (Pt has 2 aides daily, assist w/ bathing)  ADL Assistance: Needs assistance (for bathing)  Homemaking Assistance: Needs assistance (dtr does cooking, cleaning)  Ambulatory Assistance: Independent  Hand Dominance: Right  Prior Function Comments: Pt reports dresses  herself, needs assist into shower, dtr does cooking, cleaning,  aides assist w/ shower  IADL History:  IADL Comments: Dtr does cooking, cleaning  ADL:  Eating Assistance: Stand by  Eating Deficit: Setup (just finished breakfast)  Grooming Assistance: Stand by  Grooming Deficit: Setup (to brush teeth, and hair)  Bathing  Assistance: Moderate  Bathing Deficit:  (per clinical judgement)  UE Dressing Assistance: Minimal  UE Dressing Deficit:  (per clinical judgement)  LE Dressing Assistance: Moderate  LE Dressing Deficit:  (per clinical judgement)  Toileting Assistance with Device: Not performed  Functional Assistance: Not performed  Activity Tolerance:  Endurance: Decreased tolerance for upright activites  Bed Mobility/Transfers: Bed Mobility  Bed Mobility: Yes  Bed Mobility 1  Bed Mobility 1: Supine to sitting  Level of Assistance 1: Close supervision  Bed Mobility Comments 1: HOB elev. effortful however, able to complete w/ extra time  Bed Mobility 2  Bed Mobility  2: Sitting to supine  Level of Assistance 2: Close supervision  Bed Mobility Comments 2: onto cart    Transfers  Transfer: Yes  Transfer 1  Transfer From 1: Bed to  Transfer to 1: Stand  Technique 1: Sit to stand  Transfer Device 1: Walker  Transfer Level of Assistance 1: Minimal verbal cues  Trials/Comments 1: Verbal cuesa for hand placement  Transfers 2  Transfer From 2: Stand to  Transfer to 2: Sit (cart)  Technique 2: Stand to sit  Transfer Device 2: Walker  Transfer Level of Assistance 2: Contact guard  Trials/Comments 2: Verbal cues for hand placement      Functional Mobility:  Functional Mobility  Functional Mobility Performed: Yes  Functional Mobility 1  Surface 1: Level tile  Device 1: Rolling walker  Assistance 1: Contact guard  Comments 1: Pt ambulated from bed to cart w/ FWW and CGA, verbal cues for safety  Modalities:     IADL's:   IADL Comments: Dtr does cooking, cleaning  Vision: Vision - Basic Assessment  Current Vision: Wears glasses all the time  Sensation:  Light Touch: No apparent deficits  Strength:  Strength Comments: BUE 4/5  Perception:     Coordination:  Coordination Comment: 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   Hand Function:  Hand Function  Gross Grasp: Impaired (d/t arthritis)  Coordination: Impaired (d/t arthritis)  Extremities: RUE   RUE :  Within Functional Limits and LUE   LUE: Within Functional Limits      Outcome Measures: [facility] Daily Activity  Putting on and taking off regular lower body clothing: A lot  Bathing (including washing, rinsing, drying): A lot  Putting on and taking off regular upper body clothing: A little  Toileting, which includes using toilet, bedpan or urinal: A little  Taking care of personal grooming such as brushing teeth: A little  Eating Meals: A little  Daily Activity - Total Score: 16      Education Documentation  ADL Training, taught by Dariela Ulloa OT at 2/26/2025 12:14 PM.  Learner: Patient  Readiness: Acceptance  Method: Explanation  Response: Needs Reinforcement  Comment: Pt educated in purpose of OT, POC, initiated functional transfr training for safety in mobility.    Education Comments  No comments found.      Goals:   Encounter Problems       Encounter Problems (Active)       OT Goals       ADL's (Progressing)       Start:  02/26/25    Expected End:  03/12/25       Patient will complete ADL tasks, with supervision using AE need in order to increase patient's safety and independence with self-care tasks.           Functional transfers (Progressing)       Start:  02/26/25    Expected End:  03/12/25       Patient will complete functional transfers with modified independent using least restrictive device, in order to increase patient's safety and independence with daily tasks.           Activity tolerance (Progressing)       Start:  02/26/25    Expected End:  03/12/25       Patient will demonstrate the ability to participate in functional activity at least >/= 20 minutes in order to increase patient's safety and independence with daily tasks.

## 2025-02-27 ENCOUNTER — PHARMACY VISIT (OUTPATIENT)
Dept: PHARMACY | Facility: CLINIC | Age: 85
End: 2025-02-27
Payer: COMMERCIAL

## 2025-02-27 VITALS
DIASTOLIC BLOOD PRESSURE: 67 MMHG | RESPIRATION RATE: 16 BRPM | HEIGHT: 60 IN | OXYGEN SATURATION: 97 % | TEMPERATURE: 97.9 F | WEIGHT: 94.36 LBS | SYSTOLIC BLOOD PRESSURE: 127 MMHG | BODY MASS INDEX: 18.52 KG/M2 | HEART RATE: 61 BPM

## 2025-02-27 PROBLEM — R41.82 ALTERED MENTAL STATUS, UNSPECIFIED: Status: ACTIVE | Noted: 2025-02-27

## 2025-02-27 LAB
AORTIC VALVE PEAK VELOCITY: 1.75 M/S
AV PEAK GRADIENT: 12 MMHG
AVA (PEAK VEL): 1.8 CM2
EJECTION FRACTION APICAL 4 CHAMBER: 63.1
EJECTION FRACTION: 63 %
LEFT ATRIUM VOLUME AREA LENGTH INDEX BSA: 32.6 ML/M2
LEFT VENTRICLE INTERNAL DIMENSION DIASTOLE: 3.17 CM (ref 3.5–6)
LEFT VENTRICULAR OUTFLOW TRACT DIAMETER: 1.8 CM
LV EJECTION FRACTION BIPLANE: 61 %
MITRAL VALVE E/A RATIO: 0.61
RIGHT VENTRICLE FREE WALL PEAK S': 11.5 CM/S
TRICUSPID ANNULAR PLANE SYSTOLIC EXCURSION: 1.6 CM

## 2025-02-27 PROCEDURE — 97116 GAIT TRAINING THERAPY: CPT | Mod: GP,CQ

## 2025-02-27 PROCEDURE — 97110 THERAPEUTIC EXERCISES: CPT | Mod: GP,CQ

## 2025-02-27 PROCEDURE — 2500000001 HC RX 250 WO HCPCS SELF ADMINISTERED DRUGS (ALT 637 FOR MEDICARE OP): Performed by: STUDENT IN AN ORGANIZED HEALTH CARE EDUCATION/TRAINING PROGRAM

## 2025-02-27 PROCEDURE — 97535 SELF CARE MNGMENT TRAINING: CPT | Mod: GO

## 2025-02-27 PROCEDURE — 2500000001 HC RX 250 WO HCPCS SELF ADMINISTERED DRUGS (ALT 637 FOR MEDICARE OP): Performed by: NURSE PRACTITIONER

## 2025-02-27 PROCEDURE — 97530 THERAPEUTIC ACTIVITIES: CPT | Mod: GO

## 2025-02-27 PROCEDURE — G0406 INPT/TELE FOLLOW UP 15: HCPCS | Performed by: STUDENT IN AN ORGANIZED HEALTH CARE EDUCATION/TRAINING PROGRAM

## 2025-02-27 PROCEDURE — RXMED WILLOW AMBULATORY MEDICATION CHARGE

## 2025-02-27 PROCEDURE — 99239 HOSP IP/OBS DSCHRG MGMT >30: CPT | Performed by: NURSE PRACTITIONER

## 2025-02-27 PROCEDURE — G0378 HOSPITAL OBSERVATION PER HR: HCPCS

## 2025-02-27 PROCEDURE — 2500000004 HC RX 250 GENERAL PHARMACY W/ HCPCS (ALT 636 FOR OP/ED): Performed by: STUDENT IN AN ORGANIZED HEALTH CARE EDUCATION/TRAINING PROGRAM

## 2025-02-27 PROCEDURE — 2500000002 HC RX 250 W HCPCS SELF ADMINISTERED DRUGS (ALT 637 FOR MEDICARE OP, ALT 636 FOR OP/ED): Mod: MUE | Performed by: STUDENT IN AN ORGANIZED HEALTH CARE EDUCATION/TRAINING PROGRAM

## 2025-02-27 RX ORDER — ACETAMINOPHEN 325 MG/1
650 TABLET ORAL EVERY 6 HOURS PRN
Start: 2025-02-27

## 2025-02-27 RX ORDER — LEVOTHYROXINE SODIUM 75 UG/1
75 TABLET ORAL DAILY
Qty: 30 TABLET | Refills: 0 | Status: SHIPPED | OUTPATIENT
Start: 2025-02-28 | End: 2025-03-30

## 2025-02-27 RX ADMIN — LEVOTHYROXINE SODIUM 75 MCG: 0.07 TABLET ORAL at 06:17

## 2025-02-27 RX ADMIN — AMLODIPINE BESYLATE 5 MG: 5 TABLET ORAL at 10:08

## 2025-02-27 RX ADMIN — PREDNISONE 2.5 MG: 5 TABLET ORAL at 10:10

## 2025-02-27 RX ADMIN — APIXABAN 2.5 MG: 2.5 TABLET, FILM COATED ORAL at 06:17

## 2025-02-27 RX ADMIN — LISINOPRIL 20 MG: 20 TABLET ORAL at 10:08

## 2025-02-27 ASSESSMENT — COGNITIVE AND FUNCTIONAL STATUS - GENERAL
DRESSING REGULAR UPPER BODY CLOTHING: A LITTLE
DAILY ACTIVITIY SCORE: 19
WALKING IN HOSPITAL ROOM: A LITTLE
DRESSING REGULAR UPPER BODY CLOTHING: A LITTLE
DAILY ACTIVITIY SCORE: 20
TURNING FROM BACK TO SIDE WHILE IN FLAT BAD: A LITTLE
MOBILITY SCORE: 17
TOILETING: A LITTLE
TURNING FROM BACK TO SIDE WHILE IN FLAT BAD: A LITTLE
DRESSING REGULAR LOWER BODY CLOTHING: A LITTLE
CLIMB 3 TO 5 STEPS WITH RAILING: A LOT
MOVING TO AND FROM BED TO CHAIR: A LITTLE
HELP NEEDED FOR BATHING: A LITTLE
MOVING FROM LYING ON BACK TO SITTING ON SIDE OF FLAT BED WITH BEDRAILS: A LITTLE
HELP NEEDED FOR BATHING: A LITTLE
STANDING UP FROM CHAIR USING ARMS: A LITTLE
STANDING UP FROM CHAIR USING ARMS: A LITTLE
PERSONAL GROOMING: A LITTLE
MOVING TO AND FROM BED TO CHAIR: A LITTLE
DRESSING REGULAR LOWER BODY CLOTHING: A LITTLE
TOILETING: A LITTLE
MOBILITY SCORE: 19
WALKING IN HOSPITAL ROOM: A LITTLE
CLIMB 3 TO 5 STEPS WITH RAILING: A LITTLE

## 2025-02-27 ASSESSMENT — ACTIVITIES OF DAILY LIVING (ADL): HOME_MANAGEMENT_TIME_ENTRY: 15

## 2025-02-27 ASSESSMENT — PAIN - FUNCTIONAL ASSESSMENT
PAIN_FUNCTIONAL_ASSESSMENT: 0-10
PAIN_FUNCTIONAL_ASSESSMENT: 0-10

## 2025-02-27 ASSESSMENT — PAIN SCALES - GENERAL
PAINLEVEL_OUTOF10: 0 - NO PAIN

## 2025-02-27 NOTE — PROGRESS NOTES
Subjective:  Patient seen virtually today.   Denies any complaints.   Does not recall me or being virtually evaluated yesterday via Ipad. Still oriented x2.     Objective:  Exam:  Vitals:    02/27/25 0842   BP: 127/67   Pulse: 61   Resp: 16   Temp: 36.6 °C (97.9 °F)   SpO2: 97%     Exam performed virtually via video.  Patient is laying in bed, in NAD  Oriented x2 only, does not know month or year, unable to state president even with choices   Language without dysarthria or aphasia  Gaze midline, EOM appears full range horizontally and vertically   Face is symmetric on eyeclosure, eyebrow raise and smile  Tongue midline   No drift in both arms and no drift in both legs  Sensation intact to light touch     NIHSS 1 LOC question       Imaging Results:  MRI: MR brain wo IV contrast    Result Date: 2/26/2025  1. 2 punctate foci of signal abnormality located within the posterior and superior right parietal lobe are most consistent with acute or subacute infarcts. There is no hemorrhagic transformation or mass effect. 2. Findings of probable chronic small vessel ischemic changes. Small old infarcts in the right cerebellum.   Comments: The above information was relayed to the ordering physician via [employer] secure chat at 0825 hours on 26 February 2025.   MACRO: No Critical Finding:  See findings. Notification was initiated on 2/26/2025 at 8:36 am by  Ye Connor.  (**-YCF-**)ne   Signed by: Ye Connor 2/26/2025 8:37 AM Dictation workstation:   [id number]   CT Head: CT brain attack head wo IV contrast    Result Date: 2/25/2025  No CT evidence of acute large vessel territory infarct, intracranial hemorrhage or mass effect.   MACRO: Phil Cordero discussed the significance and urgency of this critical finding by telephone with  AVELINO MCINTYRE on 2/25/2025 at 3:36 pm.  (**-RCF-**) Findings:  See findings.   Signed by: Phil Cordero 2/25/2025 3:39 PM Dictation workstation:   [id number]   CTA: CT angio head and neck w and wo  IV contrast    Result Date: 2/26/2025  1. No intracranial major vascular occlusion. 2. No flow-limiting stenosis or significant plaque irregularity in the neck.     MACRO: None   Signed by: Portillo Kirby 2/26/2025 3:03 PM Dictation workstation:   [id number]    CT brain attack head wo IV contrast    Result Date: 2/25/2025  No CT evidence of acute large vessel territory infarct, intracranial hemorrhage or mass effect.   MACRO: Phil Cordero discussed the significance and urgency of this critical finding by telephone with  AVELINO MCINTYRE on 2/25/2025 at 3:36 pm.  (**-RCF-**) Findings:  See findings.   Signed by: Phil Cordero 2/25/2025 3:39 PM Dictation workstation:   [id number]   Echo: No echocardiogram results found for the past 14 days      ASSESSMENT:  84 y.o. female with history of hypertension, peripheral vascular disease, COPD, paroxysmal A-fib not on AC, hyperlipidemia, reported dementia.  Presented to ED yesterday with non-specific sx of altered mental status and stuttering of her speech. MRI subsequently showed 2 foci of punctate infarcts, 1 cortical and 1 near- cortical.  This may be due to her history of atrial fibrillation.      Note these lesions do not explain her presentation, but may represent underlying embolic process that has been chronically ongoing.     2/27: CTA head and neck unrevealing and was without significant stenosis. TTE normal EF and mildly dilated LA, aortic valve sclerosis and mild AR, trace TR. Pt already started on eliquis by primary.      Etiology: Radiographic Ischemic Stroke: Cardioembolism     Diagnosis:   Ischemic Stroke        RECOMMENDATIONS:   - would need potential risk/benefit discussion for anticoagulation for pAfib, benefit may not outweigh the risks if pt has falls hx or GIB hx   - could consider holter monitor to assess Afib burden   - can follow up in stroke clinic in 1-2 months    [facility] at [address]  [facility],  [address]  [place] [address]   Phone: [phone]  Fax: [phone]       Thank you for the consult. Will sign off. Please do not hesitate to reach out with any questions or any new change in patient's neurological status.     Ny Claire MD   Neurology and Vascular Neurology        Consult completed by: TELEPHONE and VIDEO interactive communication was used to provide this telehealth service. Time includes consultation with ED provider and extensive review of data- history, medical records, lab/radiology/medical test results, neuroimaging studies:  15 minutes was spent in FOLLOW-UP telehealth consultation

## 2025-02-27 NOTE — PROGRESS NOTES
Occupational Therapy    Occupational Therapy Treatment    Name: Maisha Agosto  MRN: [id number]  Department: [address]  Room: [facility]313-A  Date: 02/27/25  Time Calculation  Start Time: 1316  Stop Time: 1343  Time Calculation (min): 27 min    Assessment:  OT Assessment: Patient will continue to benefit from skilled OT to maximize safety and independence with daily tasks.  Prognosis: Good  Barriers to Discharge Home: Physical needs, Cognition needs  Cognition Needs: Insight of patient limited regarding functional ability/needs, Cognition-related high falls risk, 24hr supervision for safety awareness needed  Physical Needs: Intermittent ADL assistance needed, Intermittent mobility assistance needed  Evaluation/Treatment Tolerance: Patient tolerated treatment well  End of Session Communication: Bedside nurse  End of Session Patient Position: Up in chair, Alarm on  Plan:  Treatment Interventions: ADL retraining, Functional transfer training, UE strengthening/ROM, Endurance training, Patient/family training, Compensatory technique education  OT Frequency: 3 times per week  OT Discharge Recommendations: Low intensity level of continued care  Equipment Recommended upon Discharge: Wheeled walker  OT Recommended Transfer Status: Assistive equipment (Comment), Stand by assist  OT - OK to Discharge: Yes    Subjective   Previous Visit Info:  OT Last Visit  OT Received On: 02/27/25  General:  General  Reason for Referral: decline in ADLs, CVA  Referred By: Dr. Faina Julian  Past Medical History Relevant to Rehab: HTN, PVD, COPD, Afib, HLD, dementia  Family/Caregiver Present: No  Prior to Session Communication: Bedside nurse  Patient Position Received: Up in chair, Alarm on  Preferred Learning Style: verbal, visual  General Comment: Patient is cleared by nursing for therapy. Patient in the chair upon arrival and agreeable to participate.  Precautions:  Hearing/Visual Limitations: glasses  Medical Precautions: Fall  "precautions    Pain Assessment:  Pain Assessment  Pain Assessment: 0-10  0-10 (Numeric) Pain Score: 0 - No pain    Objective   Cognition:  Overall Cognitive Status: Impaired at baseline  Orientation Level: Disoriented to place, Disoriented to time, Disoriented to situation  Cognition Comments: when re-oriented to the hospital, patient states \"no this is not the hospital\" despite cues and reorientation. patient is pleasant and cooperative throughout. appears to recognize familiar tasks. able to follow one step commands throughout  Activities of Daily Living: Grooming  Grooming Level of Assistance: Close supervision  Grooming Where Assessed: Standing sinkside  Grooming Comments: comb hair and brush teeth    LE Dressing  LE Dressing: Yes  Pants Level of Assistance: Close supervision  Shoe Level of Assistance: Close supervision  LE Dressing Where Assessed: Chair  LE Dressing Comments: Close Supervision in standing for patient to don pants over her hips. Patient also demo the ability to don her shoes and tie them after intial set up, with increased time    Functional Standing Tolerance:  Functional Standing Tolerance  Time: ~4 minutes  Activity: standing sinkside  Functional Standing Tolerance Comments: Close Supervision, cues for safety  Bed Mobility/Transfers:      Transfers  Transfer: Yes  Transfer 1  Transfer From 1: Chair with arms to  Transfer to 1: Stand  Technique 1: Sit to stand  Transfer Device 1: Walker  Transfer Level of Assistance 1: Close supervision  Trials/Comments 1: x4 trials    Functional Mobility:  Functional Mobility  Functional Mobility Performed: Yes  Functional Mobility 1  Surface 1: Level tile  Device 1: Rolling walker  Assistance 1: Close supervision, Moderate verbal cues  Comments 1: > household distances. patient often vears to the right with the walker, requiring cues for proper walker placement throughout mobility    Standing Balance:  Dynamic Standing Balance  Dynamic Standing-Balance " Support:  (unilateral UE support)  Dynamic Standing-Level of Assistance: Close supervision  Dynamic Standing-Balance: Reaching for objects, Reaching across midline  Dynamic Standing-Comments: during ADLs    Therapy/Activity: Therapeutic Activity  Therapeutic Activity Performed: Yes  Therapeutic Activity 1: patient completes item retrieval within her room with Close Supervision using 2WW. cues for safety and walker management    RUE   RUE : Within Functional Limits and LUE   LUE: Within Functional Limits    Outcome Measures:  [facility] Daily Activity  Putting on and taking off regular lower body clothing: A little  Bathing (including washing, rinsing, drying): A little  Putting on and taking off regular upper body clothing: A little  Toileting, which includes using toilet, bedpan or urinal: A little  Taking care of personal grooming such as brushing teeth: A little  Eating Meals: None  Daily Activity - Total Score: 19    Education Documentation  Body Mechanics, taught by Carleen Kennedy OT at 2/27/2025  2:39 PM.  Learner: Patient  Readiness: Acceptance  Method: Explanation, Demonstration  Response: Needs Reinforcement  Comment: education on safe transfers    Precautions, taught by Carleen Kennedy OT at 2/27/2025  2:39 PM.  Learner: Patient  Readiness: Acceptance  Method: Explanation, Demonstration  Response: Needs Reinforcement  Comment: education on safe transfers    ADL Training, taught by Carleen Kennedy OT at 2/27/2025  2:39 PM.  Learner: Patient  Readiness: Acceptance  Method: Explanation, Demonstration  Response: Needs Reinforcement  Comment: education on safe transfers    Education Comments  No comments found.      Goals:  Encounter Problems       Encounter Problems (Active)       OT Goals       ADL's (Progressing)       Start:  02/26/25    Expected End:  03/12/25       Patient will complete ADL tasks, with supervision using AE need in order to increase patient's safety and independence with self-care tasks.            Functional transfers (Progressing)       Start:  02/26/25    Expected End:  03/12/25       Patient will complete functional transfers with modified independent using least restrictive device, in order to increase patient's safety and independence with daily tasks.           Activity tolerance (Progressing)       Start:  02/26/25    Expected End:  03/12/25       Patient will demonstrate the ability to participate in functional activity at least >/= 20 minutes in order to increase patient's safety and independence with daily tasks.

## 2025-02-27 NOTE — PROGRESS NOTES
Physical Therapy    Physical Therapy Treatment    Patient Name: Maisha Agosto  MRN: [id number]  Department: [address]  Room: [facility]313-A  Today's Date: 2/27/2025  Time Calculation  Start Time: 1348  Stop Time: 1427  Time Calculation (min): 39 min         Assessment/Plan   PT Assessment  PT Assessment Results: Decreased strength, Decreased range of motion, Decreased endurance, Impaired balance, Decreased mobility, Decreased coordination, Decreased cognition, Impaired judgement, Decreased safety awareness, Decreased skin integrity  Rehab Prognosis: Good  Barriers to Discharge Home: Physical needs, Cognition needs  Cognition Needs: 24hr supervision for safety awareness needed, Insight of patient limited regarding functional ability/needs  Physical Needs: 24hr mobility assistance needed, 24hr ADL assistance needed  Evaluation/Treatment Tolerance: Patient tolerated treatment well  Medical Staff Made Aware: Yes  Strengths: Support of Caregivers  Barriers to Participation: Comorbidities  End of Session Communication: Bedside nurse  Assessment Comment: Pt confused but pleasant and cooperative. Appeared familiar with seated therex. Pt with decreased safety awarness and decreased awareness of need to call for assist getting up.  End of Session Patient Position: Up in chair, Alarm on     PT Plan  Treatment/Interventions: Transfer training, Gait training, Balance training, Strengthening, Endurance training, Therapeutic exercise, Therapeutic activity  PT Plan: Ongoing PT  PT Frequency: 4 times per week  PT Discharge Recommendations: Low intensity level of continued care, 24 hr supervision due to cognition  Equipment Recommended upon Discharge: Wheeled walker  PT Recommended Transfer Status: Assist x1, Assistive device  PT - OK to Discharge: Yes      General Visit Information:   PT  Visit  PT Received On: 02/27/25  General  Reason for Referral: Impaired mobility, CVA  Referred By: Dr. Faina Julian  Past Medical History Relevant to  "Rehab: HTN, PVD, COPD, Afib, HLD, dementia  Prior to Session Communication: Bedside nurse  Patient Position Received: Up in chair, Alarm on  Preferred Learning Style: verbal, visual  General Comment: Pt standing at bedside chair, alarm going off \"I need to get to the bathroom!\" Pt agreeable to PT.    Subjective   Precautions:  Precautions  Hearing/Visual Limitations: glasses  Medical Precautions: Fall precautions        Objective   Pain:  Pain Assessment  Pain Assessment: 0-10  0-10 (Numeric) Pain Score: 0 - No pain    Cognition:  Cognition  Overall Cognitive Status: Impaired  Orientation Level: Disoriented to situation, Disoriented to time, Disoriented to place  Cognition Comments: Pt confused but pleasant and cooperative. Follows commands without difficulty.  Insight: Moderate  Impulsive: Mildly     Treatments:  Therapeutic Exercise  Therapeutic Exercise Performed: Yes  Therapeutic Exercise Activity 1: Pt performed seated bilat LE ankle pumps, heel raises, glute sets with cues, LAQ, hip flexion, anyi hip adduction and resisted hip abduction x20 reps each.    Ambulation/Gait Training 1  Surface 1: Level tile  Device 1: Rolling walker  Assistance 1: Contact guard  Quality of Gait 1: Narrow base of support, Inconsistent stride length, Decreased step length, Forward flexed posture  Comments/Distance (ft) 1: Pt ambulated with RW ~15' x2 to/from bathroom ~50' x1, ~175' x1 and ~130' x1 in hallway CGA. two seated rest breaks and one brief standing rest break. Slow taz, decreased bilat step height, slightly forward flexed posture. Pt required occasional assist maneuvering walker around obstacles in woody. Has tendency to slowly vear right. Min fatigue noted.    Transfer 1  Transfer From 1: Chair with arms to  Transfer to 1: Stand  Technique 1: Sit to stand, Stand to sit  Transfer Device 1: Walker  Transfer Level of Assistance 1: Close supervision  Trials/Comments 1: x3 trials  Transfers 2  Transfer From 2: Stand " to  Transfer to 2: Sit, Toilet  Technique 2: Stand to sit, Sit to stand  Transfer Device 2: Walker  Transfer Level of Assistance 2: Contact guard, Minimal verbal cues  Trials/Comments 2: Verbal cues for backing up to toilet and for safe hand placement. Pt able to get pants down with min assist. Min assist with donning new brief (incontinent of small amt of BM) and re-donning pants.    Outcome Measures:  [facility] Basic Mobility  Turning from your back to your side while in a flat bed without using bedrails: A little  Moving from lying on your back to sitting on the side of a flat bed without using bedrails: A little  Moving to and from bed to chair (including a wheelchair): A little  Standing up from a chair using your arms (e.g. wheelchair or bedside chair): A little  To walk in hospital room: A little  Climbing 3-5 steps with railing: A lot  Basic Mobility - Total Score: 17    Education Documentation  Precautions, taught by Agatha Caputo PTA at 2/27/2025  3:07 PM.  Learner: Patient  Readiness: Acceptance  Method: Explanation  Response: Needs Reinforcement  Comment: Safety with mobility    Body Mechanics, taught by Agatha Caputo PTA at 2/27/2025  3:07 PM.  Learner: Patient  Readiness: Acceptance  Method: Explanation  Response: Needs Reinforcement  Comment: Safety with mobility    Mobility Training, taught by Agatha Caputo PTA at 2/27/2025  3:07 PM.  Learner: Patient  Readiness: Acceptance  Method: Explanation  Response: Needs Reinforcement  Comment: Safety with mobility    Education Comments  No comments found.        OP EDUCATION:       Encounter Problems       Encounter Problems (Active)       Balance       standing (Progressing)       Start:  02/26/25    Expected End:  03/12/25       Pt will demonstrate good static standing balance to promote safe participation with out of bed activity, transfers, and mobility              Mobility       ambulation (Progressing)       Start:  02/26/25    Expected End:  03/12/25        Pt will ambulate 150' independent assist with walker to promote safe home mobility           supine to sit (Progressing)       Start:  02/26/25    Expected End:  03/12/25       Pt will transfer supine to sitting at edge of bed with independent assist to promote acute care out of bed activity              Mobility       entry stair navigation (Progressing)       Start:  02/26/25    Expected End:  03/12/25       Pt will ascend/descend 2 stairs with rail(s) on R and independent assist to promote safe entry and exit in home environment                PT Transfers       sit to stand (Progressing)       Start:  02/26/25    Expected End:  03/12/25       Pt will transfer sit to standing position with independent assist and walker to promote safe out of bed activity           bed to chair (Progressing)       Start:  02/26/25    Expected End:  03/12/25       Pt will transfer from sitting edge of bed to the chair with independent assist and walker to promote out of bed activity and reduce the risks of prolonged acute care bedrest              Pain - Adult          Safety       safe mobility techniques (Progressing)       Start:  02/26/25    Expected End:  03/12/25       Pt will correctly identify and demonstrate safe mobility techniques to reduce their risks for falls during their acute care stay

## 2025-02-27 NOTE — PROGRESS NOTES
02/27/25 1251   Discharge Planning   Living Arrangements Children   Support Systems Children   Type of Residence Private residence   Who is requesting discharge planning? Provider   Home or Post Acute Services In home services   Type of Home Care Services Home health aide   Expected Discharge Disposition Home Health   Does the patient need discharge transport arranged? No     PER NOTES:   ---MRI shows remote CVA as well as an acute/subacute CVA  ---Neurology following  ---Echo pending  ---Holter monitor at discharge    PLAN: Patient lives at home with her daughter and plans to return there at discharge. Patient has established HHC at home.   Potential discharge today

## 2025-02-27 NOTE — NURSING NOTE
Up with 1 assist and walker to brp does not call for assist bed alarm was in use   kelly chand in [facility]

## 2025-02-27 NOTE — CARE PLAN
The patient's goals for the shift include      The clinical goals for the shift include safety      Problem: Pain - Adult  Goal: Verbalizes/displays adequate comfort level or baseline comfort level  Outcome: Progressing     Problem: Safety - Adult  Goal: Free from fall injury  Outcome: Progressing     Problem: Discharge Planning  Goal: Discharge to home or other facility with appropriate resources  Outcome: Progressing     Problem: Chronic Conditions and Co-morbidities  Goal: Patient's chronic conditions and co-morbidity symptoms are monitored and maintained or improved  Outcome: Progressing     Problem: Nutrition  Goal: Nutrient intake appropriate for maintaining nutritional needs  Outcome: Progressing     Problem: Skin  Goal: Participates in plan/prevention/treatment measures  Outcome: Progressing  Goal: Prevent/manage excess moisture  Outcome: Progressing  Goal: Prevent/minimize sheer/friction injuries  Outcome: Progressing  Flowsheets (Taken 2/27/2025 0319)  Prevent/minimize sheer/friction injuries:   Use pull sheet   HOB 30 degrees or less  Goal: Promote/optimize nutrition  Outcome: Progressing  Goal: Promote skin healing  Outcome: Progressing     Problem: Fall/Injury  Goal: Verbalize understanding of personal risk factors for fall in the hospital  Outcome: Progressing  Goal: Verbalize understanding of risk factor reduction measures to prevent injury from fall in the home  Outcome: Progressing     Problem: Fall/Injury  Goal: Use assistive devices by end of the shift  Outcome: Met  Goal: Pace activities to prevent fatigue by end of the shift  Outcome: Met

## 2025-02-27 NOTE — DISCHARGE SUMMARY
Discharge Diagnosis  AMS (altered mental status)    Issues Requiring Follow-Up  Follow-up with neurology in 1 to 2 months    Discharge Meds     Medication List      START taking these medications     acetaminophen 325 mg tablet; Commonly known as: Tylenol; Take 2 tablets   (650 mg) by mouth every 6 hours if needed for mild pain (1 - 3).   apixaban 2.5 mg tablet; Commonly known as: Eliquis; Take 1 tablet (2.5   mg) by mouth 2 times a day.     CHANGE how you take these medications     levothyroxine 75 mcg tablet; Commonly known as: Synthroid, Levoxyl; Take   1 tablet (75 mcg) by mouth early in the morning.. Take on an empty stomach   at the same time each day, either 30 to 60 minutes prior to breakfast;   Start taking on: February 28, 2025; What changed: medication strength, See   the new instructions.   metoprolol tartrate 25 mg tablet; Commonly known as: Lopressor; Take 1   tablet (25 mg) by mouth once daily at bedtime.; What changed: when to take   this, additional instructions     CONTINUE taking these medications     amLODIPine 5 mg tablet; Commonly known as: Norvasc   atorvastatin 40 mg tablet; Commonly known as: Lipitor   calcium carbonate-vitamin D3 250 mg-3.125 mcg (125 unit) tablet;   Commonly known as: Oyster Shell   Fish OiL 1,000 (120-180) mg capsule; Generic drug: omega 3-dha-epa-fish   oil   magnesium oxide 400 mg (241.3 mg magnesium) tablet; Commonly known as:   Mag-Ox   multivitamin tablet   oxygen gas therapy; Commonly known as: O2; Inhale 1 each once every 24   hours. Wear nightly during sleep hours   potassium chloride CR 10 mEq ER tablet; Commonly known as: Klor-Con   predniSONE 1 mg tablet; Commonly known as: Deltasone   ramipril 10 mg capsule; Commonly known as: Altace   traZODone 50 mg tablet; Commonly known as: Desyrel     STOP taking these medications     aspirin 81 mg EC tablet   cholestyramine light 4 gram packet; Commonly known as: Prevalite   colestipol 1 gram tablet; Commonly known as:  Colestid   hydrOXYzine HCL 25 mg tablet; Commonly known as: Atarax   loperamide 2 mg capsule; Commonly known as: Imodium       Test Results Pending At Discharge  Pending Labs       No current pending labs.            Hospital Course   Patient is an 84-year-old female who presented with stuttering speech and inability to walk.Patient has a history of advanced dementia and paroxsymal atrial fibrillation. An MRI revealed an acute/subacute infarct. Patient started on Eliquis. Echo had a negative bubble test. Patient is doing will NII[facility] of 0 will be discharged home without needs. She has 24 hour care at home from family and caregivers. Follow up with neurology in 1-2 months.     Pertinent Physical Exam At Time of Discharge  Physical Exam  Constitutional:       Appearance: Normal appearance.   Cardiovascular:      Rate and Rhythm: Normal rate and regular rhythm.      Pulses: Normal pulses.      Heart sounds: Normal heart sounds.   Pulmonary:      Effort: Pulmonary effort is normal.      Breath sounds: Normal breath sounds.   Abdominal:      General: Bowel sounds are normal.      Palpations: Abdomen is soft.   Musculoskeletal:         General: Normal range of motion.   Skin:     General: Skin is warm and dry.   Neurological:      Mental Status: She is alert and oriented to person, place, and time.         Outpatient Follow-Up  Future Appointments   Date Time Provider Department Center   4/22/2025  3:30 PM Mariah Schaeffer DPM [id number]         EL[facility]

## 2025-02-28 LAB
ATRIAL RATE: 64 BPM
P AXIS: 46 DEGREES
P OFFSET: 195 MS
P ONSET: 140 MS
PR INTERVAL: 164 MS
Q ONSET: 222 MS
QRS COUNT: 11 BEATS
QRS DURATION: 72 MS
QT INTERVAL: 420 MS
QTC CALCULATION(BAZETT): 433 MS
QTC FREDERICIA: 429 MS
R AXIS: 12 DEGREES
T AXIS: 75 DEGREES
T OFFSET: 432 MS
VENTRICULAR RATE: 64 BPM

## 2025-03-18 ENCOUNTER — APPOINTMENT (OUTPATIENT)
Dept: CARDIOLOGY | Facility: HOSPITAL | Age: 85
End: 2025-03-18
Payer: MEDICARE

## 2025-03-18 ENCOUNTER — APPOINTMENT (OUTPATIENT)
Dept: RADIOLOGY | Facility: HOSPITAL | Age: 85
DRG: 189 | End: 2025-03-18
Payer: MEDICARE

## 2025-03-18 ENCOUNTER — HOSPITAL ENCOUNTER (INPATIENT)
Facility: HOSPITAL | Age: 85
LOS: 2 days | Discharge: HOME HEALTH | DRG: 189 | End: 2025-03-21
Attending: EMERGENCY MEDICINE | Admitting: INTERNAL MEDICINE
Payer: MEDICARE

## 2025-03-18 DIAGNOSIS — R09.02 HYPOXIA: ICD-10-CM

## 2025-03-18 DIAGNOSIS — R40.4 TRANSIENT ALTERATION OF AWARENESS: ICD-10-CM

## 2025-03-18 DIAGNOSIS — J44.1 COPD EXACERBATION (MULTI): Primary | ICD-10-CM

## 2025-03-18 LAB
ALBUMIN SERPL BCP-MCNC: 4 G/DL (ref 3.4–5)
ALP SERPL-CCNC: 65 U/L (ref 33–136)
ALT SERPL W P-5'-P-CCNC: 21 U/L (ref 7–45)
ANION GAP SERPL CALC-SCNC: 10 MMOL/L (ref 10–20)
APPEARANCE UR: CLEAR
AST SERPL W P-5'-P-CCNC: 21 U/L (ref 9–39)
BACTERIA #/AREA URNS AUTO: ABNORMAL /HPF
BASOPHILS # BLD AUTO: 0.06 X10*3/UL (ref 0–0.1)
BASOPHILS NFR BLD AUTO: 0.6 %
BILIRUB SERPL-MCNC: 0.4 MG/DL (ref 0–1.2)
BILIRUB UR STRIP.AUTO-MCNC: NEGATIVE MG/DL
BUN SERPL-MCNC: 21 MG/DL (ref 6–23)
CALCIUM SERPL-MCNC: 9.6 MG/DL (ref 8.6–10.3)
CHLORIDE SERPL-SCNC: 101 MMOL/L (ref 98–107)
CO2 SERPL-SCNC: 32 MMOL/L (ref 21–32)
COLOR UR: COLORLESS
CREAT SERPL-MCNC: 0.73 MG/DL (ref 0.5–1.05)
EGFRCR SERPLBLD CKD-EPI 2021: 80 ML/MIN/1.73M*2
EOSINOPHIL # BLD AUTO: 0.15 X10*3/UL (ref 0–0.4)
EOSINOPHIL NFR BLD AUTO: 1.5 %
ERYTHROCYTE [DISTWIDTH] IN BLOOD BY AUTOMATED COUNT: 13.2 % (ref 11.5–14.5)
FLUAV RNA RESP QL NAA+PROBE: NOT DETECTED
FLUBV RNA RESP QL NAA+PROBE: NOT DETECTED
GLUCOSE SERPL-MCNC: 121 MG/DL (ref 74–99)
GLUCOSE UR STRIP.AUTO-MCNC: NORMAL MG/DL
HCT VFR BLD AUTO: 40.3 % (ref 36–46)
HGB BLD-MCNC: 12.9 G/DL (ref 12–16)
IMM GRANULOCYTES # BLD AUTO: 0.05 X10*3/UL (ref 0–0.5)
IMM GRANULOCYTES NFR BLD AUTO: 0.5 % (ref 0–0.9)
KETONES UR STRIP.AUTO-MCNC: NEGATIVE MG/DL
LACTATE SERPL-SCNC: 0.7 MMOL/L (ref 0.4–2)
LEUKOCYTE ESTERASE UR QL STRIP.AUTO: NEGATIVE
LYMPHOCYTES # BLD AUTO: 2.37 X10*3/UL (ref 0.8–3)
LYMPHOCYTES NFR BLD AUTO: 22.9 %
MCH RBC QN AUTO: 29.3 PG (ref 26–34)
MCHC RBC AUTO-ENTMCNC: 32 G/DL (ref 32–36)
MCV RBC AUTO: 91 FL (ref 80–100)
MONOCYTES # BLD AUTO: 0.81 X10*3/UL (ref 0.05–0.8)
MONOCYTES NFR BLD AUTO: 7.8 %
NEUTROPHILS # BLD AUTO: 6.89 X10*3/UL (ref 1.6–5.5)
NEUTROPHILS NFR BLD AUTO: 66.7 %
NITRITE UR QL STRIP.AUTO: NEGATIVE
NRBC BLD-RTO: 0 /100 WBCS (ref 0–0)
PH UR STRIP.AUTO: 7.5 [PH]
PLATELET # BLD AUTO: 191 X10*3/UL (ref 150–450)
POTASSIUM SERPL-SCNC: 3.8 MMOL/L (ref 3.5–5.3)
PROT SERPL-MCNC: 7.4 G/DL (ref 6.4–8.2)
PROT UR STRIP.AUTO-MCNC: NEGATIVE MG/DL
RBC # BLD AUTO: 4.41 X10*6/UL (ref 4–5.2)
RBC # UR STRIP.AUTO: ABNORMAL MG/DL
RBC #/AREA URNS AUTO: ABNORMAL /HPF
SARS-COV-2 RNA RESP QL NAA+PROBE: NOT DETECTED
SODIUM SERPL-SCNC: 139 MMOL/L (ref 136–145)
SP GR UR STRIP.AUTO: 1.01
UROBILINOGEN UR STRIP.AUTO-MCNC: NORMAL MG/DL
WBC # BLD AUTO: 10.3 X10*3/UL (ref 4.4–11.3)
WBC #/AREA URNS AUTO: ABNORMAL /HPF

## 2025-03-18 PROCEDURE — 71250 CT THORAX DX C-: CPT

## 2025-03-18 PROCEDURE — 81001 URINALYSIS AUTO W/SCOPE: CPT | Performed by: EMERGENCY MEDICINE

## 2025-03-18 PROCEDURE — 71250 CT THORAX DX C-: CPT | Performed by: RADIOLOGY

## 2025-03-18 PROCEDURE — 36415 COLL VENOUS BLD VENIPUNCTURE: CPT | Performed by: EMERGENCY MEDICINE

## 2025-03-18 PROCEDURE — 70450 CT HEAD/BRAIN W/O DYE: CPT

## 2025-03-18 PROCEDURE — 2500000001 HC RX 250 WO HCPCS SELF ADMINISTERED DRUGS (ALT 637 FOR MEDICARE OP): Performed by: NURSE PRACTITIONER

## 2025-03-18 PROCEDURE — G0378 HOSPITAL OBSERVATION PER HR: HCPCS

## 2025-03-18 PROCEDURE — 70450 CT HEAD/BRAIN W/O DYE: CPT | Performed by: RADIOLOGY

## 2025-03-18 PROCEDURE — 99285 EMERGENCY DEPT VISIT HI MDM: CPT | Mod: 25 | Performed by: EMERGENCY MEDICINE

## 2025-03-18 PROCEDURE — 83605 ASSAY OF LACTIC ACID: CPT | Performed by: EMERGENCY MEDICINE

## 2025-03-18 PROCEDURE — 93005 ELECTROCARDIOGRAM TRACING: CPT

## 2025-03-18 PROCEDURE — 94760 N-INVAS EAR/PLS OXIMETRY 1: CPT

## 2025-03-18 PROCEDURE — 80053 COMPREHEN METABOLIC PANEL: CPT | Performed by: EMERGENCY MEDICINE

## 2025-03-18 PROCEDURE — 85025 COMPLETE CBC W/AUTO DIFF WBC: CPT | Performed by: EMERGENCY MEDICINE

## 2025-03-18 PROCEDURE — 2500000004 HC RX 250 GENERAL PHARMACY W/ HCPCS (ALT 636 FOR OP/ED): Mod: JZ,TB | Performed by: NURSE PRACTITIONER

## 2025-03-18 PROCEDURE — 96374 THER/PROPH/DIAG INJ IV PUSH: CPT

## 2025-03-18 PROCEDURE — 87636 SARSCOV2 & INF A&B AMP PRB: CPT | Performed by: EMERGENCY MEDICINE

## 2025-03-18 PROCEDURE — 2500000005 HC RX 250 GENERAL PHARMACY W/O HCPCS: Performed by: EMERGENCY MEDICINE

## 2025-03-18 PROCEDURE — 2500000001 HC RX 250 WO HCPCS SELF ADMINISTERED DRUGS (ALT 637 FOR MEDICARE OP): Performed by: STUDENT IN AN ORGANIZED HEALTH CARE EDUCATION/TRAINING PROGRAM

## 2025-03-18 PROCEDURE — 87075 CULTR BACTERIA EXCEPT BLOOD: CPT | Mod: GENLAB,59 | Performed by: EMERGENCY MEDICINE

## 2025-03-18 RX ORDER — CALCIUM CARBONATE 200(500)MG
500 TABLET,CHEWABLE ORAL 4 TIMES DAILY PRN
Status: DISCONTINUED | OUTPATIENT
Start: 2025-03-18 | End: 2025-03-21 | Stop reason: HOSPADM

## 2025-03-18 RX ORDER — ACETAMINOPHEN 500 MG
5 TABLET ORAL NIGHTLY PRN
Status: DISCONTINUED | OUTPATIENT
Start: 2025-03-18 | End: 2025-03-21 | Stop reason: HOSPADM

## 2025-03-18 RX ORDER — AMOXICILLIN 250 MG
1 CAPSULE ORAL NIGHTLY PRN
Status: DISCONTINUED | OUTPATIENT
Start: 2025-03-18 | End: 2025-03-21 | Stop reason: HOSPADM

## 2025-03-18 RX ORDER — AMLODIPINE BESYLATE 5 MG/1
5 TABLET ORAL DAILY
Status: DISCONTINUED | OUTPATIENT
Start: 2025-03-19 | End: 2025-03-21 | Stop reason: HOSPADM

## 2025-03-18 RX ORDER — GUAIFENESIN/DEXTROMETHORPHAN 100-10MG/5
5 SYRUP ORAL EVERY 4 HOURS PRN
Status: DISCONTINUED | OUTPATIENT
Start: 2025-03-18 | End: 2025-03-21 | Stop reason: HOSPADM

## 2025-03-18 RX ORDER — ATORVASTATIN CALCIUM 40 MG/1
40 TABLET, FILM COATED ORAL DAILY
Status: DISCONTINUED | OUTPATIENT
Start: 2025-03-19 | End: 2025-03-21 | Stop reason: HOSPADM

## 2025-03-18 RX ORDER — LANOLIN ALCOHOL/MO/W.PET/CERES
400 CREAM (GRAM) TOPICAL DAILY
Status: DISCONTINUED | OUTPATIENT
Start: 2025-03-19 | End: 2025-03-21 | Stop reason: HOSPADM

## 2025-03-18 RX ORDER — IPRATROPIUM BROMIDE AND ALBUTEROL SULFATE 2.5; .5 MG/3ML; MG/3ML
3 SOLUTION RESPIRATORY (INHALATION)
Status: DISCONTINUED | OUTPATIENT
Start: 2025-03-19 | End: 2025-03-19

## 2025-03-18 RX ORDER — ONDANSETRON HYDROCHLORIDE 2 MG/ML
4 INJECTION, SOLUTION INTRAVENOUS EVERY 8 HOURS PRN
Status: DISCONTINUED | OUTPATIENT
Start: 2025-03-18 | End: 2025-03-21 | Stop reason: HOSPADM

## 2025-03-18 RX ORDER — PSYLLIUM HUSK 0.4 G
0.5 CAPSULE ORAL DAILY
Status: DISCONTINUED | OUTPATIENT
Start: 2025-03-19 | End: 2025-03-21 | Stop reason: HOSPADM

## 2025-03-18 RX ORDER — PANTOPRAZOLE SODIUM 40 MG/1
40 TABLET, DELAYED RELEASE ORAL
Status: DISCONTINUED | OUTPATIENT
Start: 2025-03-19 | End: 2025-03-21 | Stop reason: HOSPADM

## 2025-03-18 RX ORDER — LEVOTHYROXINE SODIUM 75 UG/1
75 TABLET ORAL DAILY
Status: DISCONTINUED | OUTPATIENT
Start: 2025-03-19 | End: 2025-03-21 | Stop reason: HOSPADM

## 2025-03-18 RX ORDER — TRAZODONE HYDROCHLORIDE 50 MG/1
50 TABLET ORAL NIGHTLY
Status: DISCONTINUED | OUTPATIENT
Start: 2025-03-18 | End: 2025-03-21 | Stop reason: HOSPADM

## 2025-03-18 RX ORDER — ACETAMINOPHEN 325 MG/1
650 TABLET ORAL EVERY 4 HOURS PRN
Status: DISCONTINUED | OUTPATIENT
Start: 2025-03-18 | End: 2025-03-21 | Stop reason: HOSPADM

## 2025-03-18 RX ORDER — ONDANSETRON 4 MG/1
4 TABLET, FILM COATED ORAL EVERY 8 HOURS PRN
Status: DISCONTINUED | OUTPATIENT
Start: 2025-03-18 | End: 2025-03-21 | Stop reason: HOSPADM

## 2025-03-18 RX ORDER — LISINOPRIL 20 MG/1
20 TABLET ORAL DAILY
Status: DISCONTINUED | OUTPATIENT
Start: 2025-03-19 | End: 2025-03-21 | Stop reason: HOSPADM

## 2025-03-18 RX ORDER — GUAIFENESIN 600 MG/1
600 TABLET, EXTENDED RELEASE ORAL EVERY 12 HOURS PRN
Status: DISCONTINUED | OUTPATIENT
Start: 2025-03-18 | End: 2025-03-21 | Stop reason: HOSPADM

## 2025-03-18 RX ORDER — METOPROLOL TARTRATE 25 MG/1
25 TABLET, FILM COATED ORAL NIGHTLY
Status: DISCONTINUED | OUTPATIENT
Start: 2025-03-18 | End: 2025-03-21 | Stop reason: HOSPADM

## 2025-03-18 RX ORDER — POTASSIUM CHLORIDE 750 MG/1
10 TABLET, FILM COATED, EXTENDED RELEASE ORAL DAILY
Status: DISCONTINUED | OUTPATIENT
Start: 2025-03-19 | End: 2025-03-21 | Stop reason: HOSPADM

## 2025-03-18 RX ORDER — PANTOPRAZOLE SODIUM 40 MG/10ML
40 INJECTION, POWDER, LYOPHILIZED, FOR SOLUTION INTRAVENOUS
Status: DISCONTINUED | OUTPATIENT
Start: 2025-03-19 | End: 2025-03-19

## 2025-03-18 RX ADMIN — METOPROLOL TARTRATE 25 MG: 25 TABLET, FILM COATED ORAL at 23:23

## 2025-03-18 RX ADMIN — Medication 2 L/MIN: at 20:10

## 2025-03-18 RX ADMIN — METHYLPREDNISOLONE SODIUM SUCCINATE 40 MG: 40 INJECTION, POWDER, FOR SOLUTION INTRAMUSCULAR; INTRAVENOUS at 23:21

## 2025-03-18 RX ADMIN — TRAZODONE HYDROCHLORIDE 50 MG: 50 TABLET ORAL at 23:21

## 2025-03-18 RX ADMIN — Medication 5 MG: at 23:22

## 2025-03-18 RX ADMIN — APIXABAN 2.5 MG: 2.5 TABLET, FILM COATED ORAL at 23:21

## 2025-03-18 RX ADMIN — Medication 2 L/MIN: at 18:18

## 2025-03-18 SDOH — SOCIAL STABILITY: SOCIAL INSECURITY: WERE YOU ABLE TO COMPLETE ALL THE BEHAVIORAL HEALTH SCREENINGS?: YES

## 2025-03-18 SDOH — ECONOMIC STABILITY: TRANSPORTATION INSECURITY: IN THE PAST 12 MONTHS, HAS LACK OF TRANSPORTATION KEPT YOU FROM MEDICAL APPOINTMENTS OR FROM GETTING MEDICATIONS?: NO

## 2025-03-18 SDOH — SOCIAL STABILITY: SOCIAL INSECURITY: WITHIN THE LAST YEAR, HAVE YOU BEEN AFRAID OF YOUR PARTNER OR EX-PARTNER?: NO

## 2025-03-18 SDOH — ECONOMIC STABILITY: INCOME INSECURITY: IN THE PAST 12 MONTHS HAS THE ELECTRIC, GAS, OIL, OR WATER COMPANY THREATENED TO SHUT OFF SERVICES IN YOUR HOME?: NO

## 2025-03-18 SDOH — SOCIAL STABILITY: SOCIAL NETWORK: HOW OFTEN DO YOU ATTEND MEETINGS OF THE CLUBS OR ORGANIZATIONS YOU BELONG TO?: NEVER

## 2025-03-18 SDOH — SOCIAL STABILITY: SOCIAL NETWORK
DO YOU BELONG TO ANY CLUBS OR ORGANIZATIONS SUCH AS CHURCH GROUPS, UNIONS, FRATERNAL OR ATHLETIC GROUPS, OR SCHOOL GROUPS?: NO

## 2025-03-18 SDOH — HEALTH STABILITY: PHYSICAL HEALTH: ON AVERAGE, HOW MANY MINUTES DO YOU ENGAGE IN EXERCISE AT THIS LEVEL?: 0 MIN

## 2025-03-18 SDOH — SOCIAL STABILITY: SOCIAL NETWORK: HOW OFTEN DO YOU GET TOGETHER WITH FRIENDS OR RELATIVES?: NEVER

## 2025-03-18 SDOH — ECONOMIC STABILITY: HOUSING INSECURITY: AT ANY TIME IN THE PAST 12 MONTHS, WERE YOU HOMELESS OR LIVING IN A SHELTER (INCLUDING NOW)?: NO

## 2025-03-18 SDOH — SOCIAL STABILITY: SOCIAL INSECURITY: ARE YOU OR HAVE YOU BEEN THREATENED OR ABUSED PHYSICALLY, EMOTIONALLY, OR SEXUALLY BY ANYONE?: NO

## 2025-03-18 SDOH — SOCIAL STABILITY: SOCIAL INSECURITY: DO YOU FEEL UNSAFE GOING BACK TO THE PLACE WHERE YOU ARE LIVING?: NO

## 2025-03-18 SDOH — SOCIAL STABILITY: SOCIAL INSECURITY: WITHIN THE LAST YEAR, HAVE YOU BEEN HUMILIATED OR EMOTIONALLY ABUSED IN OTHER WAYS BY YOUR PARTNER OR EX-PARTNER?: NO

## 2025-03-18 SDOH — HEALTH STABILITY: MENTAL HEALTH: BEHAVIORS/MOOD: COOPERATIVE;CALM

## 2025-03-18 SDOH — HEALTH STABILITY: MENTAL HEALTH: HAVE YOU ACTUALLY HAD ANY THOUGHTS OF KILLING YOURSELF?: NO

## 2025-03-18 SDOH — ECONOMIC STABILITY: FOOD INSECURITY: WITHIN THE PAST 12 MONTHS, THE FOOD YOU BOUGHT JUST DIDN'T LAST AND YOU DIDN'T HAVE MONEY TO GET MORE.: NEVER TRUE

## 2025-03-18 SDOH — ECONOMIC STABILITY: HOUSING INSECURITY: IN THE LAST 12 MONTHS, WAS THERE A TIME WHEN YOU WERE NOT ABLE TO PAY THE MORTGAGE OR RENT ON TIME?: NO

## 2025-03-18 SDOH — HEALTH STABILITY: MENTAL HEALTH: HAVE YOU EVER DONE ANYTHING, STARTED TO DO ANYTHING, OR PREPARED TO DO ANYTHING TO END YOUR LIFE?: NO

## 2025-03-18 SDOH — ECONOMIC STABILITY: FOOD INSECURITY: WITHIN THE PAST 12 MONTHS, YOU WORRIED THAT YOUR FOOD WOULD RUN OUT BEFORE YOU GOT THE MONEY TO BUY MORE.: NEVER TRUE

## 2025-03-18 SDOH — SOCIAL STABILITY: SOCIAL INSECURITY: HAS ANYONE EVER THREATENED TO HURT YOUR FAMILY OR YOUR PETS?: NO

## 2025-03-18 SDOH — SOCIAL STABILITY: SOCIAL NETWORK: HOW OFTEN DO YOU ATTEND CHURCH OR RELIGIOUS SERVICES?: NEVER

## 2025-03-18 SDOH — SOCIAL STABILITY: SOCIAL INSECURITY: HAVE YOU HAD THOUGHTS OF HARMING ANYONE ELSE?: NO

## 2025-03-18 SDOH — ECONOMIC STABILITY: FOOD INSECURITY: HOW HARD IS IT FOR YOU TO PAY FOR THE VERY BASICS LIKE FOOD, HOUSING, MEDICAL CARE, AND HEATING?: NOT VERY HARD

## 2025-03-18 SDOH — HEALTH STABILITY: PHYSICAL HEALTH: ON AVERAGE, HOW MANY DAYS PER WEEK DO YOU ENGAGE IN MODERATE TO STRENUOUS EXERCISE (LIKE A BRISK WALK)?: 0 DAYS

## 2025-03-18 SDOH — HEALTH STABILITY: MENTAL HEALTH: SUICIDE ASSESSMENT: ADULT (C-SSRS)

## 2025-03-18 SDOH — HEALTH STABILITY: MENTAL HEALTH: HAVE YOU WISHED YOU WERE DEAD OR WISHED YOU COULD GO TO SLEEP AND NOT WAKE UP?: NO

## 2025-03-18 SDOH — SOCIAL STABILITY: SOCIAL INSECURITY: DOES ANYONE TRY TO KEEP YOU FROM HAVING/CONTACTING OTHER FRIENDS OR DOING THINGS OUTSIDE YOUR HOME?: NO

## 2025-03-18 SDOH — SOCIAL STABILITY: SOCIAL INSECURITY: ARE YOU MARRIED, WIDOWED, DIVORCED, SEPARATED, NEVER MARRIED, OR LIVING WITH A PARTNER?: WIDOWED

## 2025-03-18 SDOH — SOCIAL STABILITY: SOCIAL INSECURITY: ABUSE: ADULT

## 2025-03-18 SDOH — SOCIAL STABILITY: SOCIAL NETWORK: IN A TYPICAL WEEK, HOW MANY TIMES DO YOU TALK ON THE PHONE WITH FAMILY, FRIENDS, OR NEIGHBORS?: NEVER

## 2025-03-18 SDOH — SOCIAL STABILITY: SOCIAL NETWORK: VISITOR BEHAVIORS: APPROPRIATE FOR SITUATION;SUPPORTIVE;CALM

## 2025-03-18 SDOH — SOCIAL STABILITY: SOCIAL INSECURITY: HAVE YOU HAD ANY THOUGHTS OF HARMING ANYONE ELSE?: NO

## 2025-03-18 SDOH — SOCIAL STABILITY: SOCIAL INSECURITY: ARE THERE ANY APPARENT SIGNS OF INJURIES/BEHAVIORS THAT COULD BE RELATED TO ABUSE/NEGLECT?: NO

## 2025-03-18 SDOH — HEALTH STABILITY: MENTAL HEALTH: BEHAVIORAL HEALTH(WDL): EXCEPTIONS TO WDL

## 2025-03-18 SDOH — ECONOMIC STABILITY: HOUSING INSECURITY: IN THE PAST 12 MONTHS, HOW MANY TIMES HAVE YOU MOVED WHERE YOU WERE LIVING?: 0

## 2025-03-18 SDOH — SOCIAL STABILITY: SOCIAL INSECURITY: DO YOU FEEL ANYONE HAS EXPLOITED OR TAKEN ADVANTAGE OF YOU FINANCIALLY OR OF YOUR PERSONAL PROPERTY?: NO

## 2025-03-18 ASSESSMENT — LIFESTYLE VARIABLES
HOW OFTEN DO YOU HAVE 6 OR MORE DRINKS ON ONE OCCASION: NEVER
SKIP TO QUESTIONS 9-10: 1
AUDIT-C TOTAL SCORE: 0
HOW MANY STANDARD DRINKS CONTAINING ALCOHOL DO YOU HAVE ON A TYPICAL DAY: PATIENT DOES NOT DRINK
PRESCIPTION_ABUSE_PAST_12_MONTHS: NO
HOW OFTEN DO YOU HAVE A DRINK CONTAINING ALCOHOL: NEVER
SUBSTANCE_ABUSE_PAST_12_MONTHS: NO
AUDIT-C TOTAL SCORE: 0

## 2025-03-18 ASSESSMENT — COGNITIVE AND FUNCTIONAL STATUS - GENERAL
MOVING TO AND FROM BED TO CHAIR: A LITTLE
WALKING IN HOSPITAL ROOM: A LITTLE
PERSONAL GROOMING: A LITTLE
CLIMB 3 TO 5 STEPS WITH RAILING: A LOT
TOILETING: A LITTLE
DRESSING REGULAR LOWER BODY CLOTHING: A LITTLE
PATIENT BASELINE BEDBOUND: NO
HELP NEEDED FOR BATHING: A LITTLE
DAILY ACTIVITIY SCORE: 20
STANDING UP FROM CHAIR USING ARMS: A LITTLE
MOBILITY SCORE: 19

## 2025-03-18 ASSESSMENT — ACTIVITIES OF DAILY LIVING (ADL)
LACK_OF_TRANSPORTATION: NO
ADEQUATE_TO_COMPLETE_ADL: YES
WALKS IN HOME: INDEPENDENT
TOILETING: INDEPENDENT
HEARING - LEFT EAR: FUNCTIONAL
PATIENT'S MEMORY ADEQUATE TO SAFELY COMPLETE DAILY ACTIVITIES?: NO
FEEDING YOURSELF: INDEPENDENT
LACK_OF_TRANSPORTATION: NO
GROOMING: NEEDS ASSISTANCE
ASSISTIVE_DEVICE: WALKER
BATHING: INDEPENDENT
DRESSING YOURSELF: NEEDS ASSISTANCE
HEARING - RIGHT EAR: FUNCTIONAL
JUDGMENT_ADEQUATE_SAFELY_COMPLETE_DAILY_ACTIVITIES: NO

## 2025-03-18 ASSESSMENT — PAIN SCALES - GENERAL
PAINLEVEL_OUTOF10: 0 - NO PAIN

## 2025-03-18 ASSESSMENT — PAIN - FUNCTIONAL ASSESSMENT: PAIN_FUNCTIONAL_ASSESSMENT: 0-10

## 2025-03-18 NOTE — ED PROVIDER NOTES
HPI   Chief Complaint   Patient presents with    Shaking     Shaking more than normal, not answering when spoken to sometimes; pt does have dementia with frequent falls; no recent falls, seems like she isn't paying attention like normal       HPI        Patient History   Past Medical History:   Diagnosis Date    Bowel perforation (Multi) 2024    Clostridium difficile colitis     Colitis due to Clostridioides difficile 2024    Dementia     Heart murmur     Hypernatremia     Terminal ileitis with complication (Multi) 2024    UTI (urinary tract infection) 2024     Past Surgical History:   Procedure Laterality Date    CATARACT EXTRACTION       SECTION, CLASSIC  2016     Section    CHOLECYSTECTOMY  2014    Cholecystectomy    COLONOSCOPY  2014    Colonoscopy (Fiberoptic)    CT HEAD ANGIO W AND WO IV CONTRAST  2021    CT HEAD ANGIO W AND WO IV CONTRAST 2021 [facility]    ESOPHAGOGASTRODUODENOSCOPY  2014    Diagnostic Esophagogastroduodenoscopy    MR HEAD ANGIO WO IV CONTRAST  2021    MR HEAD ANGIO WO IV CONTRAST 2021 [facility]    MR NECK ANGIO WO IV CONTRAST  2021    MR NECK ANGIO WO IV CONTRAST 2021 [facility]     Family History   Problem Relation Name Age of Onset    Diabetes Mother      Heart disease Other      Heart attack Other       Social History     Tobacco Use    Smoking status: Never    Smokeless tobacco: Never   Vaping Use    Vaping status: Never Used   Substance Use Topics    Alcohol use: Never    Drug use: Never       Physical Exam   ED Triage Vitals   Temperature Heart Rate Respirations BP   25 1511 25 1511 25 1511 25 1511   36.2 °C (97.2 °F) 62 18 (!) 181/76      Pulse Ox Temp Source Heart Rate Source Patient Position   25 1630 25 1511 -- 25 1818   (!) 91 % Temporal  Sitting      BP Location FiO2 (%)     25 1818 --     Right arm         Physical Exam  Constitutional:       General: She is not in acute distress.     Appearance: Normal appearance. She is not toxic-appearing.   HENT:      Head: Normocephalic and atraumatic.      Right Ear: Tympanic membrane normal.      Left Ear: Tympanic membrane normal.      Mouth/Throat:      Mouth: Mucous membranes are moist.      Pharynx: Oropharynx is clear.   Eyes:      Conjunctiva/sclera: Conjunctivae normal.      Pupils: Pupils are equal, round, and reactive to light.   Cardiovascular:      Rate and Rhythm: Normal rate and regular rhythm.      Pulses: Normal pulses.      Heart sounds: Normal heart sounds.   Pulmonary:      Effort: Pulmonary effort is normal. No respiratory distress.      Breath sounds: Normal breath sounds. No wheezing.   Abdominal:      General: Bowel sounds are normal.      Palpations: Abdomen is soft.      Tenderness: There is no abdominal tenderness. There is no guarding or rebound.   Musculoskeletal:         General: Normal range of motion.      Cervical back: Normal range of motion.   Skin:     General: Skin is warm and dry.   Neurological:      General: No focal deficit present.      Mental Status: She is alert. She is disoriented.           ED Course & MDM   ED Course as of 03/18/25 1836   Tue Mar 18, 2025   1520 EKG performed at 1520 showing normal sinus rhythm with no ST elevation or depression indication of a STEMI at this time ventricular rate of 59 interpreted by me. [KA]      ED Course User Index  [KA] Saul Hall DO         Diagnoses as of 03/18/25 1836   Hypoxia   COPD exacerbation (Multi)   Transient alteration of awareness                 No data recorded     [place] Coma Scale Score: 14 (03/18/25 1554 : Karey Quinones RN)                           Medical Decision Making  84-year-old female presents to the ER with chief complaint of episode of confusion and staring off.  Patient came to the ED for evaluation.  No etiology except that she is slightly hypoxic  slight COPD exacerbation patient currently on 2 L.  Patient does have some wheezing on exam we will treat her with some breathing treatments and some antibiotics.  Due to the altered mental status and requirement of oxygen the plan is to admit her to the hospital for further evaluation and treatment.  Patient does have mild dementia at this time most information is obtained by her daughter who is at the bedside.        Procedure  Procedures     Saul Hall,   03/18/25 8208

## 2025-03-19 ENCOUNTER — APPOINTMENT (OUTPATIENT)
Dept: CARDIOLOGY | Facility: HOSPITAL | Age: 85
DRG: 189 | End: 2025-03-19
Payer: MEDICARE

## 2025-03-19 ENCOUNTER — HOSPITAL ENCOUNTER (OUTPATIENT)
Dept: CARDIOLOGY | Facility: HOSPITAL | Age: 85
Discharge: HOME | End: 2025-03-19
Payer: MEDICARE

## 2025-03-19 PROBLEM — J21.9 BRONCHIOLITIS: Status: ACTIVE | Noted: 2024-06-05

## 2025-03-19 PROBLEM — J44.1 COPD EXACERBATION (MULTI): Status: ACTIVE | Noted: 2025-03-19

## 2025-03-19 PROBLEM — J96.01 ACUTE RESPIRATORY FAILURE WITH HYPOXEMIA (MULTI): Status: ACTIVE | Noted: 2025-03-19

## 2025-03-19 LAB
ANION GAP SERPL CALC-SCNC: 10 MMOL/L (ref 10–20)
BUN SERPL-MCNC: 22 MG/DL (ref 6–23)
CALCIUM SERPL-MCNC: 9.6 MG/DL (ref 8.6–10.3)
CHLORIDE SERPL-SCNC: 103 MMOL/L (ref 98–107)
CO2 SERPL-SCNC: 29 MMOL/L (ref 21–32)
CREAT SERPL-MCNC: 0.7 MG/DL (ref 0.5–1.05)
EGFRCR SERPLBLD CKD-EPI 2021: 84 ML/MIN/1.73M*2
ERYTHROCYTE [DISTWIDTH] IN BLOOD BY AUTOMATED COUNT: 13.2 % (ref 11.5–14.5)
GLUCOSE SERPL-MCNC: 174 MG/DL (ref 74–99)
HCT VFR BLD AUTO: 41.9 % (ref 36–46)
HGB BLD-MCNC: 13.4 G/DL (ref 12–16)
HOLD SPECIMEN: NORMAL
MCH RBC QN AUTO: 29.2 PG (ref 26–34)
MCHC RBC AUTO-ENTMCNC: 32 G/DL (ref 32–36)
MCV RBC AUTO: 91 FL (ref 80–100)
NRBC BLD-RTO: 0 /100 WBCS (ref 0–0)
PLATELET # BLD AUTO: 205 X10*3/UL (ref 150–450)
POTASSIUM SERPL-SCNC: 4.2 MMOL/L (ref 3.5–5.3)
RBC # BLD AUTO: 4.59 X10*6/UL (ref 4–5.2)
SODIUM SERPL-SCNC: 138 MMOL/L (ref 136–145)
WBC # BLD AUTO: 8.7 X10*3/UL (ref 4.4–11.3)

## 2025-03-19 PROCEDURE — 80048 BASIC METABOLIC PNL TOTAL CA: CPT | Performed by: NURSE PRACTITIONER

## 2025-03-19 PROCEDURE — 2500000004 HC RX 250 GENERAL PHARMACY W/ HCPCS (ALT 636 FOR OP/ED): Performed by: NURSE PRACTITIONER

## 2025-03-19 PROCEDURE — 1200000002 HC GENERAL ROOM WITH TELEMETRY DAILY: Mod: IPSPLIT

## 2025-03-19 PROCEDURE — 85027 COMPLETE CBC AUTOMATED: CPT | Performed by: NURSE PRACTITIONER

## 2025-03-19 PROCEDURE — 97535 SELF CARE MNGMENT TRAINING: CPT | Mod: GO,IPSPLIT

## 2025-03-19 PROCEDURE — 97161 PT EVAL LOW COMPLEX 20 MIN: CPT | Mod: GP,IPSPLIT | Performed by: PHYSICAL THERAPIST

## 2025-03-19 PROCEDURE — 2500000001 HC RX 250 WO HCPCS SELF ADMINISTERED DRUGS (ALT 637 FOR MEDICARE OP): Mod: IPSPLIT | Performed by: STUDENT IN AN ORGANIZED HEALTH CARE EDUCATION/TRAINING PROGRAM

## 2025-03-19 PROCEDURE — 94760 N-INVAS EAR/PLS OXIMETRY 1: CPT | Mod: IPSPLIT

## 2025-03-19 PROCEDURE — 96376 TX/PRO/DX INJ SAME DRUG ADON: CPT | Mod: IPSPLIT

## 2025-03-19 PROCEDURE — 2500000004 HC RX 250 GENERAL PHARMACY W/ HCPCS (ALT 636 FOR OP/ED): Performed by: STUDENT IN AN ORGANIZED HEALTH CARE EDUCATION/TRAINING PROGRAM

## 2025-03-19 PROCEDURE — 2500000005 HC RX 250 GENERAL PHARMACY W/O HCPCS: Mod: IPSPLIT | Performed by: NURSE PRACTITIONER

## 2025-03-19 PROCEDURE — 93005 ELECTROCARDIOGRAM TRACING: CPT

## 2025-03-19 PROCEDURE — 36415 COLL VENOUS BLD VENIPUNCTURE: CPT | Performed by: NURSE PRACTITIONER

## 2025-03-19 PROCEDURE — 97166 OT EVAL MOD COMPLEX 45 MIN: CPT | Mod: GO,IPSPLIT

## 2025-03-19 PROCEDURE — 93005 ELECTROCARDIOGRAM TRACING: CPT | Mod: IPSPLIT

## 2025-03-19 PROCEDURE — 2500000002 HC RX 250 W HCPCS SELF ADMINISTERED DRUGS (ALT 637 FOR MEDICARE OP, ALT 636 FOR OP/ED): Mod: IPSPLIT | Performed by: STUDENT IN AN ORGANIZED HEALTH CARE EDUCATION/TRAINING PROGRAM

## 2025-03-19 PROCEDURE — 2500000001 HC RX 250 WO HCPCS SELF ADMINISTERED DRUGS (ALT 637 FOR MEDICARE OP): Performed by: NURSE PRACTITIONER

## 2025-03-19 PROCEDURE — 94640 AIRWAY INHALATION TREATMENT: CPT | Mod: IPSPLIT

## 2025-03-19 PROCEDURE — 2500000004 HC RX 250 GENERAL PHARMACY W/ HCPCS (ALT 636 FOR OP/ED): Mod: IPSPLIT | Performed by: NURSE PRACTITIONER

## 2025-03-19 RX ORDER — VIT C/E/ZN/COPPR/LUTEIN/ZEAXAN 250MG-90MG
25 CAPSULE ORAL DAILY
COMMUNITY

## 2025-03-19 RX ORDER — CEFTRIAXONE 2 G/50ML
2 INJECTION, SOLUTION INTRAVENOUS EVERY 24 HOURS
Status: DISCONTINUED | OUTPATIENT
Start: 2025-03-19 | End: 2025-03-20

## 2025-03-19 RX ORDER — AZITHROMYCIN 250 MG/1
500 TABLET, FILM COATED ORAL
Status: DISCONTINUED | OUTPATIENT
Start: 2025-03-20 | End: 2025-03-21 | Stop reason: HOSPADM

## 2025-03-19 RX ORDER — POTASSIUM CHLORIDE 20 MEQ/1
20 TABLET, EXTENDED RELEASE ORAL DAILY
COMMUNITY

## 2025-03-19 RX ORDER — IPRATROPIUM BROMIDE AND ALBUTEROL SULFATE 2.5; .5 MG/3ML; MG/3ML
3 SOLUTION RESPIRATORY (INHALATION) 3 TIMES DAILY
Status: DISCONTINUED | OUTPATIENT
Start: 2025-03-19 | End: 2025-03-19

## 2025-03-19 RX ORDER — ALBUTEROL SULFATE 0.83 MG/ML
2.5 SOLUTION RESPIRATORY (INHALATION) EVERY 2 HOUR PRN
Status: DISCONTINUED | OUTPATIENT
Start: 2025-03-19 | End: 2025-03-21 | Stop reason: HOSPADM

## 2025-03-19 RX ADMIN — METHYLPREDNISOLONE SODIUM SUCCINATE 40 MG: 40 INJECTION, POWDER, FOR SOLUTION INTRAMUSCULAR; INTRAVENOUS at 21:33

## 2025-03-19 RX ADMIN — METHYLPREDNISOLONE SODIUM SUCCINATE 40 MG: 40 INJECTION, POWDER, FOR SOLUTION INTRAMUSCULAR; INTRAVENOUS at 06:00

## 2025-03-19 RX ADMIN — Medication 2 L/MIN: at 08:12

## 2025-03-19 RX ADMIN — AZITHROMYCIN MONOHYDRATE 500 MG: 500 INJECTION, POWDER, LYOPHILIZED, FOR SOLUTION INTRAVENOUS at 09:38

## 2025-03-19 RX ADMIN — Medication 400 MG: at 08:13

## 2025-03-19 RX ADMIN — METHYLPREDNISOLONE SODIUM SUCCINATE 40 MG: 40 INJECTION, POWDER, FOR SOLUTION INTRAMUSCULAR; INTRAVENOUS at 13:48

## 2025-03-19 RX ADMIN — Medication 0.5 TABLET: at 09:02

## 2025-03-19 RX ADMIN — APIXABAN 2.5 MG: 2.5 TABLET, FILM COATED ORAL at 20:36

## 2025-03-19 RX ADMIN — CEFTRIAXONE 2 G: 2 INJECTION, SOLUTION INTRAVENOUS at 09:02

## 2025-03-19 RX ADMIN — LISINOPRIL 20 MG: 20 TABLET ORAL at 08:13

## 2025-03-19 RX ADMIN — TRAZODONE HYDROCHLORIDE 50 MG: 50 TABLET ORAL at 20:36

## 2025-03-19 RX ADMIN — POTASSIUM CHLORIDE 10 MEQ: 750 TABLET, FILM COATED, EXTENDED RELEASE ORAL at 08:13

## 2025-03-19 RX ADMIN — GUAIFENESIN 600 MG: 600 TABLET, EXTENDED RELEASE ORAL at 08:13

## 2025-03-19 RX ADMIN — AMLODIPINE BESYLATE 5 MG: 5 TABLET ORAL at 08:13

## 2025-03-19 RX ADMIN — LEVOTHYROXINE SODIUM 75 MCG: 0.07 TABLET ORAL at 06:00

## 2025-03-19 RX ADMIN — PANTOPRAZOLE SODIUM 40 MG: 40 TABLET, DELAYED RELEASE ORAL at 06:00

## 2025-03-19 RX ADMIN — APIXABAN 2.5 MG: 2.5 TABLET, FILM COATED ORAL at 08:13

## 2025-03-19 RX ADMIN — IPRATROPIUM BROMIDE AND ALBUTEROL SULFATE 3 ML: .5; 3 SOLUTION RESPIRATORY (INHALATION) at 09:09

## 2025-03-19 RX ADMIN — ATORVASTATIN CALCIUM 40 MG: 40 TABLET, FILM COATED ORAL at 08:13

## 2025-03-19 RX ADMIN — Medication 2 L/MIN: at 20:37

## 2025-03-19 ASSESSMENT — COGNITIVE AND FUNCTIONAL STATUS - GENERAL
HELP NEEDED FOR BATHING: A LITTLE
CLIMB 3 TO 5 STEPS WITH RAILING: A LOT
WALKING IN HOSPITAL ROOM: A LITTLE
DRESSING REGULAR UPPER BODY CLOTHING: A LITTLE
EATING MEALS: A LITTLE
PERSONAL GROOMING: A LITTLE
MOBILITY SCORE: 19
PERSONAL GROOMING: A LITTLE
CLIMB 3 TO 5 STEPS WITH RAILING: A LITTLE
MOBILITY SCORE: 20
MOVING TO AND FROM BED TO CHAIR: A LITTLE
DAILY ACTIVITIY SCORE: 20
DRESSING REGULAR LOWER BODY CLOTHING: A LITTLE
TOILETING: A LITTLE
WALKING IN HOSPITAL ROOM: A LITTLE
DRESSING REGULAR LOWER BODY CLOTHING: A LITTLE
MOVING TO AND FROM BED TO CHAIR: A LITTLE
DAILY ACTIVITIY SCORE: 18
TOILETING: A LITTLE
STANDING UP FROM CHAIR USING ARMS: A LITTLE
STANDING UP FROM CHAIR USING ARMS: A LITTLE
HELP NEEDED FOR BATHING: A LITTLE

## 2025-03-19 ASSESSMENT — ACTIVITIES OF DAILY LIVING (ADL)
HOME_MANAGEMENT_TIME_ENTRY: 13
BATHING_ASSISTANCE: MINIMAL
LACK_OF_TRANSPORTATION: NO
BATHING_LEVEL_OF_ASSISTANCE: CONTACT GUARD

## 2025-03-19 ASSESSMENT — ENCOUNTER SYMPTOMS
WEAKNESS: 1
ACTIVITY CHANGE: 1
CHILLS: 0
EYES NEGATIVE: 1
FEVER: 1
FATIGUE: 0
MUSCULOSKELETAL NEGATIVE: 1
APPETITE CHANGE: 0
PSYCHIATRIC NEGATIVE: 1
COUGH: 1
CARDIOVASCULAR NEGATIVE: 1
DIAPHORESIS: 0
GASTROINTESTINAL NEGATIVE: 1

## 2025-03-19 ASSESSMENT — PAIN SCALES - GENERAL
PAINLEVEL_OUTOF10: 0 - NO PAIN

## 2025-03-19 ASSESSMENT — PAIN - FUNCTIONAL ASSESSMENT
PAIN_FUNCTIONAL_ASSESSMENT: 0-10

## 2025-03-19 NOTE — PROGRESS NOTES
Pharmacy Medication History Review    Maisha Agosto is a 84 y.o. female admitted for COPD (chronic obstructive pulmonary disease) ([facility]). Pharmacy reviewed the patient's [id number] medications and allergies for accuracy.    Sources used to confirm medication list: Informant interview  Informant: Child (Eli)  Informant credibility: Excellent (Able to recall medication, indication, strength, frequency, and/or prescriber for >90% of medications).    Total number of adjustments: 6     Medications Added Medications Removed Medications Adjusted  Adjustments Made   Vitamin D3 Tylenol Prednisone Two and a half 1 MG tabs --> Half of a 5 MG tab   Potassium 20 mEq Oyster Shell      Potassium 10 mEq       The list below reflectives the updated PTA list. Please review each medication in order reconciliation for additional clarification and justification.  Medications Prior to Admission   Medication Sig Dispense Refill Last Dose/Taking    amLODIPine (Norvasc) 5 mg tablet Take 1 tablet (5 mg) by mouth once daily.   3/18/2025 at  9:00 AM    apixaban (Eliquis) 2.5 mg tablet Take 1 tablet (2.5 mg) by mouth 2 times a day. 60 tablet 0 3/18/2025 at  9:00 AM    atorvastatin (Lipitor) 40 mg tablet Take 1 tablet (40 mg) by mouth once daily.   3/17/2025 at  9:00 PM    levothyroxine (Synthroid, Levoxyl) 75 mcg tablet Take 1 tablet (75 mcg) by mouth early in the morning. Take on an empty stomach at the same time each day, either 30 to 60 minutes prior to breakfast 30 tablet 0 3/18/2025 at  8:00 AM    magnesium oxide (Mag-Ox) 400 mg (241.3 mg magnesium) tablet Take 1 tablet (400 mg) by mouth once daily.   3/18/2025 at  9:00 AM    metoprolol tartrate (Lopressor) 25 mg tablet Take 1 tablet (25 mg) by mouth once daily at bedtime. 30 tablet 0 3/17/2025 at  9:00 PM    multivitamin tablet Take 1 tablet by mouth once daily.   3/18/2025 at  9:00 AM    omega 3-dha-epa-fish oil (Fish OiL) 1,000 mg (120 mg-180 mg) capsule Take 1 capsule  (1,000 mg) by mouth once daily.   3/18/2025 at  9:00 AM    oxygen (O2) gas therapy Inhale 1 each once every 24 hours. Wear nightly during sleep hours   3/18/2025 at  9:00 PM    predniSONE (Deltasone) 5 mg tablet Take 0.5 tablets (2.5 mg) by mouth once daily.   3/18/2025 at  9:00 AM    ramipril (Altace) 10 mg capsule Take 1 capsule (10 mg) by mouth once daily. In the evening   3/17/2025 at  9:00 PM    traZODone (Desyrel) 50 mg tablet Take 1 tablet (50 mg) by mouth once daily at bedtime.   3/17/2025 at  9:00 PM    acetaminophen (Tylenol) 325 mg tablet Take 2 tablets (650 mg) by mouth every 6 hours if needed for mild pain (1 - 3). (Patient not taking: Reported on 3/19/2025)   Not Taking    cholecalciferol (Vitamin D3) 25 MCG (1000 UT) capsule Take 1 capsule (25 mcg) by mouth once daily.       potassium chloride CR 20 mEq ER tablet Take 1 tablet (20 mEq) by mouth once daily. Do not crush or chew.           The list below reflectives the updated allergy list. Please review each documented allergy for additional clarification and justification.  Allergies  Reviewed by Fannie Maynard RN on 3/18/2025        Severity Reactions Comments    Acthar [corticotropin] Not Specified Psychosis     Chloroquine Phosphate Not Specified Unknown Tremors/ weakness    Ciprofloxacin Not Specified Unknown     Humira [adalimumab] Not Specified Unknown     Levaquin [levofloxacin] Not Specified Unknown     Methotrexate Not Specified Unknown     Sulfamethoxazole-trimethoprim Not Specified Unknown     Xeljanz [tofacitinib] Not Specified Psychosis     Amoxicillin-pot Clavulanate Low Diarrhea Pt tolerated ceftriaxone during 9/28/24 admission. (DARREN Wallace, PharmD).     Enbrel [etanercept] Low Rash Legs and arms            Below are additional concerns with the patient's PTA list.  Spoke w/pt's daughter, Eli in pharmacy office. Eli works at [employer] in the kitchen and stopped down to review her mother's medication. Please review changes made to  the PTA list in the chart above.    Kyra Espinoza CPhT  Medication History Pharmacy Technician  CARLOS 8-4:30  Available via [employer] Secure Chat  OR  [phone]

## 2025-03-19 NOTE — CARE PLAN
"The patient's goals for the shift include  \"I want to go home\"    The clinical goals for the shift include  \"Patient will remain free from falls or injury for entire shift. Patient will remain HDS for entire shift.     Over the shift, the patient did not make progress toward the following goals. Barriers to progression include . Recommendations to address these barriers include .    "

## 2025-03-19 NOTE — CARE PLAN
The patient's goals for the shift include going home.     The clinical goals for the shift include monitor for changes in LOC, wean oxygen as tolerated, fall precautions, and monitor heart rate.    Patient disoriented upon awakening, but easily reoriented. Patient c/o mild dizziness when getting OOB to [facility] with assistance. Patient eating well, but fluid intake is poor. Patient voiding without difficulty and has had several BMs today. Patient has not needed oxygen all day. Heart rate has remained in high 50s or greater today. Dr. Cole consulted, but has not been in to see patient yet. Patient's daughter Eli updated multiple times throughout the day.

## 2025-03-19 NOTE — PROGRESS NOTES
Occupational Therapy    Evaluation/Treatment    Patient Name: Maisha Agosto  MRN: [id number]  Department: [facility]  Room: 02/02-A  Today's Date: 03/19/25  Time Calculation  Start Time: 1330  Stop Time: 1358  Time Calculation (min): 28 min       Assessment:  OT Assessment: Pt. displays a mild decline from her prior level of functioning for self care skills & functional transfers due to her hospitalization for COPD exacerbation & transient alteration of awareness. OT intervention is indicated to address the pt.'s deficit areas with safety, balance, activity tolerance, self care skills & functional transfers to restore the pt. to her previous level of functioning & to reduce caregiver level of assistance needed.  Prognosis: Good  Barriers to Discharge Home: No anticipated barriers  Evaluation/Treatment Tolerance: Patient tolerated treatment well  Medical Staff Made Aware: Yes  End of Session Communication: Bedside nurse  End of Session Patient Position: Bed, 2 rail up, Alarm on  OT Assessment Results: Decreased ADL status, Decreased cognition, Decreased safe judgment during ADL, Decreased endurance, Decreased functional mobility, Decreased trunk control for functional activities  Prognosis: Good  Barriers to Discharge: None  Evaluation/Treatment Tolerance: Patient tolerated treatment well  Medical Staff Made Aware: Yes  Strengths: Support and attitude of living partners, Attitude of self  Barriers to Participation: Comorbidities  Plan:  Treatment Interventions: ADL retraining, Functional transfer training, Endurance training, Cognitive reorientation, Patient/family training, Equipment evaluation/education, Neuromuscular reeducation, Compensatory technique education  OT Frequency: 3 times per week  OT Discharge Recommendations: Low intensity level of continued care  OT Recommended Transfer Status: Assist of 1  OT - OK to Discharge: Yes Based on completed evaluation and care plan recommendations, no barriers to discharge to  next site of care    Treatment Interventions: ADL retraining, Functional transfer training, Endurance training, Cognitive reorientation, Patient/family training, Equipment evaluation/education, Neuromuscular reeducation, Compensatory technique education    Subjective   Current Problem:  1. COPD exacerbation (Multi)        2. Hypoxia        3. Transient alteration of awareness          General:   OT Received On: 03/19/25  General  Reason for Referral: Imapired self care skills & functonal transfers due to hospitalization for COPD exacerbation, transient alteration of awareness  Referred By: ELIO Jerry-CNP  Past Medical History Relevant to Rehab: dementia, COPD, DM, HLD, hypothyroidism, RA, TIA  Family/Caregiver Present: No  Prior to Session Communication: Bedside nurse  Patient Position Received: Up in chair, Alarm on  General Comment: telemetry   Precautions:  Medical Precautions: Fall precautions  Precautions Comment: safety precautions    Vital signs: Pre session:O2 saturation level was 95%, HR was 58 BPM & BP was 119/75        Pain:  Pain Assessment  Pain Assessment: 0-10  0-10 (Numeric) Pain Score: 0 - No pain    Objective   Cognition:  Overall Cognitive Status: Impaired at baseline  Arousal/Alertness: Appropriate responses to stimuli  Orientation Level: Oriented to self & place.  Decreased to time & situation  Following Commands: WFL for 1-2 level commands  Safety Judgment: Mod/max cues for safety  Memory: Impaired memory         Home Living:  Type of Home: House  Lives With:  Her daughter & son in law.  Pt. has supervision/assistance while her daughter is at work.  Home Adaptive Equipment: FWW  Home Layout: One level  Home Access: Stairs to enter with rails  Entrance Stairs-Rails: Right  Entrance Stairs-Number of Steps: 2  Bathroom Shower/Tub: Walk-in shower  Bathroom Toilet: Standard  Bathroom Equipment: Pt. denied having a shower chair or grab bars, however the pt. is not a reliable  historian.  Prior Function:  Level of [place]: Modified independent for ambulation with a FWW.  Modified independent for toileting. S for bathing & dressing  Receives Help From: Family, Personal care attendant  ADL Assistance: S for bathing & dressing  Homemaking Assistance: Needs assistance  Ambulatory Assistance: Modified independent with a FWW  Leisure: Pt. likes to read & spend time with her family.  Hand Dominance: Right  IADL History:  Homemaking Responsibilities: No  ADL:  Eating Assistance: S set up  Grooming Assistance:  [place]  Bathing Assistance: Minimal  UE Dressing Assistance: Minimal  LE Dressing Assistance: CGA & cues  Toileting Assistance with Device:  CGA & cues  Activities of Daily Living:    Grooming  Grooming Level of Assistance: Minimum assistance to assist with denture care.  Pt. washed her face & combed her hair with S se tup & cues  Grooming Where Assessed: Chair    UE Bathing  UE Bathing Level of Assistance: S set up & cues  UE Bathing Where Assessed: bedside chair    LE Bathing  LE Bathing Level of Assistance: Contact guard & cues when standing to perform perineal hygiene care with cues for one UE for support.  LE Bathing Where Assessed:  bedside chair    UE Dressing  UE Dressing Level of Assistance: [place]alis & cues to assist with donning a hospital gown  UE Dressing Where Assessed: Chair    LE Dressing  LE Dressing: Yes  Pants Level of Assistance: Contact guard & cues when standing to don clothing over her hips  Sock Level of Assistance:  Close S set up & cues  Adult Briefs Level of Assistance: CGA & cues when donning the brief over her hips.  LE Dressing Where Assessed: Chair    Toileting  Toileting Level of Assistance:  CGA & cues when standing to perform perineal hygiene care & pull up brief donning  Where Assessed: Toilet  Activity Tolerance:  Endurance: Endurance does not limit participation in activity at this time     Bed Mobility/Transfers: Bed Mobility  Bed Mobility: Yes  Bed  Mobility 1  Bed Mobility 1: Sitting to supine  Level of Assistance 1: Supervision & cues with use of a bed rail    Transfers  Transfer: Yes  Transfer 1  Technique 1: Sit to stand, Stand to sit  Transfer Device 1: Walker, Gait belt  Transfer Level of Assistance 1: Close SBA & cues  Transfers 4  Transfer From 4: Transfers to the bed  Transfer Device 4: Gait belt, Walker  Transfer Level of Assistance 4: CGA & cues    Sitting Balance:  Static Sitting Balance  Static Sitting-Level of Assistance: S static sitting balance on the side of the bed  Standing Balance:  Static Standing Balance  Static Standing-Level of Assistance: CGA & cues for standing balance during self care       Vision:Vision - Basic Assessment  Current Vision: Wears glasses all the time    Strength:  Strength Comments: MMT deferred at this time.  Appears grossly G- based on functional performance        Hand Function:  Hand Function  Gross Grasp: Functional despite arthritic deformities B hands/digits  Extremities:   RUE : Within Functional Limits (Enlarged MP joints B UE's & B ulnar deviation)   LUE: Within Functional Limits      Outcome Measures: [facility] Daily Activity  Putting on and taking off regular lower body clothing: A little  Bathing (including washing, rinsing, drying): A little  Putting on and taking off regular upper body clothing: A little  Toileting, which includes using toilet, bedpan or urinal: A little  Taking care of personal grooming such as brushing teeth: A little  Eating Meals: A little  Daily Activity - Total Score: 18         and OT Adult Other Outcome Measures  4AT: 8 Patient's 4AT score:  Alertness: 0 - Normal (fully alert, but not agitated, throughout assessment)   AMT4: 2 - 2 or more mistakes/untestable   Attention: 2 - Untestable (cannot start because unwell, drowsy, inattentive)   Acute change or fluctuating course: 4 - Yes   4 AT Score: 8/12     4 AT is a standardized assessment completed with patient to assess delirium. The  4AT assesses 4 items including alertness, abbreviated mental test (AMT4), Attention, and acute change or fluctuating course.     The test is scored out of 12 points. Score indications are as follows:   0: delirium or severe cognitive impairment unlikely   (delirium is still possible if acute change or fluctuating course information is incomplete and prior cognitive status is not obtained)  1-3: possible cognitive impairment  4 or above: possible delirium +/- cognitive impairment     The 4AT is scored as follows:   Alertness:   Normal (fully alert, but not agitated, throughout assessment) 0  Mild Sleepiness for <10 seconds after waking, then normal   0  Clearly Abnormal       4    AMT4: (age, date of birth, place (name of hospital or building), current year)  No mistakes     0  1 mistake    1  2 or more mistakes/untestable 2    Attention: (months of they year in backwards order starting with December)  Achieves 7 months or more correctly     0  Starts but scores <7 months/refuses to start    1  Untestable (cannot start because unwell, drowsy, inattentive) 2    Acute change or fluctuating Course: (change in cognition or mental function in last 2 wks and is still evident in last 24 hrs.   No  0  Yes  4          Education Documentation  Body Mechanics, taught by Rola Mattson OT at 3/19/2025  5:35 PM.  Learner: Patient  Readiness: Acceptance  Method: Explanation, Demonstration  Response: Demonstrated Understanding, Needs Reinforcement  Comment: OT POC.  Education on safety with transfers, safety with balance, & energy conservation.    ADL Training, taught by Rola Mattson OT at 3/19/2025  5:35 PM.  Learner: Patient  Readiness: Acceptance  Method: Explanation, Demonstration  Response: Demonstrated Understanding, Needs Reinforcement  Comment: OT POC.  Education on safety with transfers, safety with balance, & energy conservation.      Goals:  Encounter Problems       Encounter Problems (Active)       ADLs        Patient with complete upper body dressing with S set up       Start:  03/19/25    Expected End:  03/26/25            Patient with complete lower body dressing with S set up       Start:  03/19/25    Expected End:  03/26/25            Patient will complete daily grooming tasks with S while standing sinkside       Start:  03/19/25    Expected End:  03/26/25            Patient will complete toileting including hygiene clothing management/hygiene with S with A/safety devices       Start:  03/19/25    Expected End:  03/26/25            Close S bathing with A/safety devices       Start:  03/19/25    Expected End:  03/26/25               BALANCE       S standing balance as needed for self care performance with A device       Start:  03/19/25    Expected End:  03/26/25               TRANSFERS       S transfers to the bed, chair & toilet with A/safety devices       Start:  03/19/25    Expected End:  03/26/25

## 2025-03-19 NOTE — NURSING NOTE
Patient awakened from a nap disoriented, calling out, and fearful. Patient stated she felt lost. Patient reoriented easily and calm after.

## 2025-03-19 NOTE — PROGRESS NOTES
Physical Therapy    Physical Therapy Evaluation    Patient Name: Maisha Agosto  MRN: [id number]  Department: [facility] ICU  Room: 02/02-A  Today's Date: 3/19/2025   Time Calculation  Start Time: 1250  Stop Time: 1305  Time Calculation (min): 15 min    Assessment/Plan   PT Assessment  PT Assessment Results: Decreased strength, Impaired balance, Decreased mobility, Decreased safety awareness  Rehab Prognosis: Good  Barriers to Discharge Home: No anticipated barriers  Evaluation/Treatment Tolerance: Patient tolerated treatment well  Strengths: Support of Caregivers, Rehab experience  Barriers to Participation:  (n/a)  Assessment Comment: Pleasant 84 y.o presents with weakness and impaired mobility. Pt. lives with daughter and has SUP during the day (MALLORIE with WW at night) . Pt. currently requires SBA/CGA and would benefit from additional PT to address above noted limitations and prevent further decline.  End of Session Patient Position: Up in chair, Alarm on  IP OR SWING BED PT PLAN  Inpatient or Swing Bed: Inpatient  PT Plan  Treatment/Interventions: Transfer training, Bed mobility, Gait training, Stair training, Balance training, Strengthening, Endurance training, Therapeutic exercise, Therapeutic activity, Home exercise program  PT Plan: Ongoing PT  PT Frequency: 2 times per week  PT Discharge Recommendations: Low intensity level of continued care  PT Recommended Transfer Status: Stand by assist  PT - OK to Discharge: Yes Based on completed evaluation and care plan recommendations, no barriers to discharge to next site of care      Subjective   General Visit Information:  General  Reason for Referral: impaired mobility, COPD  Referred By: USAMA Jerry  Past Medical History Relevant to Rehab: dementia, COPD, DM, HLD, hypothyroidism, RA, TIA  Family/Caregiver Present: Yes  Caregiver Feedback: daughter present at start of session  Prior to Session Communication: Bedside nurse  Patient Position Received: Up in  chair, Alarm off, not on at start of session  General Comment: berta bolaños, BP cuff  Home Living:  Home Living  Type of Home: House  Lives With:  (daughter; pt. Has SUP during the day)  Home Adaptive Equipment: Walker rolling or standard  Home Layout: One level  Home Access: Stairs to enter with rails  Entrance Stairs-Rails: Right  Entrance Stairs-Number of Steps: 2  Bathroom Shower/Tub: Walk-in shower  Bathroom Toilet: Standard  Prior Level of Function:  Prior Function Per Pt/Caregiver Report  Level of [place]:  ((IND with mobility, IND dressing/toileting, has supervision when bathing)  Receives Help From: Family, Personal care attendant  Homemaking Assistance: Needs assistance  Ambulatory Assistance: Independent (with WW)  Precautions:  Precautions  Hearing/Visual Limitations: glasses  Medical Precautions: Fall precautions (reports no recent falls)      Date/Time Vitals Session Patient Position Pulse Resp SpO2 BP MAP (mmHg)    03/19/25 1200 --  --  61  20  98 %  126/68  86     03/19/25 1250 --  --  59  --  98 %  126/68  --     03/19/25 1300 --  --  66  14  98 %  119/75  88           Objective   Pain:  Pain Assessment  Pain Assessment: 0-10  0-10 (Numeric) Pain Score: 0 - No pain  Cognition:  Cognition  Overall Cognitive Status: Impaired at baseline  Orientation Level: Disoriented to time    General Assessments:     Activity Tolerance  Endurance: Endurance does not limit participation in activity    Sensation  Sensation Comment: denies deficits    Strength  Strength Comments: BLE: grossly 4-/5  Coordination  Movements are Fluid and Coordinated: Yes    Static Standing Balance  Static Standing-Comment/Number of Minutes: F+; with BUE support  Dynamic Standing Balance  Dynamic Standing-Comments: F+; with BUE support (had 1 LOB with attempted sit to stand without UE support)    Functional Assessments:  Bed Mobility  Bed Mobility: No    Transfers  Transfer: Yes  Transfer 1  Technique 1: Sit to stand, Stand to  sit  Transfer Device 1: Walker  Transfer Level of Assistance 1: Close supervision    Ambulation/Gait Training  Ambulation/Gait Training Performed: Yes  Ambulation/Gait Training 1  Surface 1: Level tile  Device 1: Rolling walker  Assistance 1: Contact guard  Quality of Gait 1: Forward flexed posture  Comments/Distance (ft) 1: 100    Extremity/Trunk Assessments:  RLE   RLE : Within Functional Limits  LLE   LLE : Within Functional Limits  Outcome Measures:  [facility] Basic Mobility  Turning from your back to your side while in a flat bed without using bedrails: None  Moving from lying on your back to sitting on the side of a flat bed without using bedrails: None  Moving to and from bed to chair (including a wheelchair): A little  Standing up from a chair using your arms (e.g. wheelchair or bedside chair): A little  To walk in hospital room: A little  Climbing 3-5 steps with railing: A little  Basic Mobility - Total Score: 20    FSS-ICU  Ambulation: Walks >/ or equal to 50 feet with any assistance x1  Rolling: Modified independence, requires use of assistive device  Sitting: Supervision or set-up only  Transfer Sit-to-Stand: Supervision or set-up only  Transfer Supine-to-Sit: Supervision or set-up only  Total Score: 23    Encounter Problems       Encounter Problems (Active)       Balance       Pt. will complete 5 sit to stands without UE support with good balance (no LOB)        Start:  03/19/25    Expected End:  04/03/25               Mobility       LTG - Patient will ambulate community distance with WW at SUP        Start:  03/19/25    Expected End:  04/03/25            LTG - Patient will navigate 2 steps with R rail         Start:  03/19/25    Expected End:  04/03/25               PT Transfers       STG - Patient will perform bed mobility IND       Start:  03/19/25    Expected End:  04/03/25            STG - Patient will transfer sit to and from stand MALLORIE with WW        Start:  03/19/25    Expected End:  04/03/25                Pain - Adult              Education Documentation  Mobility Training, taught by Mariah Ridley, PT at 3/19/2025  1:19 PM.  Learner: Patient  Readiness: Acceptance  Method: Explanation  Response: Verbalizes Understanding  Comment: Educated pt. on PT POC    Education Comments  No comments found.

## 2025-03-19 NOTE — NURSING NOTE
DNR-CCA, Living Will, and Healthcare POA forms printed and added to chart. Magda Campa CNP, made aware of patient's code status.

## 2025-03-19 NOTE — CARE PLAN
The patient's goals for the shift include going home.    The clinical goals for the shift include monitor for changes in LOC, wean oxygen as tolerated, fall precautions, and monitor heart rate.

## 2025-03-19 NOTE — PROGRESS NOTES
03/19/25 1021   Discharge Planning   Living Arrangements Children   Support Systems Children;Family members   Assistance Needed Patient confused, history obtained from daughter Eli. Patient lives with Eli and son in law. When daughter works, she has 2 girls that come into the house and assist patient due to her dementia. She is independent with ADL's, daughter states she needs supervision so help eliminate falls. Patient uses a walker for ambulation. Does not wear oxygen at home. No other assistance in the house beside the private duty.   Type of Residence Private residence   Number of Stairs to Enter Residence 2   Number of Stairs Within Residence 0   Do you have animals or pets at home? Yes   Type of Animals or Pets Dog  Husky   Who is requesting discharge planning? Provider   Home or Post Acute Services None   Expected Discharge Disposition Home  (Pending PT/OT evaluation to determine discharge plan.)   Does the patient need discharge transport arranged? No  (Eli to )   Financial Resource Strain   How hard is it for you to pay for the very basics like food, housing, medical care, and heating? Not very   Housing Stability   In the last 12 months, was there a time when you were not able to pay the mortgage or rent on time? N   In the past 12 months, how many times have you moved where you were living? 0   At any time in the past 12 months, were you homeless or living in a shelter (including now)? N   Transportation Needs   In the past 12 months, has lack of transportation kept you from medical appointments or from getting medications? no   In the past 12 months, has lack of transportation kept you from meetings, work, or from getting things needed for daily living? No   Stroke Family Assessment   Stroke Family Assessment Needed No   Intensity of Service   Intensity of Service 0-30 min        03/19/25 1608   Discharge Planning   Home or Post Acute Services In home services   Type of Home Care  Services Home OT;Home PT   Expected Discharge Disposition Home H  (Discussed LOW intensity recommendation from PT with daughter Eli. Eli is interested in home care for her mother. Referrals sent.)   Patient Choice   Provider Choice list and CMS website (https://medicare.gov/care-compare#search) for post-acute Quality and Resource Measure Data were provided and reviewed with: Family   Patient / Family choosing to utilize agency / facility established prior to hospitalization No

## 2025-03-19 NOTE — H&P
History Of Present Illness  Maisha Agosto is a 84 y.o. female presenting with a complaint of shaking. She is from home; has a hx of dementia. Her daughter felt she was more confused and staring off in space. Patient is a poor historian; history obtained from the ED note and old charts.    In the ED:   VS: T 36.2; HR 62; R 18 /76; SpO2 86% on RA  Labs: Glu 121; Na 139; K 3.8; BuN 21; Cr 0.73; WBC 10.3; Hb 12.9; Hcrt 40.3;   Radiology: CT chest  No alveolar consolidation or effusions are identified. There are scattered areas of bronchiectasis with associated mucous plugging along with scattered areas of tree-in-bud nodular infiltrate likely representing bronchiolitis. Stable 2 x 7 mm nodule right upper lobe laterally. Suspicion of bilateral hydronephrosis. CT head No acute intracranial pathologic findings are identified. There is no interval change when compared to the previous examination.  Medication: None  Disposition: admitted to med/surg     Past Medical History  Past Medical History:   Diagnosis Date    Bowel perforation (Multi) 2024    Clostridium difficile colitis     Colitis due to Clostridioides difficile 2024    Dementia     Heart murmur     Hypernatremia     Terminal ileitis with complication (Multi) 2024    UTI (urinary tract infection) 2024       Surgical History  Past Surgical History:   Procedure Laterality Date    CATARACT EXTRACTION       SECTION, CLASSIC  2016     Section    CHOLECYSTECTOMY  2014    Cholecystectomy    COLONOSCOPY  2014    Colonoscopy (Fiberoptic)    CT HEAD ANGIO W AND WO IV CONTRAST  2021    CT HEAD ANGIO W AND WO IV CONTRAST 2021 [facility]    ESOPHAGOGASTRODUODENOSCOPY  2014    Diagnostic Esophagogastroduodenoscopy    MR HEAD ANGIO WO IV CONTRAST  2021    MR HEAD ANGIO WO IV CONTRAST 2021 [facility]    MR NECK ANGIO WO IV CONTRAST  2021    MR NECK ANGIO WO  IV CONTRAST 7/21/2021 [facility]        Social History  She reports that she has never smoked. She has never used smokeless tobacco. She reports that she does not drink alcohol and does not use drugs.    Family History  Family History   Problem Relation Name Age of Onset    Diabetes Mother      Heart disease Other      Heart attack Other          Allergies  Acthar [corticotropin], Chloroquine phosphate, Ciprofloxacin, Humira [adalimumab], Levaquin [levofloxacin], Methotrexate, Sulfamethoxazole-trimethoprim, Xeljanz [tofacitinib], Amoxicillin-pot clavulanate, and Enbrel [etanercept]    Review of Systems   Constitutional:  Positive for activity change and fever. Negative for appetite change, chills, diaphoresis and fatigue.   HENT: Negative.     Eyes: Negative.    Respiratory:  Positive for cough.    Cardiovascular: Negative.    Gastrointestinal: Negative.    Genitourinary: Negative.    Musculoskeletal: Negative.    Neurological:  Positive for weakness.   Psychiatric/Behavioral: Negative.          Physical Exam  Vitals reviewed.   Constitutional:       Appearance: Normal appearance. She is normal weight.   HENT:      Head: Normocephalic and atraumatic.      Right Ear: External ear normal.      Left Ear: External ear normal.      Nose: Nose normal.      Mouth/Throat:      Mouth: Mucous membranes are moist.      Pharynx: Oropharynx is clear.   Eyes:      Conjunctiva/sclera: Conjunctivae normal.      Pupils: Pupils are equal, round, and reactive to light.   Cardiovascular:      Rate and Rhythm: Normal rate and regular rhythm.      Pulses: Normal pulses.      Heart sounds: Normal heart sounds.   Pulmonary:      Effort: Pulmonary effort is normal.      Breath sounds: Normal breath sounds.   Abdominal:      General: Bowel sounds are normal.      Palpations: Abdomen is soft.   Musculoskeletal:         General: Normal range of motion.      Cervical back: Normal range of motion and neck supple.   Skin:     General:  Skin is warm and dry.   Neurological:      General: No focal deficit present.      Mental Status: Mental status is at baseline. She is disoriented.   Psychiatric:         Mood and Affect: Mood normal.         Behavior: Behavior normal.          Last Recorded Vitals  Blood pressure 134/63, pulse (!) 43, temperature 36.5 °C (97.7 °F), temperature source Temporal, resp. rate 10, height 1.524 m (5'), weight 46 kg (101 lb 6.6 oz), SpO2 100%.    Relevant Results    Scheduled medications  amLODIPine, 5 mg, oral, Daily  apixaban, 2.5 mg, oral, BID  atorvastatin, 40 mg, oral, Daily  calcium carbonate-vitamin D3, 1 tablet, oral, Daily  ipratropium-albuteroL, 3 mL, nebulization, TID  levothyroxine, 75 mcg, oral, Daily  lisinopril, 20 mg, oral, Daily  magnesium oxide, 400 mg, oral, Daily  methylPREDNISolone sodium succinate (PF), 40 mg, intravenous, q8h  [Held by provider] metoprolol tartrate, 25 mg, oral, Nightly  oxygen, , inhalation, Continuous - Inhalation  pantoprazole, 40 mg, oral, Daily before breakfast   Or  pantoprazole, 40 mg, intravenous, Daily before breakfast  potassium chloride CR, 10 mEq, oral, Daily  traZODone, 50 mg, oral, Nightly      Continuous medications     PRN medications  PRN medications: acetaminophen, acetaminophen, albuterol, benzocaine-menthol, calcium carbonate, dextromethorphan-guaifenesin, guaiFENesin, melatonin, ondansetron **OR** ondansetron, sennosides-docusate sodium    Results for orders placed or performed during the hospital encounter of 03/18/25 (from the past 24 hours)   CBC and Auto Differential   Result Value Ref Range    WBC 10.3 4.4 - 11.3 x10*3/uL    nRBC 0.0 0.0 - 0.0 /100 WBCs    RBC 4.41 4.00 - 5.20 x10*6/uL    Hemoglobin 12.9 12.0 - 16.0 g/dL    Hematocrit 40.3 36.0 - 46.0 %    MCV 91 80 - 100 fL    MCH 29.3 26.0 - 34.0 pg    MCHC 32.0 32.0 - 36.0 g/dL    RDW 13.2 11.5 - 14.5 %    Platelets 191 150 - 450 x10*3/uL    Neutrophils % 66.7 40.0 - 80.0 %    Immature Granulocytes %,  Automated 0.5 0.0 - 0.9 %    Lymphocytes % 22.9 13.0 - 44.0 %    Monocytes % 7.8 2.0 - 10.0 %    Eosinophils % 1.5 0.0 - 6.0 %    Basophils % 0.6 0.0 - 2.0 %    Neutrophils Absolute 6.89 (H) 1.60 - 5.50 x10*3/uL    Immature Granulocytes Absolute, Automated 0.05 0.00 - 0.50 x10*3/uL    Lymphocytes Absolute 2.37 0.80 - 3.00 x10*3/uL    Monocytes Absolute 0.81 (H) 0.05 - 0.80 x10*3/uL    Eosinophils Absolute 0.15 0.00 - 0.40 x10*3/uL    Basophils Absolute 0.06 0.00 - 0.10 x10*3/uL   Comprehensive Metabolic Panel   Result Value Ref Range    Glucose 121 (H) 74 - 99 mg/dL    Sodium 139 136 - 145 mmol/L    Potassium 3.8 3.5 - 5.3 mmol/L    Chloride 101 98 - 107 mmol/L    Bicarbonate 32 21 - 32 mmol/L    Anion Gap 10 10 - 20 mmol/L    Urea Nitrogen 21 6 - 23 mg/dL    Creatinine 0.73 0.50 - 1.05 mg/dL    eGFR 80 >60 mL/min/1.73m*2    Calcium 9.6 8.6 - 10.3 mg/dL    Albumin 4.0 3.4 - 5.0 g/dL    Alkaline Phosphatase 65 33 - 136 U/L    Total Protein 7.4 6.4 - 8.2 g/dL    AST 21 9 - 39 U/L    Bilirubin, Total 0.4 0.0 - 1.2 mg/dL    ALT 21 7 - 45 U/L   Lactate   Result Value Ref Range    Lactate 0.7 0.4 - 2.0 mmol/L   Blood Culture    Specimen: Peripheral Venipuncture; Blood culture   Result Value Ref Range    Blood Culture Loaded on Instrument - Culture in progress    Blood Culture    Specimen: Peripheral Venipuncture; Blood culture   Result Value Ref Range    Blood Culture Loaded on Instrument - Culture in progress    Sars-CoV-2 and Influenza A/B PCR   Result Value Ref Range    Flu A Result Not Detected Not Detected    Flu B Result Not Detected Not Detected    Coronavirus 2019, PCR Not Detected Not Detected   Urinalysis with Reflex Culture and Microscopic   Result Value Ref Range    Color, Urine Colorless (N) Light-Yellow, Yellow, Dark-Yellow    Appearance, Urine Clear Clear    Specific Gravity, Urine 1.007 1.005 - 1.035    pH, Urine 7.5 5.0, 5.5, 6.0, 6.5, 7.0, 7.5, 8.0    Protein, Urine NEGATIVE NEGATIVE, 10 (TRACE), 20  (TRACE) mg/dL    Glucose, Urine Normal Normal mg/dL    Blood, Urine 0.03 (TRACE) (A) NEGATIVE mg/dL    Ketones, Urine NEGATIVE NEGATIVE mg/dL    Bilirubin, Urine NEGATIVE NEGATIVE mg/dL    Urobilinogen, Urine Normal Normal mg/dL    Nitrite, Urine NEGATIVE NEGATIVE    Leukocyte Esterase, Urine NEGATIVE NEGATIVE   Extra Urine Gray Tube   Result Value Ref Range    Extra Tube Hold for add-ons.    Urinalysis Microscopic   Result Value Ref Range    WBC, Urine NONE 1-5, NONE /HPF    RBC, Urine 6-10 (A) NONE, 1-2, 3-5 /HPF    Bacteria, Urine 1+ (A) NONE SEEN /HPF   Basic metabolic panel   Result Value Ref Range    Glucose 174 (H) 74 - 99 mg/dL    Sodium 138 136 - 145 mmol/L    Potassium 4.2 3.5 - 5.3 mmol/L    Chloride 103 98 - 107 mmol/L    Bicarbonate 29 21 - 32 mmol/L    Anion Gap 10 10 - 20 mmol/L    Urea Nitrogen 22 6 - 23 mg/dL    Creatinine 0.70 0.50 - 1.05 mg/dL    eGFR 84 >60 mL/min/1.73m*2    Calcium 9.6 8.6 - 10.3 mg/dL   CBC   Result Value Ref Range    WBC 8.7 4.4 - 11.3 x10*3/uL    nRBC 0.0 0.0 - 0.0 /100 WBCs    RBC 4.59 4.00 - 5.20 x10*6/uL    Hemoglobin 13.4 12.0 - 16.0 g/dL    Hematocrit 41.9 36.0 - 46.0 %    MCV 91 80 - 100 fL    MCH 29.2 26.0 - 34.0 pg    MCHC 32.0 32.0 - 36.0 g/dL    RDW 13.2 11.5 - 14.5 %    Platelets 205 150 - 450 x10*3/uL             Assessment/Plan   Assessment & Plan  Bronchiolitis    Acute respiratory failure with hypoxemia (Multi)    Dyslipidemia    Benign essential HTN    Hypothyroidism    Paroxysmal atrial fibrillation (Multi)      Acute Respiratory failure with hypoxia  Bronchiolitis   - SpO2 86% on RA  - supplemental oxygen to keep SpO2 > 90%  - currently on 2lpm via NC  - baseline is RA  - started azithromycin 500 mg daily and ceftriaxone 2 g daily; day 2  - started solumedrol 40 mg q8h  - albuterol 2.5 mg q2h; prn  - RT for bronchial hygiene  - CT chest:  . No alveolar consolidation or effusions are identified. There are scattered areas of bronchiectasis with associated  mucous plugging along with scattered areas of tree-in-bud nodular infiltrate likely representing bronchiolitis. Stable 2 x 7 mm nodule right upper lobe laterally.     - blood cultures pending    Bradycardia  Paroxysmal Atrial Fibrillation  Essential HTN  Dyslipidemia  - HR dropped to 27 during the night  - continue amlodipine 5 mg daily, apixaban 2.5 mg BID, atorvastatin 40 mg daily, lisinopril 20 mg daily  - discontinue metoprolol    - cardiac monitoring via telemetry[  - cardiology consult  - monitor BP and HR    Hypothyroidism  - continue levothyroxine 75 mg daily    Hypomagnesia  -continue magnesium oxide 400 mg daily    Anxiety and Depression  - continue trazodone 50 mg hs    PUD ppx  - continue pantoprazole 40 mg daily    DVT ppx  - continue apixaban 2.5 BID    Code status: DNRCC-Arrest    Disposition: Patient requires more than 2 inpatient days.      Seen and discussed with MD Petra Santos, APRN-CNP  Attending Attestation:    Patient was seen and examined face to face, history and physical was taken personally at bedside the APRN-CNP, was present for the whole duration of the exam who participated in the documentation of this note. I performed the medical decision-making components (assessment and plan of care). I have reviewed the documentation and verified the findings in the note as written with additions or exceptions as stated in the body of this note.  Elderly lady with history of dementia living with her daughter, was found to be having some change of mental status and more confused, staring into space while in the emergency room patient was pulse oxing 86% was started on 2 L of oxygen CT head was negative for any acute finding CT of the chest showing tree-in-bud and bronchiolitis ABG showed pH of 731 pCO2 of 85 pO2 of 92 she was started on antibiotics bronchodilator, she was seen this morning she is not on oxygen no respiratory distress pulse oxing low 90s, sitting in a  chair, complaining of cough, and shortness of breath, otherwise difficult to obtain any subjective data on exam there are rhonchi bilaterally, heart regular, abdomen soft bowel sounds are present extremity no edema pedal pulses are present, neurologic exam showing no any focal neurological deficit.  Patient with dementia and increasing confusion possibly secondary to bronchiolitis possible pneumonia we will continue with IV antibiotics bronchodilators and steroids, physical and Occupational Therapy consult.    Dr. Rigo Burnett MD  Internal Medicine

## 2025-03-20 LAB
ANION GAP SERPL CALC-SCNC: 13 MMOL/L (ref 10–20)
BUN SERPL-MCNC: 25 MG/DL (ref 6–23)
CALCIUM SERPL-MCNC: 9.9 MG/DL (ref 8.6–10.3)
CHLORIDE SERPL-SCNC: 102 MMOL/L (ref 98–107)
CO2 SERPL-SCNC: 27 MMOL/L (ref 21–32)
CREAT SERPL-MCNC: 0.69 MG/DL (ref 0.5–1.05)
EGFRCR SERPLBLD CKD-EPI 2021: 85 ML/MIN/1.73M*2
ERYTHROCYTE [DISTWIDTH] IN BLOOD BY AUTOMATED COUNT: 13.2 % (ref 11.5–14.5)
GLUCOSE SERPL-MCNC: 182 MG/DL (ref 74–99)
HCT VFR BLD AUTO: 41.1 % (ref 36–46)
HGB BLD-MCNC: 13 G/DL (ref 12–16)
MCH RBC QN AUTO: 28.8 PG (ref 26–34)
MCHC RBC AUTO-ENTMCNC: 31.6 G/DL (ref 32–36)
MCV RBC AUTO: 91 FL (ref 80–100)
NRBC BLD-RTO: 0 /100 WBCS (ref 0–0)
PLATELET # BLD AUTO: 214 X10*3/UL (ref 150–450)
POTASSIUM SERPL-SCNC: 4.1 MMOL/L (ref 3.5–5.3)
RBC # BLD AUTO: 4.52 X10*6/UL (ref 4–5.2)
SODIUM SERPL-SCNC: 138 MMOL/L (ref 136–145)
WBC # BLD AUTO: 15.5 X10*3/UL (ref 4.4–11.3)

## 2025-03-20 PROCEDURE — 2500000002 HC RX 250 W HCPCS SELF ADMINISTERED DRUGS (ALT 637 FOR MEDICARE OP, ALT 636 FOR OP/ED): Mod: IPSPLIT | Performed by: STUDENT IN AN ORGANIZED HEALTH CARE EDUCATION/TRAINING PROGRAM

## 2025-03-20 PROCEDURE — 2500000004 HC RX 250 GENERAL PHARMACY W/ HCPCS (ALT 636 FOR OP/ED): Mod: IPSPLIT | Performed by: NURSE PRACTITIONER

## 2025-03-20 PROCEDURE — 94760 N-INVAS EAR/PLS OXIMETRY 1: CPT | Mod: IPSPLIT

## 2025-03-20 PROCEDURE — 2500000002 HC RX 250 W HCPCS SELF ADMINISTERED DRUGS (ALT 637 FOR MEDICARE OP, ALT 636 FOR OP/ED): Mod: IPSPLIT | Performed by: NURSE PRACTITIONER

## 2025-03-20 PROCEDURE — 2500000001 HC RX 250 WO HCPCS SELF ADMINISTERED DRUGS (ALT 637 FOR MEDICARE OP): Mod: IPSPLIT | Performed by: STUDENT IN AN ORGANIZED HEALTH CARE EDUCATION/TRAINING PROGRAM

## 2025-03-20 PROCEDURE — 80048 BASIC METABOLIC PNL TOTAL CA: CPT | Mod: IPSPLIT | Performed by: NURSE PRACTITIONER

## 2025-03-20 PROCEDURE — 2500000004 HC RX 250 GENERAL PHARMACY W/ HCPCS (ALT 636 FOR OP/ED): Mod: IPSPLIT | Performed by: STUDENT IN AN ORGANIZED HEALTH CARE EDUCATION/TRAINING PROGRAM

## 2025-03-20 PROCEDURE — 99233 SBSQ HOSP IP/OBS HIGH 50: CPT | Performed by: NURSE PRACTITIONER

## 2025-03-20 PROCEDURE — 36415 COLL VENOUS BLD VENIPUNCTURE: CPT | Mod: IPSPLIT | Performed by: NURSE PRACTITIONER

## 2025-03-20 PROCEDURE — 85027 COMPLETE CBC AUTOMATED: CPT | Mod: IPSPLIT | Performed by: NURSE PRACTITIONER

## 2025-03-20 PROCEDURE — 2500000004 HC RX 250 GENERAL PHARMACY W/ HCPCS (ALT 636 FOR OP/ED): Mod: JZ,TB,IPSPLIT | Performed by: NURSE PRACTITIONER

## 2025-03-20 PROCEDURE — 2500000001 HC RX 250 WO HCPCS SELF ADMINISTERED DRUGS (ALT 637 FOR MEDICARE OP): Mod: IPSPLIT | Performed by: NURSE PRACTITIONER

## 2025-03-20 PROCEDURE — 1200000002 HC GENERAL ROOM WITH TELEMETRY DAILY: Mod: IPSPLIT

## 2025-03-20 RX ORDER — PREDNISONE 20 MG/1
20 TABLET ORAL DAILY
Status: DISCONTINUED | OUTPATIENT
Start: 2025-03-22 | End: 2025-03-21 | Stop reason: HOSPADM

## 2025-03-20 RX ORDER — PREDNISONE 20 MG/1
40 TABLET ORAL DAILY
Status: COMPLETED | OUTPATIENT
Start: 2025-03-21 | End: 2025-03-21

## 2025-03-20 RX ORDER — PREDNISONE 5 MG/1
5 TABLET ORAL DAILY
Status: DISCONTINUED | OUTPATIENT
Start: 2025-03-24 | End: 2025-03-21 | Stop reason: HOSPADM

## 2025-03-20 RX ORDER — PREDNISONE 5 MG/1
10 TABLET ORAL DAILY
Status: DISCONTINUED | OUTPATIENT
Start: 2025-03-23 | End: 2025-03-21 | Stop reason: HOSPADM

## 2025-03-20 RX ORDER — CEFUROXIME AXETIL 250 MG/1
500 TABLET ORAL 2 TIMES DAILY
Status: DISCONTINUED | OUTPATIENT
Start: 2025-03-21 | End: 2025-03-21 | Stop reason: HOSPADM

## 2025-03-20 RX ADMIN — AZITHROMYCIN DIHYDRATE 500 MG: 250 TABLET, FILM COATED ORAL at 09:09

## 2025-03-20 RX ADMIN — LISINOPRIL 20 MG: 20 TABLET ORAL at 09:09

## 2025-03-20 RX ADMIN — METHYLPREDNISOLONE SODIUM SUCCINATE 40 MG: 40 INJECTION, POWDER, FOR SOLUTION INTRAMUSCULAR; INTRAVENOUS at 18:46

## 2025-03-20 RX ADMIN — ATORVASTATIN CALCIUM 40 MG: 40 TABLET, FILM COATED ORAL at 09:09

## 2025-03-20 RX ADMIN — POTASSIUM CHLORIDE 10 MEQ: 750 TABLET, FILM COATED, EXTENDED RELEASE ORAL at 09:09

## 2025-03-20 RX ADMIN — METHYLPREDNISOLONE SODIUM SUCCINATE 40 MG: 40 INJECTION, POWDER, FOR SOLUTION INTRAMUSCULAR; INTRAVENOUS at 06:42

## 2025-03-20 RX ADMIN — METOPROLOL TARTRATE 25 MG: 25 TABLET, FILM COATED ORAL at 20:04

## 2025-03-20 RX ADMIN — Medication 0.5 TABLET: at 09:09

## 2025-03-20 RX ADMIN — TRAZODONE HYDROCHLORIDE 50 MG: 50 TABLET ORAL at 20:04

## 2025-03-20 RX ADMIN — APIXABAN 2.5 MG: 2.5 TABLET, FILM COATED ORAL at 20:04

## 2025-03-20 RX ADMIN — LEVOTHYROXINE SODIUM 75 MCG: 0.07 TABLET ORAL at 06:42

## 2025-03-20 RX ADMIN — PANTOPRAZOLE SODIUM 40 MG: 40 TABLET, DELAYED RELEASE ORAL at 06:42

## 2025-03-20 RX ADMIN — Medication 400 MG: at 09:09

## 2025-03-20 RX ADMIN — AMLODIPINE BESYLATE 5 MG: 5 TABLET ORAL at 09:09

## 2025-03-20 RX ADMIN — APIXABAN 2.5 MG: 2.5 TABLET, FILM COATED ORAL at 09:09

## 2025-03-20 RX ADMIN — CEFTRIAXONE 2 G: 2 INJECTION, SOLUTION INTRAVENOUS at 09:10

## 2025-03-20 RX ADMIN — CALCIUM CARBONATE (ANTACID) CHEW TAB 500 MG 500 MG: 500 CHEW TAB at 23:52

## 2025-03-20 ASSESSMENT — ENCOUNTER SYMPTOMS
SHORTNESS OF BREATH: 0
FEVER: 0
DIZZINESS: 1
ABDOMINAL PAIN: 0
PALPITATIONS: 0
CHILLS: 0
SPEECH DIFFICULTY: 1
ABDOMINAL DISTENTION: 0
COUGH: 0

## 2025-03-20 ASSESSMENT — PAIN - FUNCTIONAL ASSESSMENT
PAIN_FUNCTIONAL_ASSESSMENT: 0-10
PAIN_FUNCTIONAL_ASSESSMENT: 0-10

## 2025-03-20 ASSESSMENT — PAIN SCALES - GENERAL
PAINLEVEL_OUTOF10: 0 - NO PAIN
PAINLEVEL_OUTOF10: 0 - NO PAIN

## 2025-03-20 NOTE — CARE PLAN
"The patient's goals for the shift include \"Ok ill wait here until my daughter comes in the morning\".    The clinical goals for the shift include Pt will remain HDS and have a heart rate >/= 40 BPM entire shift. Pt will remain free from fall or injury for entire shift.    Over the shift, the patient did not make progress toward the following goals. Barriers to progression include . Recommendations to address these barriers include .    "

## 2025-03-20 NOTE — PROGRESS NOTES
Maisha Agosto is a 84 y.o. female on day 1 of admission presenting with Bronchiolitis.    Subjective   She is sitting up in a chair eating breakfast, tolerating it well.  Her caregiver is at her bedside.  She has no complaints, breathing easily, on room air.  I spoke with her daughter Eli as well.  We will monitor her for 1 more day since her medications have been adjusted.  All questions answered.     Objective     Physical Exam  Constitutional:       General: She is not in acute distress.     Appearance: Normal appearance. She is not toxic-appearing.   HENT:      Head: Normocephalic and atraumatic.      Mouth/Throat:      Mouth: Mucous membranes are moist.   Eyes:      Extraocular Movements: Extraocular movements intact.      Pupils: Pupils are equal, round, and reactive to light.   Cardiovascular:      Rate and Rhythm: Normal rate and regular rhythm.      Pulses: Normal pulses.      Heart sounds: Normal heart sounds. No murmur heard.     No gallop.   Pulmonary:      Effort: Pulmonary effort is normal. No respiratory distress.      Breath sounds: Wheezing present. No rhonchi or rales.   Abdominal:      General: Bowel sounds are normal. There is no distension.      Palpations: Abdomen is soft.      Tenderness: There is no abdominal tenderness. There is no guarding or rebound.   Musculoskeletal:         General: No swelling, tenderness, deformity or signs of injury. Normal range of motion.      Cervical back: Normal range of motion and neck supple.   Skin:     General: Skin is warm and dry.      Capillary Refill: Capillary refill takes less than 2 seconds.      Coloration: Skin is not jaundiced.      Findings: No bruising or rash.   Neurological:      General: No focal deficit present.      Mental Status: She is alert. Mental status is at baseline.      Cranial Nerves: No cranial nerve deficit.      Sensory: No sensory deficit.      Motor: No weakness.      Gait: Gait normal.   Psychiatric:         Mood and  Affect: Mood normal.         Behavior: Behavior normal.     Last Recorded Vitals  Blood pressure 91/53, pulse 65, temperature 36.1 °C (97 °F), temperature source Temporal, resp. rate 19, height 1.524 m (5'), weight 47.3 kg (104 lb 4.4 oz), SpO2 94%.  Intake/Output last 3 Shifts:  I/O last 3 completed shifts:  In: 896.5 (19 mL/kg) [P.O.:600; IV Piggyback:296.5]  Out: - (0 mL/kg)   Weight: 47.3 kg     Scheduled medications  amLODIPine, 5 mg, oral, Daily  apixaban, 2.5 mg, oral, BID  atorvastatin, 40 mg, oral, Daily  azithromycin, 500 mg, oral, q24h MINDY  calcium carbonate-vitamin D3, 0.5 tablet, oral, Daily  [START ON 3/21/2025] cefuroxime, 500 mg, oral, BID  levothyroxine, 75 mcg, oral, Daily  lisinopril, 20 mg, oral, Daily  magnesium oxide, 400 mg, oral, Daily  methylPREDNISolone sodium succinate (PF), 40 mg, intravenous, q12h  [Held by provider] metoprolol tartrate, 25 mg, oral, Nightly  oxygen, , inhalation, Continuous - Inhalation  pantoprazole, 40 mg, oral, Daily before breakfast  potassium chloride CR, 10 mEq, oral, Daily  [START ON 3/21/2025] predniSONE, 40 mg, oral, Daily   Followed by  [START ON 3/22/2025] predniSONE, 20 mg, oral, Daily   Followed by  [START ON 3/23/2025] predniSONE, 10 mg, oral, Daily   Followed by  [START ON 3/24/2025] predniSONE, 5 mg, oral, Daily  traZODone, 50 mg, oral, Nightly     PRN medications  PRN medications: acetaminophen, acetaminophen, albuterol, benzocaine-menthol, calcium carbonate, dextromethorphan-guaifenesin, guaiFENesin, melatonin, ondansetron **OR** ondansetron, sennosides-docusate sodium    Relevant Results  Electrocardiogram, 12-lead PRN ACS symptoms  Result Date: 3/19/2025  Sinus bradycardia Minimal voltage criteria for LVH, may be normal variant ( Abdulaziz product ) Poor R-wave progression ; consider septal infarct, lead placement, or normal variant Abnormal ECG When compared with ECG of 18-MAR-2025 15:20, (unconfirmed) Nonspecific T wave abnormality, worse in  Anterolateral leads    CT chest wo IV contrast  Result Date: 3/18/2025  1. No alveolar consolidation or effusions are identified. 2. There are scattered areas of bronchiectasis with associated mucous plugging along with scattered areas of tree-in-bud nodular infiltrate likely representing bronchiolitis. 3. Stable 2 x 7 mm nodule right upper lobe laterally. 4. Suspicion of bilateral hydronephrosis.   MACRO: none   Signed by: Elias Lagunas 3/18/2025 4:14 PM Dictation workstation:   [id number]    CT head wo IV contrast  Result Date: 3/18/2025  No acute intracranial pathologic findings are identified. There is no interval change when compared to the previous examination.   MACRO: none   Signed by: Elias Lagunas 3/18/2025 4:01 PM Dictation workstation:   [id number]     Latest Reference Range & Units 03/19/25 05:54 03/20/25 05:32   GLUCOSE 74 - 99 mg/dL 174 (H) 182 (H)   SODIUM 136 - 145 mmol/L 138 138   POTASSIUM 3.5 - 5.3 mmol/L 4.2 4.1   CHLORIDE 98 - 107 mmol/L 103 102   Bicarbonate 21 - 32 mmol/L 29 27   Anion Gap 10 - 20 mmol/L 10 13   Blood Urea Nitrogen 6 - 23 mg/dL 22 25 (H)   Creatinine 0.50 - 1.05 mg/dL 0.70 0.69   EGFR >60 mL/min/1.73m*2 84 85   Calcium 8.6 - 10.3 mg/dL 9.6 9.9   WBC 4.4 - 11.3 x10*3/uL 8.7 15.5 (H)   nRBC 0.0 - 0.0 /100 WBCs 0.0 0.0   RBC 4.00 - 5.20 x10*6/uL 4.59 4.52   HEMOGLOBIN 12.0 - 16.0 g/dL 13.4 13.0   HEMATOCRIT 36.0 - 46.0 % 41.9 41.1   MCV 80 - 100 fL 91 91   MCH 26.0 - 34.0 pg 29.2 28.8   MCHC 32.0 - 36.0 g/dL 32.0 31.6 (L)   RED CELL DISTRIBUTION WIDTH 11.5 - 14.5 % 13.2 13.2   Platelets 150 - 450 x10*3/uL 205 214     Assessment/Plan   Assessment & Plan  Bronchiolitis    Acute respiratory failure with hypoxemia (Multi)    Dyslipidemia    Benign essential HTN    Hypothyroidism    Paroxysmal atrial fibrillation (Multi)      Acute Respiratory failure with hypoxia  Bronchiolitis   - SpO2 86% on RA  - supplemental oxygen to keep SpO2 > 90%  - currently on 2lpm via NC  - baseline  is RA  - started azithromycin 500 mg daily and ceftriaxone 2 g daily; day 3, transition to oral Ceftin  - started solumedrol 40 mg q8h, transition to oral daily  - albuterol 2.5 mg q2h; prn  - RT for bronchial hygiene  - CT chest: No alveolar consolidation or effusions are identified. There are scattered areas of bronchiectasis with associated mucous plugging along with scattered areas of tree-in-bud nodular infiltrate likely representing bronchiolitis. Stable 2 x 7 mm nodule right upper lobe laterally.     - blood cultures NGTD  -on room air     Bradycardia  Paroxysmal Atrial Fibrillation  Essential HTN  Dyslipidemia  - HR dropped to 27 during the night  - continue amlodipine 5 mg daily, apixaban 2.5 mg BID, atorvastatin 40 mg daily, lisinopril 20 mg daily  - discontinue metoprolol    - cardiac monitoring via telemetry[  - cardiology consult  - monitor BP and HR  -Metoprolol added back by cardiology, continue to monitor until tomorrow     Hypothyroidism  - continue levothyroxine 75 mg daily     Hypomagnesia  -continue magnesium oxide 400 mg daily     Anxiety and Depression  - continue trazodone 50 mg hs     PUD ppx: continue pantoprazole 40 mg daily   DVT ppx: continue apixaban 2.5 BID   Code status: DNRCC-Arrest   Disposition: Patient requires inpatient days.      ELIO Jerry-CNP

## 2025-03-20 NOTE — CONSULTS
Inpatient consult to Cardiology  Consult performed by: ELIO Stevens-CNP  Consult ordered by: USAMA Aparicio  Reason for consult: bradycardia          History Of Present Illness  Maisha Agosto is a 84 y.o. female presenting with change in mental status. Her family at the bedside state that she was staring like she did prior to her stroke last month and stuttering. They also report that she looks unsteady when she first stands up. She denies any chest pain or palpitations, does complain of dizziness at times but is a poor historian.     Lab work in the ER showed glucose 121, sodium 139, potassium 3.8, BUN/creatinine 21/0.73, lactate 0.7, WBCs 10.3, H&H 12.9/40.3, CT of the head negative, CT of the chest showed scattered areas of bronchiectasis with associated mucous plugging along with scattered areas of tree-in-bud nodular infiltrate likely representing bronchiolitis, suspicion of bilateral hydronephrosis.    EKG showed sinus bradycardia, rate 59bpm.     On the day of admit she was given her metoprolol in the evening and overnight her heart rate was noted to drop into the 40s.  No reports of patient being symptomatic.      Past Medical History  Past Medical History:   Diagnosis Date    Bowel perforation (Multi) 2024    Clostridium difficile colitis     Colitis due to Clostridioides difficile 2024    Dementia     Heart murmur     Hypernatremia     Terminal ileitis with complication (Multi) 2024    UTI (urinary tract infection) 2024   Paroxysmal atrial fibrillation, CVA    Surgical History  Past Surgical History:   Procedure Laterality Date    CATARACT EXTRACTION       SECTION, CLASSIC  2016     Section    CHOLECYSTECTOMY  2014    Cholecystectomy    COLONOSCOPY  2014    Colonoscopy (Fiberoptic)    CT HEAD ANGIO W AND WO IV CONTRAST  2021    CT HEAD ANGIO W AND WO IV CONTRAST 2021 [facility]     ESOPHAGOGASTRODUODENOSCOPY  02/12/2014    Diagnostic Esophagogastroduodenoscopy    MR HEAD ANGIO WO IV CONTRAST  07/21/2021    MR HEAD ANGIO WO IV CONTRAST 7/21/2021 [facility]    MR NECK ANGIO WO IV CONTRAST  07/21/2021    MR NECK ANGIO WO IV CONTRAST 7/21/2021 [facility]        Social History  She reports that she has never smoked. She has never used smokeless tobacco. She reports that she does not drink alcohol and does not use drugs.    Family History  Family History   Problem Relation Name Age of Onset    Diabetes Mother      Heart disease Other      Heart attack Other          Allergies  Acthar [corticotropin], Chloroquine phosphate, Ciprofloxacin, Humira [adalimumab], Levaquin [levofloxacin], Methotrexate, Sulfamethoxazole-trimethoprim, Xeljanz [tofacitinib], Amoxicillin-pot clavulanate, and Enbrel [etanercept]    Review of Systems  Review of Systems   Constitutional:  Negative for chills and fever.   Respiratory:  Negative for cough and shortness of breath.    Cardiovascular:  Negative for chest pain, palpitations and leg swelling.   Gastrointestinal:  Negative for abdominal distention and abdominal pain.   Musculoskeletal:  Positive for gait problem.   Neurological:  Positive for dizziness and speech difficulty.   All other systems reviewed and are negative.         Physical Exam  Constitutional: Well developed, awake/alert/oriented to self, no distress, alert and cooperative  Eyes: PERRL, EOMI, clear sclera  ENMT: mucous membranes moist, no apparent injury, no lesions seen  Head/Neck: Neck supple, no apparent injury, thyroid without mass or tenderness, No JVD, trachea midline, no bruits  Respiratory/Thorax: Patent airways, CTAB, normal breath sounds with good chest expansion, thorax symmetric  Cardiovascular: Regular, rate and rhythm, no murmurs, 2+ equal pulses of the extremities, normal S 1and S 2  Gastrointestinal: Nondistended, soft, non-tender, no rebound tenderness or guarding, no  masses palpable, no organomegaly, +BS, no bruits  Musculoskeletal: ROM intact, no joint swelling, normal strength  Extremities: normal extremities, no cyanosis edema, contusions or wounds, no clubbing  Neurological: alert and oriented to self, intact senses, motor, response and reflexes, normal strength  Lymphatic: No significant lymphadenopathy  Psychological: Appropriate mood and behavior  Skin: Warm and dry, no lesions, no rashes       Last Recorded Vitals  Blood pressure 91/53, pulse 65, temperature 36.1 °C (97 °F), temperature source Temporal, resp. rate 19, height 1.524 m (5'), weight 47.3 kg (104 lb 4.4 oz), SpO2 94%.    Medications  Scheduled medications  amLODIPine, 5 mg, oral, Daily  apixaban, 2.5 mg, oral, BID  atorvastatin, 40 mg, oral, Daily  azithromycin, 500 mg, oral, q24h MINDY  calcium carbonate-vitamin D3, 0.5 tablet, oral, Daily  [START ON 3/21/2025] cefuroxime, 500 mg, oral, BID  levothyroxine, 75 mcg, oral, Daily  lisinopril, 20 mg, oral, Daily  magnesium oxide, 400 mg, oral, Daily  methylPREDNISolone sodium succinate (PF), 40 mg, intravenous, q12h  [Held by provider] metoprolol tartrate, 25 mg, oral, Nightly  oxygen, , inhalation, Continuous - Inhalation  pantoprazole, 40 mg, oral, Daily before breakfast  potassium chloride CR, 10 mEq, oral, Daily  [START ON 3/21/2025] predniSONE, 40 mg, oral, Daily   Followed by  [START ON 3/22/2025] predniSONE, 20 mg, oral, Daily   Followed by  [START ON 3/23/2025] predniSONE, 10 mg, oral, Daily   Followed by  [START ON 3/24/2025] predniSONE, 5 mg, oral, Daily  traZODone, 50 mg, oral, Nightly      Continuous medications     PRN medications  PRN medications: acetaminophen, acetaminophen, albuterol, benzocaine-menthol, calcium carbonate, dextromethorphan-guaifenesin, guaiFENesin, melatonin, ondansetron **OR** ondansetron, sennosides-docusate sodium    Relevant Results  Results for orders placed or performed during the hospital encounter of 03/18/25 (from the past  24 hours)   Basic metabolic panel   Result Value Ref Range    Glucose 182 (H) 74 - 99 mg/dL    Sodium 138 136 - 145 mmol/L    Potassium 4.1 3.5 - 5.3 mmol/L    Chloride 102 98 - 107 mmol/L    Bicarbonate 27 21 - 32 mmol/L    Anion Gap 13 10 - 20 mmol/L    Urea Nitrogen 25 (H) 6 - 23 mg/dL    Creatinine 0.69 0.50 - 1.05 mg/dL    eGFR 85 >60 mL/min/1.73m*2    Calcium 9.9 8.6 - 10.3 mg/dL   CBC   Result Value Ref Range    WBC 15.5 (H) 4.4 - 11.3 x10*3/uL    nRBC 0.0 0.0 - 0.0 /100 WBCs    RBC 4.52 4.00 - 5.20 x10*6/uL    Hemoglobin 13.0 12.0 - 16.0 g/dL    Hematocrit 41.1 36.0 - 46.0 %    MCV 91 80 - 100 fL    MCH 28.8 26.0 - 34.0 pg    MCHC 31.6 (L) 32.0 - 36.0 g/dL    RDW 13.2 11.5 - 14.5 %    Platelets 214 150 - 450 x10*3/uL       CT chest wo IV contrast   Final Result   1. No alveolar consolidation or effusions are identified.   2. There are scattered areas of bronchiectasis with associated mucous   plugging along with scattered areas of tree-in-bud nodular infiltrate   likely representing bronchiolitis.   3. Stable 2 x 7 mm nodule right upper lobe laterally.   4. Suspicion of bilateral hydronephrosis.        MACRO:   none        Signed by: Elias Lagunas 3/18/2025 4:14 PM   Dictation workstation:   [id number]      CT head wo IV contrast   Final Result   No acute intracranial pathologic findings are identified.   There is no interval change when compared to the previous examination.        MACRO:   none        Signed by: Elias Lagunas 3/18/2025 4:01 PM   Dictation workstation:   [id number]          Transthoracic Echo (TTE) Complete    Result Date: 2/27/2025            [facility] [address] , [place] [id number]             Phone [phone] TRANSTHORACIC ECHOCARDIOGRAM REPORT Patient Name:       DAVID TOLENTINO        Reading Physician:    [id number] Chilo Vizcarra MD Study Date:         2/26/2025            Ordering Provider:    [id number]  TORSTEN CROCKETT MRN/PID:            [id number]             Fellow: Accession#:         [id number]         Nurse: Date of Birth/Age:  1940 / 84 years  Sonographer:          Karie JOHNSON Gender Assigned at  F                    Additional Staff: Birth: Height:             152.40 cm            Admit Date:           2/26/2025 Weight:             42.64 kg             Admission Status:     Inpatient -                                                                Routine BSA / BMI:          1.35 m2 / 18.36      Department Location:                     kg/m2 Blood Pressure: 158 /86 mmHg Study Type:    TRANSTHORACIC ECHO (TTE) COMPLETE Diagnosis/ICD: Paroxysmal atrial fibrillation-I48.0; Cerebral Infarction,                unspecified-I63.9 Indication:    Punctate Foci Subacute Versus Acute Stroke on MRI CPT Codes:     Echo Complete w Full [id number] Patient History: Pertinent History: HLD CVA PAF HTN PVD CAD COPD DMII Arteriosclerosis Of                    Coronary Artery Chronic Liver Disease AMS. Study Detail: The following Echo studies were performed: 2D, M-Mode, Doppler and               color flow. Technically challenging study due to patient lying in               supine position. Agitated saline used as a contrast agent for               intraseptal flow evaluation. Unable to obtain suprasternal notch               view.  PHYSICIAN INTERPRETATION: Left Ventricle: The left ventricular systolic function is normal, with a visually estimated ejection fraction of 60-65%. There are no regional wall motion abnormalities. The left ventricular cavity size is normal. There is normal septal and normal posterior left ventricular wall thickness. There is left ventricular concentric remodeling. Spectral Doppler shows a Grade I (impaired relaxation pattern) of left ventricular diastolic filling  with normal left atrial filling pressure. Left Atrium: The left atrial size is mildly dilated. A bubble study using agitated saline was performed. Bubble study is negative. Right Ventricle: The right ventricle is normal in size. There is normal right ventricular global systolic function. Right Atrium: The right atrial size is normal. Aortic Valve: The aortic valve is trileaflet. There is mild aortic valve thickening. There is evidence of mildly elevated transaortic gradients consistent with sclerosis of the aortic valve. There is mild aortic valve regurgitation. The peak instantaneous gradient of the aortic valve is 12 mmHg. Mitral Valve: The mitral valve is normal in structure. There is mild mitral annular calcification. There is no evidence of mitral valve regurgitation. Tricuspid Valve: The tricuspid valve is structurally normal. There is trace tricuspid regurgitation. The right ventricular systolic pressure is unable to be estimated. Pulmonic Valve: The pulmonic valve is structurally normal. There is physiologic pulmonic valve regurgitation. Pericardium: No pericardial effusion noted. Aorta: The aortic root is normal. Systemic Veins: The inferior vena cava appears normal in size, with IVC inspiratory collapse greater than 50%.  CONCLUSIONS:  1. The left ventricular systolic function is normal, with a visually estimated ejection fraction of 60-65%.  2. Spectral Doppler shows a Grade I (impaired relaxation pattern) of left ventricular diastolic filling with normal left atrial filling pressure.  3. No evidence of mitral valve regurgitation.  4. Trace tricuspid regurgitation is visualized.  5. Aortic valve sclerosis.  6. Mild aortic valve regurgitation. QUANTITATIVE DATA SUMMARY:  2D MEASUREMENTS:             Normal Ranges: LAs:             3.40 cm     (2.7-4.0cm) IVSd:            0.82 cm     (0.6-1.1cm) LVPWd:           0.93 cm     (0.6-1.1cm) LVIDd:           3.17 cm     (3.9-5.9cm) LVIDs:           2.01 cm LV  Mass Index:   54.3 g/m2 LVEDV Index:     42.02 ml/m2 LV % FS          36.6 %  LEFT ATRIUM:                  Normal Ranges: LA Vol A4C:        40.6 ml    (22+/-6mL/m2) LA Vol A2C:        46.8 ml LA Vol BP:         44.2 ml LA Vol Index A4C:  30.0ml/m2 LA Vol Index A2C:  34.5 ml/m2 LA Vol Index BP:   32.6 ml/m2 LA Area A4C:       15.4 cm2 LA Area A2C:       16.3 cm2 LA Major Axis A4C: 5.0 cm LA Major Axis A2C: 4.8 cm LA Volume Index:   30.7 ml/m2 LA Vol A4C:        38.3 ml LA Vol A2C:        43.9 ml LA Vol Index BSA:  30.3 ml/m2  RIGHT ATRIUM:                 Normal Ranges: RA Vol A4C:        20.4 ml    (8.3-19.5ml) RA Vol Index A4C:  15.1 ml/m2 RA Area A4C:       9.9 cm2 RA Major Axis A4C: 4.0 cm  M-MODE MEASUREMENTS:         Normal Ranges: Ao Root:             2.80 cm (2.0-3.7cm) LAs:                 4.50 cm (2.7-4.0cm)  AORTA MEASUREMENTS:         Normal Ranges: Ao Sinus, d:        3.50 cm (2.1-3.5cm) Ao STJ, d:          3.08 cm (1.7-3.4cm) Asc Ao, d:          3.60 cm (2.1-3.4cm)  LV SYSTOLIC FUNCTION:                      Normal Ranges: EF-A4C View:    63 % (>=55%) EF-A2C View:    57 % EF-Biplane:     61 % EF-Visual:      63 % LV EF Reported: 63 %  LV DIASTOLIC FUNCTION:            Normal Ranges: MV Peak E:             0.80 m/s   (0.7-1.2 m/s) MV Peak A:             1.31 m/s   (0.42-0.7 m/s) E/A Ratio:             0.61       (1.0-2.2) MV e'                  0.048 m/s  (>8.0) MV lateral e'          0.05 m/s MV medial e'           0.04 m/s E/e' Ratio:            16.49      (<8.0) PulmV Sys Damien:         44.60 cm/s PulmV Dhaliwal Damien:        37.30 cm/s PulmV S/D Damien:         1.20 PulmV A Revs Damien:      33.80 cm/s  MITRAL VALVE:          Normal Ranges: MV DT:        264 msec (150-240msec)  AORTIC VALVE:            Normal Ranges: AoV Vmax:      1.75 m/s  (<=1.7m/s) AoV Peak P.2 mmHg (<20mmHg) LVOT Max Damien:  1.24 m/s  (<=1.1m/s) LVOT VTI:      26.30 cm LVOT Diameter: 1.80 cm   (1.8-2.4cm) AoV Area,Vmax: 1.80 cm2   (2.5-4.5cm2)  AORTIC INSUFFICIENCY: AI Vmax:       4.03 m/s AI Half-time:  836 msec AI Decel Rate: 137.50 cm/s2  RIGHT VENTRICLE: RV Basal 2.36 cm RV Mid   2.00 cm RV Major 4.3 cm TAPSE:   16.1 mm RV s'    0.12 m/s  TRICUSPID VALVE/RVSP:         Normal Ranges: IVC Diam:             1.65 cm  PULMONIC VALVE:          Normal Ranges: PV Accel Time:  82 msec  (>120ms) PV Max Damien:     1.3 m/s  (0.6-0.9m/s) PV Max P.8 mmHg  PULMONARY VEINS: PulmV A Revs Damien: 33.80 cm/s PulmV Dhaliwal Damien:   37.30 cm/s PulmV S/D Damien:    1.20 PulmV Sys Damien:    44.60 cm/s  [id number] Chilo Vizcarra MD Electronically signed on 2025 at 8:31:47 AM  ** Final **         Assessment/Plan      Bronchiolitis  -CT chest showed scattered areas of bronchiectasis with associated mucous plugging along with scattered areas of tree-in-bud nodular infiltrate likely representing bronchioliti   -antibiotics  -bronchodilators  -Steroids  -BC pending  -oxygen support  -sputum culture     2. Bradycardia, paroxysmal atrial fibrillation  -EKG reviewed, Sinus sherley, rate 59bpm  -Telemetry reviewed, no significant bradycardia  -Flowsheets reviewed and showed HR down to 40's dovernight after receiving metoprolol  -Pt asymptomatic and most likely sleeping   -Restart metoprolol to prevent tachycardia/afib  -Monitor on tele  -Outpt ambulatory monitor to assess HR trend     3. Hypertension  -stable  -Check orthostatic VS  -2gm na diet  -cont meds  -monitor     4. Hyperlipidemia  -Cont statin      ELIO Stevens-CNP

## 2025-03-20 NOTE — PROGRESS NOTES
03/20/25 1525   Discharge Planning   Expected Discharge Disposition Home H  ([facility] is [facility] Team notified of expected referral. Provider aware of need for internal home care referral. TCC will follow.)   Intensity of Service   Intensity of Service 0-30 min

## 2025-03-20 NOTE — CONSULTS
Reason For Consult  COPD navigator    COPD navigator not attempted due to fact patient has dementia and is having trouble remembering stuff like she has in the past.  We will continue to monitor          Xiao Davila, RRT

## 2025-03-20 NOTE — CARE PLAN
"  Problem: Pain - Adult  Goal: Verbalizes/displays adequate comfort level or baseline comfort level  Outcome: Progressing     Problem: Safety - Adult  Goal: Free from fall injury  Outcome: Progressing     Problem: Discharge Planning  Goal: Discharge to home or other facility with appropriate resources  Outcome: Progressing     Problem: Chronic Conditions and Co-morbidities  Goal: Patient's chronic conditions and co-morbidity symptoms are monitored and maintained or improved  Outcome: Progressing     Problem: Nutrition  Goal: Nutrient intake appropriate for maintaining nutritional needs  Outcome: Progressing     Problem: Skin  Goal: Participates in plan/prevention/treatment measures  Outcome: Progressing  Goal: Promote/optimize nutrition  Outcome: Progressing  Goal: Decreased wound size/increased tissue granulation at next dressing change  Outcome: Progressing  Goal: Prevent/manage excess moisture  Outcome: Progressing  Goal: Prevent/minimize sheer/friction injuries  Outcome: Progressing  Goal: Promote skin healing  Outcome: Progressing     Problem: Fall/Injury  Goal: Not fall by end of shift  Outcome: Progressing  Goal: Be free from injury by end of the shift  Outcome: Progressing  Goal: Verbalize understanding of personal risk factors for fall in the hospital  Outcome: Progressing  Goal: Verbalize understanding of risk factor reduction measures to prevent injury from fall in the home  Outcome: Progressing  Goal: Use assistive devices by end of the shift  Outcome: Progressing  Goal: Pace activities to prevent fatigue by end of the shift  Outcome: Progressing     Problem: Respiratory  Goal: Minimal/no exertional discomfort or dyspnea this shift  Outcome: Progressing  Goal: Patent airway maintained this shift  Outcome: Progressing     Problem: Diabetes  Goal: Maintain glucose levels >70mg/dl to <250mg/dl throughout shift  Outcome: Progressing   The patient's goals for the shift include \"Ok ill wait here until my " "daughter comes in the morning\".    The clinical goals for the shift include safety    Over the shift, the patient did not make progress toward the following goals. Barriers to progression include none. Recommendations to address these barriers include none.    "

## 2025-03-21 ENCOUNTER — HOME HEALTH ADMISSION (OUTPATIENT)
Dept: HOME HEALTH SERVICES | Facility: HOME HEALTH | Age: 85
End: 2025-03-21
Payer: MEDICARE

## 2025-03-21 ENCOUNTER — DOCUMENTATION (OUTPATIENT)
Dept: HOME HEALTH SERVICES | Facility: HOME HEALTH | Age: 85
End: 2025-03-21
Payer: MEDICARE

## 2025-03-21 VITALS
WEIGHT: 104.28 LBS | BODY MASS INDEX: 20.47 KG/M2 | RESPIRATION RATE: 17 BRPM | OXYGEN SATURATION: 95 % | SYSTOLIC BLOOD PRESSURE: 104 MMHG | HEIGHT: 60 IN | TEMPERATURE: 98.2 F | HEART RATE: 66 BPM | DIASTOLIC BLOOD PRESSURE: 54 MMHG

## 2025-03-21 LAB
ANION GAP SERPL CALC-SCNC: 11 MMOL/L (ref 10–20)
ATRIAL RATE: 59 BPM
BUN SERPL-MCNC: 28 MG/DL (ref 6–23)
CALCIUM SERPL-MCNC: 9.4 MG/DL (ref 8.6–10.3)
CHLORIDE SERPL-SCNC: 102 MMOL/L (ref 98–107)
CO2 SERPL-SCNC: 30 MMOL/L (ref 21–32)
CREAT SERPL-MCNC: 0.76 MG/DL (ref 0.5–1.05)
EGFRCR SERPLBLD CKD-EPI 2021: 76 ML/MIN/1.73M*2
ERYTHROCYTE [DISTWIDTH] IN BLOOD BY AUTOMATED COUNT: 13.5 % (ref 11.5–14.5)
GLUCOSE SERPL-MCNC: 162 MG/DL (ref 74–99)
HCT VFR BLD AUTO: 41.3 % (ref 36–46)
HGB BLD-MCNC: 13.4 G/DL (ref 12–16)
MCH RBC QN AUTO: 29.3 PG (ref 26–34)
MCHC RBC AUTO-ENTMCNC: 32.4 G/DL (ref 32–36)
MCV RBC AUTO: 90 FL (ref 80–100)
NRBC BLD-RTO: 0 /100 WBCS (ref 0–0)
P AXIS: 20 DEGREES
P OFFSET: 195 MS
P ONSET: 143 MS
PLATELET # BLD AUTO: 248 X10*3/UL (ref 150–450)
POTASSIUM SERPL-SCNC: 3.8 MMOL/L (ref 3.5–5.3)
PR INTERVAL: 160 MS
Q ONSET: 223 MS
QRS COUNT: 9 BEATS
QRS DURATION: 74 MS
QT INTERVAL: 444 MS
QTC CALCULATION(BAZETT): 439 MS
QTC FREDERICIA: 441 MS
R AXIS: -5 DEGREES
RBC # BLD AUTO: 4.57 X10*6/UL (ref 4–5.2)
SODIUM SERPL-SCNC: 139 MMOL/L (ref 136–145)
T AXIS: 83 DEGREES
T OFFSET: 445 MS
VENTRICULAR RATE: 59 BPM
WBC # BLD AUTO: 20.5 X10*3/UL (ref 4.4–11.3)

## 2025-03-21 PROCEDURE — 2500000004 HC RX 250 GENERAL PHARMACY W/ HCPCS (ALT 636 FOR OP/ED): Mod: IPSPLIT | Performed by: STUDENT IN AN ORGANIZED HEALTH CARE EDUCATION/TRAINING PROGRAM

## 2025-03-21 PROCEDURE — 94760 N-INVAS EAR/PLS OXIMETRY 1: CPT | Mod: IPSPLIT

## 2025-03-21 PROCEDURE — 2500000002 HC RX 250 W HCPCS SELF ADMINISTERED DRUGS (ALT 637 FOR MEDICARE OP, ALT 636 FOR OP/ED): Mod: IPSPLIT | Performed by: STUDENT IN AN ORGANIZED HEALTH CARE EDUCATION/TRAINING PROGRAM

## 2025-03-21 PROCEDURE — 2500000001 HC RX 250 WO HCPCS SELF ADMINISTERED DRUGS (ALT 637 FOR MEDICARE OP): Mod: IPSPLIT | Performed by: STUDENT IN AN ORGANIZED HEALTH CARE EDUCATION/TRAINING PROGRAM

## 2025-03-21 PROCEDURE — 36415 COLL VENOUS BLD VENIPUNCTURE: CPT | Mod: IPSPLIT | Performed by: NURSE PRACTITIONER

## 2025-03-21 PROCEDURE — 80048 BASIC METABOLIC PNL TOTAL CA: CPT | Mod: IPSPLIT | Performed by: NURSE PRACTITIONER

## 2025-03-21 PROCEDURE — 2500000002 HC RX 250 W HCPCS SELF ADMINISTERED DRUGS (ALT 637 FOR MEDICARE OP, ALT 636 FOR OP/ED): Mod: IPSPLIT | Performed by: NURSE PRACTITIONER

## 2025-03-21 PROCEDURE — 85027 COMPLETE CBC AUTOMATED: CPT | Mod: IPSPLIT | Performed by: NURSE PRACTITIONER

## 2025-03-21 PROCEDURE — 2500000001 HC RX 250 WO HCPCS SELF ADMINISTERED DRUGS (ALT 637 FOR MEDICARE OP): Mod: IPSPLIT | Performed by: NURSE PRACTITIONER

## 2025-03-21 PROCEDURE — 2500000004 HC RX 250 GENERAL PHARMACY W/ HCPCS (ALT 636 FOR OP/ED): Mod: IPSPLIT | Performed by: NURSE PRACTITIONER

## 2025-03-21 RX ORDER — AZITHROMYCIN 250 MG/1
TABLET, FILM COATED ORAL
Qty: 6 TABLET | Refills: 0 | Status: SHIPPED | OUTPATIENT
Start: 2025-03-22

## 2025-03-21 RX ORDER — PREDNISONE 5 MG/1
TABLET ORAL
Qty: 7 TABLET | Refills: 0 | Status: SHIPPED | OUTPATIENT
Start: 2025-03-22 | End: 2025-03-31

## 2025-03-21 RX ORDER — CEFUROXIME AXETIL 500 MG/1
500 TABLET ORAL 2 TIMES DAILY
Qty: 9 TABLET | Refills: 0 | Status: SHIPPED | OUTPATIENT
Start: 2025-03-21 | End: 2025-03-26

## 2025-03-21 RX ADMIN — AMLODIPINE BESYLATE 5 MG: 5 TABLET ORAL at 08:39

## 2025-03-21 RX ADMIN — CEFUROXIME AXETIL 500 MG: 250 TABLET, FILM COATED ORAL at 08:38

## 2025-03-21 RX ADMIN — Medication 400 MG: at 08:39

## 2025-03-21 RX ADMIN — AZITHROMYCIN DIHYDRATE 500 MG: 250 TABLET, FILM COATED ORAL at 08:38

## 2025-03-21 RX ADMIN — APIXABAN 2.5 MG: 2.5 TABLET, FILM COATED ORAL at 08:39

## 2025-03-21 RX ADMIN — POTASSIUM CHLORIDE 10 MEQ: 750 TABLET, FILM COATED, EXTENDED RELEASE ORAL at 08:39

## 2025-03-21 RX ADMIN — PANTOPRAZOLE SODIUM 40 MG: 40 TABLET, DELAYED RELEASE ORAL at 06:20

## 2025-03-21 RX ADMIN — Medication 0.5 TABLET: at 08:39

## 2025-03-21 RX ADMIN — ATORVASTATIN CALCIUM 40 MG: 40 TABLET, FILM COATED ORAL at 08:39

## 2025-03-21 RX ADMIN — LEVOTHYROXINE SODIUM 75 MCG: 0.07 TABLET ORAL at 06:20

## 2025-03-21 RX ADMIN — PREDNISONE 40 MG: 20 TABLET ORAL at 08:38

## 2025-03-21 RX ADMIN — LISINOPRIL 20 MG: 20 TABLET ORAL at 08:38

## 2025-03-21 ASSESSMENT — PAIN SCALES - GENERAL: PAINLEVEL_OUTOF10: 0 - NO PAIN

## 2025-03-21 NOTE — CARE PLAN
"The patient's goals for the shift include \"Ok ill wait here until my daughter comes in the morning\".    The clinical goals for the shift include Patient will remain safe this shift.    Patient remained safe before she was discharged home with daughter.  "

## 2025-03-21 NOTE — HH CARE COORDINATION
Home Care received a Referral for Nursing and Physical Therapy. We have processed the referral for a Start of Care on 3/22-3/23.     If you have any questions or concerns, please feel free to contact us at [phone]. Follow the prompts, enter your five digit zip code, and you will be directed to your care team on [facility].

## 2025-03-21 NOTE — DISCHARGE INSTR - OTHER ORDERS
Thank you for choosing [facility] for your Health Care needs. As you transition from the hospital back to home, we hope we took your preferences into account on how you manage your health needs so you can manage your health at home.     You may receive a survey in the mail within the next couple weeks. Please take the time to complete it and return it. Your input is ALWAYS important to us. Thank you!  Your Care Transition Team - Emma Chicas Angie  & Ludin     For questions about your medications listed on your discharge instructions, please call the Nurses Station at [phone].

## 2025-03-21 NOTE — DISCHARGE SUMMARY
Discharge Diagnosis  Bronchiolitis    Discharge Meds     Medication List      START taking these medications     azithromycin 250 mg tablet; Commonly known as: Zithromax; Take 2 tabs   (500 mg) by mouth today, then take 1 tab daily for 4 days. Do not fill   before March 22, 2025.; Start taking on: March 22, 2025   cefuroxime 500 mg tablet; Commonly known as: Ceftin; Take 1 tablet (500   mg) by mouth 2 times a day for 9 doses.     CHANGE how you take these medications     * predniSONE 5 mg tablet; Commonly known as: Deltasone; What changed:   Another medication with the same name was added. Make sure you understand   how and when to take each.   * predniSONE 5 mg tablet; Commonly known as: Deltasone; Take 4 tablets   (20 mg) by mouth once daily for 1 day, THEN 2 tablets (10 mg) once daily   for 1 day, THEN 1 tablet (5 mg) once daily for 1 day. Do not fill before   March 22, 2025.; Start taking on: March 22, 2025; What changed: You were   already taking a medication with the same name, and this prescription was   added. Make sure you understand how and when to take each.  * This list has 2 medication(s) that are the same as other medications   prescribed for you. Read the directions carefully, and ask your doctor or   other care provider to review them with you.     CONTINUE taking these medications     amLODIPine 5 mg tablet; Commonly known as: Norvasc   atorvastatin 40 mg tablet; Commonly known as: Lipitor   Eliquis 2.5 mg tablet; Generic drug: apixaban; Take 1 tablet (2.5 mg) by   mouth 2 times a day.   Fish OiL 1,000 (120-180) mg capsule; Generic drug: omega 3-dha-epa-fish   oil   levothyroxine 75 mcg tablet; Commonly known as: Synthroid, Levoxyl; Take   1 tablet (75 mcg) by mouth early in the morning. Take on an empty stomach   at the same time each day, either 30 to 60 minutes prior to breakfast   magnesium oxide 400 mg (241.3 mg magnesium) tablet; Commonly known as:   Mag-Ox   metoprolol tartrate 25 mg tablet;  "Commonly known as: Lopressor; Take 1   tablet (25 mg) by mouth once daily at bedtime.   multivitamin tablet   oxygen gas therapy; Commonly known as: O2; Inhale 1 each once every 24   hours. Wear nightly during sleep hours   potassium chloride CR 20 mEq ER tablet; Commonly known as: Klor-Con M20   ramipril 10 mg capsule; Commonly known as: Altace   traZODone 50 mg tablet; Commonly known as: Desyrel   Vitamin D3 25 MCG (1000 UT) capsule; Generic drug: cholecalciferol     STOP taking these medications     acetaminophen 325 mg tablet; Commonly known as: Tylenol       Test Results Pending At Discharge  Pending Labs       Order Current Status    Blood Culture Preliminary result    Blood Culture Preliminary result            Hospital Course   HPI: \"Maisha Agosto is a 84 y.o. female presenting with a complaint of shaking. She is from home; has a hx of dementia. Her daughter felt she was more confused and staring off in space. Patient is a poor historian; history obtained from the ED note and old charts.\"    Maisha was admitted to Medicine and treated for acute respiratory failure and bronchiolitis.  She was given antibiotics, steroids and bronchodilators.  She also had an episode of bradycardia and was seen by cardiology.   Chronic medical conditions were also addressed and monitored. PT/OT evals were done. Labs were closely monitored.  She was discharged in stable condition with the above medication changes and/or additions. Recommendations were made to follow up with pt's PCP in 1-2 weeks.   See full inpatient plan below.     Problem list:  Acute Respiratory failure with hypoxia  Bronchiolitis   - SpO2 86% on RA  - supplemental oxygen to keep SpO2 > 90%  - currently on 2lpm via NC  - baseline is RA  - started azithromycin 500 mg daily and ceftriaxone 2 g daily; day 3, transition to oral Ceftin  - started solumedrol 40 mg q8h, transition to oral daily  - albuterol 2.5 mg q2h; prn  - RT for bronchial hygiene  - CT chest: No " alveolar consolidation or effusions are identified. There are scattered areas of bronchiectasis with associated mucous plugging along with scattered areas of tree-in-bud nodular infiltrate likely representing bronchiolitis. Stable 2 x 7 mm nodule right upper lobe laterally.     - blood cultures NGTD  -on room air  --- Discharge home with the above medication changes or additions  --- Follow-up with PCP as needed     Bradycardia  Paroxysmal Atrial Fibrillation  Essential HTN  Dyslipidemia  - HR dropped to 27 during the night  - continue amlodipine 5 mg daily, apixaban 2.5 mg BID, atorvastatin 40 mg daily, lisinopril 20 mg daily  - discontinue metoprolol    - cardiac monitoring via telemetry[  - cardiology consult  - monitor BP and HR  -Metoprolol added back by cardiology, continue to monitor until tomorrow  --- Continue metoprolol and other chronic medications  --- Follow-up with cardiology     Hypothyroidism  Hypomagnesia  Anxiety and Depression  --- Continue chronic medical therapies  --- Follow-up as needed with PCP    Pertinent Physical Exam At Time of Discharge  Physical Exam  Constitutional:       General: She is not in acute distress.     Appearance: Normal appearance. She is not toxic-appearing.   HENT:      Head: Normocephalic and atraumatic.      Mouth/Throat:      Mouth: Mucous membranes are moist.   Eyes:      Extraocular Movements: Extraocular movements intact.      Pupils: Pupils are equal, round, and reactive to light.   Cardiovascular:      Rate and Rhythm: Normal rate and regular rhythm.      Pulses: Normal pulses.      Heart sounds: Normal heart sounds. No murmur heard.     No gallop.   Pulmonary:      Effort: Pulmonary effort is normal. No respiratory distress.      Breath sounds: Wheezing present. No rhonchi or rales.   Abdominal:      General: Bowel sounds are normal. There is no distension.      Palpations: Abdomen is soft.      Tenderness: There is no abdominal tenderness. There is no guarding  or rebound.   Musculoskeletal:         General: No swelling, tenderness, deformity or signs of injury. Normal range of motion.      Cervical back: Normal range of motion and neck supple.   Skin:     General: Skin is warm and dry.      Capillary Refill: Capillary refill takes less than 2 seconds.      Coloration: Skin is not jaundiced.      Findings: No bruising or rash.   Neurological:      General: No focal deficit present.      Mental Status: She is alert. Mental status is at baseline.      Cranial Nerves: No cranial nerve deficit.      Sensory: No sensory deficit.      Motor: No weakness.      Gait: Gait normal.   Psychiatric:         Mood and Affect: Mood normal.         Behavior: Behavior normal.     Patient seen by Dr Marie on day of discharge.  Stable for discharge to home.  Total cumulative time spent in preparation of this discharge including documentation review, coordination of care with the medical team including PT/SW/care coordinators and treating consultants, discussion with patient and pertinent family members and finalization of prescriptions, follow-up appointments, and this discharge summary was approximately 45 minutes.    Outpatient Follow-Up  Future Appointments   Date Time Provider Department [place]   4/22/2025  3:30 PM Mariah Schaeffer DPM [id number]         Ekta Martin APRN-CNP

## 2025-03-21 NOTE — PROGRESS NOTES
03/21/25 0836   Discharge Planning   Living Arrangements Children   Support Systems Children;Home care staff   Assistance Needed Patient confused, history obtained from daughter Eli. Patient lives with Eli and son in law. When daughter works, she has 2 girls that come into the house and assist patient due to her dementia. She is independent with ADL's, daughter states she needs supervision so help eliminate falls. Patient uses a walker for ambulation. Does not wear oxygen at home. No other assistance in the house beside the private duty.   Type of Residence Private residence   Number of Stairs to Enter Residence 2   Number of Stairs Within Residence 0   Do you have animals or pets at home? Yes   Type of Animals or Pets Dog  Husky   Who is requesting discharge planning? Provider   Home or Post Acute Services In home services   Type of Home Care Services Home health aide;Home nursing visits;Home PT   Expected Discharge Disposition   ([facility] is [facility] notified of expected referral. Provider aware of need for internal home care referral. [facility] will follow.)   Does the patient need discharge transport arranged? No   Stroke Family Assessment   Stroke Family Assessment Needed No   Intensity of Service   Intensity of Service >30 min        03/21/25 1024   Discharge Planning   Expected Discharge Disposition Home H  ([facility] SOC within 48 hours verified.)

## 2025-03-23 LAB
BACTERIA BLD CULT: NORMAL
BACTERIA BLD CULT: NORMAL

## 2025-03-26 ENCOUNTER — HOME CARE VISIT (OUTPATIENT)
Dept: HOME HEALTH SERVICES | Facility: HOME HEALTH | Age: 85
End: 2025-03-26
Payer: MEDICARE

## 2025-03-26 VITALS
WEIGHT: 98 LBS | DIASTOLIC BLOOD PRESSURE: 70 MMHG | TEMPERATURE: 97 F | BODY MASS INDEX: 18.03 KG/M2 | HEIGHT: 62 IN | RESPIRATION RATE: 16 BRPM | OXYGEN SATURATION: 97 % | HEART RATE: 64 BPM | SYSTOLIC BLOOD PRESSURE: 124 MMHG

## 2025-03-26 PROCEDURE — 169592 NO-PAY CLAIM PROCEDURE

## 2025-03-26 PROCEDURE — G0299 HHS/HOSPICE OF RN EA 15 MIN: HCPCS | Mod: HHH

## 2025-03-26 SDOH — ECONOMIC STABILITY: FOOD INSECURITY: MEALS PER DAY: 3

## 2025-03-26 ASSESSMENT — ACTIVITIES OF DAILY LIVING (ADL)
ORAL_CARE_CURRENT_FUNCTION: NEEDS ASSISTANCE
AMBULATION ASSISTANCE: 1
SHOPPING ASSESSED: 1
GROOMING ASSESSED: 1
BATHING_CURRENT_FUNCTION: MAXIMUM ASSIST
LIGHT HOUSEKEEPING: DEPENDENT
TOILETING: MODERATE ASSIST
ORAL_CARE_ASSESSED: 1
TRANSPORTATION ASSESSED: 1
TELEPHONE USE ASSESSED: 1
LAUNDRY: DEPENDENT
DRESSING_LB_CURRENT_FUNCTION: MAXIMUM ASSIST
OASIS_M1830: 03
SHOPPING: DEPENDENT
USING THE TELPHONE: DEPENDENT
FEEDING: SUPERVISION
BATHING ASSESSED: 1
FEEDING ASSESSED: 1
ENTERING_EXITING_HOME: MAXIMUM ASSIST
AMBULATION ASSISTANCE: STAND BY ASSIST
GROOMING_CURRENT_FUNCTION: MODERATE ASSIST
TRANSPORTATION: DEPENDENT
PREPARING MEALS: DEPENDENT
DRESSING_UB_CURRENT_FUNCTION: STAND BY ASSIST
CURRENT_FUNCTION: MAXIMUM ASSIST
LAUNDRY ASSESSED: 1
TOILETING: 1
PHYSICAL TRANSFERS ASSESSED: 1
HOUSEKEEPING ASSESSED: 1

## 2025-03-26 ASSESSMENT — ENCOUNTER SYMPTOMS
DESCRIPTION OF MEMORY LOSS: SHORT TERM
DYSPNEA ACTIVITY LEVEL: AFTER AMBULATING 10 - 20 FT
DRY SKIN: 1
ARTHRALGIAS: 1
HYPERTENSION: 1
APPETITE LEVEL: GOOD
DEPRESSION: 0
TREMORS: 1
LOWER EXTREMITY EDEMA: 1
MUSCLE WEAKNESS: 1
PERSON REPORTING PAIN: PATIENT
STOOL FREQUENCY: DAILY
SHORTNESS OF BREATH: 1
DENIES PAIN: 1
OCCASIONAL FEELINGS OF UNSTEADINESS: 1
LOSS OF SENSATION IN FEET: 0

## 2025-03-26 ASSESSMENT — PAIN SCALES - PAIN ASSESSMENT IN ADVANCED DEMENTIA (PAINAD)
CONSOLABILITY: 0 - NO NEED TO CONSOLE.
TOTALSCORE: 0
BODYLANGUAGE: 0 - RELAXED.
FACIALEXPRESSION: 0
NEGVOCALIZATION: 0
FACIALEXPRESSION: 0 - SMILING OR INEXPRESSIVE.
BREATHING: 0
CONSOLABILITY: 0
BODYLANGUAGE: 0
NEGVOCALIZATION: 0 - NONE.

## 2025-03-27 ENCOUNTER — HOME CARE VISIT (OUTPATIENT)
Dept: HOME HEALTH SERVICES | Facility: HOME HEALTH | Age: 85
End: 2025-03-27
Payer: MEDICARE

## 2025-03-27 VITALS
TEMPERATURE: 97 F | HEART RATE: 60 BPM | RESPIRATION RATE: 18 BRPM | OXYGEN SATURATION: 98 % | DIASTOLIC BLOOD PRESSURE: 70 MMHG | SYSTOLIC BLOOD PRESSURE: 122 MMHG

## 2025-03-27 PROCEDURE — G0151 HHCP-SERV OF PT,EA 15 MIN: HCPCS | Mod: HHH

## 2025-03-27 ASSESSMENT — ENCOUNTER SYMPTOMS
OCCASIONAL FEELINGS OF UNSTEADINESS: 1
PERSON REPORTING PAIN: PATIENT
DENIES PAIN: 1

## 2025-03-27 ASSESSMENT — ACTIVITIES OF DAILY LIVING (ADL): AMBULATION ASSISTANCE ON FLAT SURFACES: 1

## 2025-03-27 NOTE — CASE COMMUNICATION
patient evaluated for PT services per referral s/p COPD with pt requiring PT services to address deficits in strength, balance, and functional mobility safety with pt agreeable to PT services. pt also requires developmentation and implementation of safe and appropriate HEP
Statement Selected

## 2025-04-02 ENCOUNTER — HOME CARE VISIT (OUTPATIENT)
Dept: HOME HEALTH SERVICES | Facility: HOME HEALTH | Age: 85
End: 2025-04-02
Payer: MEDICARE

## 2025-04-02 VITALS — HEART RATE: 54 BPM | OXYGEN SATURATION: 98 % | TEMPERATURE: 97.4 F

## 2025-04-02 PROCEDURE — G0157 HHC PT ASSISTANT EA 15: HCPCS | Mod: CQ,HHH

## 2025-04-02 ASSESSMENT — ENCOUNTER SYMPTOMS
PERSON REPORTING PAIN: PATIENT
DENIES PAIN: 1

## 2025-04-04 ENCOUNTER — HOME CARE VISIT (OUTPATIENT)
Dept: HOME HEALTH SERVICES | Facility: HOME HEALTH | Age: 85
End: 2025-04-04
Payer: MEDICARE

## 2025-04-04 VITALS
RESPIRATION RATE: 18 BRPM | SYSTOLIC BLOOD PRESSURE: 132 MMHG | OXYGEN SATURATION: 97 % | TEMPERATURE: 97.2 F | DIASTOLIC BLOOD PRESSURE: 62 MMHG | HEART RATE: 64 BPM

## 2025-04-04 VITALS
TEMPERATURE: 97.2 F | HEART RATE: 64 BPM | RESPIRATION RATE: 18 BRPM | OXYGEN SATURATION: 97 % | DIASTOLIC BLOOD PRESSURE: 62 MMHG | SYSTOLIC BLOOD PRESSURE: 132 MMHG

## 2025-04-04 PROCEDURE — G0300 HHS/HOSPICE OF LPN EA 15 MIN: HCPCS | Mod: HHH

## 2025-04-04 PROCEDURE — G0157 HHC PT ASSISTANT EA 15: HCPCS | Mod: CQ,HHH

## 2025-04-04 ASSESSMENT — PAIN SCALES - PAIN ASSESSMENT IN ADVANCED DEMENTIA (PAINAD)
FACIALEXPRESSION: 0 - SMILING OR INEXPRESSIVE.
NEGVOCALIZATION: 0 - NONE.
BREATHING: 0
BODYLANGUAGE: 0 - RELAXED.
NEGVOCALIZATION: 0
BODYLANGUAGE: 0
CONSOLABILITY: 0
TOTALSCORE: 0
CONSOLABILITY: 0 - NO NEED TO CONSOLE.
FACIALEXPRESSION: 0

## 2025-04-04 ASSESSMENT — ENCOUNTER SYMPTOMS
CHANGE IN APPETITE: UNCHANGED
LAST BOWEL MOVEMENT: 67299
PERSON REPORTING PAIN: PATIENT
STOOL FREQUENCY: DAILY
DENIES PAIN: 1
APPETITE LEVEL: GOOD
DENIES PAIN: 1

## 2025-04-07 ENCOUNTER — HOME CARE VISIT (OUTPATIENT)
Dept: HOME HEALTH SERVICES | Facility: HOME HEALTH | Age: 85
End: 2025-04-07
Payer: MEDICARE

## 2025-04-07 VITALS
SYSTOLIC BLOOD PRESSURE: 120 MMHG | RESPIRATION RATE: 18 BRPM | OXYGEN SATURATION: 97 % | HEART RATE: 57 BPM | DIASTOLIC BLOOD PRESSURE: 65 MMHG | TEMPERATURE: 98.5 F

## 2025-04-07 PROCEDURE — G0157 HHC PT ASSISTANT EA 15: HCPCS | Mod: CQ,HHH

## 2025-04-07 ASSESSMENT — ENCOUNTER SYMPTOMS: DENIES PAIN: 1

## 2025-04-09 ENCOUNTER — HOME CARE VISIT (OUTPATIENT)
Dept: HOME HEALTH SERVICES | Facility: HOME HEALTH | Age: 85
End: 2025-04-09
Payer: MEDICARE

## 2025-04-09 VITALS
DIASTOLIC BLOOD PRESSURE: 65 MMHG | HEART RATE: 58 BPM | RESPIRATION RATE: 18 BRPM | OXYGEN SATURATION: 98 % | TEMPERATURE: 99.4 F | SYSTOLIC BLOOD PRESSURE: 125 MMHG

## 2025-04-09 PROCEDURE — G0157 HHC PT ASSISTANT EA 15: HCPCS | Mod: CQ,HHH

## 2025-04-09 ASSESSMENT — ENCOUNTER SYMPTOMS: DENIES PAIN: 1

## 2025-04-11 ENCOUNTER — HOME CARE VISIT (OUTPATIENT)
Dept: HOME HEALTH SERVICES | Facility: HOME HEALTH | Age: 85
End: 2025-04-11
Payer: MEDICARE

## 2025-04-11 VITALS
HEART RATE: 60 BPM | RESPIRATION RATE: 18 BRPM | TEMPERATURE: 97.6 F | OXYGEN SATURATION: 96 % | DIASTOLIC BLOOD PRESSURE: 60 MMHG | SYSTOLIC BLOOD PRESSURE: 106 MMHG

## 2025-04-11 PROCEDURE — G0300 HHS/HOSPICE OF LPN EA 15 MIN: HCPCS | Mod: HHH

## 2025-04-11 SDOH — ECONOMIC STABILITY: GENERAL

## 2025-04-11 ASSESSMENT — PAIN SCALES - PAIN ASSESSMENT IN ADVANCED DEMENTIA (PAINAD)
CONSOLABILITY: 0 - NO NEED TO CONSOLE.
TOTALSCORE: 0
CONSOLABILITY: 0
NEGVOCALIZATION: 0 - NONE.
NEGVOCALIZATION: 0
BREATHING: 0
FACIALEXPRESSION: 0
FACIALEXPRESSION: 0 - SMILING OR INEXPRESSIVE.
BODYLANGUAGE: 0
BODYLANGUAGE: 0 - RELAXED.

## 2025-04-11 ASSESSMENT — ENCOUNTER SYMPTOMS
BOWEL PATTERN NORMAL: 1
STOOL FREQUENCY: DAILY
APPETITE LEVEL: GOOD
DENIES PAIN: 1
LAST BOWEL MOVEMENT: 67306
PERSON REPORTING PAIN: PATIENT
CHANGE IN APPETITE: UNCHANGED

## 2025-04-11 ASSESSMENT — ACTIVITIES OF DAILY LIVING (ADL): MONEY MANAGEMENT (EXPENSES/BILLS): INDEPENDENT

## 2025-04-14 ENCOUNTER — HOME CARE VISIT (OUTPATIENT)
Dept: HOME HEALTH SERVICES | Facility: HOME HEALTH | Age: 85
End: 2025-04-14
Payer: MEDICARE

## 2025-04-14 VITALS
RESPIRATION RATE: 18 BRPM | OXYGEN SATURATION: 96 % | TEMPERATURE: 98.9 F | SYSTOLIC BLOOD PRESSURE: 118 MMHG | HEART RATE: 69 BPM | DIASTOLIC BLOOD PRESSURE: 65 MMHG

## 2025-04-14 PROCEDURE — G0157 HHC PT ASSISTANT EA 15: HCPCS | Mod: CQ,HHH

## 2025-04-14 ASSESSMENT — ENCOUNTER SYMPTOMS: DENIES PAIN: 1

## 2025-04-17 ENCOUNTER — HOME CARE VISIT (OUTPATIENT)
Dept: HOME HEALTH SERVICES | Facility: HOME HEALTH | Age: 85
End: 2025-04-17
Payer: MEDICARE

## 2025-04-17 VITALS
HEART RATE: 68 BPM | OXYGEN SATURATION: 96 % | RESPIRATION RATE: 18 BRPM | TEMPERATURE: 97.2 F | DIASTOLIC BLOOD PRESSURE: 60 MMHG | SYSTOLIC BLOOD PRESSURE: 120 MMHG

## 2025-04-17 PROCEDURE — G0151 HHCP-SERV OF PT,EA 15 MIN: HCPCS | Mod: HHH

## 2025-04-17 SDOH — HEALTH STABILITY: PHYSICAL HEALTH: EXERCISE TYPE: HEP

## 2025-04-17 ASSESSMENT — ENCOUNTER SYMPTOMS
PERSON REPORTING PAIN: PATIENT
DENIES PAIN: 1

## 2025-04-17 NOTE — Clinical Note
Thank you.  I will be discharging her on Tuesday  ----- Message -----  From: Thalia Henderson, PT  Sent: 4/17/2025  12:40 PM EDT  To: ELIO Jerry-VICTOR HUGO; Yareli Henderson, *      patient is discharged from PT services and remains with nsg at the time of dc. pt is agreeable to dc at this time. pt denies any pain or any changes since last visit. pt is discharged from PT services

## 2025-04-17 NOTE — CASE COMMUNICATION
patient is discharged from PT services and remains with nsg at the time of dc. pt is agreeable to dc at this time. pt denies any pain or any changes since last visit. pt is discharged from PT services

## 2025-04-18 ENCOUNTER — HOME CARE VISIT (OUTPATIENT)
Dept: HOME HEALTH SERVICES | Facility: HOME HEALTH | Age: 85
End: 2025-04-18
Payer: MEDICARE

## 2025-04-22 ENCOUNTER — HOME CARE VISIT (OUTPATIENT)
Dept: HOME HEALTH SERVICES | Facility: HOME HEALTH | Age: 85
End: 2025-04-22
Payer: MEDICARE

## 2025-04-22 ENCOUNTER — APPOINTMENT (OUTPATIENT)
Dept: PODIATRY | Facility: CLINIC | Age: 85
End: 2025-04-22
Payer: MEDICARE

## 2025-04-22 VITALS
DIASTOLIC BLOOD PRESSURE: 70 MMHG | TEMPERATURE: 97.5 F | OXYGEN SATURATION: 96 % | SYSTOLIC BLOOD PRESSURE: 140 MMHG | HEART RATE: 60 BPM | RESPIRATION RATE: 16 BRPM

## 2025-04-22 DIAGNOSIS — M79.671 PAIN IN BOTH FEET: ICD-10-CM

## 2025-04-22 DIAGNOSIS — I73.9 PVD (PERIPHERAL VASCULAR DISEASE) (CMS-HCC): ICD-10-CM

## 2025-04-22 DIAGNOSIS — R80.9 CONTROLLED TYPE 2 DIABETES MELLITUS WITH MICROALBUMINURIA, WITHOUT LONG-TERM CURRENT USE OF INSULIN (MULTI): Primary | ICD-10-CM

## 2025-04-22 DIAGNOSIS — E11.29 CONTROLLED TYPE 2 DIABETES MELLITUS WITH MICROALBUMINURIA, WITHOUT LONG-TERM CURRENT USE OF INSULIN (MULTI): Primary | ICD-10-CM

## 2025-04-22 DIAGNOSIS — B35.1 ONYCHOMYCOSIS: ICD-10-CM

## 2025-04-22 DIAGNOSIS — M79.672 PAIN IN BOTH FEET: ICD-10-CM

## 2025-04-22 PROCEDURE — G0299 HHS/HOSPICE OF RN EA 15 MIN: HCPCS | Mod: HHH

## 2025-04-22 PROCEDURE — 99212 OFFICE O/P EST SF 10 MIN: CPT | Performed by: PODIATRIST

## 2025-04-22 ASSESSMENT — PAIN SCALES - PAIN ASSESSMENT IN ADVANCED DEMENTIA (PAINAD)
BODYLANGUAGE: 0
NEGVOCALIZATION: 0
NEGVOCALIZATION: 0 - NONE.
BODYLANGUAGE: 0 - RELAXED.
CONSOLABILITY: 0 - NO NEED TO CONSOLE.
TOTALSCORE: 0
BREATHING: 0
CONSOLABILITY: 0
FACIALEXPRESSION: 0
FACIALEXPRESSION: 0 - SMILING OR INEXPRESSIVE.

## 2025-04-22 ASSESSMENT — ENCOUNTER SYMPTOMS
PERSON REPORTING PAIN: PATIENT
DENIES PAIN: 1

## 2025-04-22 ASSESSMENT — ACTIVITIES OF DAILY LIVING (ADL)
HOME_HEALTH_OASIS: 01
OASIS_M1830: 02

## 2025-04-22 NOTE — PROGRESS NOTES
History of Present Illness:   Patient states they are here for Dm exam  Denies NTB to feet  Most recent A1C is 6.3 - Jan 2025  Unable to safely trim nails on own    Presents with daughter, assists in history and     Last visit Jan 2025      Past Medical History  Past Medical History:   Diagnosis Date    Bowel perforation (Multi) 06/05/2024    Clostridium difficile colitis     Colitis due to Clostridioides difficile 05/16/2024    Heart murmur     Hypernatremia     Terminal ileitis with complication (Multi) 06/06/2024    UTI (urinary tract infection) 05/07/2024       Medications and Allergies have been reviewed.    Review Of Systems:  GENERAL: No weight loss, malaise or fevers.  HEENT: Negative for frequent or significant headaches,   RESPIRATORY: Negative for cough, wheezing or shortness of breath.  CARDIOVASCULAR: Negative for chest pain, leg swelling or palpitations.    Physical Exam:  Vascular exam: Dorsalis pedis and Posterior Tibial pulses palpable 1/4 bilateral. Capillary refill time less than 3 seconds b/l. No Hair growth noted to digits. +1 bl LE edema noted. Skin temp warm to warm from proximal to distal b/l. + varicosities noted.     Neurologic exam: Vibratory, protective and proprioception intact b/l. No neurologic deficits noted.      Musculoskeletal exam: Muscle strength 5/5 for all major muscle groups tested in the lower extremity b/l. +Ra, lateral deviation noted of digits     Dermatologic exam: Nails 1-5 b/l are discolored and thick. Non elongated Webspaces 1-4 b/l are clean, dry and intact. No primary or secondary skin lesions noted. No open lesions or wounds noted at this time .    1. Controlled type 2 diabetes mellitus with microalbuminuria, without long-term current use of insulin (Multi)        2. PVD (peripheral vascular disease) (CMS-[facility])        3. Pain in both feet          Patient educated on proper diabetic foot care.  Nails 1-5 b/l were debrided in thickness and length with nail cutting  forceps.  A1C revd. 6.3 Jan 2025  All hpk tissue was debrided to smooth skin - left sub 3rd. No open wound noted  Patient to follow up in 6 mos or sooner if any problems arise.   Patient was in agreement to this plan. All questions answered.      PCP Dr. aVlente Last visit 4/16/2025    Mariah Schaeffer DPM  [phone]  Option 2  Fax: [phone]

## 2025-05-07 LAB
ATRIAL RATE: 52 BPM
P AXIS: 50 DEGREES
P OFFSET: 186 MS
P ONSET: 131 MS
PR INTERVAL: 176 MS
Q ONSET: 219 MS
QRS COUNT: 8 BEATS
QRS DURATION: 90 MS
QT INTERVAL: 456 MS
QTC CALCULATION(BAZETT): 424 MS
QTC FREDERICIA: 434 MS
R AXIS: 10 DEGREES
T AXIS: 84 DEGREES
T OFFSET: 447 MS
VENTRICULAR RATE: 52 BPM

## 2025-05-30 ENCOUNTER — HOSPITAL ENCOUNTER (OUTPATIENT)
Dept: RADIOLOGY | Facility: HOSPITAL | Age: 85
Discharge: HOME | End: 2025-05-30
Payer: MEDICARE

## 2025-05-30 DIAGNOSIS — Z78.0 ASYMPTOMATIC MENOPAUSAL STATE: ICD-10-CM

## 2025-05-30 PROCEDURE — 77085 DXA BONE DENSITY AXL VRT FX: CPT

## 2025-06-04 ENCOUNTER — HOSPITAL ENCOUNTER (INPATIENT)
Facility: HOSPITAL | Age: 85
End: 2025-06-04
Attending: EMERGENCY MEDICINE | Admitting: INTERNAL MEDICINE
Payer: MEDICARE

## 2025-06-04 ENCOUNTER — APPOINTMENT (OUTPATIENT)
Dept: CARDIOLOGY | Facility: HOSPITAL | Age: 85
DRG: 291 | End: 2025-06-04
Payer: MEDICARE

## 2025-06-04 ENCOUNTER — APPOINTMENT (OUTPATIENT)
Dept: RADIOLOGY | Facility: HOSPITAL | Age: 85
DRG: 291 | End: 2025-06-04
Payer: MEDICARE

## 2025-06-04 DIAGNOSIS — R53.1 GENERALIZED WEAKNESS: ICD-10-CM

## 2025-06-04 DIAGNOSIS — I50.9 ACUTE CONGESTIVE HEART FAILURE, UNSPECIFIED HEART FAILURE TYPE: Primary | ICD-10-CM

## 2025-06-04 DIAGNOSIS — J44.1 ACUTE EXACERBATION OF COPD WITH ASTHMA: ICD-10-CM

## 2025-06-04 LAB
ALBUMIN SERPL BCP-MCNC: 3.8 G/DL (ref 3.4–5)
ALP SERPL-CCNC: 54 U/L (ref 33–136)
ALT SERPL W P-5'-P-CCNC: 15 U/L (ref 7–45)
ANION GAP SERPL CALC-SCNC: 9 MMOL/L (ref 10–20)
APPEARANCE UR: CLEAR
AST SERPL W P-5'-P-CCNC: 17 U/L (ref 9–39)
BASOPHILS # BLD AUTO: 0.04 X10*3/UL (ref 0–0.1)
BASOPHILS NFR BLD AUTO: 0.3 %
BILIRUB SERPL-MCNC: 0.7 MG/DL (ref 0–1.2)
BILIRUB UR STRIP.AUTO-MCNC: NEGATIVE MG/DL
BNP SERPL-MCNC: 388 PG/ML (ref 0–99)
BUN SERPL-MCNC: 22 MG/DL (ref 6–23)
CALCIUM SERPL-MCNC: 9.4 MG/DL (ref 8.6–10.3)
CARDIAC TROPONIN I PNL SERPL HS: 11 NG/L (ref 0–13)
CHLORIDE SERPL-SCNC: 105 MMOL/L (ref 98–107)
CO2 SERPL-SCNC: 30 MMOL/L (ref 21–32)
COLOR UR: ABNORMAL
CREAT SERPL-MCNC: 0.62 MG/DL (ref 0.5–1.05)
EGFRCR SERPLBLD CKD-EPI 2021: 87 ML/MIN/1.73M*2
EOSINOPHIL # BLD AUTO: 0.06 X10*3/UL (ref 0–0.4)
EOSINOPHIL NFR BLD AUTO: 0.5 %
ERYTHROCYTE [DISTWIDTH] IN BLOOD BY AUTOMATED COUNT: 13 % (ref 11.5–14.5)
FLUAV RNA RESP QL NAA+PROBE: NOT DETECTED
FLUBV RNA RESP QL NAA+PROBE: NOT DETECTED
GLUCOSE SERPL-MCNC: 170 MG/DL (ref 74–99)
GLUCOSE UR STRIP.AUTO-MCNC: ABNORMAL MG/DL
HCT VFR BLD AUTO: 37.2 % (ref 36–46)
HGB BLD-MCNC: 12 G/DL (ref 12–16)
IMM GRANULOCYTES # BLD AUTO: 0.05 X10*3/UL (ref 0–0.5)
IMM GRANULOCYTES NFR BLD AUTO: 0.4 % (ref 0–0.9)
KETONES UR STRIP.AUTO-MCNC: NEGATIVE MG/DL
LACTATE SERPL-SCNC: 0.8 MMOL/L (ref 0.4–2)
LEUKOCYTE ESTERASE UR QL STRIP.AUTO: NEGATIVE
LIPASE SERPL-CCNC: 16 U/L (ref 9–82)
LYMPHOCYTES # BLD AUTO: 1.43 X10*3/UL (ref 0.8–3)
LYMPHOCYTES NFR BLD AUTO: 11.2 %
MAGNESIUM SERPL-MCNC: 1.98 MG/DL (ref 1.6–2.4)
MCH RBC QN AUTO: 29.9 PG (ref 26–34)
MCHC RBC AUTO-ENTMCNC: 32.3 G/DL (ref 32–36)
MCV RBC AUTO: 93 FL (ref 80–100)
MONOCYTES # BLD AUTO: 1.28 X10*3/UL (ref 0.05–0.8)
MONOCYTES NFR BLD AUTO: 10.1 %
MUCOUS THREADS #/AREA URNS AUTO: ABNORMAL /LPF
NEUTROPHILS # BLD AUTO: 9.86 X10*3/UL (ref 1.6–5.5)
NEUTROPHILS NFR BLD AUTO: 77.5 %
NITRITE UR QL STRIP.AUTO: NEGATIVE
NRBC BLD-RTO: 0 /100 WBCS (ref 0–0)
PH UR STRIP.AUTO: 7.5 [PH]
PLATELET # BLD AUTO: 181 X10*3/UL (ref 150–450)
POTASSIUM SERPL-SCNC: 3.5 MMOL/L (ref 3.5–5.3)
PROT SERPL-MCNC: 6.6 G/DL (ref 6.4–8.2)
PROT UR STRIP.AUTO-MCNC: NEGATIVE MG/DL
RBC # BLD AUTO: 4.01 X10*6/UL (ref 4–5.2)
RBC # UR STRIP.AUTO: ABNORMAL MG/DL
RBC #/AREA URNS AUTO: >20 /HPF
RSV RNA RESP QL NAA+PROBE: NOT DETECTED
SARS-COV-2 RNA RESP QL NAA+PROBE: NOT DETECTED
SODIUM SERPL-SCNC: 140 MMOL/L (ref 136–145)
SP GR UR STRIP.AUTO: 1.01
UROBILINOGEN UR STRIP.AUTO-MCNC: NORMAL MG/DL
WBC # BLD AUTO: 12.7 X10*3/UL (ref 4.4–11.3)
WBC #/AREA URNS AUTO: ABNORMAL /HPF

## 2025-06-04 PROCEDURE — 80053 COMPREHEN METABOLIC PANEL: CPT | Performed by: EMERGENCY MEDICINE

## 2025-06-04 PROCEDURE — 87637 SARSCOV2&INF A&B&RSV AMP PRB: CPT | Performed by: EMERGENCY MEDICINE

## 2025-06-04 PROCEDURE — 71045 X-RAY EXAM CHEST 1 VIEW: CPT

## 2025-06-04 PROCEDURE — 84484 ASSAY OF TROPONIN QUANT: CPT | Performed by: EMERGENCY MEDICINE

## 2025-06-04 PROCEDURE — 83880 ASSAY OF NATRIURETIC PEPTIDE: CPT | Performed by: EMERGENCY MEDICINE

## 2025-06-04 PROCEDURE — 83605 ASSAY OF LACTIC ACID: CPT | Performed by: EMERGENCY MEDICINE

## 2025-06-04 PROCEDURE — 81001 URINALYSIS AUTO W/SCOPE: CPT | Performed by: EMERGENCY MEDICINE

## 2025-06-04 PROCEDURE — 83735 ASSAY OF MAGNESIUM: CPT | Performed by: EMERGENCY MEDICINE

## 2025-06-04 PROCEDURE — 71045 X-RAY EXAM CHEST 1 VIEW: CPT | Performed by: RADIOLOGY

## 2025-06-04 PROCEDURE — 99285 EMERGENCY DEPT VISIT HI MDM: CPT | Performed by: EMERGENCY MEDICINE

## 2025-06-04 PROCEDURE — 2500000005 HC RX 250 GENERAL PHARMACY W/O HCPCS: Performed by: EMERGENCY MEDICINE

## 2025-06-04 PROCEDURE — 85025 COMPLETE CBC W/AUTO DIFF WBC: CPT | Performed by: EMERGENCY MEDICINE

## 2025-06-04 PROCEDURE — 93005 ELECTROCARDIOGRAM TRACING: CPT

## 2025-06-04 PROCEDURE — 99285 EMERGENCY DEPT VISIT HI MDM: CPT | Mod: 25

## 2025-06-04 PROCEDURE — 83690 ASSAY OF LIPASE: CPT | Performed by: EMERGENCY MEDICINE

## 2025-06-04 PROCEDURE — 36415 COLL VENOUS BLD VENIPUNCTURE: CPT | Performed by: EMERGENCY MEDICINE

## 2025-06-04 RX ADMIN — Medication 3 L/MIN: at 22:54

## 2025-06-04 ASSESSMENT — PAIN SCALES - GENERAL
PAINLEVEL_OUTOF10: 0 - NO PAIN
PAINLEVEL_OUTOF10: 0 - NO PAIN

## 2025-06-04 ASSESSMENT — PAIN - FUNCTIONAL ASSESSMENT: PAIN_FUNCTIONAL_ASSESSMENT: 0-10

## 2025-06-05 ENCOUNTER — APPOINTMENT (OUTPATIENT)
Dept: RADIOLOGY | Facility: HOSPITAL | Age: 85
DRG: 291 | End: 2025-06-05
Payer: MEDICARE

## 2025-06-05 PROBLEM — I50.9 ACUTE CONGESTIVE HEART FAILURE, UNSPECIFIED HEART FAILURE TYPE: Status: ACTIVE | Noted: 2025-06-05

## 2025-06-05 LAB
ANION GAP SERPL CALC-SCNC: 13 MMOL/L (ref 10–20)
ATRIAL RATE: 66 BPM
BASOPHILS # BLD AUTO: 0.07 X10*3/UL (ref 0–0.1)
BASOPHILS NFR BLD AUTO: 0.4 %
BUN SERPL-MCNC: 17 MG/DL (ref 6–23)
CALCIUM SERPL-MCNC: 9.3 MG/DL (ref 8.6–10.3)
CARDIAC TROPONIN I PNL SERPL HS: 12 NG/L (ref 0–13)
CHLORIDE SERPL-SCNC: 98 MMOL/L (ref 98–107)
CO2 SERPL-SCNC: 32 MMOL/L (ref 21–32)
CREAT SERPL-MCNC: 0.56 MG/DL (ref 0.5–1.05)
D DIMER PPP FEU-MCNC: 367 NG/ML FEU
EGFRCR SERPLBLD CKD-EPI 2021: 89 ML/MIN/1.73M*2
EOSINOPHIL # BLD AUTO: 0.02 X10*3/UL (ref 0–0.4)
EOSINOPHIL NFR BLD AUTO: 0.1 %
ERYTHROCYTE [DISTWIDTH] IN BLOOD BY AUTOMATED COUNT: 12.9 % (ref 11.5–14.5)
GLUCOSE SERPL-MCNC: 153 MG/DL (ref 74–99)
HCT VFR BLD AUTO: 38.9 % (ref 36–46)
HGB BLD-MCNC: 12.7 G/DL (ref 12–16)
HOLD SPECIMEN: 293
IMM GRANULOCYTES # BLD AUTO: 0.07 X10*3/UL (ref 0–0.5)
IMM GRANULOCYTES NFR BLD AUTO: 0.4 % (ref 0–0.9)
LYMPHOCYTES # BLD AUTO: 1.62 X10*3/UL (ref 0.8–3)
LYMPHOCYTES NFR BLD AUTO: 9.6 %
MCH RBC QN AUTO: 29.7 PG (ref 26–34)
MCHC RBC AUTO-ENTMCNC: 32.6 G/DL (ref 32–36)
MCV RBC AUTO: 91 FL (ref 80–100)
MONOCYTES # BLD AUTO: 1.85 X10*3/UL (ref 0.05–0.8)
MONOCYTES NFR BLD AUTO: 11 %
MRSA DNA SPEC QL NAA+PROBE: NOT DETECTED
NEUTROPHILS # BLD AUTO: 13.19 X10*3/UL (ref 1.6–5.5)
NEUTROPHILS NFR BLD AUTO: 78.5 %
NRBC BLD-RTO: 0 /100 WBCS (ref 0–0)
P AXIS: 67 DEGREES
P OFFSET: 204 MS
P ONSET: 141 MS
PLATELET # BLD AUTO: 183 X10*3/UL (ref 150–450)
POTASSIUM SERPL-SCNC: 3.1 MMOL/L (ref 3.5–5.3)
PR INTERVAL: 164 MS
Q ONSET: 223 MS
QRS COUNT: 11 BEATS
QRS DURATION: 82 MS
QT INTERVAL: 404 MS
QTC CALCULATION(BAZETT): 423 MS
QTC FREDERICIA: 417 MS
R AXIS: 20 DEGREES
RBC # BLD AUTO: 4.27 X10*6/UL (ref 4–5.2)
SODIUM SERPL-SCNC: 140 MMOL/L (ref 136–145)
T AXIS: 82 DEGREES
T OFFSET: 425 MS
VENTRICULAR RATE: 66 BPM
WBC # BLD AUTO: 16.8 X10*3/UL (ref 4.4–11.3)

## 2025-06-05 PROCEDURE — 1100000001 HC PRIVATE ROOM DAILY: Mod: IPSPLIT

## 2025-06-05 PROCEDURE — 96374 THER/PROPH/DIAG INJ IV PUSH: CPT | Mod: IPSPLIT

## 2025-06-05 PROCEDURE — 80048 BASIC METABOLIC PNL TOTAL CA: CPT | Mod: IPSPLIT | Performed by: HOSPITALIST

## 2025-06-05 PROCEDURE — 87641 MR-STAPH DNA AMP PROBE: CPT | Mod: IPSPLIT | Performed by: NURSE PRACTITIONER

## 2025-06-05 PROCEDURE — 87640 STAPH A DNA AMP PROBE: CPT | Mod: IPSPLIT | Performed by: NURSE PRACTITIONER

## 2025-06-05 PROCEDURE — 71250 CT THORAX DX C-: CPT | Mod: IPSPLIT

## 2025-06-05 PROCEDURE — 36415 COLL VENOUS BLD VENIPUNCTURE: CPT | Mod: IPSPLIT | Performed by: HOSPITALIST

## 2025-06-05 PROCEDURE — 2500000005 HC RX 250 GENERAL PHARMACY W/O HCPCS: Mod: IPSPLIT | Performed by: NURSE PRACTITIONER

## 2025-06-05 PROCEDURE — 85379 FIBRIN DEGRADATION QUANT: CPT | Mod: IPSPLIT | Performed by: NURSE PRACTITIONER

## 2025-06-05 PROCEDURE — 85025 COMPLETE CBC W/AUTO DIFF WBC: CPT | Mod: IPSPLIT | Performed by: HOSPITALIST

## 2025-06-05 PROCEDURE — 2500000004 HC RX 250 GENERAL PHARMACY W/ HCPCS (ALT 636 FOR OP/ED): Performed by: EMERGENCY MEDICINE

## 2025-06-05 PROCEDURE — 97161 PT EVAL LOW COMPLEX 20 MIN: CPT | Mod: GP,IPSPLIT | Performed by: PHYSICAL THERAPIST

## 2025-06-05 PROCEDURE — 84484 ASSAY OF TROPONIN QUANT: CPT | Performed by: EMERGENCY MEDICINE

## 2025-06-05 PROCEDURE — 87040 BLOOD CULTURE FOR BACTERIA: CPT | Mod: GENLAB | Performed by: NURSE PRACTITIONER

## 2025-06-05 PROCEDURE — 99223 1ST HOSP IP/OBS HIGH 75: CPT | Performed by: NURSE PRACTITIONER

## 2025-06-05 PROCEDURE — 2500000004 HC RX 250 GENERAL PHARMACY W/ HCPCS (ALT 636 FOR OP/ED): Mod: IPSPLIT | Performed by: NURSE PRACTITIONER

## 2025-06-05 PROCEDURE — 36415 COLL VENOUS BLD VENIPUNCTURE: CPT | Performed by: EMERGENCY MEDICINE

## 2025-06-05 PROCEDURE — 2500000002 HC RX 250 W HCPCS SELF ADMINISTERED DRUGS (ALT 637 FOR MEDICARE OP, ALT 636 FOR OP/ED): Mod: IPSPLIT | Performed by: HOSPITALIST

## 2025-06-05 PROCEDURE — 2500000004 HC RX 250 GENERAL PHARMACY W/ HCPCS (ALT 636 FOR OP/ED): Mod: IPSPLIT | Performed by: HOSPITALIST

## 2025-06-05 PROCEDURE — 71250 CT THORAX DX C-: CPT | Performed by: RADIOLOGY

## 2025-06-05 PROCEDURE — 2500000001 HC RX 250 WO HCPCS SELF ADMINISTERED DRUGS (ALT 637 FOR MEDICARE OP): Mod: IPSPLIT | Performed by: HOSPITALIST

## 2025-06-05 PROCEDURE — 94760 N-INVAS EAR/PLS OXIMETRY 1: CPT | Mod: IPSPLIT

## 2025-06-05 PROCEDURE — 97165 OT EVAL LOW COMPLEX 30 MIN: CPT | Mod: GO,IPSPLIT | Performed by: OCCUPATIONAL THERAPIST

## 2025-06-05 RX ORDER — FUROSEMIDE 10 MG/ML
20 INJECTION INTRAMUSCULAR; INTRAVENOUS EVERY 12 HOURS
Status: DISCONTINUED | OUTPATIENT
Start: 2025-06-05 | End: 2025-06-05

## 2025-06-05 RX ORDER — ALUMINUM HYDROXIDE, MAGNESIUM HYDROXIDE, AND SIMETHICONE 1200; 120; 1200 MG/30ML; MG/30ML; MG/30ML
20 SUSPENSION ORAL EVERY 4 HOURS PRN
Status: ACTIVE | OUTPATIENT
Start: 2025-06-05

## 2025-06-05 RX ORDER — LEVOTHYROXINE SODIUM 75 UG/1
75 TABLET ORAL DAILY
Status: DISCONTINUED | OUTPATIENT
Start: 2025-06-05 | End: 2025-06-06

## 2025-06-05 RX ORDER — FUROSEMIDE 10 MG/ML
40 INJECTION INTRAMUSCULAR; INTRAVENOUS ONCE
Status: COMPLETED | OUTPATIENT
Start: 2025-06-05 | End: 2025-06-05

## 2025-06-05 RX ORDER — ACETAMINOPHEN 500 MG
10 TABLET ORAL DAILY PRN
Status: DISPENSED | OUTPATIENT
Start: 2025-06-05

## 2025-06-05 RX ORDER — LANOLIN ALCOHOL/MO/W.PET/CERES
400 CREAM (GRAM) TOPICAL DAILY
Status: DISPENSED | OUTPATIENT
Start: 2025-06-05

## 2025-06-05 RX ORDER — METOPROLOL TARTRATE 25 MG/1
25 TABLET, FILM COATED ORAL 2 TIMES DAILY
Status: DISCONTINUED | OUTPATIENT
Start: 2025-06-05 | End: 2025-06-05

## 2025-06-05 RX ORDER — VANCOMYCIN HYDROCHLORIDE 1 G/20ML
INJECTION, POWDER, LYOPHILIZED, FOR SOLUTION INTRAVENOUS DAILY PRN
Status: DISCONTINUED | OUTPATIENT
Start: 2025-06-05 | End: 2025-06-06

## 2025-06-05 RX ORDER — AZITHROMYCIN 250 MG/1
500 TABLET, FILM COATED ORAL EVERY 24 HOURS
Status: DISCONTINUED | OUTPATIENT
Start: 2025-06-05 | End: 2025-06-05

## 2025-06-05 RX ORDER — SODIUM CHLORIDE 9 MG/ML
10 INJECTION, SOLUTION INTRAVENOUS CONTINUOUS PRN
Status: ACTIVE | OUTPATIENT
Start: 2025-06-05 | End: 2026-06-05

## 2025-06-05 RX ORDER — VANCOMYCIN HYDROCHLORIDE 1 G/200ML
1000 INJECTION, SOLUTION INTRAVENOUS ONCE
Status: COMPLETED | OUTPATIENT
Start: 2025-06-05 | End: 2025-06-05

## 2025-06-05 RX ORDER — CHOLECALCIFEROL (VITAMIN D3) 25 MCG
25 TABLET ORAL DAILY
Status: DISPENSED | OUTPATIENT
Start: 2025-06-05

## 2025-06-05 RX ORDER — ATORVASTATIN CALCIUM 40 MG/1
40 TABLET, FILM COATED ORAL DAILY
Status: DISPENSED | OUTPATIENT
Start: 2025-06-05

## 2025-06-05 RX ORDER — AMLODIPINE BESYLATE 5 MG/1
5 TABLET ORAL DAILY
Status: DISPENSED | OUTPATIENT
Start: 2025-06-05

## 2025-06-05 RX ORDER — CALCIUM CARBONATE 200(500)MG
500 TABLET,CHEWABLE ORAL 4 TIMES DAILY PRN
Status: DISCONTINUED | OUTPATIENT
Start: 2025-06-05 | End: 2025-06-05

## 2025-06-05 RX ORDER — ACETAMINOPHEN 325 MG/1
650 TABLET ORAL EVERY 6 HOURS PRN
Status: ACTIVE | OUTPATIENT
Start: 2025-06-05

## 2025-06-05 RX ORDER — CALCIUM CARBONATE 200(500)MG
500 TABLET,CHEWABLE ORAL 4 TIMES DAILY PRN
Status: ACTIVE | OUTPATIENT
Start: 2025-06-05

## 2025-06-05 RX ORDER — POLYETHYLENE GLYCOL 3350 17 G/17G
17 POWDER, FOR SOLUTION ORAL 2 TIMES DAILY PRN
Status: DISPENSED | OUTPATIENT
Start: 2025-06-05

## 2025-06-05 RX ORDER — POTASSIUM CHLORIDE 20 MEQ/1
20 TABLET, EXTENDED RELEASE ORAL DAILY
Status: DISPENSED | OUTPATIENT
Start: 2025-06-05

## 2025-06-05 RX ORDER — METOPROLOL TARTRATE 25 MG/1
25 TABLET, FILM COATED ORAL 2 TIMES DAILY
Status: DISPENSED | OUTPATIENT
Start: 2025-06-05

## 2025-06-05 RX ORDER — ALUMINUM HYDROXIDE, MAGNESIUM HYDROXIDE, AND SIMETHICONE 1200; 120; 1200 MG/30ML; MG/30ML; MG/30ML
20 SUSPENSION ORAL EVERY 4 HOURS PRN
Status: DISCONTINUED | OUTPATIENT
Start: 2025-06-05 | End: 2025-06-05

## 2025-06-05 RX ORDER — POTASSIUM CHLORIDE 20 MEQ/1
20 TABLET, EXTENDED RELEASE ORAL ONCE
Status: COMPLETED | OUTPATIENT
Start: 2025-06-05 | End: 2025-06-05

## 2025-06-05 RX ORDER — FUROSEMIDE 20 MG/1
20 TABLET ORAL DAILY
Status: ACTIVE | OUTPATIENT
Start: 2025-06-05

## 2025-06-05 RX ORDER — ONDANSETRON HYDROCHLORIDE 2 MG/ML
4 INJECTION, SOLUTION INTRAVENOUS EVERY 4 HOURS PRN
Status: ACTIVE | OUTPATIENT
Start: 2025-06-05

## 2025-06-05 RX ORDER — LISINOPRIL 20 MG/1
20 TABLET ORAL DAILY
Status: DISPENSED | OUTPATIENT
Start: 2025-06-05

## 2025-06-05 RX ORDER — PREDNISONE 5 MG/1
2.5 TABLET ORAL DAILY
Status: DISPENSED | OUTPATIENT
Start: 2025-06-05

## 2025-06-05 RX ORDER — AZITHROMYCIN 250 MG/1
500 TABLET, FILM COATED ORAL
Status: DISCONTINUED | OUTPATIENT
Start: 2025-06-06 | End: 2025-06-05

## 2025-06-05 RX ORDER — LOPERAMIDE HYDROCHLORIDE 2 MG/1
2 CAPSULE ORAL 4 TIMES DAILY PRN
Status: ACTIVE | OUTPATIENT
Start: 2025-06-05

## 2025-06-05 RX ORDER — TRAZODONE HYDROCHLORIDE 50 MG/1
50 TABLET ORAL NIGHTLY
Status: DISCONTINUED | OUTPATIENT
Start: 2025-06-05 | End: 2025-06-05

## 2025-06-05 RX ORDER — SODIUM CHLORIDE 9 MG/ML
100 INJECTION, SOLUTION INTRAVENOUS CONTINUOUS
Status: ACTIVE | OUTPATIENT
Start: 2025-06-05 | End: 2025-06-05

## 2025-06-05 RX ORDER — TRAZODONE HYDROCHLORIDE 50 MG/1
50 TABLET ORAL NIGHTLY
Status: DISPENSED | OUTPATIENT
Start: 2025-06-05

## 2025-06-05 RX ADMIN — PIPERACILLIN SODIUM AND TAZOBACTAM SODIUM 3.38 G: 3; .375 INJECTION, SOLUTION INTRAVENOUS at 17:01

## 2025-06-05 RX ADMIN — POTASSIUM CHLORIDE 20 MEQ: 1500 TABLET, EXTENDED RELEASE ORAL at 02:04

## 2025-06-05 RX ADMIN — LISINOPRIL 20 MG: 20 TABLET ORAL at 09:03

## 2025-06-05 RX ADMIN — SODIUM CHLORIDE 100 ML/HR: 900 INJECTION, SOLUTION INTRAVENOUS at 16:15

## 2025-06-05 RX ADMIN — PIPERACILLIN SODIUM AND TAZOBACTAM SODIUM 3.38 G: 3; .375 INJECTION, SOLUTION INTRAVENOUS at 23:21

## 2025-06-05 RX ADMIN — PREDNISONE 2.5 MG: 5 TABLET ORAL at 09:03

## 2025-06-05 RX ADMIN — ATORVASTATIN CALCIUM 40 MG: 40 TABLET, FILM COATED ORAL at 09:04

## 2025-06-05 RX ADMIN — Medication 25 MCG: at 09:03

## 2025-06-05 RX ADMIN — AMLODIPINE BESYLATE 5 MG: 5 TABLET ORAL at 09:04

## 2025-06-05 RX ADMIN — APIXABAN 2.5 MG: 2.5 TABLET, FILM COATED ORAL at 09:04

## 2025-06-05 RX ADMIN — TRAZODONE HYDROCHLORIDE 50 MG: 50 TABLET ORAL at 22:27

## 2025-06-05 RX ADMIN — LEVOTHYROXINE SODIUM 75 MCG: 0.07 TABLET ORAL at 06:04

## 2025-06-05 RX ADMIN — POTASSIUM CHLORIDE 20 MEQ: 1500 TABLET, EXTENDED RELEASE ORAL at 09:05

## 2025-06-05 RX ADMIN — FUROSEMIDE 20 MG: 10 INJECTION, SOLUTION INTRAVENOUS at 06:03

## 2025-06-05 RX ADMIN — METOPROLOL TARTRATE 25 MG: 25 TABLET, FILM COATED ORAL at 09:03

## 2025-06-05 RX ADMIN — AZITHROMYCIN MONOHYDRATE 500 MG: 500 INJECTION, POWDER, LYOPHILIZED, FOR SOLUTION INTRAVENOUS at 11:02

## 2025-06-05 RX ADMIN — Medication 2 L/MIN: at 20:51

## 2025-06-05 RX ADMIN — Medication 1 TABLET: at 09:02

## 2025-06-05 RX ADMIN — FUROSEMIDE 40 MG: 10 INJECTION, SOLUTION INTRAMUSCULAR; INTRAVENOUS at 00:36

## 2025-06-05 RX ADMIN — VANCOMYCIN HYDROCHLORIDE 1000 MG: 1 INJECTION, SOLUTION INTRAVENOUS at 18:26

## 2025-06-05 RX ADMIN — APIXABAN 2.5 MG: 2.5 TABLET, FILM COATED ORAL at 21:03

## 2025-06-05 SDOH — SOCIAL STABILITY: SOCIAL INSECURITY: WERE YOU ABLE TO COMPLETE ALL THE BEHAVIORAL HEALTH SCREENINGS?: YES

## 2025-06-05 SDOH — SOCIAL STABILITY: SOCIAL INSECURITY: WITHIN THE LAST YEAR, HAVE YOU BEEN HUMILIATED OR EMOTIONALLY ABUSED IN OTHER WAYS BY YOUR PARTNER OR EX-PARTNER?: NO

## 2025-06-05 SDOH — SOCIAL STABILITY: SOCIAL INSECURITY: ARE YOU OR HAVE YOU BEEN THREATENED OR ABUSED PHYSICALLY, EMOTIONALLY, OR SEXUALLY BY ANYONE?: NO

## 2025-06-05 SDOH — SOCIAL STABILITY: SOCIAL INSECURITY: HAS ANYONE EVER THREATENED TO HURT YOUR FAMILY OR YOUR PETS?: NO

## 2025-06-05 SDOH — SOCIAL STABILITY: SOCIAL INSECURITY: WITHIN THE LAST YEAR, HAVE YOU BEEN AFRAID OF YOUR PARTNER OR EX-PARTNER?: NO

## 2025-06-05 SDOH — ECONOMIC STABILITY: FOOD INSECURITY
HOW HARD IS IT FOR YOU TO PAY FOR THE VERY BASICS LIKE FOOD, HOUSING, MEDICAL CARE, AND HEATING?: PATIENT UNABLE TO ANSWER

## 2025-06-05 SDOH — SOCIAL STABILITY: SOCIAL INSECURITY: HAVE YOU HAD ANY THOUGHTS OF HARMING ANYONE ELSE?: NO

## 2025-06-05 SDOH — ECONOMIC STABILITY: HOUSING INSECURITY: AT ANY TIME IN THE PAST 12 MONTHS, WERE YOU HOMELESS OR LIVING IN A SHELTER (INCLUDING NOW)?: PATIENT UNABLE TO ANSWER

## 2025-06-05 SDOH — SOCIAL STABILITY: SOCIAL INSECURITY: DOES ANYONE TRY TO KEEP YOU FROM HAVING/CONTACTING OTHER FRIENDS OR DOING THINGS OUTSIDE YOUR HOME?: NO

## 2025-06-05 SDOH — ECONOMIC STABILITY: HOUSING INSECURITY: IN THE PAST 12 MONTHS, HOW MANY TIMES HAVE YOU MOVED WHERE YOU WERE LIVING?: 0

## 2025-06-05 SDOH — SOCIAL STABILITY: SOCIAL INSECURITY: DO YOU FEEL ANYONE HAS EXPLOITED OR TAKEN ADVANTAGE OF YOU FINANCIALLY OR OF YOUR PERSONAL PROPERTY?: NO

## 2025-06-05 SDOH — SOCIAL STABILITY: SOCIAL INSECURITY: ARE THERE ANY APPARENT SIGNS OF INJURIES/BEHAVIORS THAT COULD BE RELATED TO ABUSE/NEGLECT?: NO

## 2025-06-05 SDOH — SOCIAL STABILITY: SOCIAL INSECURITY: HAVE YOU HAD THOUGHTS OF HARMING ANYONE ELSE?: NO

## 2025-06-05 SDOH — SOCIAL STABILITY: SOCIAL INSECURITY: ABUSE: ADULT

## 2025-06-05 SDOH — SOCIAL STABILITY: SOCIAL INSECURITY: DO YOU FEEL UNSAFE GOING BACK TO THE PLACE WHERE YOU ARE LIVING?: NO

## 2025-06-05 ASSESSMENT — ACTIVITIES OF DAILY LIVING (ADL)
LACK_OF_TRANSPORTATION: PATIENT UNABLE TO ANSWER
HEARING - RIGHT EAR: FUNCTIONAL
BATHING_ASSISTANCE: MINIMAL
LACK_OF_TRANSPORTATION: NO
ADL_ASSISTANCE: NEEDS ASSISTANCE
WALKS IN HOME: NEEDS ASSISTANCE
ASSISTIVE_DEVICE: WALKER
BATHING: NEEDS ASSISTANCE
DRESSING YOURSELF: NEEDS ASSISTANCE
TOILETING_ASSISTANCE: MINIMAL
HEARING - LEFT EAR: FUNCTIONAL
TOILETING_ASSISTANCE: MINIMAL
ADL_ASSISTANCE: NEEDS ASSISTANCE
JUDGMENT_ADEQUATE_SAFELY_COMPLETE_DAILY_ACTIVITIES: NO
BATHING_ASSISTANCE: MINIMAL
LACK_OF_TRANSPORTATION: PATIENT UNABLE TO ANSWER
GROOMING_ASSISTANCE: STAND BY
PATIENT'S MEMORY ADEQUATE TO SAFELY COMPLETE DAILY ACTIVITIES?: NO
ADEQUATE_TO_COMPLETE_ADL: YES
FEEDING: STAND BY
TOILETING: NEEDS ASSISTANCE
GROOMING: NEEDS ASSISTANCE
BATHING_ASSISTANCE: MAXIMAL
FEEDING YOURSELF: INDEPENDENT

## 2025-06-05 ASSESSMENT — COGNITIVE AND FUNCTIONAL STATUS - GENERAL
DRESSING REGULAR UPPER BODY CLOTHING: A LOT
MOBILITY SCORE: 12
PERSONAL GROOMING: A LOT
DRESSING REGULAR LOWER BODY CLOTHING: A LOT
DAILY ACTIVITIY SCORE: 12
EATING MEALS: A LITTLE
PERSONAL GROOMING: A LITTLE
TOILETING: TOTAL
WALKING IN HOSPITAL ROOM: A LOT
PATIENT BASELINE BEDBOUND: NO
TOILETING: A LOT
WALKING IN HOSPITAL ROOM: A LITTLE
DRESSING REGULAR UPPER BODY CLOTHING: A LITTLE
EATING MEALS: A LOT
MOVING TO AND FROM BED TO CHAIR: A LOT
MOVING TO AND FROM BED TO CHAIR: A LITTLE
MOVING FROM LYING ON BACK TO SITTING ON SIDE OF FLAT BED WITH BEDRAILS: A LOT
DRESSING REGULAR LOWER BODY CLOTHING: TOTAL
CLIMB 3 TO 5 STEPS WITH RAILING: A LOT
TURNING FROM BACK TO SIDE WHILE IN FLAT BAD: A LOT
STANDING UP FROM CHAIR USING ARMS: A LOT
MOVING FROM LYING ON BACK TO SITTING ON SIDE OF FLAT BED WITH BEDRAILS: A LITTLE
CLIMB 3 TO 5 STEPS WITH RAILING: A LOT
HELP NEEDED FOR BATHING: A LOT
STANDING UP FROM CHAIR USING ARMS: A LITTLE
TURNING FROM BACK TO SIDE WHILE IN FLAT BAD: A LITTLE
MOBILITY SCORE: 17
HELP NEEDED FOR BATHING: A LOT
DAILY ACTIVITIY SCORE: 13

## 2025-06-05 ASSESSMENT — ENCOUNTER SYMPTOMS
DYSPNEA ON EXERTION: 1
ENDOCRINE NEGATIVE: 1
GASTROINTESTINAL NEGATIVE: 1
HEMATOLOGIC/LYMPHATIC NEGATIVE: 1
PSYCHIATRIC NEGATIVE: 1
WHEEZING: 1
WEAKNESS: 1
EYES NEGATIVE: 1

## 2025-06-05 ASSESSMENT — PAIN SCALES - GENERAL
PAINLEVEL_OUTOF10: 0 - NO PAIN

## 2025-06-05 ASSESSMENT — LIFESTYLE VARIABLES
HOW OFTEN DO YOU HAVE A DRINK CONTAINING ALCOHOL: NEVER
SUBSTANCE_ABUSE_PAST_12_MONTHS: NO
HOW MANY STANDARD DRINKS CONTAINING ALCOHOL DO YOU HAVE ON A TYPICAL DAY: PATIENT DOES NOT DRINK
HOW OFTEN DO YOU HAVE 6 OR MORE DRINKS ON ONE OCCASION: NEVER
SKIP TO QUESTIONS 9-10: 1
AUDIT-C TOTAL SCORE: 0
AUDIT-C TOTAL SCORE: 0
PRESCIPTION_ABUSE_PAST_12_MONTHS: NO

## 2025-06-05 ASSESSMENT — PAIN - FUNCTIONAL ASSESSMENT
PAIN_FUNCTIONAL_ASSESSMENT: 0-10

## 2025-06-05 NOTE — ED PROVIDER NOTES
Department of Emergency Medicine   ED  Provider Note  Admit Date/RoomTime: 2025 10:24 PM  ED Room: AC02/AC02                  History of Present Illness:   Maisha Agosto is a 84 y.o. female presenting to the ED for generalized weakness, illness, fatigue and cough, beginning tonight.  She is quite frail.  The complaint has been intermittent, moderate in severity, and worsened by nothing.  She was too weak to ambulate at home so her daughter called 911 and arrival via EMS.  Daughter states she felt a little warm at home and she thought she had a low-grade fever there.  She has had a cough has been nonproductive.  No nausea vomiting diarrhea.  No chest pain.  Denied significant shortness of breath but was found to have a pulse ox of 84% on arrival here.  No abdominal pain.  Daughter states she has had weakness like this with UTI in the past.      Review of Systems:   Pertinent positives and review of systems as noted above.  Remaining 10 review of systems is negative or noncontributory to today's episode of care.        --------------------------------------------- PAST HISTORY ---------------------------------------------  Past Medical History:  has a past medical history of Bowel perforation (Multi) (2024), Clostridium difficile colitis, Colitis due to Clostridioides difficile (2024), Dementia, Heart murmur, Hypernatremia, Terminal ileitis with complication (Multi) (2024), and UTI (urinary tract infection) (2024).    Past Surgical History:  has a past surgical history that includes Cholecystectomy (2014); Esophagogastroduodenoscopy (2014); Colonoscopy (2014);  section, classic (2016); MR angio head wo IV contrast (2021); MR angio neck wo IV contrast (2021); CT angio head w and wo IV contrast (2021); and Cataract extraction.    Social History:  reports that she has never smoked. She has never used smokeless tobacco. She reports that she does  not drink alcohol and does not use drugs.    Family History: family history includes Diabetes in her mother; Heart attack in an other family member; Heart disease in an other family member. Unless otherwise noted, family history is non contributory    Patient's Medications   New Prescriptions    No medications on file   Previous Medications    AMLODIPINE (NORVASC) 5 MG TABLET    Take 1 tablet (5 mg) by mouth once daily.    APIXABAN (ELIQUIS) 2.5 MG TABLET    Take 1 tablet (2.5 mg) by mouth 2 times a day.    ATORVASTATIN (LIPITOR) 40 MG TABLET    Take 1 tablet (40 mg) by mouth once daily.    AZITHROMYCIN (ZITHROMAX) 250 MG TABLET    Take 2 tabs (500 mg) by mouth today, then take 1 tab daily for 4 days. Do not fill before March 22, 2025.    CHOLECALCIFEROL (VITAMIN D3) 25 MCG (1000 UT) CAPSULE    Take 1 capsule (25 mcg) by mouth once daily.    LEVOTHYROXINE (SYNTHROID, LEVOXYL) 75 MCG TABLET    Take 1 tablet (75 mcg) by mouth early in the morning. Take on an empty stomach at the same time each day, either 30 to 60 minutes prior to breakfast    LOPERAMIDE (IMODIUM A-D) 2 MG TABLET    Take 2 mg by mouth early in the morning. Indications: diarrhea    MAGNESIUM OXIDE (MAG-OX) 400 MG (241.3 MG MAGNESIUM) TABLET    Take 1 tablet (400 mg) by mouth once daily.    METOPROLOL TARTRATE (LOPRESSOR) 25 MG TABLET    Take 1 tablet (25 mg) by mouth once daily at bedtime.    MULTIVITAMIN TABLET    Take 1 tablet by mouth once daily.    OMEGA 3-DHA-EPA-FISH OIL (FISH OIL) 1,000 MG (120 MG-180 MG) CAPSULE    Take 1 capsule (1,000 mg) by mouth once daily.    OXYGEN (O2) GAS THERAPY    Inhale 1 each once every 24 hours. Wear nightly during sleep hours    POTASSIUM CHLORIDE CR 20 MEQ ER TABLET    Take 1 tablet (20 mEq) by mouth once daily. Do not crush or chew.    PREDNISONE (DELTASONE) 5 MG TABLET    Take 0.5 tablets (2.5 mg) by mouth once daily.    RAMIPRIL (ALTACE) 10 MG CAPSULE    Take 1 capsule (10 mg) by mouth once daily. In the  evening    TRAZODONE (DESYREL) 50 MG TABLET    Take 1 tablet (50 mg) by mouth once daily at bedtime.   Modified Medications    No medications on file   Discontinued Medications    No medications on file      The patient’s home medications have been reviewed.    Allergies: Acthar [corticotropin], Chloroquine phosphate, Ciprofloxacin, Humira [adalimumab], Levaquin [levofloxacin], Methotrexate, Sulfamethoxazole-trimethoprim, Xeljanz [tofacitinib], Amoxicillin-pot clavulanate, and Enbrel [etanercept]    -------------------------------------------------- RESULTS -------------------------------------------------  All laboratory and radiology results have been personally reviewed by myself   LABS:  Labs Reviewed   CBC WITH AUTO DIFFERENTIAL - Abnormal       Result Value    WBC 12.7 (*)     nRBC 0.0      RBC 4.01      Hemoglobin 12.0      Hematocrit 37.2      MCV 93      MCH 29.9      MCHC 32.3      RDW 13.0      Platelets 181      Neutrophils % 77.5      Immature Granulocytes %, Automated 0.4      Lymphocytes % 11.2      Monocytes % 10.1      Eosinophils % 0.5      Basophils % 0.3      Neutrophils Absolute 9.86 (*)     Immature Granulocytes Absolute, Automated 0.05      Lymphocytes Absolute 1.43      Monocytes Absolute 1.28 (*)     Eosinophils Absolute 0.06      Basophils Absolute 0.04     COMPREHENSIVE METABOLIC PANEL - Abnormal    Glucose 170 (*)     Sodium 140      Potassium 3.5      Chloride 105      Bicarbonate 30      Anion Gap 9 (*)     Urea Nitrogen 22      Creatinine 0.62      eGFR 87      Calcium 9.4      Albumin 3.8      Alkaline Phosphatase 54      Total Protein 6.6      AST 17      Bilirubin, Total 0.7      ALT 15     B-TYPE NATRIURETIC PEPTIDE - Abnormal     (*)     Narrative:        <100 pg/mL - Heart failure unlikely  100-299 pg/mL - Intermediate probability of acute heart                  failure exacerbation. Correlate with clinical                  context and patient history.    >=300 pg/mL -  Heart Failure likely. Correlate with clinical                  context and patient history.    BNP testing is performed using different testing methodology at [facility] than at other [facility]. Direct result comparisons should only be made within the same method.      URINALYSIS WITH REFLEX CULTURE AND MICROSCOPIC - Abnormal    Color, Urine Light-Yellow      Appearance, Urine Clear      Specific Gravity, Urine 1.012      pH, Urine 7.5      Protein, Urine NEGATIVE      Glucose, Urine 200 (2+) (*)     Blood, Urine 0.1 (1+) (*)     Ketones, Urine NEGATIVE      Bilirubin, Urine NEGATIVE      Urobilinogen, Urine Normal      Nitrite, Urine NEGATIVE      Leukocyte Esterase, Urine NEGATIVE     URINALYSIS MICROSCOPIC WITH REFLEX CULTURE - Abnormal    WBC, Urine 1-5      RBC, Urine >20 (*)     Mucus, Urine FEW     MAGNESIUM - Normal    Magnesium 1.98     LIPASE - Normal    Lipase 16      Narrative:     Venipuncture immediately after or during the administration of Metamizole may lead to falsely low results. Testing should be performed immediately prior to Metamizole dosing.   LACTATE - Normal    Lactate 0.8      Narrative:     Venipuncture immediately after or during the administration of Metamizole may lead to falsely low results. Testing should be performed immediately prior to Metamizole dosing.   SARS-COV-2, INFLUENZA A/B AND RSV PCR - Normal    Coronavirus 2019, PCR Not Detected      Flu A Result Not Detected      Flu B Result Not Detected      RSV PCR Not Detected      Narrative:     This assay is an FDA-cleared, in vitro diagnostic nucleic acid amplification test for the qualitative detection and differentiation of SARS CoV-2/ Influenza A/B/ RSV from nasopharyngeal specimens collected from individuals with signs and symptoms of respiratory tract infections, and has been validated for use at [facility]. Negative results do not preclude COVID-19/ Influenza A/B/ RSV infections  and should not be used as the sole basis for diagnosis, treatment, or other management decisions. Testing for SARS CoV-2 is recommended only for patients who meet current clinical and/or epidemiological criteria defined by federal, state, or local public health directives.   SERIAL TROPONIN-INITIAL - Normal    Troponin I, High Sensitivity 11      Narrative:     Less than 99th percentile of normal range cutoff-  Female and children under 18 years old <14 ng/L; Male <21 ng/L: Negative  Repeat testing should be performed if clinically indicated.     Female and children under 18 years old 14-50 ng/L; Male 21-50 ng/L:  Consistent with possible cardiac damage and possible increased clinical   risk. Serial measurements may help to assess extent of myocardial damage.     >50 ng/L: Consistent with cardiac damage, increased clinical risk and  myocardial infarction. Serial measurements may help assess extent of   myocardial damage.      NOTE: Children less than 1 year old may have higher baseline troponin   levels and results should be interpreted in conjunction with the overall   clinical context.     NOTE: Troponin I testing is performed using a different   testing methodology at [facility] than at other   [facility]. Direct result comparisons should only   be made within the same method.   URINALYSIS WITH REFLEX CULTURE AND MICROSCOPIC    Narrative:     The following orders were created for panel order Urinalysis with Reflex Culture and Microscopic.  Procedure                               Abnormality         Status                     ---------                               -----------         ------                     Urinalysis with Reflex C...[[id number]]  Abnormal            Final result               Extra Urine Gray Tube[[id number]]                            In process                   Please view results for these tests on the individual orders.   TROPONIN SERIES- (INITIAL, 1 HR)    Narrative:      The following orders were created for panel order Troponin I Series, High Sensitivity (0, 1 HR).  Procedure                               Abnormality         Status                     ---------                               -----------         ------                     Troponin I, High Sensiti...[[id number]]  Normal              Final result               Troponin, High Sensitivi...[[id number]]                      In process                   Please view results for these tests on the individual orders.   EXTRA URINE GRAY TUBE   SERIAL TROPONIN, 1 HOUR   BASIC METABOLIC PANEL   CBC WITH AUTO DIFFERENTIAL         RADIOLOGY:  Interpreted by Radiologist.  XR chest 1 view   Final Result   Cardiomegaly with mild diffuse interstitial prominence which could be   chronic or relate to component developing interstitial edema.   Correlate clinically and attention on continued follow-up is advised.        MACRO:   None        Signed by: Tapan Huang 6/4/2025 11:19 PM   Dictation workstation:   [id number]          Encounter Date: 03/18/25   ECG 12 Lead   Result Value    Ventricular Rate 52    Atrial Rate 52    KS Interval 176    QRS Duration 90    QT Interval 456    QTC Calculation(Bazett) 424    P Axis 50    R Axis 10    T Axis 84    QRS Count 8    Q Onset 219    P Onset 131    P Offset 186    T Offset 447    QTC Fredericia 434    Narrative    Sinus bradycardia  Minimal voltage criteria for LVH, may be normal variant ( Abdulaziz product )  Poor R-wave progression ; consider septal infarct, lead placement, or normal variant  Abnormal ECG  When compared with ECG of 18-MAR-2025 15:20,  Nonspecific T wave abnormality, worse in Anterolateral leads  Confirmed by Douglas Bejarano ([id number]) on 6/2/2025 8:56:24 AM     ------------------------- NURSING NOTES AND VITALS REVIEWED ---------------------------   The nursing notes within the ED encounter and vital signs as below have been reviewed.   /71   Pulse 69   Temp 37.3  °C (99.1 °F) (Temporal)   Resp 18   Ht 1.524 m (5')   Wt 49.9 kg (110 lb)   SpO2 97%   BMI 21.48 kg/m²   Oxygen Saturation Interpretation: Normal      ---------------------------------------------------PHYSICAL EXAM--------------------------------------  Physical Exam  Vitals and nursing note reviewed.   Constitutional:       General: She is not in acute distress.     Appearance: She is not ill-appearing or toxic-appearing.   HENT:      Head: Normocephalic and atraumatic.      Nose: Nose normal. No congestion or rhinorrhea.      Mouth/Throat:      Mouth: Mucous membranes are moist.      Pharynx: Oropharynx is clear. No oropharyngeal exudate or posterior oropharyngeal erythema.   Eyes:      General: No scleral icterus.     Extraocular Movements: Extraocular movements intact.      Conjunctiva/sclera: Conjunctivae normal.      Pupils: Pupils are equal, round, and reactive to light.   Cardiovascular:      Rate and Rhythm: Normal rate and regular rhythm.      Pulses: Normal pulses.      Heart sounds: Normal heart sounds. No murmur heard.  Pulmonary:      Effort: Pulmonary effort is normal. No respiratory distress.      Breath sounds: No stridor. Rales present. No wheezing or rhonchi.      Comments: Bibasilar crackles.  No accessory muscles of respiration in use.  Chest:      Chest wall: No tenderness.   Abdominal:      General: Bowel sounds are normal. There is no distension.      Tenderness: There is no abdominal tenderness. There is no right CVA tenderness, left CVA tenderness, guarding or rebound.   Musculoskeletal:         General: No swelling, tenderness, deformity or signs of injury. Normal range of motion.      Cervical back: Normal range of motion and neck supple. No rigidity or tenderness.      Right lower leg: No edema.      Left lower leg: No edema.   Lymphadenopathy:      Cervical: No cervical adenopathy.   Skin:     General: Skin is warm and dry.      Findings: No rash.   Neurological:      Mental  Status: She is alert and oriented to person, place, and time.      Cranial Nerves: No cranial nerve deficit.      Sensory: No sensory deficit.      Motor: No weakness.      Coordination: Coordination normal.   Psychiatric:         Mood and Affect: Mood normal.            Procedures  NONE  ------------------------------ ED COURSE/MEDICAL DECISION MAKING----------------------    Medical Decision Making:   Patient seen and evaluated by me.  Patient was found to be hypoxic on arrival and placed on 2 L nasal cannula.    ED Course as of 06/05/25 0033 Wed Jun 04, 2025 2303 EKG 2229 interpreted by me at 2241.  Normal sinus rhythm 66 bpm.  Normal axis.  SC, QRS, QT intervals are normal.  Nonspecific T wave change.  No STEMI. [EC]   2304 White blood cell count 12.7, hemoglobin 12.0, hematocrit 37.2, platelet count 181,000, no significant shift on the differential [EC]   Thu Jun 05, 2025   0010 Comprehensive metabolic panel is grossly unremarkable.  Glucose 170, electrolytes are normal.  BUN and creatinine are normal.  LFTs are normal. [EC]   0010 Urinalysis is grossly negative for infection. [EC]   0010 Coronavirus not detected  Influenza A not detected  Influenza B not detected  RSV not detected [EC]   0011 BNP mildly elevated 388 [EC]   0011 Troponin normal at 11  Lipase normal at 16  Magnesium normal at 1.98  Lactate is normal at 0.8 [EC]   0012 Chest x-ray:  IMPRESSION:  Cardiomegaly with mild diffuse interstitial prominence which could be  chronic or relate to component developing interstitial edema.  Correlate clinically and attention on continued follow-up is advised.   [EC]   0023 I have ordered Lasix 40 mg IV x 1 dose for probable CHF. [EC]   0023 Will discuss with the hospitalist regarding admission. [EC]   0033 D/w Dr ANTOLIN Moreira, accepts for observation tele. [EC]      ED Course User Index  [EC] Peewee Diggs DO         Diagnoses as of 06/05/25 0033   Acute congestive heart failure, unspecified heart failure  type   Generalized weakness      Counseling:   The emergency provider has spoken with the patient and discussed today’s results, in addition to providing specific details for the plan of care and counseling regarding the diagnosis and prognosis.  Questions are answered at this time and they are agreeable with the plan.      --------------------------------- IMPRESSION AND DISPOSITION ---------------------------------        IMPRESSION  1. Acute congestive heart failure, unspecified heart failure type    2. Generalized weakness        DISPOSITION  Disposition: Admit to telemetry  Patient condition is fair      Billing Provider Critical Care Time: 0 minutes     Peewee Diggs DO  06/05/25 0033

## 2025-06-05 NOTE — CARE PLAN
The patient's goals for the shift include      The clinical goals for the shift include Patient will maintain pulse ox> 92% this shift.      Patient was sitting in chair on room air per RT, when pt was returned to bed she became short of breath and pulse ox dropped to 83%, O2 at 2L NC applied, pt's BP was dropping to high 80's-90's then back to 100's, heart rate bradycardic, after her morning home meds, provider was aware, meds were held, fluids ordered, vitals improving with fluids.

## 2025-06-05 NOTE — PROGRESS NOTES
Occupational Therapy    Evaluation    Patient Name: Maisha Agosto  MRN: [id number]  Department: [facility]  Room: 01/01-A  Today's Date: 6/5/2025  Time Calculation  Start Time: 0751  Stop Time: 0823  Time Calculation (min): 32 min    Assessment  IP OT Assessment  OT Assessment: Pt. is a 84 y.o. female  referred to occupational therapy for impaired self-care 2/2 admission for acute CHF. Pt. displays decline from baseline, difficulty with self-care, transfers, bed mobility, dressing, feeding, and self-care. Pt. would benefit from skilled OT at MOD intensity to address above deficits and return to PLOF in LRE.  Prognosis: Good  Barriers to Discharge Home: Cognition needs, Physical needs  Cognition Needs: 24hr supervision for safety awareness needed, Insight of patient limited regarding functional ability/needs, Cognition-related high falls risk  Physical Needs: Stair navigation into home limited by function/safety, 24hr mobility assistance needed, Intermittent ADL assistance needed  Evaluation/Treatment Tolerance: Patient tolerated treatment well  Medical Staff Made Aware: Yes  End of Session Communication: Bedside nurse  End of Session Patient Position: Up in chair, Alarm on  Plan:  Treatment Interventions: ADL retraining, Functional transfer training, UE strengthening/ROM, Endurance training, Patient/family training, Neuromuscular reeducation, Equipment evaluation/education, Compensatory technique education  OT Frequency: 3 times per week  OT Discharge Recommendations: Moderate intensity level of continued care  OT Recommended Transfer Status: Assist of 1, Moderate assist  OT - OK to Discharge: Yes (Based on completed evaluation and care plan recommendations, no barriers to discharge to next site of care.)    Subjective   Current Problem:  1. Acute congestive heart failure, unspecified heart failure type        2. Generalized weakness          OT Visit Info:  OT Received On: 06/05/25  General Visit Info:  General  Reason  for Referral: impaired self-care d/t admission for acute CHF  Referred By: Magan Moreira DO  Past Medical History Relevant to Rehab: bowel perforation, C diff, colitis, dementia, heart murmur, hypernatremia, terminal ileitis with complication,  Family/Caregiver Present: No  Prior to Session Communication: Bedside nurse  Patient Position Received: Bed, 2 rail up, Alarm off, not on at start of session  General Comment: Pt. was pleasant and cooperative with OT. Pt. displayed confusion throughout assessement, oriented to self and place.  Precautions:  Hearing/Visual Limitations: glasses  Medical Precautions: Fall precautions, Oxygen therapy device and L/min  Precautions Comment: masimo, tele, purewick, O2 2LPM     Date/Time Vitals Session Patient Position Pulse Resp SpO2 BP MAP (mmHg)    06/05/25 0751 Pre OT  --  72  --  99 %  102/66  77     06/05/25 0800 --  --  75  17  99 %  151/79  95     06/05/25 0900 --  --  79  19  97 %  112/61  76           Pain:  Pain Assessment  Pain Assessment: 0-10  0-10 (Numeric) Pain Score: 0 - No pain    Objective   Cognition:  Overall Cognitive Status: Within Functional Limits  Arousal/Alertness: Appropriate responses to stimuli  Orientation Level: Disoriented to time, Disoriented to situation  Home Living:  Type of Home: House  Lives With: Adult children  Home Adaptive Equipment: Walker rolling or standard  Home Layout: One level  Home Access: Stairs to enter with rails  Entrance Stairs-Rails: Right  Entrance Stairs-Number of Steps: 2  Bathroom Shower/Tub: Walk-in shower, Tub/shower unit  Bathroom Toilet: Standard  Bathroom Equipment: Grab bars in shower, Built-in shower seat  Home Living Comments: 24hr supervision   Prior Function:  Level of [place]: Needs assistance with ADLs, Needs assistance with homemaking  Receives Help From: Family, Personal care attendant  ADL Assistance: Needs assistance  Bath: Minimal (MIN A - SBA)  Toileting: Minimal (MIN A - SBA)  Dressing: Stand  "by  Grooming: Stand by  Feeding: Stand by  Homemaking Assistance: Needs assistance (family completes)  Ambulatory Assistance: Independent (with FWW)  Leisure: Pt. likes to read & spend time with her family.  IADL History:  Homemaking Responsibilities: No  ADL:  Eating Assistance: Stand by  Eating Deficit: Setup, Verbal cueing  Grooming Assistance: Minimal (anticipated)  Grooming Deficit: Setup, Verbal cueing  Bathing Assistance: Maximal (anticipated)  UE Dressing Assistance: Minimal (anticipated)  LE Dressing Assistance: Total  LE Dressing Deficit: Don/doff R sock, Don/doff L sock  Toileting Assistance with Device: Total  Toileting Deficit: Use of adaptive equipment, Incontinent (pt. was not aware when she was urinating using purewick - pt. asked, \"is it raining?\")  Activity Tolerance:  Endurance: Decreased tolerance for upright activites, Tolerates less than 10 min exercise with changes in vital signs  Activity Tolerance Comments: O2 desatted on RA - with quick recovery with PLB  Bed Mobility/Transfers: Bed Mobility  Bed Mobility: Yes  Bed Mobility 1  Bed Mobility 1: Supine to sitting  Level of Assistance 1: Moderate assistance    Transfers  Transfer: Yes  Transfer 1  Transfer From 1: Bed to  Transfer to 1: Chair with arms  Technique 1: Sit to stand, Stand to sit  Transfer Device 1: Walker, Gait belt  Transfer Level of Assistance 1: Minimum assistance  Trials/Comments 1: cues for transfer to chair; hand placement and safety    Functional Mobility:  Functional Mobility  Functional Mobility Performed: Yes  Functional Mobility 1  Surface 1: Level tile  Device 1: Rolling walker  Functional Mobility Support Devices: Gait belt  Assistance 1: Moderate assistance  Comments 1: verbal cueing for hand placement, directional cueing and sequencing  Sitting Balance:  Static Sitting Balance  Static Sitting-Balance Support: Feet supported  Static Sitting-Level of Assistance: Contact guard  Dynamic Sitting Balance  Dynamic " Sitting-Balance Support: Feet supported  Dynamic Sitting-Level of Assistance: Minimum assistance  Dynamic Sitting-Balance: Forward lean  Standing Balance:  Static Standing Balance  Static Standing-Balance Support: Bilateral upper extremity supported  Static Standing-Level of Assistance: Contact guard  Dynamic Standing Balance  Dynamic Standing-Balance Support: Bilateral upper extremity supported  Dynamic Standing-Level of Assistance: Minimum assistance  Dynamic Standing-Balance: Turning  IADL's:   Homemaking Responsibilities: No  Vision: Vision - Basic Assessment  Current Vision: Wears glasses all the time  Sensation:  Sensation Comment: hx of arthritis ulnar deviated MCP  Strength:  Strength Comments: BUE: 3+/5 grossly throughout  Coordination:  Movements are Fluid and Coordinated: Yes   Hand Function:  Hand Function  Gross Grasp: Impaired (impaired 2/2 arthritic hands)  Coordination: Impaired (2/2 arthritic hands)  Extremities: RUE   RUE : Within Functional Limits and LUE   LUE: Within Functional Limits    Outcome Measures: [facility] Daily Activity  Putting on and taking off regular lower body clothing: Total  Bathing (including washing, rinsing, drying): A lot  Putting on and taking off regular upper body clothing: A little  Toileting, which includes using toilet, bedpan or urinal: Total  Taking care of personal grooming such as brushing teeth: A little  Eating Meals: A little  Daily Activity - Total Score: 13      Education Documentation  Body Mechanics, taught by Di Valente OT at 6/5/2025  9:05 AM.  Learner: Patient  Readiness: Acceptance  Method: Explanation  Response: Needs Reinforcement  Comment: edu on POC, safety with transfers.    ADL Training, taught by Di Valente OT at 6/5/2025  9:05 AM.  Learner: Patient  Readiness: Acceptance  Method: Explanation  Response: Needs Reinforcement  Comment: edu on POC, safety with transfers.    Education Comments  No comments found.      Goals:   Encounter Problems        Encounter Problems (Active)       ADLs       Patient will perform UB and LB bathing with MIN A level of assistance.       Start:  06/05/25    Expected End:  06/19/25            Patient with complete upper body dressing with set-up, supervision level of assistance donning and doffing all UE clothes with PRN adaptive equipment while supported sitting       Start:  06/05/25    Expected End:  06/19/25            Patient with complete lower body dressing with set-up, supervision level of assistance donning and doffing all LE clothes  with PRN adaptive equipment while supported sitting       Start:  06/05/25    Expected End:  06/19/25            Patient will complete daily grooming tasks brushing teeth and washing face/hair with set-up, supervision level of assistance and PRN adaptive equipment while standing.        Start:  06/05/25    Expected End:  06/19/25            Patient will complete toileting including hygiene clothing management/hygiene with set-up, minimal assist  level of assistance.       Start:  06/05/25    Expected End:  06/19/25               TRANSFERS       Patient will complete functional transfer to chair, bed, commode with front wheeled walker with modified independent level of assistance.       Start:  06/05/25    Expected End:  06/19/25

## 2025-06-05 NOTE — PROGRESS NOTES
shower seat in WIS with grab bars PT/OT recommend MOD intensity- spoke with Eli (daughter)-we will wait one more day to see how patient improves with treatment.  She would prefer patient to return home with home care vs SNF.  Patient will need precert auth if going to SNF.  TCC following.

## 2025-06-05 NOTE — PROGRESS NOTES
06/05/25 1154   Discharge Planning   Living Arrangements Children;Family members  (Daughter and son in law)   Support Systems  --    Assistance Needed Patient lives with her daughter, son in law and dog in a 2 story house with basement.  (Laundry) She uses either her rollator or wheelchair to move around.  She has supervision 24/7-when daughter is at work, there are caregivers that come in to stay with her.  DME:  wheelchair, rollator, shower seat in WIS with grab bars  Patient is mostly independent with her ADLs; she does have confusion.  Daughter would prefer patient to return home with home care vs SNF-   Type of Residence Private residence   Number of Stairs to Enter Residence 2   Number of Stairs Within Residence 12  (basement)   Do you have animals or pets at home? Yes   Type of Animals or Pets Dog  Husky   Home or Post Acute Services In home services   Type of Home Care Services Home PT;Home OT   Expected Discharge Disposition Home H   Does the patient need discharge transport arranged? No   Financial Resource Strain   How hard is it for you to pay for the very basics like food, housing, medical care, and heating? Not hard   Housing Stability   In the last 12 months, was there a time when you were not able to pay the mortgage or rent on time? N   In the past 12 months, how many times have you moved where you were living? 0   At any time in the past 12 months, were you homeless or living in a shelter (including now)? N   Transportation Needs   In the past 12 months, has lack of transportation kept you from medical appointments or from getting medications? no   In the past 12 months, has lack of transportation kept you from meetings, work, or from getting things needed for daily living? No   Stroke Family Assessment   Stroke Family Assessment Needed No   Intensity of Service   Intensity of Service 0-30 min     Daughter would prefer to see how patient is doing on Friday before choosing SNF.  TCC following.    Returned call to patient and advised that Dr. Long had entered echo orders and her office will be reaching out to assist in scheduling. Velia verbalized understanding and had no further questions or concerns.

## 2025-06-05 NOTE — H&P
History Of Present Illness:    Maisha Agosto is a very pleasant 84 year old female with a history of pAfib (Eliquis), HT, diastolic heart failure, HLD, COPD and RA, she presented to the ER with general weakness. States she had been falling at home. She lives at home with family. She was given Lasix in the ER and had good diuresis. Denies shortness of breath or chest pain. Resting sitting up in a chair. When she gets up Oxygen desaturates to 87 % and then improves. Currently does not use Oxygen at home.      Review of Systems   Constitutional: Positive for malaise/fatigue.   HENT: Negative.     Eyes: Negative.    Cardiovascular:  Positive for dyspnea on exertion.   Respiratory:  Positive for wheezing.    Endocrine: Negative.    Hematologic/Lymphatic: Negative.    Skin: Negative.    Musculoskeletal:  Positive for muscle weakness.   Gastrointestinal: Negative.    Neurological:  Positive for weakness.   Psychiatric/Behavioral: Negative.        Last Recorded Vitals:  Vitals:    06/05/25 0800 06/05/25 0900 06/05/25 0910 06/05/25 1000   BP: 151/79 112/61 112/61 87/65   BP Location:       Patient Position:       Pulse: 75 79 82 63   Resp: 17 19  13   Temp:       TempSrc:       SpO2: 99% 97% 91% 91%   Weight:       Height:           Last Labs:  CBC - 6/5/2025:  5:25 AM  16.8 12.7 183    38.9      CMP - 6/5/2025:  5:25 AM  9.3 6.6 17 --- 0.7   _ 3.8 15 54      PTT - 2/25/2025:  4:45 PM  1.0   11.1 30     Troponin I, High Sensitivity   Date/Time Value Ref Range Status   06/05/2025 12:21 AM 12 0 - 13 ng/L Final   06/04/2025 10:45 PM 11 0 - 13 ng/L Final   02/25/2025 04:22 PM 6 0 - 13 ng/L Final     BNP   Date/Time Value Ref Range Status   06/04/2025 10:45  (H) 0 - 99 pg/mL Final   06/23/2022 10:48 AM 91 0 - 99 pg/mL Final     Comment:     .  <100 pg/mL - Heart failure unlikely  100-299 pg/mL - Intermediate probability of acute heart  .               failure exacerbation. Correlate with clinical  .               context  and patient history.    >=300 pg/mL - Heart Failure likely. Correlate with clinical  .               context and patient history.  BNP testing is performed using different testing   methodology at [facility] than at other   system hospitals. Direct result comparisons should   only be made within the same method.     04/23/2018 11:08 AM 97 0 - 99 pg/mL Final     Comment:     .  <100 pg/mL - Heart failure unlikely  100-299 pg/mL - Intermediate probability of acute heart  .               failure exacerbation. Correlate with clinical  .               context and patient history.    >=300 pg/mL - Heart Failure likely. Correlate with clinical  .               context and patient history.  BNP testing is performed using different testing   methodology at [facility] than at other   system hospitals. Direct result comparisons should   only be made within the same method.       Hemoglobin A1C   Date/Time Value Ref Range Status   05/01/2025 07:26 AM 6.6 (H) 4.3 - 5.6 % Final   01/24/2025 09:16 AM 6.3 (H) 4.3 - 5.6 % Final   11/20/2023 09:15 AM 6.5 (H) see below % Final     LDL Calculated   Date/Time Value Ref Range Status   11/20/2023 09:15 AM 39 <=99 mg/dL Final     Comment:                                 Near   Borderline      AGE      Desirable  Optimal    High     High     Very High     0-19 Y     0 - 109     ---    110-129   >/= 130     ----    20-24 Y     0 - 119     ---    120-159   >/= 160     ----      >24 Y     0 -  99   100-129  130-159   160-189     >/=190       VLDL   Date/Time Value Ref Range Status   11/20/2023 09:15 AM 24 0 - 40 mg/dL Final   04/28/2022 06:17 AM 45 (H) 0 - 40 mg/dL Final   03/12/2021 08:21 AM 40 0 - 40 mg/dL Final   11/23/2020 09:15 AM 64 (H) 0 - 40 mg/dL Final      Last I/O:  I/O last 3 completed shifts:  In: - (0 mL/kg)   Out: 1250 (25.1 mL/kg) [Urine:1250 (0.7 mL/kg/hr)]  Weight: 49.9 kg     Past Cardiology Tests (Last 3 Years):  EKG:  ECG 12 lead 06/04/2025  (Preliminary) shows a sinus rhythm heart rate 66 bpm     Echo:  Transthoracic Echo (TTE) Complete 2025  1. The left ventricular systolic function is normal, with a visually estimated ejection fraction of 60-65%.   2. Spectral Doppler shows a Grade I (impaired relaxation pattern) of left ventricular diastolic filling with normal left atrial filling pressure.   3. No evidence of mitral valve regurgitation.   4. Trace tricuspid regurgitation is visualized.   5. Aortic valve sclerosis.   6. Mild aortic valve regurgitation.  Ejection Fractions:  EF   Date/Time Value Ref Range Status   2025 10:27 AM 63 %      Cath:  No results found for this or any previous visit from the past 1095 days.    Stress Test:  No results found for this or any previous visit from the past 1095 days.     Imaging:  Chest x-ray   Cardiomegaly with mild diffuse interstitial prominence which could be  chronic or relate to component developing interstitial edema.  Correlate clinically and attention on continued follow-up is advised.    Past Medical History:  She has a past medical history of Bowel perforation (Multi) (2024), Clostridium difficile colitis, Colitis due to Clostridioides difficile (2024), Dementia, Heart murmur, Hypernatremia, Terminal ileitis with complication (Multi) (2024), and UTI (urinary tract infection) (2024).    Past Surgical History:  She has a past surgical history that includes Cholecystectomy (2014); Esophagogastroduodenoscopy (2014); Colonoscopy (2014);  section, classic (2016); MR angio head wo IV contrast (2021); MR angio neck wo IV contrast (2021); CT angio head w and wo IV contrast (2021); and Cataract extraction.      Social History:  She reports that she has never smoked. She has never used smokeless tobacco. She reports that she does not drink alcohol and does not use drugs.    Family History:  Family History[1]      Allergies:  Acthar [corticotropin], Chloroquine phosphate, Ciprofloxacin, Humira [adalimumab], Levaquin [levofloxacin], Methotrexate, Sulfamethoxazole-trimethoprim, Xeljanz [tofacitinib], Amoxicillin-pot clavulanate, and Enbrel [etanercept]    Inpatient Medications:  Scheduled Medications[2]  PRN Medications[3]  Continuous Medications[4]  Outpatient Medications:  Current Outpatient Medications   Medication Instructions    amLODIPine (NORVASC) 5 mg, Daily    atorvastatin (LIPITOR) 40 mg, Daily    azithromycin (Zithromax) 250 mg tablet Take 2 tabs (500 mg) by mouth today, then take 1 tab daily for 4 days.    cholecalciferol (VITAMIN D3) 25 mcg, Daily    Eliquis 2.5 mg, oral, 2 times daily    levothyroxine (Synthroid, Levoxyl) 75 mcg tablet Take 1 tablet (75 mcg) by mouth early in the morning. Take on an empty stomach at the same time each day, either 30 to 60 minutes prior to breakfast    loperamide (IMODIUM) 2 mg, oral, Every evening    magnesium oxide (MAG-OX) 400 mg, Daily    metoprolol tartrate (LOPRESSOR) 25 mg, oral, Nightly    multivitamin tablet 1 tablet, Daily    omega 3-dha-epa-fish oil (Fish OiL) 1,000 mg (120 mg-180 mg) capsule 1 capsule, Daily    oxygen (O2) gas therapy 1 each, inhalation, Every 24 hours, Wear nightly during sleep hours    potassium chloride CR 20 mEq ER tablet 20 mEq, Daily    predniSONE (DELTASONE) 2.5 mg, Daily    ramipril (ALTACE) 10 mg, oral, Every evening    traZODone (DESYREL) 50 mg, Nightly       Physical Exam:  Physical Exam  Vitals reviewed.   HENT:      Head: Normocephalic.      Nose: Nose normal.   Eyes:      Pupils: Pupils are equal, round, and reactive to light.   Cardiovascular:      Rate and Rhythm: Normal rate and regular rhythm.   Pulmonary:      Effort: Pulmonary effort is normal.      Breath sounds: few crackles in the bases bilaterally   Abdominal:      General: Abdomen is flat.      Palpations: Abdomen is soft.   Musculoskeletal:         General: Normal range of  motion.      Cervical back: Normal range of motion.   Skin:     General: Skin is warm and dry.   Neurological:      General: No focal deficit present.      Mental Status: He is alert and oriented to person, place, and time.   Psychiatric:         Mood and Affect: Mood normal.        Extremities no pedal edema bilaterally   Neck supple no JVP no bruit   Assessment/Plan   Maisha Agosto is a very pleasant 84 year old female with a history of pAfib (Eliquis), HTN, diastolic heart failure, HLD, COPD and RA, she presented to the ER with general weakness. States she had been falling at home. She lives at home with family. She was given Lasix in the ER and had good diuresis. Denies shortness of breath or chest pain. Resting sitting up in a chair. When she gets up Oxygen desaturates to 87 % and then improves. Currently does not use Oxygen at home.     Acute congestive heart failure     Pafib (Eliquis)   HTN   HLD     COPD    Acute congestive heart failure   -diuresis well in the ER with IV Lasix   -transition to PO Lasix 20 mg daily   -echocardiogram LVEF 60-65%  -start Zithromax   -Appreciate rec cardiology     Pafib (Eliquis)   HTN   HLD   -continue Eliquis 2.5 mg twice a day   -continue Atorvastatin 40 mg daily   -hold the Lisinopril and Amlodipine to avoid hypotension   -continue Metoprolol 25 mg twice a day     COPD  -appreciate RT   -continue Prednisone  -Oxygen 2 L NC   -short of breath with little exertion will have a CT of the chest   -with the shortness of breath will get a D-dimer      GI ppx: PPI  DVT ppx: Eliquis   Fluids: prn  Electrolytes: replace as needed  Nutrition: reg  Adjuncts: PIV  Code Status: DNR   Pt requires inpatient stay at this time.    Peripheral IV 06/04/25 18 G Left;Posterior Forearm (Active)   Site Assessment Clean;Dry 06/05/25 0921   Dressing Type Transparent 06/05/25 0921   Line Status Flushed;No blood return 06/05/25 0921   Dressing Status Clean;Dry 06/05/25 0921   Number of days: 1        Code Status:  DNR    I spent  minutes in the professional and overall care of this patient.        Rachana Trejo, ELIO-CNP  Attending Attestation:    Patient was seen and examined face to face, history and physical was taken personally at bedside the APRN-CNP, was present for the whole duration of the exam who participated in the documentation of this note. I performed the medical decision-making components (assessment and plan of care). I have reviewed the documentation and verified the findings in the note as written with additions or exceptions as stated in the body of this note.  Elderly lady with hard of hearing sitting in a chair in no distress, complaining of falling frequently, when even walking with the walker, has been eating okay no GI or urine symptoms, no cough, chest pain shortness of breath.  On exam she is looking dry, no tenderness over the sinuses, heart regular, lungs clear bilaterally, abdomen soft bowel sounds are present extremities no edema.  X-ray is showed some fluid overload patient received Lasix IV with good urine output however she seems to be not to be in any fluid overload at this point, we will check her D-dimer and if it is positive we will get CT scan of chest rule out PE.    Dr. Rigo Burnett MD  Internal Medicine        [1]   Family History  Problem Relation Name Age of Onset    Diabetes Mother      Heart disease Other      Heart attack Other     [2]   Scheduled medications   Medication Dose Route Frequency    amLODIPine  5 mg oral Daily    apixaban  2.5 mg oral BID    atorvastatin  40 mg oral Daily    cholecalciferol  25 mcg oral Daily    furosemide  20 mg intravenous q12h    levothyroxine  75 mcg oral Daily    lisinopril  20 mg oral Daily    magnesium oxide  400 mg of magnesium oxide oral Daily    metoprolol tartrate  25 mg oral BID    oxygen   inhalation Continuous - Inhalation    potassium chloride CR  20 mEq oral Daily    predniSONE  2.5 mg oral Daily     traZODone  50 mg oral Nightly   [3]   PRN medications   Medication    acetaminophen    alum-mag hydroxide-simeth    benzocaine-menthol    calcium carbonate    loperamide    melatonin    ondansetron    polyethylene glycol    sodium chloride    sodium chloride 0.9%   [4]   Continuous Medications   Medication Dose Last Rate    sodium chloride 0.9%  10 mL/hr

## 2025-06-05 NOTE — PROGRESS NOTES
Physical Therapy    Physical Therapy Evaluation    Patient Name: Maisha Agosto  MRN: [id number]  Department: [facility] ICU  Room: 01/01-A  Today's Date: 6/5/2025   Time Calculation  Start Time: 0910  Stop Time: 0925  Time Calculation (min): 15 min    Assessment/Plan   PT Assessment  PT Assessment Results: Decreased strength, Impaired balance, Decreased mobility, Decreased endurance  Rehab Prognosis: Good  Barriers to Discharge Home: Physical needs  Physical Needs: 24hr mobility assistance needed  Evaluation/Treatment Tolerance:  (limited 2/2 weakness)  Strengths: Support of Caregivers  Barriers to Participation: Comorbidities  End of Session Communication: Bedside nurse  Assessment Comment: James 84 y.o presents with weakness and impaired mobility. Pt. lives with daughter and is normally MALLORIE with WW (at nighttime). Pt. currently requires Zeinab and would benefit from additional PT to address above noted limitations and prevent further decline.  End of Session Patient Position: Alarm on, Up in chair  IP OR SWING BED PT PLAN  Inpatient or Swing Bed: Inpatient  PT Plan  Treatment/Interventions: Transfer training, Bed mobility, Gait training, Stair training, Balance training, Strengthening, Endurance training, Therapeutic exercise, Therapeutic activity, Home exercise program  PT Plan: Ongoing PT  PT Frequency: 3 times per week  PT Discharge Recommendations: Moderate intensity level of continued care  PT Recommended Transfer Status: Assist x1  PT - OK to Discharge: Yes Based on completed evaluation and care plan recommendations, no barriers to discharge to next site of care       Subjective     PT Visit Info:  PT Received On: 06/05/25  General Visit Information:  General  Reason for Referral: impaired mobility, acute CHF  Referred By: Magan Moreira DO  Past Medical History Relevant to Rehab: bowel perforation, C diff, colitis, dementia, heart murmur, hypernatremia, terminal ileitis with complication,  Family/Caregiver  Present: Yes  Prior to Session Communication: Bedside nurse  Patient Position Received: Up in chair, Alarm on  General Comment: berta bolaños purewick  Home Living:  Home Living  Type of Home: House  Lives With: Adult children (has caregiver when daughter is not home)  Home Adaptive Equipment: Walker rolling or standard  Home Layout: One level  Home Access: Stairs to enter with rails  Entrance Stairs-Rails: Right  Entrance Stairs-Number of Steps: 2  Bathroom Shower/Tub: Walk-in shower  Bathroom Toilet: Standard  Bathroom Equipment: Grab bars in shower, Built-in shower seat  Prior Level of Function:  Prior Function Per Pt/Caregiver Report  Level of [place]: Needs assistance with ADLs, Needs assistance with homemaking  Receives Help From: Family, Personal care attendant  ADL Assistance: Needs assistance  Bath: Minimal (MIN A - SBA)  Toileting: Minimal (MIN A - SBA)  Homemaking Assistance: Needs assistance  Ambulatory Assistance: Independent (with WW)  Precautions:  Precautions  Hearing/Visual Limitations: glasses  Medical Precautions: Fall precautions (reports recent fall)      Date/Time Vitals Session Patient Position Pulse Resp SpO2 BP MAP (mmHg)    06/05/25 0800 --  --  75  17  99 %  151/79  95     06/05/25 0900 --  --  79  19  97 %  112/61  76     06/05/25 0910 --  --  82  --  91 %  112/61  --            SPO2 dropped to 88% after amb. 5'   Objective   Pain:  Pain Assessment  Pain Assessment: 0-10  0-10 (Numeric) Pain Score: 0 - No pain  Cognition:  Cognition  Overall Cognitive Status: Impaired at baseline  Arousal/Alertness: Appropriate responses to stimuli  Orientation Level: Disoriented to time    General Assessments:       Activity Tolerance  Endurance: Decreased tolerance for upright activites    Strength  Strength Comments: BLE: grossly 4-/5  Coordination  Movements are Fluid and Coordinated: No (+tremors when amb. backwards)       Static Standing Balance  Static Standing-Comment/Number of Minutes: F+;  with BUE support  Dynamic Standing Balance  Dynamic Standing-Comments: fair; Zeinab when amb. backwards  Functional Assessments:     Transfers  Transfer: Yes  Transfer 1  Technique 1: Sit to stand, Stand to sit  Transfer Device 1: Walker, Gait belt  Transfer Level of Assistance 1: Minimum assistance    Ambulation/Gait Training  Ambulation/Gait Training Performed: Yes  Ambulation/Gait Training 2  Gait Support Devices: Gait belt  Assistance 2: Contact guard (Zeinab when amb. backwards)  Quality of Gait 2: Inconsistent stride length, Decreased step length  Comments/Distance (ft) 2: 5'  Extremity/Trunk Assessments:  Defer to OT for UE detail   RLE   RLE : Within Functional Limits  LLE   LLE : Within Functional Limits  Outcome Measures:  [facility] Basic Mobility  Turning from your back to your side while in a flat bed without using bedrails: A little  Moving from lying on your back to sitting on the side of a flat bed without using bedrails: A little  Moving to and from bed to chair (including a wheelchair): A little  Standing up from a chair using your arms (e.g. wheelchair or bedside chair): A little  To walk in hospital room: A little  Climbing 3-5 steps with railing: A lot  Basic Mobility - Total Score: 17    FSS-ICU  Ambulation: Walks <50 feet with any assistance x1 or walks any distance with assistance x2 people  Rolling: Supervision or set-up only  Sitting: Minimal assistance (performs 75% or more of task)  Transfer Sit-to-Stand: Minimal assistance (performs 75% or more of task)  Transfer Supine-to-Sit: Minimal assistance (performs 75% or more of task)  Total Score: 18      Other Measures  5x Sit to Stand: unable to complete without UE support    Encounter Problems       Encounter Problems (Active)       Balance       STG - Maintains dynamic standing balance with upper extremity support with >=good- balance        Start:  06/05/25    Expected End:  06/19/25               Mobility       LTG - Patient will ambulate  community distance with SUP and WW       Start:  06/05/25    Expected End:  06/19/25               PT Transfers       STG - Patient will perform bed mobility IND       Start:  06/05/25    Expected End:  06/19/25            STG - Patient will transfer sit to and from stand MALLORIE with WW       Start:  06/05/25    Expected End:  06/19/25               Pain - Adult              Education Documentation  Mobility Training, taught by Mariah Ridley, PT at 6/5/2025  9:58 AM.  Learner: Patient  Readiness: Acceptance  Method: Explanation  Response: Verbalizes Understanding  Comment: Educated pt. on PT POC    Education Comments  No comments found.

## 2025-06-05 NOTE — PROGRESS NOTES
Pharmacy Medication History Review    Maisha Agosto is a 84 y.o. female admitted for Acute congestive heart failure, unspecified heart failure type. Pharmacy reviewed the patient's [id number] medications and allergies for accuracy.    Sources used to confirm medication list: Informant interview and Medication Dispense History  Informant: Child (Eli, daughter)  Informant credibility: Good (Able to recall medication, indication, strength, frequency, and/or prescriber for >75% of medications).    Total number of adjustments: 3     Medications Added Medications Removed Medications Adjusted  Adjustments Made    Azithromycin Levothyroxine 1 tablet --> 1.5 tablets     imodium Tablet, QAM --> Capsule, QPM     The list below reflectives the updated PTA list. Please review each medication in order reconciliation for additional clarification and justification.  Medications Prior to Admission   Medication Sig Dispense Refill Last Dose/Taking    amLODIPine (Norvasc) 5 mg tablet Take 1 tablet (5 mg) by mouth once daily.   6/4/2025    apixaban (Eliquis) 2.5 mg tablet Take 1 tablet (2.5 mg) by mouth 2 times a day. 60 tablet 0 6/4/2025 Evening    atorvastatin (Lipitor) 40 mg tablet Take 1 tablet (40 mg) by mouth once daily.   6/4/2025    cholecalciferol (Vitamin D3) 25 MCG (1000 UT) capsule Take 1 capsule (25 mcg) by mouth once daily.   6/4/2025    levothyroxine (Synthroid, Levoxyl) 75 mcg tablet Take 1 tablet (75 mcg) by mouth early in the morning. Take on an empty stomach at the same time each day, either 30 to 60 minutes prior to breakfast (Patient taking differently: Take 1.5 tablets (112.5 mcg) by mouth early in the morning..) 30 tablet 0 6/4/2025 Morning    loperamide (Imodium) 2 mg capsule Take 1 capsule (2 mg) by mouth once daily in the evening.   6/4/2025    magnesium oxide (Mag-Ox) 400 mg (241.3 mg magnesium) tablet Take 1 tablet by mouth once daily.   6/4/2025    metoprolol tartrate (Lopressor) 25 mg tablet  Take 1 tablet (25 mg) by mouth once daily at bedtime. 30 tablet 0 6/4/2025 Evening    multivitamin tablet Take 1 tablet by mouth once daily.   6/4/2025    omega 3-dha-epa-fish oil (Fish OiL) 1,000 mg (120 mg-180 mg) capsule Take 1 capsule (1,000 mg) by mouth once daily.   6/4/2025    potassium chloride CR 20 mEq ER tablet Take 1 tablet (20 mEq) by mouth once daily. Do not crush or chew.   6/4/2025    predniSONE (Deltasone) 5 mg tablet Take 0.5 tablets (2.5 mg) by mouth once daily.   6/4/2025    ramipril (Altace) 10 mg capsule Take 1 capsule (10 mg) by mouth once daily in the evening.   6/4/2025    traZODone (Desyrel) 50 mg tablet Take 1 tablet (50 mg) by mouth once daily at bedtime.   6/4/2025    azithromycin (Zithromax) 250 mg tablet Take 2 tabs (500 mg) by mouth today, then take 1 tab daily for 4 days. Do not fill before March 22, 2025. (Patient not taking: Reported on 6/5/2025) 6 tablet 0 Not Taking    oxygen (O2) gas therapy Inhale 1 each once every 24 hours. Wear nightly during sleep hours           The list below reflectives the updated allergy list. Please review each documented allergy for additional clarification and justification.  Allergies  Reviewed by Mike Bui RN on 6/4/2025        Severity Reactions Comments    Acthar [corticotropin] Not Specified Psychosis     Chloroquine Phosphate Not Specified Unknown Tremors/ weakness    Ciprofloxacin Not Specified Unknown     Humira [adalimumab] Not Specified Unknown     Levaquin [levofloxacin] Not Specified Unknown     Methotrexate Not Specified Unknown     Sulfamethoxazole-trimethoprim Not Specified Unknown     Xeljanz [tofacitinib] Not Specified Psychosis     Amoxicillin-pot Clavulanate Low Diarrhea Pt tolerated ceftriaxone during 9/28/24 admission. (DARREN Wallace, PharmD).     Enbrel [etanercept] Low Rash Legs and arms            Below are additional concerns with the patient's PTA list.  Spoke w/pt's daughter, Eli via telephone. Reviewed PTA list.  Please review changes made to the PTA list in the chart above.    Kyra Espinoza CPhT  Medication History Pharmacy Technician  CARLOS 8-4:30  Available via [employer] Secure Chat  OR  [phone]

## 2025-06-05 NOTE — ED TRIAGE NOTES
Pt arrives to [facility] via EMS from home, presenting with generalized weakness, illness, fatigue, and a cough.

## 2025-06-06 LAB
ANION GAP SERPL CALC-SCNC: 11 MMOL/L (ref 10–20)
BASOPHILS # BLD AUTO: 0.08 X10*3/UL (ref 0–0.1)
BASOPHILS NFR BLD AUTO: 0.6 %
BUN SERPL-MCNC: 19 MG/DL (ref 6–23)
CALCIUM SERPL-MCNC: 9.1 MG/DL (ref 8.6–10.3)
CHLORIDE SERPL-SCNC: 100 MMOL/L (ref 98–107)
CO2 SERPL-SCNC: 30 MMOL/L (ref 21–32)
CREAT SERPL-MCNC: 0.65 MG/DL (ref 0.5–1.05)
EGFRCR SERPLBLD CKD-EPI 2021: 86 ML/MIN/1.73M*2
EOSINOPHIL # BLD AUTO: 0.16 X10*3/UL (ref 0–0.4)
EOSINOPHIL NFR BLD AUTO: 1.1 %
ERYTHROCYTE [DISTWIDTH] IN BLOOD BY AUTOMATED COUNT: 12.7 % (ref 11.5–14.5)
GLUCOSE SERPL-MCNC: 115 MG/DL (ref 74–99)
HCT VFR BLD AUTO: 38.3 % (ref 36–46)
HGB BLD-MCNC: 12.3 G/DL (ref 12–16)
IMM GRANULOCYTES # BLD AUTO: 0.05 X10*3/UL (ref 0–0.5)
IMM GRANULOCYTES NFR BLD AUTO: 0.4 % (ref 0–0.9)
LYMPHOCYTES # BLD AUTO: 1.72 X10*3/UL (ref 0.8–3)
LYMPHOCYTES NFR BLD AUTO: 12.3 %
MCH RBC QN AUTO: 29.9 PG (ref 26–34)
MCHC RBC AUTO-ENTMCNC: 32.1 G/DL (ref 32–36)
MCV RBC AUTO: 93 FL (ref 80–100)
MONOCYTES # BLD AUTO: 1.57 X10*3/UL (ref 0.05–0.8)
MONOCYTES NFR BLD AUTO: 11.2 %
NEUTROPHILS # BLD AUTO: 10.46 X10*3/UL (ref 1.6–5.5)
NEUTROPHILS NFR BLD AUTO: 74.4 %
NRBC BLD-RTO: 0 /100 WBCS (ref 0–0)
PLATELET # BLD AUTO: 173 X10*3/UL (ref 150–450)
POTASSIUM SERPL-SCNC: 3.2 MMOL/L (ref 3.5–5.3)
RBC # BLD AUTO: 4.11 X10*6/UL (ref 4–5.2)
SODIUM SERPL-SCNC: 138 MMOL/L (ref 136–145)
VANCOMYCIN SERPL-MCNC: 10.2 UG/ML (ref 5–20)
WBC # BLD AUTO: 14 X10*3/UL (ref 4.4–11.3)

## 2025-06-06 PROCEDURE — 2500000005 HC RX 250 GENERAL PHARMACY W/O HCPCS: Mod: IPSPLIT | Performed by: NURSE PRACTITIONER

## 2025-06-06 PROCEDURE — 85025 COMPLETE CBC W/AUTO DIFF WBC: CPT | Mod: IPSPLIT | Performed by: NURSE PRACTITIONER

## 2025-06-06 PROCEDURE — 2500000002 HC RX 250 W HCPCS SELF ADMINISTERED DRUGS (ALT 637 FOR MEDICARE OP, ALT 636 FOR OP/ED): Mod: IPSPLIT | Performed by: HOSPITALIST

## 2025-06-06 PROCEDURE — 94669 MECHANICAL CHEST WALL OSCILL: CPT | Mod: IPSPLIT

## 2025-06-06 PROCEDURE — 2500000004 HC RX 250 GENERAL PHARMACY W/ HCPCS (ALT 636 FOR OP/ED): Mod: IPSPLIT | Performed by: HOSPITALIST

## 2025-06-06 PROCEDURE — 2500000002 HC RX 250 W HCPCS SELF ADMINISTERED DRUGS (ALT 637 FOR MEDICARE OP, ALT 636 FOR OP/ED): Mod: IPSPLIT | Performed by: NURSE PRACTITIONER

## 2025-06-06 PROCEDURE — 2500000004 HC RX 250 GENERAL PHARMACY W/ HCPCS (ALT 636 FOR OP/ED): Mod: IPSPLIT | Performed by: NURSE PRACTITIONER

## 2025-06-06 PROCEDURE — 2500000001 HC RX 250 WO HCPCS SELF ADMINISTERED DRUGS (ALT 637 FOR MEDICARE OP): Mod: IPSPLIT | Performed by: NURSE PRACTITIONER

## 2025-06-06 PROCEDURE — 94760 N-INVAS EAR/PLS OXIMETRY 1: CPT | Mod: IPSPLIT

## 2025-06-06 PROCEDURE — 2500000001 HC RX 250 WO HCPCS SELF ADMINISTERED DRUGS (ALT 637 FOR MEDICARE OP): Mod: IPSPLIT | Performed by: HOSPITALIST

## 2025-06-06 PROCEDURE — 94667 MNPJ CHEST WALL 1ST: CPT | Mod: IPSPLIT

## 2025-06-06 PROCEDURE — 80202 ASSAY OF VANCOMYCIN: CPT | Mod: IPSPLIT | Performed by: NURSE PRACTITIONER

## 2025-06-06 PROCEDURE — 97535 SELF CARE MNGMENT TRAINING: CPT | Mod: GO,IPSPLIT

## 2025-06-06 PROCEDURE — 36415 COLL VENOUS BLD VENIPUNCTURE: CPT | Mod: IPSPLIT | Performed by: NURSE PRACTITIONER

## 2025-06-06 PROCEDURE — 99233 SBSQ HOSP IP/OBS HIGH 50: CPT | Performed by: NURSE PRACTITIONER

## 2025-06-06 PROCEDURE — 94668 MNPJ CHEST WALL SBSQ: CPT | Mod: IPSPLIT

## 2025-06-06 PROCEDURE — 80048 BASIC METABOLIC PNL TOTAL CA: CPT | Mod: IPSPLIT | Performed by: NURSE PRACTITIONER

## 2025-06-06 PROCEDURE — 99221 1ST HOSP IP/OBS SF/LOW 40: CPT | Performed by: NURSE PRACTITIONER

## 2025-06-06 PROCEDURE — 97530 THERAPEUTIC ACTIVITIES: CPT | Mod: GP,CQ,IPSPLIT

## 2025-06-06 PROCEDURE — 97112 NEUROMUSCULAR REEDUCATION: CPT | Mod: GP,CQ,IPSPLIT

## 2025-06-06 PROCEDURE — 1100000001 HC PRIVATE ROOM DAILY: Mod: IPSPLIT

## 2025-06-06 PROCEDURE — 94640 AIRWAY INHALATION TREATMENT: CPT | Mod: IPSPLIT

## 2025-06-06 RX ORDER — LEVOTHYROXINE SODIUM 75 UG/1
112.5 TABLET ORAL DAILY
Status: DISPENSED | OUTPATIENT
Start: 2025-06-07

## 2025-06-06 RX ORDER — AZITHROMYCIN 250 MG/1
500 TABLET, FILM COATED ORAL
Status: DISPENSED | OUTPATIENT
Start: 2025-06-06 | End: 2025-06-10

## 2025-06-06 RX ORDER — VANCOMYCIN HYDROCHLORIDE 1 G/200ML
1000 INJECTION, SOLUTION INTRAVENOUS EVERY 24 HOURS
Status: DISCONTINUED | OUTPATIENT
Start: 2025-06-06 | End: 2025-06-06

## 2025-06-06 RX ORDER — CEFUROXIME AXETIL 250 MG/1
500 TABLET ORAL 2 TIMES DAILY
Status: DISPENSED | OUTPATIENT
Start: 2025-06-06 | End: 2025-06-12

## 2025-06-06 RX ORDER — IPRATROPIUM BROMIDE AND ALBUTEROL SULFATE 2.5; .5 MG/3ML; MG/3ML
3 SOLUTION RESPIRATORY (INHALATION)
Status: DISCONTINUED | OUTPATIENT
Start: 2025-06-06 | End: 2025-06-07

## 2025-06-06 RX ADMIN — PREDNISONE 2.5 MG: 5 TABLET ORAL at 08:41

## 2025-06-06 RX ADMIN — AZITHROMYCIN DIHYDRATE 500 MG: 250 TABLET ORAL at 11:49

## 2025-06-06 RX ADMIN — Medication 25 MCG: at 08:41

## 2025-06-06 RX ADMIN — TRAZODONE HYDROCHLORIDE 50 MG: 50 TABLET ORAL at 20:28

## 2025-06-06 RX ADMIN — APIXABAN 2.5 MG: 2.5 TABLET, FILM COATED ORAL at 08:41

## 2025-06-06 RX ADMIN — ATORVASTATIN CALCIUM 40 MG: 40 TABLET, FILM COATED ORAL at 08:41

## 2025-06-06 RX ADMIN — Medication 2 L/MIN: at 16:08

## 2025-06-06 RX ADMIN — Medication 2 L/MIN: at 08:00

## 2025-06-06 RX ADMIN — IPRATROPIUM BROMIDE AND ALBUTEROL SULFATE 3 ML: .5; 3 SOLUTION RESPIRATORY (INHALATION) at 21:42

## 2025-06-06 RX ADMIN — Medication 2 L/MIN: at 21:42

## 2025-06-06 RX ADMIN — CEFUROXIME AXETIL 500 MG: 250 TABLET, FILM COATED ORAL at 11:49

## 2025-06-06 RX ADMIN — PIPERACILLIN SODIUM AND TAZOBACTAM SODIUM 3.38 G: 3; .375 INJECTION, SOLUTION INTRAVENOUS at 05:38

## 2025-06-06 RX ADMIN — Medication 1 TABLET: at 08:41

## 2025-06-06 RX ADMIN — Medication 2 L/MIN: at 08:10

## 2025-06-06 RX ADMIN — LEVOTHYROXINE SODIUM 75 MCG: 0.07 TABLET ORAL at 06:15

## 2025-06-06 RX ADMIN — CEFUROXIME AXETIL 500 MG: 250 TABLET, FILM COATED ORAL at 20:28

## 2025-06-06 RX ADMIN — POTASSIUM CHLORIDE 20 MEQ: 1500 TABLET, EXTENDED RELEASE ORAL at 08:41

## 2025-06-06 RX ADMIN — IPRATROPIUM BROMIDE AND ALBUTEROL SULFATE 3 ML: .5; 3 SOLUTION RESPIRATORY (INHALATION) at 16:07

## 2025-06-06 RX ADMIN — APIXABAN 2.5 MG: 2.5 TABLET, FILM COATED ORAL at 20:28

## 2025-06-06 ASSESSMENT — COGNITIVE AND FUNCTIONAL STATUS - GENERAL
DRESSING REGULAR LOWER BODY CLOTHING: A LOT
TURNING FROM BACK TO SIDE WHILE IN FLAT BAD: A LITTLE
TOILETING: A LOT
CLIMB 3 TO 5 STEPS WITH RAILING: A LOT
MOBILITY SCORE: 17
PERSONAL GROOMING: A LITTLE
HELP NEEDED FOR BATHING: A LITTLE
STANDING UP FROM CHAIR USING ARMS: A LOT
EATING MEALS: A LITTLE
DAILY ACTIVITIY SCORE: 15
DRESSING REGULAR UPPER BODY CLOTHING: A LITTLE
MOVING FROM LYING ON BACK TO SITTING ON SIDE OF FLAT BED WITH BEDRAILS: A LITTLE
TURNING FROM BACK TO SIDE WHILE IN FLAT BAD: A LITTLE
DRESSING REGULAR UPPER BODY CLOTHING: A LITTLE
STANDING UP FROM CHAIR USING ARMS: A LITTLE
CLIMB 3 TO 5 STEPS WITH RAILING: A LOT
MOBILITY SCORE: 14
WALKING IN HOSPITAL ROOM: A LOT
MOVING TO AND FROM BED TO CHAIR: A LITTLE
WALKING IN HOSPITAL ROOM: A LITTLE
MOVING FROM LYING ON BACK TO SITTING ON SIDE OF FLAT BED WITH BEDRAILS: A LITTLE
DRESSING REGULAR LOWER BODY CLOTHING: A LOT
DAILY ACTIVITIY SCORE: 17
HELP NEEDED FOR BATHING: A LOT
PERSONAL GROOMING: A LITTLE
MOVING TO AND FROM BED TO CHAIR: A LOT
TOILETING: A LOT

## 2025-06-06 ASSESSMENT — PAIN SCALES - GENERAL
PAINLEVEL_OUTOF10: 0 - NO PAIN

## 2025-06-06 ASSESSMENT — PAIN - FUNCTIONAL ASSESSMENT
PAIN_FUNCTIONAL_ASSESSMENT: 0-10

## 2025-06-06 ASSESSMENT — ACTIVITIES OF DAILY LIVING (ADL): HOME_MANAGEMENT_TIME_ENTRY: 30

## 2025-06-06 NOTE — PROGRESS NOTES
06/06/25 1213   Discharge Planning   Living Arrangements Children;Family members   Support Systems Children   Assistance Needed Patient lives with her daughter, son in law and dog in a 2 story house with basement. (Laundry) She uses either her rollator or wheelchair to move around. She has supervision 24/7-when daughter is at work, there are caregivers that come in to stay with her. DME: wheelchair, rollator, shower seat in WIS with grab bars Patient is mostly independent with her ADLs; she does have confusion. Daughter would prefer patient to return home with home care vs SNF-   Type of Residence Private residence   Home or Post Acute Services [facility] services  [facility]   Type of Home Care Services Home PT;Home OT   Expected Discharge Disposition Home H   Does the patient need discharge transport arranged? No   Patient Choice   Provider Choice list and CMS website (https://medicare.gov/care-compare#search) for post-acute Quality and Resource Measure Data were provided and reviewed with: Family   Stroke Family Assessment   Stroke Family Assessment Needed No   Intensity of Service   Intensity of Service 0-30 min        Spoke with patient's daughter  about therapy's MOD level therapy recommendation.  Daughter prefers patient to return home.  She is agreeable to receive home care services upon discharge.  Patient offered choices and selected [facility].  [facility]   Plan for patient to return home with the addition of HHC upon discharge.  Updated medical staff and nursing.  Will follow.      2:28 pm  Patient moved out of ICU to Med Surg room 227.  Probable discharge Saturday.     3:15 pm  [facility] is unable to accept patient 2/2 staffing.   [facility] able to accept patient.  Updated medical team and daughter/patient.      DC PLAN:  Weekend discharge with [facility], PT/OT

## 2025-06-06 NOTE — PROGRESS NOTES
Occupational Therapy    Occupational Therapy Treatment    Name: Maisha Agosto  MRN: [id number]  Department: [facility] ICU  Room: 01/01-A  Date: 06/06/25  Time Calculation  Start Time: 0752  Stop Time: 0822  Time Calculation (min): 30 min    Assessment:  OT Assessment: Pt is an 83 yo F referred to occupational therapy for impaired self-care and functional mobility 2/2 hospitalization for acute congestive heart failure. Pt demonstrates good progress towards goals this date completing transfers w/ CGA and grooming tasks seated in chair w/ setup assist. Pt would continue to benefit from OT services at the MOD intensity level with possible progression to LOW intensity w/ 24/7 supervision for safety at home.  Prognosis: Good  Barriers to Discharge Home: Cognition needs, Physical needs  Cognition Needs: 24hr supervision for safety awareness needed, Insight of patient limited regarding functional ability/needs, Cognition-related high falls risk  Physical Needs: Stair navigation into home limited by function/safety, 24hr mobility assistance needed, Intermittent ADL assistance needed  Evaluation/Treatment Tolerance: Patient tolerated treatment well  Medical Staff Made Aware: Yes  End of Session Communication: Bedside nurse  End of Session Patient Position: Up in chair, Alarm on  Plan:  Treatment Interventions: ADL retraining, Functional transfer training, UE strengthening/ROM, Endurance training, Patient/family training, Equipment evaluation/education, Compensatory technique education  OT Frequency: 3 times per week  OT Discharge Recommendations: Moderate intensity level of continued care  OT Recommended Transfer Status: Stand by assist, Assist of 1  OT - OK to Discharge: Yes(Based on completed evaluation and care plan recommendations, no barriers to discharge to next site of care)    Subjective     OT Visit Info:  OT Received On: 06/06/25  General:  General  Reason for Referral: Pt is an 83 yo F referred to occupational therapy for  impaired self-care and functional mobility 2/2 hospitalization for acute congestive heart failure  Referred By: Magan Moreira DO  Past Medical History Relevant to Rehab: bowel perforation, C diff, colitis, dementia, heart murmur, hypernatremia, terminal ileitis with complication,  Family/Caregiver Present: No  Prior to Session Communication: Bedside nurse  Patient Position Received: Bed, 3 rail up, Alarm on  Preferred Learning Style: verbal, visual  General Comment: Pt pleasant and agreeable to therapy session. Pt cleared by RN prior to session  Precautions:  Hearing/Visual Limitations: glasses  Medical Precautions: Fall precautions  Precautions Comment: masimo, tele, purewick, O2     Date/Time Vitals Session Patient Position Pulse Resp SpO2 BP MAP (mmHg)    06/06/25 1200 --  --  65  19  100 %  132/63  83     06/06/25 1300 --  --  77  19  100 %  109/50  68           Vital Signs Comment: Pt on room air during transfer to chair, SpO2 dropped to 85% during transfer, increased to 90% with rest breaks. O2 NC reapplied following transfer, increased to 94%    Pain Assessment:  Pain Assessment  Pain Assessment: 0-10  0-10 (Numeric) Pain Score: 0 - No pain    Objective   Cognition:  Overall Cognitive Status: Within Functional Limits  Arousal/Alertness: Appropriate responses to stimuli  Orientation Level: Disoriented to place, Disoriented to time, Disoriented to situation  Activities of Daily Living:    Grooming  Grooming Level of Assistance: Setup, Minimal verbal cues  Grooming Where Assessed: Chair  Grooming Comments: Setup seated in chair to complete oral hygiene and wash face, min VCs to complete    UE Dressing  UE Dressing Level of Assistance: Minimum assistance  UE Dressing Where Assessed: Chair  UE Dressing Comments: Min A to dave sleeves of gown over shoulders and tie back of gown    Bed Mobility/Transfers:   Bed Mobility  Bed Mobility: Yes  Bed Mobility 1  Bed Mobility 1: Supine to sitting  Level of Assistance 1:  Contact guard  Bed Mobility Comments 1: use of bed rails, increased time    Transfers  Transfer: Yes  Transfer 1  Transfer From 1: Bed to  Transfer to 1: Stand  Technique 1: Sit to stand, Stand to sit  Transfer Device 1: Walker  Transfer Level of Assistance 1: Contact guard  Trials/Comments 1: increased time to compelte, x2 trials    Functional Mobility:  Functional Mobility  Functional Mobility Performed: Yes  Functional Mobility 1  Surface 1: Level tile  Device 1: Rolling walker  Assistance 1: Contact guard, Moderate verbal cues  Comments 1: Mod VCs for safety w/ lines and hand placement, short distance in room from EOB to chair  Sitting Balance:  Static Sitting Balance  Static Sitting-Balance Support: Feet supported  Static Sitting-Level of Assistance: Close supervision  Dynamic Sitting Balance  Dynamic Sitting-Balance Support: Feet supported  Dynamic Sitting-Level of Assistance: Close supervision  Dynamic Sitting-Balance: Forward lean  Standing Balance:  Static Standing Balance  Static Standing-Balance Support: Bilateral upper extremity supported  Static Standing-Level of Assistance: Contact guard  Dynamic Standing Balance  Dynamic Standing-Balance Support: Bilateral upper extremity supported  Dynamic Standing-Level of Assistance: Contact guard  Dynamic Standing-Balance: Turning    Strength:  Strength  Strength Comments: BUE grossly 3+/5  RUE: Within Functional Limits   LUE: Within Functional Limits    Outcome Measures:  [facility] Daily Activity  Putting on and taking off regular lower body clothing: A lot  Bathing (including washing, rinsing, drying): A lot  Putting on and taking off regular upper body clothing: A little  Toileting, which includes using toilet, bedpan or urinal: A lot  Taking care of personal grooming such as brushing teeth: A little  Eating Meals: A little  Daily Activity - Total Score: 15    Education Documentation  Body Mechanics, taught by Bety Barrientos OT at 6/6/2025  1:42 PM.  Learner:  Patient  Readiness: Acceptance  Method: Explanation  Response: Verbalizes Understanding  Comment: Pt edcuated on POC, DC recs, safety and body mechanics when completing transfers and ADLs    ADL Training, taught by Bety Barrientos OT at 6/6/2025  1:42 PM.  Learner: Patient  Readiness: Acceptance  Method: Explanation  Response: Verbalizes Understanding  Comment: Pt edcuated on POC, DC recs, safety and body mechanics when completing transfers and ADLs    Goals:  Encounter Problems       Encounter Problems (Active)       ADLs       Patient will perform UB and LB bathing with MIN A level of assistance.       Start:  06/05/25    Expected End:  06/19/25            Patient with complete upper body dressing with set-up, supervision level of assistance donning and doffing all UE clothes with PRN adaptive equipment while supported sitting (Progressing)       Start:  06/05/25    Expected End:  06/19/25            Patient with complete lower body dressing with set-up, supervision level of assistance donning and doffing all LE clothes  with PRN adaptive equipment while supported sitting       Start:  06/05/25    Expected End:  06/19/25            Patient will complete daily grooming tasks brushing teeth and washing face/hair with set-up, supervision level of assistance and PRN adaptive equipment while standing.  (Progressing)       Start:  06/05/25    Expected End:  06/19/25            Patient will complete toileting including hygiene clothing management/hygiene with set-up, minimal assist  level of assistance.       Start:  06/05/25    Expected End:  06/19/25               TRANSFERS       Patient will complete functional transfer to chair, bed, commode with front wheeled walker with modified independent level of assistance. (Progressing)       Start:  06/05/25    Expected End:  06/19/25

## 2025-06-06 NOTE — PROGRESS NOTES
Maisha Agosto is a 84 y.o. female on day 1 of admission presenting with Acute congestive heart failure, unspecified heart failure type.    Subjective   She is sitting up in a chair eating breakfast, tolerating it well.  She is alert and pleasant, answering questions appropriately.  She is still wearing oxygen at 2 L, desats to 88% on room air.  Will continue to monitor with plans to discharge tomorrow and wean from oxygen if possible in the next 24 hours.  All questions answered.       Objective     Physical Exam  Constitutional:       General: She is not in acute distress.     Appearance: Normal appearance. She is not toxic-appearing.   HENT:      Head: Normocephalic and atraumatic.      Mouth/Throat:      Mouth: Mucous membranes are moist.   Eyes:      Extraocular Movements: Extraocular movements intact.      Pupils: Pupils are equal, round, and reactive to light.   Cardiovascular:      Rate and Rhythm: Normal rate and regular rhythm.      Pulses: Normal pulses.      Heart sounds: Normal heart sounds. No murmur heard.     No gallop.   Pulmonary:      Effort: Pulmonary effort is normal. No respiratory distress.      Breath sounds: Rhonchi present. No wheezing or rales.      Comments: Diminished bilaterally  Abdominal:      General: Bowel sounds are normal. There is no distension.      Palpations: Abdomen is soft.      Tenderness: There is no abdominal tenderness. There is no guarding or rebound.   Musculoskeletal:         General: No swelling, tenderness, deformity or signs of injury. Normal range of motion.      Cervical back: Normal range of motion and neck supple.   Skin:     General: Skin is warm and dry.      Capillary Refill: Capillary refill takes less than 2 seconds.      Coloration: Skin is pale. Skin is not jaundiced.      Findings: No bruising or rash.   Neurological:      General: No focal deficit present.      Mental Status: She is alert and oriented to person, place, and time. Mental status is at  baseline.      Cranial Nerves: No cranial nerve deficit.      Sensory: No sensory deficit.      Motor: No weakness.      Gait: Gait normal.   Psychiatric:         Mood and Affect: Mood normal.         Behavior: Behavior normal.       Last Recorded Vitals  Blood pressure 125/60, pulse 66, temperature 36.2 °C (97.2 °F), temperature source Temporal, resp. rate 22, height 1.524 m (5'), weight (!) 38.6 kg (85 lb 1.6 oz), SpO2 100%.  Intake/Output last 3 Shifts:  I/O last 3 completed shifts:  In: 1158.5 (30 mL/kg) [P.O.:60; I.V.:496.7 (12.9 mL/kg); IV Piggyback:601.8]  Out: 1500 (38.9 mL/kg) [Urine:1500 (1.1 mL/kg/hr)]  Weight: 38.6 kg     Scheduled medications  Scheduled Medications[1]  Continuous medications  Continuous Medications[2]  PRN medications  PRN Medications[3]    Relevant Results  CT chest wo IV contrast  Result Date: 6/5/2025  1.  Persistent multifocal bronchiectasis with scattered areas of endobronchial mucous plugging, some new since prior exam. There are also new infiltrates bilaterally which likely represent new areas of bronchiolitis. Diagnostic considerations may include atypical mycobacterial or fungal infection. 2. Increased dependent atelectasis involving the left lower lobe medially. Trace left pleural effusion.     MACRO: None.   Signed by: Ronaldo Wilson 6/5/2025 3:52 PM Dictation workstation:   [id number]    ECG 12 lead  Result Date: 6/5/2025  Normal sinus rhythm Possible Left atrial enlargement Nonspecific T wave abnormality Abnormal ECG When compared with ECG of 19-MAR-2025 14:02, No significant change was found    XR chest 1 view  Result Date: 6/4/2025  Cardiomegaly with mild diffuse interstitial prominence which could be chronic or relate to component developing interstitial edema. Correlate clinically and attention on continued follow-up is advised.   MACRO: None   Signed by: Tapan Huang 6/4/2025 11:19 PM Dictation workstation:   [id number]     Latest Reference Range & Units 06/05/25  05:25 06/05/25 09:10 06/06/25 05:36   GLUCOSE 74 - 99 mg/dL 153 (H)  115 (H)   SODIUM 136 - 145 mmol/L 140  138   POTASSIUM 3.5 - 5.3 mmol/L 3.1 (L)  3.2 (L)   CHLORIDE 98 - 107 mmol/L 98  100   Bicarbonate 21 - 32 mmol/L 32  30   Anion Gap 10 - 20 mmol/L 13  11   Blood Urea Nitrogen 6 - 23 mg/dL 17  19   Creatinine 0.50 - 1.05 mg/dL 0.56  0.65   EGFR >60 mL/min/1.73m*2 89  86   Calcium 8.6 - 10.3 mg/dL 9.3  9.1   D-Dimer, Quantitative VTE Exclusion <=500 ng/mL FEU  367    WBC 4.4 - 11.3 x10*3/uL 16.8 (H)  14.0 (H)   nRBC 0.0 - 0.0 /100 WBCs 0.0  0.0   RBC 4.00 - 5.20 x10*6/uL 4.27  4.11   HEMOGLOBIN 12.0 - 16.0 g/dL 12.7  12.3   HEMATOCRIT 36.0 - 46.0 % 38.9  38.3   MCV 80 - 100 fL 91  93   MCH 26.0 - 34.0 pg 29.7  29.9   MCHC 32.0 - 36.0 g/dL 32.6  32.1   RED CELL DISTRIBUTION WIDTH 11.5 - 14.5 % 12.9  12.7   Platelets 150 - 450 x10*3/uL 183  173     Assessment & Plan  Acute congestive heart failure, unspecified heart failure type    Impaired functional mobility, balance, gait, and endurance    Frail elderly    Fatigue    Generalized weakness    Dyslipidemia    Paroxysmal atrial fibrillation (Multi)    Benign essential HTN    Hypothyroidism    Rheumatoid arthritis        Impaired functional mobility, balance, gait, and endurance  Frail elderly  Fatigue  Generalized weakness  -PT/OT  -safety precautions  -DC home with home therapies when ready    Acute congestive heart failure   Pafib (Eliquis)   HTN   HLD   -diuresis well in the ER with IV Lasix, transition to PO Lasix 20 mg daily   -echocardiogram LVEF 60-65%  -Appreciate rec cardiology   -continue Eliquis 2.5 mg twice a day, Atorvastatin 40 mg daily, Metoprolol 25 mg twice a day   -hold the Lisinopril and Amlodipine to avoid hypotension      COPD  -appreciate RT   -continue Prednisone  -Oxygen 2 L NC, wean as tolerated  -short of breath with little exertion, D-dimer 367   -CT chest: 1.  Persistent multifocal bronchiectasis with scattered areas of endobronchial  mucous plugging, some new since prior exam. There are also new infiltrates bilaterally which likely represent new areas of  bronchiolitis. Diagnostic considerations may include atypical mycobacterial or fungal infection. 2. Increased dependent atelectasis involving the left lower lobe medially. Trace left pleural effusion.  -blood cx in progress  -continue azithromycin, cefuroxime  -duonebs Q6 hours    Hypothyroidism  -continue levothyroxine    RA  -continue prednisone daily      GI ppx: PPI  DVT ppx: Eliquis   Fluids: prn  Electrolytes: replace as needed  Nutrition: reg  Adjuncts: PIV  Code Status: DNR   Pt requires inpatient stay at this time.    Ekta Martin, APRN-CNP         [1] [Held by provider] amLODIPine, 5 mg, oral, Daily  apixaban, 2.5 mg, oral, BID  atorvastatin, 40 mg, oral, Daily  azithromycin, 500 mg, oral, q24h MINDY  cefuroxime, 500 mg, oral, BID  cholecalciferol, 25 mcg, oral, Daily  [Held by provider] furosemide, 20 mg, oral, Daily  ipratropium-albuteroL, 3 mL, nebulization, q6h  [START ON 6/7/2025] levothyroxine, 112.5 mcg, oral, Daily  [Held by provider] lisinopril, 20 mg, oral, Daily  magnesium oxide, 400 mg of magnesium oxide, oral, Daily  [Held by provider] metoprolol tartrate, 25 mg, oral, BID  oxygen, , inhalation, Continuous - Inhalation  potassium chloride CR, 20 mEq, oral, Daily  predniSONE, 2.5 mg, oral, Daily  traZODone, 50 mg, oral, Nightly  [2] sodium chloride 0.9%, 10 mL/hr  [3] PRN medications: acetaminophen, alum-mag hydroxide-simeth, benzocaine-menthol, calcium carbonate, loperamide, melatonin, ondansetron, polyethylene glycol, sodium chloride, sodium chloride 0.9%

## 2025-06-06 NOTE — CONSULTS
Vancomycin Dosing by Pharmacy- INITIAL    Maisha Agosto is a 84 y.o. year old female who Pharmacy has been consulted for vancomycin dosing for pneumonia. Based on the patient's indication and renal status this patient will be dosed based on a goal AUC of 400-600.     Renal function is currently stable.    Visit Vitals  BP 98/50 (BP Location: Left arm, Patient Position: Lying)   Pulse 55   Temp 36.5 °C (97.7 °F) (Temporal)   Resp 16        Lab Results   Component Value Date    CREATININE 0.56 2025    CREATININE 0.62 2025    CREATININE 0.76 2025    CREATININE 0.69 2025        Patient weight is as follows:   Vitals:    25 0800   Weight: (!) 38.6 kg (85 lb 1.6 oz)       Cultures:  No results found for the encounter in last 14 days.        I/O last 3 completed shifts:  In: - (0 mL/kg)   Out: 1250 (25.1 mL/kg) [Urine:1250 (0.7 mL/kg/hr)]  Weight: 49.9 kg   I/O during current shift:  I/O this shift:  In: 311.8 [P.O.:60; IV Piggyback:251.8]  Out: -     Temp (24hrs), Av °C (98.6 °F), Min:36.5 °C (97.7 °F), Max:37.3 °C (99.1 °F)         Assessment/Plan   1. One time dose of 1g scheduled for  @ 1800      Renata Potts PharmD   PGY1 Pharmacy Resident

## 2025-06-06 NOTE — PROGRESS NOTES
Physical Therapy    Physical Therapy Treatment    Patient Name: Maisha Agosto  MRN: [id number]  Department: [facility]  Room: 01/01-A  Today's Date: 6/6/2025  Time Calculation  Start Time: 1315  Stop Time: 1338  Time Calculation (min): 23 min    Assessment/Plan   PT Assessment  PT Assessment Results: Decreased strength, Impaired balance, Decreased mobility, Decreased endurance  Rehab Prognosis: Good  Barriers to Discharge Home: Physical needs  Physical Needs: 24hr mobility assistance needed  Evaluation/Treatment Tolerance: Patient limited by fatigue  Medical Staff Made Aware: Yes  Strengths: Support of Caregivers, Support of extended family/friends  Barriers to Participation: Comorbidities  End of Session Communication: Bedside nurse  Assessment Comment: Pt performed bed mobility with SBA. Pt demonstrated improvement with endurance by being able to maintain standing for 2 x 5 minutes using FWW as UE support. Pt was on O2 throughout session and SpO2 remained WNL. Pt would continue to benefit from skilled therapy to improve overall strength and endurance and prevent further decline.  End of Session Patient Position: Bed, 3 rail up, Alarm on     PT Plan  Treatment/Interventions: Bed mobility, Transfer training, Neuromuscular re-education, Gait training  PT Plan: Ongoing PT  PT Frequency: 3 times per week  PT Discharge Recommendations: Moderate intensity level of continued care  PT Recommended Transfer Status: Assist x1  PT - OK to Discharge: Yes    PT Visit Info:  PT Received On: 06/06/25     General Visit Information:   General  Reason for Referral: impaired mobility, acute CHF  Referred By: Magan Moreira DO  Past Medical History Relevant to Rehab: bowel perforation, C diff, colitis, dementia, heart murmur, hypernatremia, terminal ileitis with complication,  Family/Caregiver Present: Yes  Caregiver Feedback: son present  Prior to Session Communication: Bedside nurse  Patient Position Received: Bed, 3 rail up, Alarm  on  Preferred Learning Style: verbal, visual  General Comment: Pt pleasant and agreeable to therapy session. Pt cleared by RN prior to session    Subjective   Precautions:  Precautions  Hearing/Visual Limitations: glasses  Medical Precautions: Fall precautions  Precautions Comment: berta bolaños, O2     Date/Time Vitals Session Patient Position Pulse Resp SpO2 BP MAP (mmHg)    06/06/25 1200 --  --  65  19  100 %  132/63  83     06/06/25 1300 --  --  77  19  100 %  109/50  68           Vital Signs Comment: Pt's SpO2 remained in high 90s throughout session on O2.     Objective   Pain:  Pain Assessment  Pain Assessment: 0-10  0-10 (Numeric) Pain Score: 0 - No pain  Cognition:  Cognition  Overall Cognitive Status: Impaired at baseline  Arousal/Alertness: Appropriate responses to stimuli  Orientation Level: Disoriented to place, Disoriented to time, Disoriented to situation  Coordination:  Movements are Fluid and Coordinated: Yes    Treatments:  Balance/Neuromuscular Re-Education  Balance/Neuromuscular Re-Education Activity Performed: Yes  Balance/Neuromuscular Re-Education Activity 1: Static standing (Pt was able to maintain static standing using FWW as UE suppot for 2 x ~5 minutes)    Bed Mobility  Bed Mobility: Yes  Bed Mobility 1  Bed Mobility 1: Supine to sitting, Sitting to supine  Level of Assistance 1: Contact guard  Bed Mobility Comments 1: increased time    Ambulation/Gait Training  Ambulation/Gait Training Performed: Yes  Ambulation/Gait Training 2  Surface 2: Level tile  Device 2: Rolling walker  Gait Support Devices: Gait belt  Assistance 2: Contact guard  Quality of Gait 2: Inconsistent stride length, Decreased step length  Comments/Distance (ft) 2: 3' x 2 (limited due to lines)    Transfers  Transfer: Yes  Transfer 1  Transfer From 1: Bed to  Transfer to 1: Stand  Technique 1: Sit to stand, Stand to sit  Transfer Device 1: Walker  Transfer Level of Assistance 1: Contact guard  Trials/Comments 1:  x2    Outcome Measures:  [facility] Basic Mobility  Turning from your back to your side while in a flat bed without using bedrails: A little  Moving from lying on your back to sitting on the side of a flat bed without using bedrails: A little  Moving to and from bed to chair (including a wheelchair): A little  Standing up from a chair using your arms (e.g. wheelchair or bedside chair): A little  To walk in hospital room: A little  Climbing 3-5 steps with railing: A lot  Basic Mobility - Total Score: 17    Education Documentation  Mobility Training, taught by Emilee Harris PTA at 6/6/2025  1:53 PM.  Learner: Patient  Readiness: Acceptance  Method: Explanation  Response: Verbalizes Understanding  Comment: transfer technique, safety awareness    Encounter Problems       Encounter Problems (Active)       Balance       STG - Maintains dynamic standing balance with upper extremity support with >=good- balance  (Progressing)       Start:  06/05/25    Expected End:  06/19/25               Mobility       LTG - Patient will ambulate community distance with SUP and WW (Progressing)       Start:  06/05/25    Expected End:  06/19/25               PT Transfers       STG - Patient will perform bed mobility IND (Progressing)       Start:  06/05/25    Expected End:  06/19/25            STG - Patient will transfer sit to and from stand MALLORIE with WW (Progressing)       Start:  06/05/25    Expected End:  06/19/25               Pain - Adult

## 2025-06-06 NOTE — DISCHARGE INSTR - OTHER ORDERS
The Home Care Agency you selected will contact you within 72 hours to schedule a Home Care Visit. If you have any questions prior to the call, please feel free to contact Crispin Ferreira.    Thank you for choosing [facility] for your Health Care needs.  Also, thank you for allowing us to take you and your families preferences into account when determining your discharge plan.  Stay well!     You may receive a survey in the mail within the next couple weeks. Please take the time to complete it and return it. Your input is ALWAYS important to us. Thank you!  Your Care Transition Team - Aviva Carter  & Edna      For questions about your medications listed on your discharge instructions, please call the Nurses Station at [phone].

## 2025-06-06 NOTE — NURSING NOTE
0800: OT at bedside, pt up to the chair. Brushes teeth.    0915: ESTHER Segal CNP at bedside, discusses plan of care with pt and dtr Eli.     0940: UMANG Martin CNP at bedside, pt seen. Aware pt failed room air trial and is on 2l O2 NC.     1400: Report to MAVIS Fenton on med/surg.     1420: Transferred to room 227 via bed. Dtr Eli made aware.

## 2025-06-06 NOTE — CARE PLAN
The clinical goals for the shift include No increase in oxygen needs this shift.    Since arrival to med surg patient had not required additional O2 needs. Vitals were stable, no reports of pain. Appetite was good this afternoon. Daughter at bedside for dinner. Patient currently in bed with call light in reach and bed alarm on, denies any needs at this time.

## 2025-06-06 NOTE — PROGRESS NOTES
Vancomycin Dosing by Pharmacy- Cessation of Therapy    Consult to pharmacy for vancomycin dosing has been discontinued by the prescriber, pharmacy will sign off at this time.    Please call pharmacy if there are further questions or re-enter a consult if vancomycin is resumed.     MRSA PCR negative    Ginger Wallace, PharmD

## 2025-06-06 NOTE — NURSING NOTE
1419-patient arrived to med surg, oriented to room and call light, bed alarm on, denies any needs at this time

## 2025-06-06 NOTE — CONSULTS
Nutrition Initial Assessment:   Nutrition Assessment    Reason for Assessment: Admission nursing screening (MST=2)    Patient is a 84 y.o. female presenting with Acute congestive heart failure, unspecified heart failure type    PMH: Frail elderly, impaired functional mobility, balance, gait, and endurance, generalized weakness, fatigue, rheumatoid arthritis, paroxysmal atrial fibrillation, hypothyroidism, benign essential hypertension (HTN), and dyslipidemia.    Nutrition History:  Pain affecting nutrition status: N/A  Food and Nutrient History: Visited patient at bedside. She was in good spirits and reported feeling well. She has consumed 100% of her meals today per nursing documentation. Her daughter shared that the patient previously took an oral nutrition supplement (ONS), but it caused diarrhea, so it was discontinued. At home, the patient has generally been eating well, although her intake tends to decline when she is ill.  Food Intolerance:  (? lactose d/t not tolerating ONS)       Anthropometrics:  Height: 152.4 cm (5')   Weight: (!) 38.3 kg (84 lb 7 oz)   BMI (Calculated): 16.49  IBW/kg (Dietitian Calculated): 53.64 kg (IBW based on BMI of 23)  Percent of IBW: 71 %       Weight History:   Wt Readings from Last 8 Encounters:   06/06/25 (!) 38.3 kg (84 lb 7 oz)   03/26/25 (!) 44.5 kg (98 lb)   03/20/25 47.3 kg (104 lb 4.4 oz)   02/26/25 (!) 42.8 kg (94 lb 5.7 oz)   02/25/25 (!) 39 kg (86 lb)   09/28/24 (!) 39.4 kg (86 lb 13.8 oz)   06/08/24 (!) 40.8 kg (90 lb)   06/05/24 (!) 41.2 kg (90 lb 13.3 oz)       Weight Change %:  Weight History / % Weight Change: 06/06/25 (38.3 kg) to 03/26/25 (44.5 kg) = -6.2 kg (-13.7 lb) loss in 10.5 weeks, -13.9%. 06/06/25 (38.3 kg) to 02/26/25 (42.8 kg) = -4.5 kg (-9.9 lb) loss in ~14 weeks, -10.5%. 06/06/25 (38.3 kg) to 06/05/24 (41.2 kg) = -2.9 kg (-6.4 lb) loss in 1 year, -7.0%  Significant Weight Loss: Yes    Nutrition Focused Physical Exam Findings:    Subcutaneous Fat  Loss:   Orbital Fat Pads: Mild-Moderate (slight dark circles and slight hollowing) (age?)  Buccal Fat Pads: Mild-Moderate (flat cheeks, minimal bounce) (age?)  Triceps: Mild-Moderate (less than ample fat tissue) (age)  Muscle Wasting:  Temporalis: Mild-Moderate (slight depression) (age)  Pectoralis (Clavicular Region): Mild-Moderate (some protrusion of clavicle)  Deltoid/Trapezius: Mild-Moderate (slight protrusion of acromion process)  Interosseous: Severe (depressed area between thumb and forefinger)  Edema:  Edema: none  Physical Findings:  Hair: Negative  Eyes: Negative  Nails: Negative  Skin: Negative  Digestive System Findings: Loose stool    Nutrition Significant Labs:  BMP Trend:   Results from last 7 days   Lab Units 06/06/25  0536 06/05/25  0525 06/04/25  2245   GLUCOSE mg/dL 115* 153* 170*   CALCIUM mg/dL 9.1 9.3 9.4   SODIUM mmol/L 138 140 140   POTASSIUM mmol/L 3.2* 3.1* 3.5   CO2 mmol/L 30 32 30   CHLORIDE mmol/L 100 98 105   BUN mg/dL 19 17 22   CREATININE mg/dL 0.65 0.56 0.62     Nutrition Specific Medications:  Scheduled medications  Scheduled Medications[1]  Continuous medications  Continuous Medications[2]    I/O:    ; Stool Appearance: Loose (06/06/25 1400)    Dietary Orders (From admission, onward)       Start     Ordered    06/05/25 0242  May Participate in Room Service  ( ROOM SERVICE MAY PARTICIPATE)  Once        Question:  .  Answer:  Yes    06/05/25 0241    06/05/25 0035  Adult diet Regular, 2-3 grams sodium  Diet effective now        Question Answer Comment   Diet type Regular    Diet type 2-3 grams sodium        06/05/25 0034                Estimated Needs:   Total Energy Estimated Needs in 24 hours (kCal): 1340 kCal  Method for Estimating Needs: ~35 kcal/kg of actual BW  Total Protein Estimated Needs in 24 Hours (g):  (57-77g)  Method for Estimating 24 Hour Protein Needs: 1.5-2.0 g/kg of actual BW  Total Fluid Estimated Needs in 24 Hours (mL): 1400 mL  Method for Estimating 24 Hour  Fluid Needs: ~1 mL/kcal or per medical team. (CHF status, recommend cardiac team guidance)        Nutrition Diagnosis   Malnutrition Diagnosis  Patient has Malnutrition Diagnosis: Yes  Diagnosis Status: New  Malnutrition Diagnosis: Severe malnutrition related to chronic disease or condition  Related to: inadequate intake associated with chronic disease and advanced age  As Evidenced by: 13.9% weight loss over ~10 weeks, BMI of 16.5, and moderate loss of subcutaneous fat (orbital, buccal, triceps) and muscle mass (temporalis, clavicle, deltoid, interosseous).            Nutrition Interventions/Recommendations   Nutrition prescription for oral nutrition    Nutrition Recommendations:  Individualized Nutrition Prescription Provided for : 2-3g sodium per provider, suggest Liberalizeing diet in intake is less than 75%.    Nutrition Interventions/Goals:   Meals and Snacks: Mineral-modified diet  Goal: Encourage high caloire, protin rich foods, small frq meals.  Additional Interventions: No ONS at this time d/t intolerance.      Education Documentation  Nutrition Care Manual, taught by Sherly Sewell RDN, CANDIS at 6/6/2025  7:38 PM.  Learner: Family, Patient  Readiness: Acceptance  Method: Handout  Response: Verbalizes Understanding  Comment: Increase caloire and protein, heart healthy diet              Nutrition Monitoring and Evaluation   Food/Nutrient Related History Monitoring  Monitoring and Evaluation Plan: Intake / amount of food  Intake / Amount of food: Consumes at least 75% or more of meals/snacks/supplements    Anthropometric Measurements  Monitoring and Evaluation Plan: Body weight  Body Weight: Body weight - Maintain stable weight              Goal Status: New goal(s) identified    Time Spent (min): 75 minutes              [1] [Held by provider] amLODIPine, 5 mg, oral, Daily  apixaban, 2.5 mg, oral, BID  atorvastatin, 40 mg, oral, Daily  azithromycin, 500 mg, oral, q24h MINDY  cefuroxime, 500 mg, oral,  BID  cholecalciferol, 25 mcg, oral, Daily  [Held by provider] furosemide, 20 mg, oral, Daily  ipratropium-albuteroL, 3 mL, nebulization, q6h  [START ON 6/7/2025] levothyroxine, 112.5 mcg, oral, Daily  [Held by provider] lisinopril, 20 mg, oral, Daily  magnesium oxide, 400 mg of magnesium oxide, oral, Daily  [Held by provider] metoprolol tartrate, 25 mg, oral, BID  oxygen, , inhalation, Continuous - Inhalation  potassium chloride CR, 20 mEq, oral, Daily  predniSONE, 2.5 mg, oral, Daily  traZODone, 50 mg, oral, Nightly     [2] sodium chloride 0.9%, 10 mL/hr

## 2025-06-06 NOTE — CONSULTS
Cardiology Consult Note  Patient: Maisha Agosto  Unit/Bed: 01/01-A  YOB: 1940    Admitting Diagnosis: Generalized weakness [R53.1]  Acute congestive heart failure, unspecified heart failure type [I50.9]  Date:  6/4/2025  Hospital Day: 1  Attending: Cardiology consulted at the request of  Rigo Burnett MD  for ADHF   PCP: Ascencion Valente DO      Chief Complaint   Patient presents with    Weakness, Gen    Illness    Cough      History of Present Illness:   Maisha Agosto is a 84 y.o. Female who presented via [facility] with c/o generalized weakness. She lives at home with Family (DTR) whom is at the bedside and contributed most of the history. States that 2 days ago she has a low grade fever with some coughing and she was calling out for her daughter in the night which is not her usual. She is generally very independent. Patient was very weak and unable to get out of bed on her own. Also noted to have sudden onset SOB the night before coming to the ED. Found with Pulse ox 84% on RA. Pre review of chart desats with ambulation.     Past medical history significant for PAF on apixaban, HTN, HLD, COPD, dementia, Cardiac murmur, Bowl perforation   Primary Cardiology outpatient: Dr. Cole      SUBJECTIVE:   Sitting up in the chair, no acute distress.  Denies CP, SOB, Palpitations. Oxygenating on O2 via NC--baseline RA. Daughter notes that every time she is admitted she has to be on oxygen but then does not need it at home.       ALLERGIES:   RX Allergies[1]   Home Medication:  Prescriptions Prior to Admission[2]   PMHx:  Medical History[3]    PSHx:  Surgical History[4]    SOCIAL Hx:  Social History[5]    FAMILY Hx:  Family History[6]    REVIEW OF SYMPTOMS:   12 system review of symptoms is negative except as noted above          OBJECTIVE:     VITALS:   Visit Vitals  /81   Pulse 78   Temp 36.2 °C (97.2 °F) (Temporal)   Resp 26        Wt Readings from Last 5 Encounters:   06/05/25 (!) 38.6 kg (85 lb  1.6 oz)   03/26/25 (!) 44.5 kg (98 lb)   03/20/25 47.3 kg (104 lb 4.4 oz)   02/26/25 (!) 42.8 kg (94 lb 5.7 oz)   02/25/25 (!) 39 kg (86 lb)          INTAKE/ OUTPUT:   I/O last 3 completed shifts:  In: 1158.5 (30 mL/kg) [P.O.:60; I.V.:496.7 (12.9 mL/kg); IV Piggyback:601.8]  Out: 1500 (38.9 mL/kg) [Urine:1500 (1.1 mL/kg/hr)]  Weight: 38.6 kg      TELEMETRY MONITORING: SR 60s    PHYSICAL EXAMINATION:    Vitals reviewed.   Constitutional:       General: Awake. Not in acute distress.     Appearance: Normal appearance. Well-developed and not in distress. Frail.      Interventions: Nasal cannula in place.   Eyes:      General: Lids are normal.      Pupils: Pupils are equal, round, and reactive to light.   HENT:      Right Ear: External ear normal.      Left Ear: External ear normal.      Nose: Nose normal.    Mouth/Throat:      Lips: Pink.      Mouth: Mucous membranes are moist.   Pulmonary:      Effort: Pulmonary effort is normal.      Breath sounds: Normal air entry. Rhonchi present.   Cardiovascular:      PMI at left midclavicular line. Normal rate. Regular rhythm. Normal S1. Normal S2.       Murmurs: There is no murmur.   Edema:     Peripheral edema absent.   Abdominal:      General: Bowel sounds are normal.      Palpations: Abdomen is soft.      Tenderness: There is no abdominal tenderness.   Musculoskeletal:      Cervical back: Full passive range of motion without pain, normal range of motion and neck supple. Skin:     General: Skin is warm and dry.      Coloration: Skin is pale.   Neurological:      General: No focal deficit present.      Mental Status: Alert, oriented to person, place, and time and oriented to person, place and time. Mental status is at baseline.   Psychiatric:         Attention and Perception: Attention normal.         Mood and Affect: Mood normal.         Speech: Speech normal.         Behavior: Behavior is cooperative.        LABS:       CMP:   Recent Labs     06/06/25  0536 06/05/25  0588  06/04/25  2245 03/21/25  0457 03/20/25  0532 03/19/25  0554 03/18/25  1517 02/25/25  1622 10/06/24  0603 10/02/24  0625 09/30/24  0555 09/29/24  0508 06/06/24  0726 06/05/24  0816 05/18/24  0425 05/17/24  0429 05/10/24  0619 05/09/24  0610 05/08/24  0938 05/07/24  1440 11/20/23  0915 11/20/23  0915 06/23/22  1048    140 140 139 138 138 139 138 139 140   < > 139   < > 140 141 142   < > 142   < > 141  140   < > 140 139   K 3.2* 3.1* 3.5 3.8 4.1 4.2 3.8 4.6 3.7 3.4*   < > 3.2*   < > 3.2* 3.9 3.4   < > 3.1*   < > 3.0*  2.9*   < > 4.1 3.8    98 105 102 102 103 101 103 108* 108*   < > 101   < > 104 105 104   < > 103   < > 101  102   < > 102 101   CO2 30 32 30 30 27 29 32 28 26 29   < > 31   < > 29 30 30   < > 27   < > 28  29   < > 30 32   ANIONGAP 11 13 9* 11 13 10 10 12 9* <7*   < > 10   < > 10 6 8   < > 15   < > 15  12   < > 12 10   BUN 19 17 22 28* 25* 22 21 23 27* 25*   < > 18   < > 19 17 19   < > 17   < > 26*  24*   < > 20 21   CREATININE 0.65 0.56 0.62 0.76 0.69 0.70 0.73 0.74 0.63 0.57   < > 0.70   < > 0.57 0.70 0.70   < > 0.59   < > 0.69  0.64   < > 0.74 0.76   EGFR 86 89 87 76 85 84 80 79 87 89   < > 84   < > 89 85 85   < > 88   < > 85  87   < > 79  --    MG  --   --  1.98  --   --   --   --  2.10  --   --   --  2.24  --  1.79 1.70 1.80  --  1.79  --  2.17  --  2.18 2.03    < > = values in this interval not displayed.     LIVER ENZYMES:   Recent Labs     06/04/25  2245 03/18/25  1517 02/25/25  1622 09/28/24  1401 06/05/24  0816 05/18/24  0425 05/17/24  0429 05/15/24  2201 05/11/20  0831 01/09/20  0615 01/08/20  1509   ALBUMIN 3.8 4.0 3.9 3.7 3.3* 3.0* 3.0* 3.6   < > 3.7 4.3   ALT 15 21 20 24 34 25 30 31   < > 20 26   AST 17 21 22 23 19 26 31 34   < > 20 27   BILITOT 0.7 0.4 0.4 0.9 0.8 0.4 0.5 0.5   < > 0.5 0.5   LIPASE 16  --   --   --  8*  --   --   --   --  44 760*    < > = values in this interval not displayed.     CBC:  Recent Labs     06/06/25  0536 06/05/25  0525 06/04/25 2245  "03/21/25  0458 03/20/25  0532 03/19/25  0554 03/18/25  1517 02/25/25  1622   WBC 14.0* 16.8* 12.7* 20.5* 15.5* 8.7 10.3 9.7   HGB 12.3 12.7 12.0 13.4 13.0 13.4 12.9 12.6   HCT 38.3 38.9 37.2 41.3 41.1 41.9 40.3 41.8    183 181 248 214 205 191 217   MCV 93 91 93 90 91 91 91 99     COAG:   Recent Labs     02/25/25  1645 06/23/22  1048 04/30/22  1114 04/27/22  1328   INR 1.0 1.0 0.9 0.9     ABO: No results for input(s): \"ABO\" in the last 45943 hours.  HEME/ENDO:  Recent Labs     05/01/25  0726 01/24/25  0916 12/29/23  0913 11/20/23  0915 07/06/21  0818 03/12/21  0821   TSH  --   --   --  0.21*  --  1.05   HGBA1C 6.6* 6.3*   < > 6.5*   < > 6.2    < > = values in this interval not displayed.      CARDIAC:   Recent Labs     06/05/25  0021 06/04/25  2245 02/25/25  1622 10/01/24  1540 09/28/24  1401 06/05/24  0926 06/05/24  0816 05/07/24  1440 06/23/22  1223 06/23/22  1048 04/23/18  1108   TROPHS 12 11 6 10 7 13 12 13   < > 10  --    BNP  --  388*  --   --   --   --   --   --   --  91 97    < > = values in this interval not displayed.       Lipid Panel:  No results found for: \"HDL\", \"CHHDL\", \"VLDL\", \"TRIG\", \"NHDL\"     BNP:  BNP   Date Value Ref Range Status   06/04/2025 388 (H) 0 - 99 pg/mL Final     TSH  Lab Results   Component Value Date    TSH 0.21 (L) 11/20/2023     Hemoglobin A1C   Date Value Ref Range Status   05/01/2025 6.6 (H) 4.3 - 5.6 % Final       CURRENT MEDICATIONS:   Infusions:  sodium chloride 0.9%      Scheduled:  [Held by provider] amLODIPine, 5 mg, Daily  apixaban, 2.5 mg, BID  atorvastatin, 40 mg, Daily  azithromycin, 500 mg, q24h MINDY  cefuroxime, 500 mg, BID  cholecalciferol, 25 mcg, Daily  [Held by provider] furosemide, 20 mg, Daily  ipratropium-albuteroL, 3 mL, q6h  [START ON 6/7/2025] levothyroxine, 112.5 mcg, Daily  [Held by provider] lisinopril, 20 mg, Daily  magnesium oxide, 400 mg of magnesium oxide, Daily  [Held by provider] metoprolol tartrate, 25 mg, BID  oxygen, , Continuous - " Inhalation  potassium chloride CR, 20 mEq, Daily  predniSONE, 2.5 mg, Daily  traZODone, 50 mg, Nightly      PRN:  acetaminophen, 650 mg, q6h PRN  alum-mag hydroxide-simeth, 20 mL, q4h PRN  benzocaine-menthol, 1 lozenge, q2h PRN  calcium carbonate, 500 mg of calcium carbonate, 4x daily PRN  loperamide, 2 mg, 4x daily PRN  melatonin, 10 mg, Daily PRN  ondansetron, 4 mg, q4h PRN  polyethylene glycol, 17 g, BID PRN  sodium chloride, 1 spray, 4x daily PRN  sodium chloride 0.9%, 10 mL/hr, Continuous PRN          LAST IMAGING RESULTS INDEPENDENTLY REVIEWED:   CT chest wo IV contrast  Narrative: Interpreted By:  Ronaldo Wilson,   STUDY:  CT CHEST WO IV CONTRAST;  6/5/2025 2:37 pm      INDICATION:  Signs/Symptoms:shortness of breath.      COMPARISON:  None.      ACCESSION NUMBER(S):  [id number]      ORDERING CLINICIAN:  PANCHO COATES      TECHNIQUE:  Helical data acquisition of the chest was obtained  without IV  contrast.  Images were reformatted in axial, coronal, and sagittal  planes.      FINDINGS:  LUNGS and AIRWAYS:  There are scattered areas of bronchiectasis and endobronchial mucous  plugging, for example in the right middle lobe and lingula inferiorly.      Focal peripheral bronchiectasis involving the left upper lobe  anteriorly demonstrates new endobronchial mucous plugging (Series  205, Image 132).      There are new nodules bilaterally, for example:  Right upper lobe anteriorly: 1.3 cm (Series 205, Image 121). Nodule  appears somewhat tubular perhaps reflecting endobronchial mucous  plugging. Lingula: 1.3 cm (Series 205, Image 133). There are adjacent  clustered satellite nodules. Left lower lobe superior segment: 0.8 cm  (Series 205, Image 115). There adjacent tubular satellite nodules.      There is increased dependent atelectasis involving the left lower  lobe medially. Trace left pleural effusion.      MEDIASTINUM and KAYLEIGH, LOWER NECK AND AXILLA:      No thoracic lymphadenopathy by CT criteria.       Esophagus is not dilated.      HEART and VESSELS:  Mild cardiomegaly.      Small pericardial effusion.      Mild coronary atherosclerosis.      Severe thoracic aortic atherosclerosis without aneurysm.      UPPER ABDOMEN:  The visualized subdiaphragmatic structures demonstrate no acute  findings on noncontrast images.      CHEST WALL and OSSEOUS STRUCTURES:  No suspicious osseous lesions. Multilevel degenerative changes of the  thoracic spine.          Impression: 1.  Persistent multifocal bronchiectasis with scattered areas of  endobronchial mucous plugging, some new since prior exam. There are  also new infiltrates bilaterally which likely represent new areas of  bronchiolitis. Diagnostic considerations may include atypical  mycobacterial or fungal infection.  2. Increased dependent atelectasis involving the left lower lobe  medially. Trace left pleural effusion.          MACRO:  None.      Signed by: Ronaldo Wilson 6/5/2025 3:52 PM  Dictation workstation:   [id number]  ECG 12 lead  Normal sinus rhythm  Possible Left atrial enlargement  Nonspecific T wave abnormality  Abnormal ECG  When compared with ECG of 19-MAR-2025 14:02,  No significant change was found           CARDIOVASCULAR STUDIES:       EKG dated 6/4/2025 independently reviewed   NSR with no ST changes    Echocardiogram 2/26/2025  CONCLUSIONS:   1. The left ventricular systolic function is normal, with a visually estimated ejection fraction of 60-65%.   2. Spectral Doppler shows a Grade I (impaired relaxation pattern) of left ventricular diastolic filling with normal left atrial filling pressure.   3. No evidence of mitral valve regurgitation.   4. Trace tricuspid regurgitation is visualized.   5. Aortic valve sclerosis.   6. Mild aortic valve regurgitation.     Echocardiogram 4/21/2025 - outside [facility]  Normal LV, RV size and systolic function  LVEF 65%, LVH with diastolic dysfunction, mild AoV stenosis, mild aortic insufficiency, mild  tricuspid regurgitation    ASSESSMENT:   83 yo F admitted with acute decompensated HF, Acute hypoxic respiratory failure requiring O2    COPD  HTN  HLD  PAF on Apixaban    PLAN:    Reviewed recent echocardiogram--preserved LVEF, no significant valve insufficiency  Agree with gentle diuresis - appears to be close to euvolemia  PO Furosemide at discharge - consider PRN dosing given frailness   Strict Is&Os; Daily weights  Replace electrolytes as needed to maintain goal K>4 and Mag >2   Recommend ambulation and O2 evaluation  Follow up with primary cardiologist Dr. Cole as OP      Thank you  for the consult   Will sign off   Please call or message with questions, concerns or changes in clinical condition   The case was discussed with USAMA Monahan     Electronically signed by USAMA Oshea on 6/6/2025 at 11:22 AM  I spent 60 minutes in the professional and overall care of this patient.         [1]   Allergies  Allergen Reactions    Acthar [Corticotropin] Psychosis    Chloroquine Phosphate Unknown     Tremors/ weakness    Ciprofloxacin Unknown    Humira [Adalimumab] Unknown    Levaquin [Levofloxacin] Unknown    Methotrexate Unknown    Sulfamethoxazole-Trimethoprim Unknown    Xeljanz [Tofacitinib] Psychosis    Amoxicillin-Pot Clavulanate Diarrhea     Pt tolerated ceftriaxone during 9/28/24 admission. (DARREN Wallace, PharmD).     Enbrel [Etanercept] Rash     Legs and arms   [2]   Medications Prior to Admission   Medication Sig Dispense Refill Last Dose/Taking    amLODIPine (Norvasc) 5 mg tablet Take 1 tablet (5 mg) by mouth once daily.   6/4/2025    apixaban (Eliquis) 2.5 mg tablet Take 1 tablet (2.5 mg) by mouth 2 times a day. 60 tablet 0 6/4/2025 Evening    atorvastatin (Lipitor) 40 mg tablet Take 1 tablet (40 mg) by mouth once daily.   6/4/2025    cholecalciferol (Vitamin D3) 25 MCG (1000 UT) capsule Take 1 capsule (25 mcg) by mouth once daily.   6/4/2025    levothyroxine (Synthroid, Levoxyl) 75  mcg tablet Take 1 tablet (75 mcg) by mouth early in the morning. Take on an empty stomach at the same time each day, either 30 to 60 minutes prior to breakfast (Patient taking differently: Take 1.5 tablets (112.5 mcg) by mouth early in the morning..) 30 tablet 0 6/4/2025 Morning    loperamide (Imodium) 2 mg capsule Take 1 capsule (2 mg) by mouth once daily in the evening.   6/4/2025    magnesium oxide (Mag-Ox) 400 mg (241.3 mg magnesium) tablet Take 1 tablet by mouth once daily.   6/4/2025    metoprolol tartrate (Lopressor) 25 mg tablet Take 1 tablet (25 mg) by mouth once daily at bedtime. 30 tablet 0 6/4/2025 Evening    multivitamin tablet Take 1 tablet by mouth once daily.   6/4/2025    omega 3-dha-epa-fish oil (Fish OiL) 1,000 mg (120 mg-180 mg) capsule Take 1 capsule (1,000 mg) by mouth once daily.   6/4/2025    potassium chloride CR 20 mEq ER tablet Take 1 tablet (20 mEq) by mouth once daily. Do not crush or chew.   6/4/2025    predniSONE (Deltasone) 5 mg tablet Take 0.5 tablets (2.5 mg) by mouth once daily.   6/4/2025    ramipril (Altace) 10 mg capsule Take 1 capsule (10 mg) by mouth once daily in the evening.   6/4/2025    traZODone (Desyrel) 50 mg tablet Take 1 tablet (50 mg) by mouth once daily at bedtime.   6/4/2025    azithromycin (Zithromax) 250 mg tablet Take 2 tabs (500 mg) by mouth today, then take 1 tab daily for 4 days. Do not fill before March 22, 2025. (Patient not taking: Reported on 6/5/2025) 6 tablet 0 Not Taking    oxygen (O2) gas therapy Inhale 1 each once every 24 hours. Wear nightly during sleep hours      [3]   Past Medical History:  Diagnosis Date    Bowel perforation (Multi) 06/05/2024    Clostridium difficile colitis     Colitis due to Clostridioides difficile 05/16/2024    Dementia     Heart murmur     Hypernatremia     Terminal ileitis with complication (Multi) 06/06/2024    UTI (urinary tract infection) 05/07/2024   [4]   Past Surgical History:  Procedure Laterality Date    CATARACT  EXTRACTION       SECTION, CLASSIC  2016     Section    CHOLECYSTECTOMY  2014    Cholecystectomy    COLONOSCOPY  2014    Colonoscopy (Fiberoptic)    CT HEAD ANGIO W AND WO IV CONTRAST  2021    CT HEAD ANGIO W AND WO IV CONTRAST 2021 [facility]    ESOPHAGOGASTRODUODENOSCOPY  2014    Diagnostic Esophagogastroduodenoscopy    MR HEAD ANGIO WO IV CONTRAST  2021    MR HEAD ANGIO WO IV CONTRAST 2021 [facility]    MR NECK ANGIO WO IV CONTRAST  2021    MR NECK ANGIO WO IV CONTRAST 2021 [facility]   [5]   Social History  Socioeconomic History    Marital status:    Tobacco Use    Smoking status: Never    Smokeless tobacco: Never   Vaping Use    Vaping status: Never Used   Substance and Sexual Activity    Alcohol use: Never    Drug use: Never    Sexual activity: Defer     Social Drivers of Health     Financial Resource Strain: Low Risk  (2025)    Overall Financial Resource Strain (CARDIA)     Difficulty of Paying Living Expenses: Not hard at all   Food Insecurity: Patient Unable To Answer (2025)    Hunger Vital Sign     Worried About Running Out of Food in the Last Year: Patient unable to answer     Ran Out of Food in the Last Year: Patient unable to answer   Transportation Needs: No Transportation Needs (2025)    PRAPARE - Transportation     Lack of Transportation (Medical): No     Lack of Transportation (Non-Medical): No   Physical Activity: Inactive (3/18/2025)    Exercise Vital Sign     Days of Exercise per Week: 0 days     Minutes of Exercise per Session: 0 min   Stress: No Stress Concern Present (3/18/2025)    [employer] - Occupational Stress Questionnaire     Feeling of Stress : Not at all   Social Connections: Feeling Socially Integrated (2025)    OASIS : Social Isolation     Frequency of experiencing loneliness or isolation: Never   Recent Concern: Social  Connections - Socially Isolated (3/18/2025)    Social Connection and Isolation Panel [NHANES]     Frequency of Communication with Friends and Family: Never     Frequency of Social Gatherings with Friends and Family: Never     Attends Jainism Services: Never     Active Member of Clubs or Organizations: No     Attends Club or Organization Meetings: Never     Marital Status:    Intimate Partner Violence: Not At Risk (6/5/2025)    Humiliation, Afraid, Rape, and Kick questionnaire     Fear of Current or Ex-Partner: No     Emotionally Abused: No     Physically Abused: No     Sexually Abused: No   Housing Stability: Low Risk  (6/5/2025)    Housing Stability Vital Sign     Unable to Pay for Housing in the Last Year: No     Number of Times Moved in the Last Year: 0     Homeless in the Last Year: No   [6]   Family History  Problem Relation Name Age of Onset    Diabetes Mother      Heart disease Other      Heart attack Other

## 2025-06-07 LAB
ANION GAP SERPL CALC-SCNC: 11 MMOL/L (ref 10–20)
BASOPHILS # BLD AUTO: 0.06 X10*3/UL (ref 0–0.1)
BASOPHILS NFR BLD AUTO: 0.5 %
BUN SERPL-MCNC: 15 MG/DL (ref 6–23)
CALCIUM SERPL-MCNC: 8.9 MG/DL (ref 8.6–10.3)
CHLORIDE SERPL-SCNC: 103 MMOL/L (ref 98–107)
CO2 SERPL-SCNC: 29 MMOL/L (ref 21–32)
CREAT SERPL-MCNC: 0.47 MG/DL (ref 0.5–1.05)
EGFRCR SERPLBLD CKD-EPI 2021: >90 ML/MIN/1.73M*2
EOSINOPHIL # BLD AUTO: 0.14 X10*3/UL (ref 0–0.4)
EOSINOPHIL NFR BLD AUTO: 1.2 %
ERYTHROCYTE [DISTWIDTH] IN BLOOD BY AUTOMATED COUNT: 12.6 % (ref 11.5–14.5)
GLUCOSE SERPL-MCNC: 134 MG/DL (ref 74–99)
HCT VFR BLD AUTO: 36 % (ref 36–46)
HGB BLD-MCNC: 11.6 G/DL (ref 12–16)
IMM GRANULOCYTES # BLD AUTO: 0.06 X10*3/UL (ref 0–0.5)
IMM GRANULOCYTES NFR BLD AUTO: 0.5 % (ref 0–0.9)
LYMPHOCYTES # BLD AUTO: 1.29 X10*3/UL (ref 0.8–3)
LYMPHOCYTES NFR BLD AUTO: 11.3 %
MCH RBC QN AUTO: 30 PG (ref 26–34)
MCHC RBC AUTO-ENTMCNC: 32.2 G/DL (ref 32–36)
MCV RBC AUTO: 93 FL (ref 80–100)
MONOCYTES # BLD AUTO: 1.27 X10*3/UL (ref 0.05–0.8)
MONOCYTES NFR BLD AUTO: 11.2 %
NEUTROPHILS # BLD AUTO: 8.57 X10*3/UL (ref 1.6–5.5)
NEUTROPHILS NFR BLD AUTO: 75.3 %
NRBC BLD-RTO: 0 /100 WBCS (ref 0–0)
PLATELET # BLD AUTO: 189 X10*3/UL (ref 150–450)
POTASSIUM SERPL-SCNC: 3.5 MMOL/L (ref 3.5–5.3)
RBC # BLD AUTO: 3.87 X10*6/UL (ref 4–5.2)
SODIUM SERPL-SCNC: 139 MMOL/L (ref 136–145)
WBC # BLD AUTO: 11.4 X10*3/UL (ref 4.4–11.3)

## 2025-06-07 PROCEDURE — 2500000004 HC RX 250 GENERAL PHARMACY W/ HCPCS (ALT 636 FOR OP/ED): Mod: IPSPLIT | Performed by: HOSPITALIST

## 2025-06-07 PROCEDURE — 94640 AIRWAY INHALATION TREATMENT: CPT | Mod: IPSPLIT

## 2025-06-07 PROCEDURE — 99232 SBSQ HOSP IP/OBS MODERATE 35: CPT | Performed by: NURSE PRACTITIONER

## 2025-06-07 PROCEDURE — 94760 N-INVAS EAR/PLS OXIMETRY 1: CPT | Mod: IPSPLIT

## 2025-06-07 PROCEDURE — 2500000005 HC RX 250 GENERAL PHARMACY W/O HCPCS: Mod: IPSPLIT | Performed by: NURSE PRACTITIONER

## 2025-06-07 PROCEDURE — 1100000001 HC PRIVATE ROOM DAILY: Mod: IPSPLIT

## 2025-06-07 PROCEDURE — 2500000001 HC RX 250 WO HCPCS SELF ADMINISTERED DRUGS (ALT 637 FOR MEDICARE OP): Mod: IPSPLIT | Performed by: HOSPITALIST

## 2025-06-07 PROCEDURE — 2500000002 HC RX 250 W HCPCS SELF ADMINISTERED DRUGS (ALT 637 FOR MEDICARE OP, ALT 636 FOR OP/ED): Mod: IPSPLIT | Performed by: NURSE PRACTITIONER

## 2025-06-07 PROCEDURE — 36415 COLL VENOUS BLD VENIPUNCTURE: CPT | Mod: IPSPLIT | Performed by: NURSE PRACTITIONER

## 2025-06-07 PROCEDURE — 9420000001 HC RT PATIENT EDUCATION 5 MIN: Mod: IPSPLIT

## 2025-06-07 PROCEDURE — 85025 COMPLETE CBC W/AUTO DIFF WBC: CPT | Mod: IPSPLIT | Performed by: NURSE PRACTITIONER

## 2025-06-07 PROCEDURE — 80048 BASIC METABOLIC PNL TOTAL CA: CPT | Mod: IPSPLIT | Performed by: NURSE PRACTITIONER

## 2025-06-07 PROCEDURE — 2500000002 HC RX 250 W HCPCS SELF ADMINISTERED DRUGS (ALT 637 FOR MEDICARE OP, ALT 636 FOR OP/ED): Mod: IPSPLIT | Performed by: HOSPITALIST

## 2025-06-07 PROCEDURE — 2500000001 HC RX 250 WO HCPCS SELF ADMINISTERED DRUGS (ALT 637 FOR MEDICARE OP): Mod: IPSPLIT | Performed by: NURSE PRACTITIONER

## 2025-06-07 PROCEDURE — 97116 GAIT TRAINING THERAPY: CPT | Mod: GP,CQ,IPSPLIT

## 2025-06-07 PROCEDURE — 94668 MNPJ CHEST WALL SBSQ: CPT | Mod: IPSPLIT

## 2025-06-07 RX ORDER — IPRATROPIUM BROMIDE AND ALBUTEROL SULFATE 2.5; .5 MG/3ML; MG/3ML
3 SOLUTION RESPIRATORY (INHALATION) 3 TIMES DAILY
Status: DISCONTINUED | OUTPATIENT
Start: 2025-06-07 | End: 2025-06-08

## 2025-06-07 RX ORDER — ALBUTEROL SULFATE 0.83 MG/ML
2.5 SOLUTION RESPIRATORY (INHALATION) EVERY 6 HOURS PRN
Status: DISCONTINUED | OUTPATIENT
Start: 2025-06-07 | End: 2025-06-07

## 2025-06-07 RX ORDER — ALBUTEROL SULFATE 0.83 MG/ML
2.5 SOLUTION RESPIRATORY (INHALATION) EVERY 2 HOUR PRN
Status: ACTIVE | OUTPATIENT
Start: 2025-06-07

## 2025-06-07 RX ORDER — IPRATROPIUM BROMIDE AND ALBUTEROL SULFATE 2.5; .5 MG/3ML; MG/3ML
3 SOLUTION RESPIRATORY (INHALATION) EVERY 2 HOUR PRN
Status: DISCONTINUED | OUTPATIENT
Start: 2025-06-07 | End: 2025-06-07

## 2025-06-07 RX ADMIN — IPRATROPIUM BROMIDE AND ALBUTEROL SULFATE 3 ML: .5; 3 SOLUTION RESPIRATORY (INHALATION) at 09:28

## 2025-06-07 RX ADMIN — LEVOTHYROXINE SODIUM 112.5 MCG: 0.07 TABLET ORAL at 05:58

## 2025-06-07 RX ADMIN — Medication 21 PERCENT: at 22:37

## 2025-06-07 RX ADMIN — Medication 25 MCG: at 08:52

## 2025-06-07 RX ADMIN — POTASSIUM CHLORIDE 20 MEQ: 1500 TABLET, EXTENDED RELEASE ORAL at 08:51

## 2025-06-07 RX ADMIN — CEFUROXIME AXETIL 500 MG: 250 TABLET, FILM COATED ORAL at 08:51

## 2025-06-07 RX ADMIN — TRAZODONE HYDROCHLORIDE 50 MG: 50 TABLET ORAL at 20:49

## 2025-06-07 RX ADMIN — CEFUROXIME AXETIL 500 MG: 250 TABLET, FILM COATED ORAL at 20:49

## 2025-06-07 RX ADMIN — APIXABAN 2.5 MG: 2.5 TABLET, FILM COATED ORAL at 20:49

## 2025-06-07 RX ADMIN — Medication 1 TABLET: at 08:51

## 2025-06-07 RX ADMIN — IPRATROPIUM BROMIDE AND ALBUTEROL SULFATE 3 ML: .5; 3 SOLUTION RESPIRATORY (INHALATION) at 22:34

## 2025-06-07 RX ADMIN — AZITHROMYCIN DIHYDRATE 500 MG: 250 TABLET ORAL at 08:51

## 2025-06-07 RX ADMIN — ATORVASTATIN CALCIUM 40 MG: 40 TABLET, FILM COATED ORAL at 08:51

## 2025-06-07 RX ADMIN — APIXABAN 2.5 MG: 2.5 TABLET, FILM COATED ORAL at 08:53

## 2025-06-07 RX ADMIN — PREDNISONE 2.5 MG: 5 TABLET ORAL at 08:51

## 2025-06-07 ASSESSMENT — COGNITIVE AND FUNCTIONAL STATUS - GENERAL
TURNING FROM BACK TO SIDE WHILE IN FLAT BAD: A LITTLE
DAILY ACTIVITIY SCORE: 21
TOILETING: A LITTLE
MOBILITY SCORE: 19
MOBILITY SCORE: 24
CLIMB 3 TO 5 STEPS WITH RAILING: A LITTLE
MOVING FROM LYING ON BACK TO SITTING ON SIDE OF FLAT BED WITH BEDRAILS: A LITTLE
MOVING TO AND FROM BED TO CHAIR: A LITTLE
WALKING IN HOSPITAL ROOM: A LITTLE
HELP NEEDED FOR BATHING: A LITTLE
EATING MEALS: A LITTLE

## 2025-06-07 ASSESSMENT — PAIN SCALES - GENERAL
PAINLEVEL_OUTOF10: 0 - NO PAIN

## 2025-06-07 ASSESSMENT — PAIN - FUNCTIONAL ASSESSMENT
PAIN_FUNCTIONAL_ASSESSMENT: 0-10
PAIN_FUNCTIONAL_ASSESSMENT: 0-10

## 2025-06-07 NOTE — PROGRESS NOTES
Physical Therapy    Physical Therapy Treatment    Patient Name: Maisha Agosto  MRN: [id number]  Department: [facility]  Room: [address] [place] [address]  Today's Date: 6/7/2025  Time Calculation  Start Time: 0945  Stop Time: 1013  Time Calculation (min): 28 min      Assessment/Plan   PT Assessment  PT Assessment Results: Decreased strength, Impaired balance, Decreased mobility, Decreased endurance  Rehab Prognosis: Good  Barriers to Discharge Home: Cognition needs, Physical needs  Cognition Needs: Cognition-related high falls risk  Physical Needs: 24hr mobility assistance needed  Evaluation/Treatment Tolerance: Patient limited by fatigue  Medical Staff Made Aware: Yes  End of Session Communication: Bedside nurse  Assessment Comment: Patient demonstarted improved endurance this session with inc amb distances and tolerating ~5minutes of upright activities, asc/desc stairs. Pt did demonstrate difficutly with AD managemnet requiring inc assistance for safety/safety awareness. Can be impulsive.  Vitals WNL throughout, nursing informed.  End of Session Patient Position: Up in chair, Alarm on (end of session pairing/activating chair alarms for pt safety.)  PT Plan  Inpatient/Swing Bed or Outpatient: Inpatient  PT Plan  Treatment/Interventions: Bed mobility, Transfer training, Neuromuscular re-education, Gait training  PT Plan: Ongoing PT  PT Frequency: 3 times per week  PT Discharge Recommendations: Moderate intensity level of continued care  PT Recommended Transfer Status: Assist x1  PT - OK to Discharge: Yes    PT Visit Info:  PT Received On: 06/07/25     General Visit Information:   General  Reason for Referral: impaired mobility, acute CHF  Referred By: Magan Moreira DO  Family/Caregiver Present: Yes  Caregiver Feedback: Daughter present providing feedback throughout session  Prior to Session Communication: Bedside nurse  Patient Position Received: Up in chair, Alarm off, not on at start of session  Preferred Learning Style: verbal,  "visual  General Comment: Pt pleasant and agreeable to therapy session. Pt cleared by RN prior to session. Per RT pt off supplemental oxygen as of this morning.    Subjective   Precautions:  Precautions  Hearing/Visual Limitations: glasses  Medical Precautions: Fall precautions  Precautions Comment: berta bolaños    Objective   Pain:  Pain Assessment  Pain Assessment: 0-10  0-10 (Numeric) Pain Score: 0 - No pain  Cognition:  Cognition  Overall Cognitive Status: Impaired at baseline  Arousal/Alertness: Appropriate responses to stimuli  Orientation Level: Disoriented to situation, Disoriented to time, Disoriented to place  Impulsive: Moderately    Activity Tolerance:  Activity Tolerance  Endurance: Tolerates 10 - 20 min exercise with multiple rests  Treatments:  Ambulation/Gait Training 2  Surface 2: Level tile  Device 2: Rolling walker  Gait Support Devices: Gait belt  Assistance 2: Close supervision, Contact guard, Moderate assistance  Quality of Gait 2: Narrow base of support, Forward flexed posture, Inconsistent stride length, Decreased step length  Comments/Distance (ft) 2: 50-65' x 3 with brief standing rest breaks throughout.FWW veering to the right, wall bumping, occ assitance required. Daughter reports she does do this at home as well but not as frequently. Reported to nurse. (SPO2 89-93% on room air, HR  return WNL with standing/seated rest)  Transfer 1  Transfer From 1: Chair with arms to  Transfer to 1: Stand  Technique 1: Sit to stand, Stand to sit  Transfer Device 1: Walker  Transfer Level of Assistance 1: Close supervision  Trials/Comments 1: multiple performed throughout    Stairs  Stairs: Yes  Stairs  Rails 1: Right  Device 1: Railing  Support Devices 1: Gait belt  Assistance 1: Hand held assistance  Comment/Number of Steps 1: ascend/descend x 2 6\" steps, HHA on Left. Greater difficulty with descending however no LOB, Daughter reports pt appears at baseline with this activitiy & that someone is " always with her entering the house.    Outcome Measures:  [facility] Basic Mobility  Turning from your back to your side while in a flat bed without using bedrails: A little  Moving from lying on your back to sitting on the side of a flat bed without using bedrails: A little  Moving to and from bed to chair (including a wheelchair): A little  Standing up from a chair using your arms (e.g. wheelchair or bedside chair): None  To walk in hospital room: A little  Climbing 3-5 steps with railing: A little  Basic Mobility - Total Score: 19    Education Documentation  Mobility Training, taught by Corbin Quinones PTA at 6/7/2025 12:04 PM.  Learner: Family, Patient  Readiness: Acceptance  Method: Explanation  Response: Needs Reinforcement, Verbalizes Understanding  Comment: Proper AD management, maneuverability with enviromental barriers. Daugher understands, pt requries reinforcement.        Encounter Problems       Encounter Problems (Active)       Balance       STG - Maintains dynamic standing balance with upper extremity support with >=good- balance  (Progressing)       Start:  06/05/25    Expected End:  06/19/25               Mobility       LTG - Patient will ambulate community distance with SUP and WW (Progressing)       Start:  06/05/25    Expected End:  06/19/25               PT Transfers       STG - Patient will perform bed mobility IND (Progressing)       Start:  06/05/25    Expected End:  06/19/25            STG - Patient will transfer sit to and from stand MALLORIE with WW (Progressing)       Start:  06/05/25    Expected End:  06/19/25               Pain - Adult

## 2025-06-07 NOTE — NURSING NOTE
Assumed care of pt, pt in bed bed alarm on and call light with in reach, no needs.     0300- pt in bed sleeping, call light with in reach, bed alarm on, no needs.

## 2025-06-07 NOTE — PROGRESS NOTES
Maisha Agosto is a 84 y.o. female on day 2 of admission presenting with Acute congestive heart failure, unspecified heart failure type.    Subjective   She is sitting up in a chair, daughter is at her side. She is up working with physical therapy euvolemic on exam.  She is alert and pleasant, answering questions appropriately.  She is still wearing oxygen at 2 L, desats to 88% on room air.  Will continue to monitor with plans to discharge tomorrow and wean from oxygen if possible in the next 24 hours.  All questions answered.       Objective     Physical Exam  Constitutional:       General: She is not in acute distress.     Appearance: Normal appearance. She is not toxic-appearing.   HENT:      Head: Normocephalic and atraumatic.      Mouth/Throat:      Mouth: Mucous membranes are moist.   Eyes:      Extraocular Movements: Extraocular movements intact.      Pupils: Pupils are equal, round, and reactive to light.   Cardiovascular:      Rate and Rhythm: Normal rate and regular rhythm.      Pulses: Normal pulses.      Heart sounds: Normal heart sounds. No murmur heard.     No gallop.   Pulmonary:      Effort: Pulmonary effort is normal. No respiratory distress.      Breath sounds: Rhonchi present. No wheezing or rales.      Comments: Diminished bilaterally  Abdominal:      General: Bowel sounds are normal. There is no distension.      Palpations: Abdomen is soft.      Tenderness: There is no abdominal tenderness. There is no guarding or rebound.   Musculoskeletal:         General: No swelling, tenderness, deformity or signs of injury. Normal range of motion.      Cervical back: Normal range of motion and neck supple.   Skin:     General: Skin is warm and dry.      Capillary Refill: Capillary refill takes less than 2 seconds.      Coloration: Skin is pale. Skin is not jaundiced.      Findings: No bruising or rash.   Neurological:      General: No focal deficit present.      Mental Status: She is alert and oriented to  person, place, and time. Mental status is at baseline.      Cranial Nerves: No cranial nerve deficit.      Sensory: No sensory deficit.      Motor: No weakness.      Gait: Gait normal.   Psychiatric:         Mood and Affect: Mood normal.         Behavior: Behavior normal.     Last Recorded Vitals  Blood pressure 136/63, pulse 68, temperature 36.7 °C (98.1 °F), temperature source Temporal, resp. rate 16, height 1.524 m (5'), weight (!) 38.3 kg (84 lb 7 oz), SpO2 98%.  Intake/Output last 3 Shifts:  I/O last 3 completed shifts:  In: 1450 (37.9 mL/kg) [P.O.:900; I.V.:250 (6.5 mL/kg); IV Piggyback:300]  Out: 225 (5.9 mL/kg) [Urine:225 (0.2 mL/kg/hr)]  Weight: 38.3 kg     Scheduled medications  Scheduled Medications[1]  Continuous medications  Continuous Medications[2]  PRN medications  PRN Medications[3]    Relevant Results  CT chest wo IV contrast  Result Date: 6/5/2025  1.  Persistent multifocal bronchiectasis with scattered areas of endobronchial mucous plugging, some new since prior exam. There are also new infiltrates bilaterally which likely represent new areas of bronchiolitis. Diagnostic considerations may include atypical mycobacterial or fungal infection. 2. Increased dependent atelectasis involving the left lower lobe medially. Trace left pleural effusion.     MACRO: None.   Signed by: Ronaldo Wilson 6/5/2025 3:52 PM Dictation workstation:   [id number]    ECG 12 lead  Result Date: 6/5/2025  Normal sinus rhythm Possible Left atrial enlargement Nonspecific T wave abnormality Abnormal ECG When compared with ECG of 19-MAR-2025 14:02, No significant change was found    XR chest 1 view  Result Date: 6/4/2025  Cardiomegaly with mild diffuse interstitial prominence which could be chronic or relate to component developing interstitial edema. Correlate clinically and attention on continued follow-up is advised.   MACRO: None   Signed by: Tapan Huang 6/4/2025 11:19 PM Dictation workstation:   [id number]     Latest  Reference Range & Units 06/05/25 05:25 06/05/25 09:10 06/06/25 05:36   GLUCOSE 74 - 99 mg/dL 153 (H)  115 (H)   SODIUM 136 - 145 mmol/L 140  138   POTASSIUM 3.5 - 5.3 mmol/L 3.1 (L)  3.2 (L)   CHLORIDE 98 - 107 mmol/L 98  100   Bicarbonate 21 - 32 mmol/L 32  30   Anion Gap 10 - 20 mmol/L 13  11   Blood Urea Nitrogen 6 - 23 mg/dL 17  19   Creatinine 0.50 - 1.05 mg/dL 0.56  0.65   EGFR >60 mL/min/1.73m*2 89  86   Calcium 8.6 - 10.3 mg/dL 9.3  9.1   D-Dimer, Quantitative VTE Exclusion <=500 ng/mL FEU  367    WBC 4.4 - 11.3 x10*3/uL 16.8 (H)  14.0 (H)   nRBC 0.0 - 0.0 /100 WBCs 0.0  0.0   RBC 4.00 - 5.20 x10*6/uL 4.27  4.11   HEMOGLOBIN 12.0 - 16.0 g/dL 12.7  12.3   HEMATOCRIT 36.0 - 46.0 % 38.9  38.3   MCV 80 - 100 fL 91  93   MCH 26.0 - 34.0 pg 29.7  29.9   MCHC 32.0 - 36.0 g/dL 32.6  32.1   RED CELL DISTRIBUTION WIDTH 11.5 - 14.5 % 12.9  12.7   Platelets 150 - 450 x10*3/uL 183  173     Assessment & Plan  Acute congestive heart failure, unspecified heart failure type    Impaired functional mobility, balance, gait, and endurance    Frail elderly    Fatigue    Generalized weakness    Dyslipidemia    Paroxysmal atrial fibrillation (Multi)    Benign essential HTN    Hypothyroidism    Rheumatoid arthritis        Impaired functional mobility, balance, gait, and endurance  Frail elderly  Fatigue  Generalized weakness  -PT/OT  -safety precautions  -DC home with home therapies when ready    Acute congestive heart failure   Pafib (Eliquis)   HTN   HLD   -diuresis well in the ER with IV Lasix, transition to PO Lasix 20 mg daily   -echocardiogram LVEF 60-65%  -Appreciate rec cardiology   -continue Eliquis 2.5 mg twice a day, Atorvastatin 40 mg daily, Metoprolol 25 mg twice a day   -hold the Lisinopril and Amlodipine to avoid hypotension      COPD  -appreciate RT   -continue Prednisone  -Oxygen 2 L NC, wean as tolerated  -short of breath with little exertion, D-dimer 367   -CT chest: 1.  Persistent multifocal bronchiectasis with  scattered areas of endobronchial mucous plugging, some new since prior exam. There are also new infiltrates bilaterally which likely represent new areas of  bronchiolitis. Diagnostic considerations may include atypical mycobacterial or fungal infection. 2. Increased dependent atelectasis involving the left lower lobe medially. Trace left pleural effusion.  -blood cx in progress  -continue azithromycin, cefuroxime  -duonebs Q6 hours    Hypothyroidism  -continue levothyroxine    RA  -continue prednisone daily      GI ppx: PPI  DVT ppx: Eliquis   Fluids: prn  Electrolytes: replace as needed  Nutrition: reg  Adjuncts: PIV  Code Status: DNR   Pt requires inpatient stay at this time.    Rachana Trejo, APRN-CNP       [1] [Held by provider] amLODIPine, 5 mg, oral, Daily  apixaban, 2.5 mg, oral, BID  atorvastatin, 40 mg, oral, Daily  azithromycin, 500 mg, oral, q24h MINDY  cefuroxime, 500 mg, oral, BID  cholecalciferol, 25 mcg, oral, Daily  [Held by provider] furosemide, 20 mg, oral, Daily  ipratropium-albuteroL, 3 mL, nebulization, TID  levothyroxine, 112.5 mcg, oral, Daily  [Held by provider] lisinopril, 20 mg, oral, Daily  magnesium oxide, 400 mg of magnesium oxide, oral, Daily  [Held by provider] metoprolol tartrate, 25 mg, oral, BID  oxygen, , inhalation, Continuous - Inhalation  potassium chloride CR, 20 mEq, oral, Daily  predniSONE, 2.5 mg, oral, Daily  traZODone, 50 mg, oral, Nightly     [2] sodium chloride 0.9%, 10 mL/hr     [3] PRN medications: acetaminophen, albuterol, alum-mag hydroxide-simeth, benzocaine-menthol, calcium carbonate, loperamide, melatonin, ondansetron, polyethylene glycol, sodium chloride, sodium chloride 0.9%

## 2025-06-07 NOTE — CARE PLAN
The patient's goals for the shift include      The clinical goals for the shift include No incrased in oxygen needs this shift.    Assumed care at 0300. Pt resting quietly. No distress and uneventful. Satting upper 90's

## 2025-06-08 ENCOUNTER — HOSPITAL ENCOUNTER (INPATIENT)
Dept: CARDIOLOGY | Facility: HOSPITAL | Age: 85
Discharge: HOME | DRG: 291 | End: 2025-06-08
Payer: MEDICARE

## 2025-06-08 VITALS
TEMPERATURE: 98.1 F | HEIGHT: 60 IN | OXYGEN SATURATION: 91 % | RESPIRATION RATE: 18 BRPM | DIASTOLIC BLOOD PRESSURE: 83 MMHG | WEIGHT: 84.44 LBS | HEART RATE: 71 BPM | BODY MASS INDEX: 16.58 KG/M2 | SYSTOLIC BLOOD PRESSURE: 183 MMHG

## 2025-06-08 LAB
ANION GAP SERPL CALC-SCNC: 12 MMOL/L (ref 10–20)
BACTERIA BLD CULT: NORMAL
BACTERIA BLD CULT: NORMAL
BASOPHILS # BLD AUTO: 0.07 X10*3/UL (ref 0–0.1)
BASOPHILS NFR BLD AUTO: 0.6 %
BUN SERPL-MCNC: 15 MG/DL (ref 6–23)
CALCIUM SERPL-MCNC: 9.4 MG/DL (ref 8.6–10.3)
CHLORIDE SERPL-SCNC: 104 MMOL/L (ref 98–107)
CO2 SERPL-SCNC: 30 MMOL/L (ref 21–32)
CREAT SERPL-MCNC: 0.53 MG/DL (ref 0.5–1.05)
EGFRCR SERPLBLD CKD-EPI 2021: 90 ML/MIN/1.73M*2
EOSINOPHIL # BLD AUTO: 0.21 X10*3/UL (ref 0–0.4)
EOSINOPHIL NFR BLD AUTO: 1.9 %
ERYTHROCYTE [DISTWIDTH] IN BLOOD BY AUTOMATED COUNT: 12.3 % (ref 11.5–14.5)
GLUCOSE SERPL-MCNC: 121 MG/DL (ref 74–99)
HCT VFR BLD AUTO: 39.3 % (ref 36–46)
HGB BLD-MCNC: 12.5 G/DL (ref 12–16)
IMM GRANULOCYTES # BLD AUTO: 0.14 X10*3/UL (ref 0–0.5)
IMM GRANULOCYTES NFR BLD AUTO: 1.3 % (ref 0–0.9)
LYMPHOCYTES # BLD AUTO: 1.69 X10*3/UL (ref 0.8–3)
LYMPHOCYTES NFR BLD AUTO: 15.1 %
MCH RBC QN AUTO: 29.8 PG (ref 26–34)
MCHC RBC AUTO-ENTMCNC: 31.8 G/DL (ref 32–36)
MCV RBC AUTO: 94 FL (ref 80–100)
MONOCYTES # BLD AUTO: 1.24 X10*3/UL (ref 0.05–0.8)
MONOCYTES NFR BLD AUTO: 11.1 %
NEUTROPHILS # BLD AUTO: 7.82 X10*3/UL (ref 1.6–5.5)
NEUTROPHILS NFR BLD AUTO: 70 %
NRBC BLD-RTO: 0 /100 WBCS (ref 0–0)
PLATELET # BLD AUTO: 218 X10*3/UL (ref 150–450)
POTASSIUM SERPL-SCNC: 3.6 MMOL/L (ref 3.5–5.3)
RBC # BLD AUTO: 4.19 X10*6/UL (ref 4–5.2)
SODIUM SERPL-SCNC: 142 MMOL/L (ref 136–145)
WBC # BLD AUTO: 11.2 X10*3/UL (ref 4.4–11.3)

## 2025-06-08 PROCEDURE — 85025 COMPLETE CBC W/AUTO DIFF WBC: CPT | Mod: IPSPLIT | Performed by: NURSE PRACTITIONER

## 2025-06-08 PROCEDURE — 2500000002 HC RX 250 W HCPCS SELF ADMINISTERED DRUGS (ALT 637 FOR MEDICARE OP, ALT 636 FOR OP/ED): Mod: IPSPLIT | Performed by: NURSE PRACTITIONER

## 2025-06-08 PROCEDURE — 2500000005 HC RX 250 GENERAL PHARMACY W/O HCPCS: Mod: IPSPLIT | Performed by: NURSE PRACTITIONER

## 2025-06-08 PROCEDURE — 80048 BASIC METABOLIC PNL TOTAL CA: CPT | Mod: IPSPLIT | Performed by: NURSE PRACTITIONER

## 2025-06-08 PROCEDURE — 1200000002 HC GENERAL ROOM WITH TELEMETRY DAILY: Mod: IPSPLIT

## 2025-06-08 PROCEDURE — 2500000001 HC RX 250 WO HCPCS SELF ADMINISTERED DRUGS (ALT 637 FOR MEDICARE OP): Mod: IPSPLIT | Performed by: HOSPITALIST

## 2025-06-08 PROCEDURE — 2500000002 HC RX 250 W HCPCS SELF ADMINISTERED DRUGS (ALT 637 FOR MEDICARE OP, ALT 636 FOR OP/ED): Mod: IPSPLIT | Performed by: HOSPITALIST

## 2025-06-08 PROCEDURE — 36415 COLL VENOUS BLD VENIPUNCTURE: CPT | Mod: IPSPLIT | Performed by: NURSE PRACTITIONER

## 2025-06-08 PROCEDURE — 2500000001 HC RX 250 WO HCPCS SELF ADMINISTERED DRUGS (ALT 637 FOR MEDICARE OP): Mod: IPSPLIT | Performed by: NURSE PRACTITIONER

## 2025-06-08 PROCEDURE — 94760 N-INVAS EAR/PLS OXIMETRY 1: CPT | Mod: IPSPLIT

## 2025-06-08 PROCEDURE — 99232 SBSQ HOSP IP/OBS MODERATE 35: CPT | Performed by: NURSE PRACTITIONER

## 2025-06-08 PROCEDURE — 82374 ASSAY BLOOD CARBON DIOXIDE: CPT | Mod: IPSPLIT | Performed by: NURSE PRACTITIONER

## 2025-06-08 PROCEDURE — 2500000004 HC RX 250 GENERAL PHARMACY W/ HCPCS (ALT 636 FOR OP/ED): Mod: IPSPLIT | Performed by: HOSPITALIST

## 2025-06-08 PROCEDURE — 94668 MNPJ CHEST WALL SBSQ: CPT | Mod: IPSPLIT

## 2025-06-08 PROCEDURE — 94640 AIRWAY INHALATION TREATMENT: CPT | Mod: IPSPLIT

## 2025-06-08 PROCEDURE — 2500000004 HC RX 250 GENERAL PHARMACY W/ HCPCS (ALT 636 FOR OP/ED): Mod: IPSPLIT | Performed by: NURSE PRACTITIONER

## 2025-06-08 PROCEDURE — 93005 ELECTROCARDIOGRAM TRACING: CPT | Mod: IPSPLIT

## 2025-06-08 RX ORDER — METOPROLOL TARTRATE 1 MG/ML
5 INJECTION, SOLUTION INTRAVENOUS ONCE
Status: COMPLETED | OUTPATIENT
Start: 2025-06-08 | End: 2025-06-08

## 2025-06-08 RX ADMIN — Medication 25 MCG: at 10:34

## 2025-06-08 RX ADMIN — METOPROLOL TARTRATE 5 MG: 5 INJECTION INTRAVENOUS at 11:24

## 2025-06-08 RX ADMIN — AZITHROMYCIN DIHYDRATE 500 MG: 250 TABLET ORAL at 10:34

## 2025-06-08 RX ADMIN — Medication 2 L/MIN: at 09:58

## 2025-06-08 RX ADMIN — LEVOTHYROXINE SODIUM 112.5 MCG: 0.07 TABLET ORAL at 06:33

## 2025-06-08 RX ADMIN — APIXABAN 2.5 MG: 2.5 TABLET, FILM COATED ORAL at 20:30

## 2025-06-08 RX ADMIN — PREDNISONE 2.5 MG: 5 TABLET ORAL at 10:34

## 2025-06-08 RX ADMIN — ATORVASTATIN CALCIUM 40 MG: 40 TABLET, FILM COATED ORAL at 10:34

## 2025-06-08 RX ADMIN — CEFUROXIME AXETIL 500 MG: 250 TABLET, FILM COATED ORAL at 10:34

## 2025-06-08 RX ADMIN — TRAZODONE HYDROCHLORIDE 50 MG: 50 TABLET ORAL at 20:31

## 2025-06-08 RX ADMIN — Medication 1 TABLET: at 10:34

## 2025-06-08 RX ADMIN — CEFUROXIME AXETIL 500 MG: 250 TABLET, FILM COATED ORAL at 20:30

## 2025-06-08 RX ADMIN — IPRATROPIUM BROMIDE AND ALBUTEROL SULFATE 3 ML: .5; 3 SOLUTION RESPIRATORY (INHALATION) at 09:20

## 2025-06-08 RX ADMIN — POTASSIUM CHLORIDE 20 MEQ: 1500 TABLET, EXTENDED RELEASE ORAL at 10:34

## 2025-06-08 RX ADMIN — APIXABAN 2.5 MG: 2.5 TABLET, FILM COATED ORAL at 10:34

## 2025-06-08 ASSESSMENT — PAIN SCALES - GENERAL
PAINLEVEL_OUTOF10: 0 - NO PAIN
PAINLEVEL_OUTOF10: 0 - NO PAIN

## 2025-06-08 ASSESSMENT — PAIN - FUNCTIONAL ASSESSMENT: PAIN_FUNCTIONAL_ASSESSMENT: 0-10

## 2025-06-08 NOTE — PROGRESS NOTES
Maisha Agosto is a 84 y.o. female on day 3 of admission presenting with Acute congestive heart failure, unspecified heart failure type.    Subjective   She is sitting up in a chair, daughter is at her side. She is up working with physical therapy euvolemic on exam.  She is alert and pleasant, answering questions appropriately.  She is still wearing oxygen at 2L NC. Heart rate elevated today after duo neb. Heart elevated currently 180-150's. Placed on telemetry. EKG shows sinus tachycardia with PACs.  Will continue to monitor with plans to discharge tomorrow and wean from oxygen if possible in the next 24 hours.  All questions answered.       Objective     Physical Exam  Constitutional:       General: She is not in acute distress.     Appearance: Normal appearance. She is not toxic-appearing.   HENT:      Head: Normocephalic and atraumatic.      Mouth/Throat:      Mouth: Mucous membranes are moist.   Eyes:      Extraocular Movements: Extraocular movements intact.      Pupils: Pupils are equal, round, and reactive to light.   Cardiovascular:      Rate and Rhythm: Normal rate and regular rhythm.      Pulses: Normal pulses.      Heart sounds: Normal heart sounds. No murmur heard.     No gallop.   Pulmonary:      Effort: Pulmonary effort is normal. No respiratory distress.      Breath sounds: Rhonchi present. No wheezing or rales.      Comments: Diminished bilaterally  Abdominal:      General: Bowel sounds are normal. There is no distension.      Palpations: Abdomen is soft.      Tenderness: There is no abdominal tenderness. There is no guarding or rebound.   Musculoskeletal:         General: No swelling, tenderness, deformity or signs of injury. Normal range of motion.      Cervical back: Normal range of motion and neck supple.   Skin:     General: Skin is warm and dry.      Capillary Refill: Capillary refill takes less than 2 seconds.      Coloration: Skin is pale. Skin is not jaundiced.      Findings: No bruising or  rash.   Neurological:      General: No focal deficit present.      Mental Status: She is alert and oriented to person, place, and time. Mental status is at baseline.      Cranial Nerves: No cranial nerve deficit.      Sensory: No sensory deficit.      Motor: No weakness.      Gait: Gait normal.   Psychiatric:         Mood and Affect: Mood normal.         Behavior: Behavior normal.       Last Recorded Vitals  Blood pressure 142/72, pulse 69, temperature 36.4 °C (97.5 °F), temperature source Temporal, resp. rate 16, height 1.524 m (5'), weight (!) 38.3 kg (84 lb 7 oz), SpO2 97%.  Intake/Output last 3 Shifts:  I/O last 3 completed shifts:  In: 600 (15.7 mL/kg) [P.O.:600]  Out: - (0 mL/kg)   Weight: 38.3 kg     Scheduled medications  Scheduled Medications[1]  Continuous medications  Continuous Medications[2]  PRN medications  PRN Medications[3]    Relevant Results  CT chest wo IV contrast  Result Date: 6/5/2025  1.  Persistent multifocal bronchiectasis with scattered areas of endobronchial mucous plugging, some new since prior exam. There are also new infiltrates bilaterally which likely represent new areas of bronchiolitis. Diagnostic considerations may include atypical mycobacterial or fungal infection. 2. Increased dependent atelectasis involving the left lower lobe medially. Trace left pleural effusion.     MACRO: None.   Signed by: Ronaldo Wilson 6/5/2025 3:52 PM Dictation workstation:   [id number]    ECG 12 lead  Result Date: 6/5/2025  Normal sinus rhythm Possible Left atrial enlargement Nonspecific T wave abnormality Abnormal ECG When compared with ECG of 19-MAR-2025 14:02, No significant change was found    XR chest 1 view  Result Date: 6/4/2025  Cardiomegaly with mild diffuse interstitial prominence which could be chronic or relate to component developing interstitial edema. Correlate clinically and attention on continued follow-up is advised.   MACRO: None   Signed by: Tapan Huang 6/4/2025 11:19 PM Dictation  workstation:   [id number]     Latest Reference Range & Units 06/05/25 05:25 06/05/25 09:10 06/06/25 05:36   GLUCOSE 74 - 99 mg/dL 153 (H)  115 (H)   SODIUM 136 - 145 mmol/L 140  138   POTASSIUM 3.5 - 5.3 mmol/L 3.1 (L)  3.2 (L)   CHLORIDE 98 - 107 mmol/L 98  100   Bicarbonate 21 - 32 mmol/L 32  30   Anion Gap 10 - 20 mmol/L 13  11   Blood Urea Nitrogen 6 - 23 mg/dL 17  19   Creatinine 0.50 - 1.05 mg/dL 0.56  0.65   EGFR >60 mL/min/1.73m*2 89  86   Calcium 8.6 - 10.3 mg/dL 9.3  9.1   D-Dimer, Quantitative VTE Exclusion <=500 ng/mL FEU  367    WBC 4.4 - 11.3 x10*3/uL 16.8 (H)  14.0 (H)   nRBC 0.0 - 0.0 /100 WBCs 0.0  0.0   RBC 4.00 - 5.20 x10*6/uL 4.27  4.11   HEMOGLOBIN 12.0 - 16.0 g/dL 12.7  12.3   HEMATOCRIT 36.0 - 46.0 % 38.9  38.3   MCV 80 - 100 fL 91  93   MCH 26.0 - 34.0 pg 29.7  29.9   MCHC 32.0 - 36.0 g/dL 32.6  32.1   RED CELL DISTRIBUTION WIDTH 11.5 - 14.5 % 12.9  12.7   Platelets 150 - 450 x10*3/uL 183  173     Assessment & Plan  Acute congestive heart failure, unspecified heart failure type    Impaired functional mobility, balance, gait, and endurance    Frail elderly    Fatigue    Generalized weakness    Dyslipidemia    Paroxysmal atrial fibrillation (Multi)    Benign essential HTN    Hypothyroidism    Rheumatoid arthritis        Impaired functional mobility, balance, gait, and endurance  Frail elderly  Fatigue  Generalized weakness  -PT/OT  -safety precautions  -DC home with home therapies when ready    Acute congestive heart failure   Pafib (Eliquis)   HTN   HLD   -diuresis well in the ER with IV Lasix, transition to PO Lasix 20 mg daily   -echocardiogram LVEF 60-65%  -Appreciate rec cardiology   -continue Eliquis 2.5 mg twice a day, Atorvastatin 40 mg daily, Metoprolol 25 mg twice a day   -hold the Lisinopril and Amlodipine to avoid hypotension   -Continuous telemetry      COPD  -appreciate RT   -continue Prednisone  -Oxygen 2 L NC, wean as tolerated  -short of breath with little exertion, D-dimer  367   -CT chest: 1.  Persistent multifocal bronchiectasis with scattered areas of endobronchial mucous plugging, some new since prior exam. There are also new infiltrates bilaterally which likely represent new areas of  bronchiolitis. Diagnostic considerations may include atypical mycobacterial or fungal infection. 2. Increased dependent atelectasis involving the left lower lobe medially. Trace left pleural effusion.  -blood cx no growth   -continue azithromycin, cefuroxime  -hold duonebs Q6 hours    Hypothyroidism  -continue levothyroxine    RA  -continue prednisone daily      GI ppx: PPI  DVT ppx: Eliquis   Fluids: prn  Electrolytes: replace as needed  Nutrition: reg  Adjuncts: PIV  Code Status: DNR   Pt requires inpatient stay at this time.    Rachana Trejo, APRN-CNP       [1] [Held by provider] amLODIPine, 5 mg, oral, Daily  apixaban, 2.5 mg, oral, BID  atorvastatin, 40 mg, oral, Daily  azithromycin, 500 mg, oral, q24h MINDY  cefuroxime, 500 mg, oral, BID  cholecalciferol, 25 mcg, oral, Daily  [Held by provider] furosemide, 20 mg, oral, Daily  levothyroxine, 112.5 mcg, oral, Daily  [Held by provider] lisinopril, 20 mg, oral, Daily  magnesium oxide, 400 mg of magnesium oxide, oral, Daily  [Held by provider] metoprolol tartrate, 25 mg, oral, BID  oxygen, , inhalation, Continuous - Inhalation  potassium chloride CR, 20 mEq, oral, Daily  predniSONE, 2.5 mg, oral, Daily  traZODone, 50 mg, oral, Nightly     [2] sodium chloride 0.9%, 10 mL/hr     [3] PRN medications: acetaminophen, albuterol, alum-mag hydroxide-simeth, benzocaine-menthol, calcium carbonate, loperamide, melatonin, ondansetron, polyethylene glycol, sodium chloride, sodium chloride 0.9%

## 2025-06-08 NOTE — NURSING NOTE
Patient sat in recliner chair most all of today. Walked patient a few times throughout the hallway. Post breathing treatment, patient became SVT as high as 186. Notified provider. DANETTE BRITTON CNP received new order for telemetry and IVP Lopressor. Administered IVP Lopressor once, HR returned to SR. Breathing Tx's discontinued.

## 2025-06-09 VITALS
HEIGHT: 60 IN | SYSTOLIC BLOOD PRESSURE: 137 MMHG | OXYGEN SATURATION: 93 % | RESPIRATION RATE: 16 BRPM | HEART RATE: 85 BPM | DIASTOLIC BLOOD PRESSURE: 73 MMHG | WEIGHT: 84.44 LBS | BODY MASS INDEX: 16.58 KG/M2 | TEMPERATURE: 97.7 F

## 2025-06-09 LAB
ANION GAP SERPL CALC-SCNC: 11 MMOL/L (ref 10–20)
BASOPHILS # BLD AUTO: 0.09 X10*3/UL (ref 0–0.1)
BASOPHILS NFR BLD AUTO: 0.8 %
BUN SERPL-MCNC: 14 MG/DL (ref 6–23)
CALCIUM SERPL-MCNC: 9.6 MG/DL (ref 8.6–10.3)
CHLORIDE SERPL-SCNC: 105 MMOL/L (ref 98–107)
CO2 SERPL-SCNC: 30 MMOL/L (ref 21–32)
CREAT SERPL-MCNC: 0.45 MG/DL (ref 0.5–1.05)
EGFRCR SERPLBLD CKD-EPI 2021: >90 ML/MIN/1.73M*2
EOSINOPHIL # BLD AUTO: 0.36 X10*3/UL (ref 0–0.4)
EOSINOPHIL NFR BLD AUTO: 3.4 %
ERYTHROCYTE [DISTWIDTH] IN BLOOD BY AUTOMATED COUNT: 12.3 % (ref 11.5–14.5)
GLUCOSE SERPL-MCNC: 129 MG/DL (ref 74–99)
HCT VFR BLD AUTO: 39.3 % (ref 36–46)
HGB BLD-MCNC: 12.5 G/DL (ref 12–16)
IMM GRANULOCYTES # BLD AUTO: 0.03 X10*3/UL (ref 0–0.5)
IMM GRANULOCYTES NFR BLD AUTO: 0.3 % (ref 0–0.9)
LYMPHOCYTES # BLD AUTO: 1.45 X10*3/UL (ref 0.8–3)
LYMPHOCYTES NFR BLD AUTO: 13.7 %
MCH RBC QN AUTO: 29.8 PG (ref 26–34)
MCHC RBC AUTO-ENTMCNC: 31.8 G/DL (ref 32–36)
MCV RBC AUTO: 94 FL (ref 80–100)
MONOCYTES # BLD AUTO: 1.14 X10*3/UL (ref 0.05–0.8)
MONOCYTES NFR BLD AUTO: 10.7 %
NEUTROPHILS # BLD AUTO: 7.54 X10*3/UL (ref 1.6–5.5)
NEUTROPHILS NFR BLD AUTO: 71.1 %
NRBC BLD-RTO: 0 /100 WBCS (ref 0–0)
PLATELET # BLD AUTO: 233 X10*3/UL (ref 150–450)
POTASSIUM SERPL-SCNC: 3.7 MMOL/L (ref 3.5–5.3)
RBC # BLD AUTO: 4.2 X10*6/UL (ref 4–5.2)
SODIUM SERPL-SCNC: 142 MMOL/L (ref 136–145)
WBC # BLD AUTO: 10.6 X10*3/UL (ref 4.4–11.3)

## 2025-06-09 PROCEDURE — 2500000001 HC RX 250 WO HCPCS SELF ADMINISTERED DRUGS (ALT 637 FOR MEDICARE OP): Mod: IPSPLIT | Performed by: HOSPITALIST

## 2025-06-09 PROCEDURE — 2500000002 HC RX 250 W HCPCS SELF ADMINISTERED DRUGS (ALT 637 FOR MEDICARE OP, ALT 636 FOR OP/ED): Mod: IPSPLIT | Performed by: NURSE PRACTITIONER

## 2025-06-09 PROCEDURE — 2500000002 HC RX 250 W HCPCS SELF ADMINISTERED DRUGS (ALT 637 FOR MEDICARE OP, ALT 636 FOR OP/ED): Mod: IPSPLIT | Performed by: HOSPITALIST

## 2025-06-09 PROCEDURE — 36415 COLL VENOUS BLD VENIPUNCTURE: CPT | Mod: IPSPLIT | Performed by: NURSE PRACTITIONER

## 2025-06-09 PROCEDURE — 82374 ASSAY BLOOD CARBON DIOXIDE: CPT | Mod: IPSPLIT | Performed by: NURSE PRACTITIONER

## 2025-06-09 PROCEDURE — 2500000004 HC RX 250 GENERAL PHARMACY W/ HCPCS (ALT 636 FOR OP/ED): Mod: IPSPLIT | Performed by: HOSPITALIST

## 2025-06-09 PROCEDURE — 2500000001 HC RX 250 WO HCPCS SELF ADMINISTERED DRUGS (ALT 637 FOR MEDICARE OP): Mod: IPSPLIT | Performed by: NURSE PRACTITIONER

## 2025-06-09 PROCEDURE — 97530 THERAPEUTIC ACTIVITIES: CPT | Mod: GO,IPSPLIT | Performed by: OCCUPATIONAL THERAPIST

## 2025-06-09 PROCEDURE — 94760 N-INVAS EAR/PLS OXIMETRY 1: CPT | Mod: IPSPLIT

## 2025-06-09 PROCEDURE — 97535 SELF CARE MNGMENT TRAINING: CPT | Mod: GO,IPSPLIT | Performed by: OCCUPATIONAL THERAPIST

## 2025-06-09 PROCEDURE — 85025 COMPLETE CBC W/AUTO DIFF WBC: CPT | Mod: IPSPLIT | Performed by: NURSE PRACTITIONER

## 2025-06-09 PROCEDURE — 99239 HOSP IP/OBS DSCHRG MGMT >30: CPT | Performed by: NURSE PRACTITIONER

## 2025-06-09 RX ORDER — CEFUROXIME AXETIL 500 MG/1
500 TABLET ORAL 2 TIMES DAILY
Qty: 5 TABLET | Refills: 0 | Status: SHIPPED | OUTPATIENT
Start: 2025-06-09 | End: 2025-06-12

## 2025-06-09 RX ADMIN — LEVOTHYROXINE SODIUM 112.5 MCG: 0.07 TABLET ORAL at 06:11

## 2025-06-09 RX ADMIN — ATORVASTATIN CALCIUM 40 MG: 40 TABLET, FILM COATED ORAL at 08:59

## 2025-06-09 RX ADMIN — PREDNISONE 2.5 MG: 5 TABLET ORAL at 09:01

## 2025-06-09 RX ADMIN — AZITHROMYCIN DIHYDRATE 500 MG: 250 TABLET ORAL at 08:59

## 2025-06-09 RX ADMIN — APIXABAN 2.5 MG: 2.5 TABLET, FILM COATED ORAL at 08:59

## 2025-06-09 RX ADMIN — Medication 25 MCG: at 08:59

## 2025-06-09 RX ADMIN — POTASSIUM CHLORIDE 20 MEQ: 1500 TABLET, EXTENDED RELEASE ORAL at 08:59

## 2025-06-09 RX ADMIN — CEFUROXIME AXETIL 500 MG: 250 TABLET, FILM COATED ORAL at 08:59

## 2025-06-09 RX ADMIN — Medication 1 TABLET: at 08:59

## 2025-06-09 ASSESSMENT — COGNITIVE AND FUNCTIONAL STATUS - GENERAL
DRESSING REGULAR UPPER BODY CLOTHING: A LOT
MOVING TO AND FROM BED TO CHAIR: A LITTLE
DRESSING REGULAR UPPER BODY CLOTHING: A LITTLE
EATING MEALS: A LITTLE
DRESSING REGULAR LOWER BODY CLOTHING: A LITTLE
PERSONAL GROOMING: A LITTLE
EATING MEALS: A LITTLE
PERSONAL GROOMING: A LOT
TOILETING: A LOT
HELP NEEDED FOR BATHING: A LITTLE
CLIMB 3 TO 5 STEPS WITH RAILING: TOTAL
HELP NEEDED FOR BATHING: A LOT
DRESSING REGULAR LOWER BODY CLOTHING: A LOT
TURNING FROM BACK TO SIDE WHILE IN FLAT BAD: A LITTLE
DAILY ACTIVITIY SCORE: 13
MOBILITY SCORE: 15
STANDING UP FROM CHAIR USING ARMS: A LOT
DAILY ACTIVITIY SCORE: 18
TOILETING: A LITTLE
WALKING IN HOSPITAL ROOM: A LOT

## 2025-06-09 ASSESSMENT — ACTIVITIES OF DAILY LIVING (ADL)
HOME_MANAGEMENT_TIME_ENTRY: 40
BATHING_LEVEL_OF_ASSISTANCE: SETUP;CLOSE SUPERVISION;MINIMAL VERBAL CUES

## 2025-06-09 ASSESSMENT — PAIN SCALES - GENERAL
PAINLEVEL_OUTOF10: 0 - NO PAIN

## 2025-06-09 ASSESSMENT — PAIN - FUNCTIONAL ASSESSMENT
PAIN_FUNCTIONAL_ASSESSMENT: 0-10
PAIN_FUNCTIONAL_ASSESSMENT: 0-10

## 2025-06-09 NOTE — CARE PLAN
The patient's goals for the shift include      The clinical goals for the shift include Pt will remian on room air this shift    O

## 2025-06-09 NOTE — PROGRESS NOTES
06/09/25 0928   Discharge Planning   Living Arrangements Children;Family members   Support Systems Children   Assistance Needed Patient lives with her daughter, son in law and dog in a 2 story house with basement. (Laundry) She uses either her rollator or wheelchair to move around. She has supervision 24/7-when daughter is at work, there are caregivers that come in to stay with her. DME: wheelchair, rollator, shower seat in WIS with grab bars Patient is mostly independent with her ADLs; she does have confusion. Daughter would prefer patient to return home with home care vs SNF-   Type of Residence Private residence   Home or Post Acute Services In home services  (Referral sent to [facility]-N, PT/OT)   Type of Home Care Services Home PT;Home OT   Expected Discharge Disposition Home H  (This TCC covering from  [place]-message left on daughters phone. Pt and daughter aware of dc. Daughter to transport home once dc complete. AVS & HC orders sent to [facility])

## 2025-06-09 NOTE — PROGRESS NOTES
Occupational Therapy    Occupational Therapy Treatment    Name: Maisha Agosto  MRN: [id number]  Department:   Room: [address] [place] [address]  Date: 06/09/25  Time Calculation  Start Time: 0950  Stop Time: 1040  Time Calculation (min): 50 min    Assessment:  OT Assessment: Good participation in self-care performance. Redirectional cueing required 2/2 pt.'s cognitive impairment. Pt. much improved with balance, ambulation and self-care routine. Recommend OT at LOW intensity.  Prognosis: Good  Barriers to Discharge Home: Cognition needs, Physical needs  Cognition Needs: 24hr supervision for safety awareness needed, Insight of patient limited regarding functional ability/needs, Cognition-related high falls risk  Physical Needs: Stair navigation into home limited by function/safety, Intermittent ADL assistance needed, Intermittent mobility assistance needed  Evaluation/Treatment Tolerance: Patient tolerated treatment well  Medical Staff Made Aware: Yes  End of Session Communication: Bedside nurse  End of Session Patient Position: Up in chair, Alarm on  Plan:  Treatment Interventions: ADL retraining, Functional transfer training, UE strengthening/ROM, Endurance training, Patient/family training, Equipment evaluation/education, Compensatory technique education, Neuromuscular reeducation  OT Frequency: 3 times per week  OT Discharge Recommendations: Low intensity level of continued care  OT Recommended Transfer Status: Stand by assist, Assist of 1  OT - OK to Discharge: Yes (Based on completed evaluation and care plan recommendations, no barriers to discharge to next site of care.)    Subjective     OT Visit Info:  OT Received On: 06/09/25  General:  General  Reason for Referral: impaired self-care d/t admission for acute CHF  Referred By: Magan Moreira DO  Past Medical History Relevant to Rehab: bowel perforation, C diff, colitis, dementia, heart murmur, hypernatremia, terminal ileitis with complication,  Family/Caregiver Present:  No  Prior to Session Communication: Bedside nurse  Patient Position Received: Up in chair, Alarm on  General Comment: Pt. pleasant and cooperative with OT session.  Precautions:  Hearing/Visual Limitations: glasses  Medical Precautions: Fall precautions  Precautions Comment: berta bolaños,     Date/Time Vitals Session Patient Position Pulse Resp SpO2 BP MAP (mmHg)    06/09/25 1330 --  --  80  --  93 %  --  --     06/09/25 1400 --  --  89  --  94 %  --  --     06/09/25 1430 --  --  79  --  93 %  --  --     06/09/25 1445 --  --  85  16  93 %  137/73  94           Pain Assessment:  Pain Assessment  Pain Assessment: 0-10  0-10 (Numeric) Pain Score: 0 - No pain    Objective   Cognition:  Overall Cognitive Status: Impaired at baseline  Arousal/Alertness: Appropriate responses to stimuli  Orientation Level: Disoriented to situation, Disoriented to time  Activities of Daily Living: Grooming  Grooming Level of Assistance: Modified independent  Grooming Where Assessed: Standing sinkside    UE Bathing  UE Bathing Level of Assistance: Setup, Close supervision, Minimal verbal cues  UE Bathing Where Assessed:  (chair)  UE Bathing Comments: cueing to wash full body today and maintain on task    LE Bathing  LE Bathing Level of Assistance: Setup, Close supervision, Minimal verbal cues  LE Bathing Where Assessed:  (chair)  LE Bathing Comments: cueing for thoroughness    UE Dressing  UE Dressing Level of Assistance: Setup, Minimum assistance  UE Dressing Where Assessed: Chair  UE Dressing Comments: OT completed fasteners,    LE Dressing  LE Dressing: Yes  Pants Level of Assistance: Close supervision  Sock Level of Assistance: Close supervision  Adult Briefs Level of Assistance: Close supervision  LE Dressing Where Assessed: Chair  LE Dressing Comments: close sup for balance    Functional Standing Tolerance:  Functional Standing Tolerance  Time: 5 minutes  Activity: grooming at sink  Functional Standing Tolerance Comments: no lob  Bed  Mobility/Transfers: Transfers  Transfer: Yes  Transfer 1  Transfer From 1: Chair with arms to  Transfer to 1: Stand, Sit  Technique 1: Sit to stand, Stand to sit  Transfer Device 1: Walker  Transfer Level of Assistance 1: Close supervision    Functional Mobility:  Functional Mobility  Functional Mobility Performed: Yes  Functional Mobility 1  Surface 1: Level tile  Device 1: Rolling walker  Functional Mobility Support Devices: Gait belt  Assistance 1: Close supervision  Comments 1: ~ 100 ft of ambulation compelted, pt. reported fatigue following  Sitting Balance:  Static Sitting Balance  Static Sitting-Balance Support: Feet supported  Static Sitting-Level of Assistance: Modified Independent  Dynamic Sitting Balance  Dynamic Sitting-Balance Support: Feet supported  Dynamic Sitting-Level of Assistance: Modified independent  Dynamic Sitting-Balance: Forward lean  Standing Balance:  Static Standing Balance  Static Standing-Balance Support: Bilateral upper extremity supported  Static Standing-Level of Assistance: Close supervision  Dynamic Standing Balance  Dynamic Standing-Balance Support: Bilateral upper extremity supported  Dynamic Standing-Level of Assistance: Close supervision  Dynamic Standing-Balance: Turning    Outcome Measures:  [facility] Daily Activity  Putting on and taking off regular lower body clothing: A little  Bathing (including washing, rinsing, drying): A little  Putting on and taking off regular upper body clothing: A little  Toileting, which includes using toilet, bedpan or urinal: A little  Taking care of personal grooming such as brushing teeth: A little  Eating Meals: A little  Daily Activity - Total Score: 18    Education Documentation  Body Mechanics, taught by Di Valente OT at 6/9/2025  3:14 PM.  Learner: Patient  Readiness: Acceptance  Method: Explanation  Response: Verbalizes Understanding, Needs Reinforcement  Comment: edu on safety.    ADL Training, taught by Di Valente OT at 6/9/2025  3:14  PM.  Learner: Patient  Readiness: Acceptance  Method: Explanation  Response: Verbalizes Understanding, Needs Reinforcement  Comment: edu on safety.    Education Comments  No comments found.      Goals:  Encounter Problems       Encounter Problems (Active)       ADLs       Patient will perform UB and LB bathing with MIN A level of assistance. (Met)       Start:  06/05/25    Expected End:  06/19/25    Resolved:  06/09/25         Patient with complete upper body dressing with set-up, supervision level of assistance donning and doffing all UE clothes with PRN adaptive equipment while supported sitting (Progressing)       Start:  06/05/25    Expected End:  06/19/25            Patient with complete lower body dressing with set-up, supervision level of assistance donning and doffing all LE clothes  with PRN adaptive equipment while supported sitting (Met)       Start:  06/05/25    Expected End:  06/19/25    Resolved:  06/09/25         Patient will complete daily grooming tasks brushing teeth and washing face/hair with set-up, supervision level of assistance and PRN adaptive equipment while standing.  (Met)       Start:  06/05/25    Expected End:  06/19/25    Resolved:  06/09/25         Patient will complete toileting including hygiene clothing management/hygiene with set-up, minimal assist  level of assistance.       Start:  06/05/25    Expected End:  06/19/25               TRANSFERS       Patient will complete functional transfer to chair, bed, commode with front wheeled walker with modified independent level of assistance. (Progressing)       Start:  06/05/25    Expected End:  06/19/25

## 2025-06-09 NOTE — DISCHARGE SUMMARY
Discharge Diagnosis  Acute congestive heart failure, unspecified heart failure type    Discharge Meds     Medication List      PAUSE taking these medications     amLODIPine 5 mg tablet; Wait to take this until your doctor or other   care provider tells you to start again.; Commonly known as: Norvasc   ramipril 10 mg capsule; Wait to take this until your doctor or other   care provider tells you to start again.; Commonly known as: Altace     START taking these medications     cefuroxime 500 mg tablet; Commonly known as: Ceftin; Take 1 tablet (500   mg) by mouth 2 times a day for 5 doses.     CHANGE how you take these medications     levothyroxine 75 mcg tablet; Commonly known as: Synthroid, Levoxyl; Take   1 tablet (75 mcg) by mouth early in the morning. Take on an empty stomach   at the same time each day, either 30 to 60 minutes prior to breakfast;   What changed: how much to take     CONTINUE taking these medications     atorvastatin 40 mg tablet; Commonly known as: Lipitor   Eliquis 2.5 mg tablet; Generic drug: apixaban; Take 1 tablet (2.5 mg) by   mouth 2 times a day.   Fish OiL 1,000 (120-180) mg capsule; Generic drug: omega 3-dha-epa-fish   oil   loperamide 2 mg capsule; Commonly known as: Imodium   magnesium oxide 400 mg (241.3 mg elemental) tablet; Commonly known as:   Mag-Ox   metoprolol tartrate 25 mg tablet; Commonly known as: Lopressor; Take 1   tablet (25 mg) by mouth once daily at bedtime.   multivitamin tablet   oxygen gas therapy; Commonly known as: O2; Inhale 1 each once every 24   hours. Wear nightly during sleep hours   potassium chloride CR 20 mEq ER tablet; Commonly known as: Klor-Con M20   predniSONE 5 mg tablet; Commonly known as: Deltasone   traZODone 50 mg tablet; Commonly known as: Desyrel   Vitamin D3 25 mcg (1,000 units) capsule; Generic drug: cholecalciferol     STOP taking these medications     azithromycin 250 mg tablet; Commonly known as: Zithromax       Test Results Pending At  "Discharge  Pending Labs       Order Current Status    Blood Culture Preliminary result    Blood Culture Preliminary result            Hospital Course   HPI \"Maisha Agosto is a very pleasant 84 year old female with a history of pAfib (Eliquis), HT, diastolic heart failure, HLD, COPD and RA, she presented to the ER with general weakness. States she had been falling at home. She lives at home with family. She was given Lasix in the ER and had good diuresis. Denies shortness of breath or chest pain. Resting sitting up in a chair. When she gets up Oxygen desaturates to 87 % and then improves. Currently does not use Oxygen at home.\"    Maisha was admitted to Medicine and treated for weakness and fatigue.  She was seen by physical therapy and Occupational Therapy.  She was also treated for congestive heart failure with diuresis and seen by cardiology.  She was also treated for pneumonia in the setting of chronic COPD and treated with antibiotics, bronchodilators, steroids and oxygen supplementation which she was weaned down from prior to discharge.   Chronic medical conditions were also addressed and monitored. PT/OT evals were done. Labs were closely monitored.  She was discharged in stable condition with the above medication changes and/or additions. Recommendations were made to follow up with pt's PCP in 1-2 weeks.   See full inpatient plan below.     Problem list:  Impaired functional mobility, balance, gait, and endurance  Frail elderly  Fatigue  Generalized weakness  -PT/OT  -safety precautions  ---DC home with home therapies when ready     Acute congestive heart failure   Pafib (Eliquis)   HTN   HLD   COPD  -appreciate RT   -continue Prednisone  -Oxygen 2 L NC, wean as tolerated  -short of breath with little exertion, D-dimer 367   -CT chest: 1.  Persistent multifocal bronchiectasis with scattered areas of endobronchial mucous plugging, some new since prior exam. There are also new infiltrates bilaterally which likely " represent new areas of  bronchiolitis. Diagnostic considerations may include atypical mycobacterial or fungal infection. 2. Increased dependent atelectasis involving the left lower lobe medially. Trace left pleural effusion.  -blood cx no growth   -continue azithromycin, cefuroxime  -hold duonebs Q6 hours  --- Continue oral antibiotics on discharge, follow-up with PCP as needed     Hypothyroidism  RA  --- Continue chronic medical therapies, follow-up with PCP as needed      Malnutrition Diagnosis Status: New  Malnutrition Diagnosis: Severe malnutrition related to chronic disease or condition  Related to: inadequate intake associated with chronic disease and advanced age  As Evidenced by: 13.9% weight loss over ~10 weeks, BMI of 16.5, and moderate loss of subcutaneous fat (orbital, buccal, triceps) and muscle mass (temporalis, clavicle, deltoid, interosseous).  I agree with the dietitian's malnutrition diagnosis.    Pertinent Physical Exam At Time of Discharge  Physical Exam  Constitutional:       General: She is not in acute distress.     Appearance: Normal appearance. She is not toxic-appearing.   HENT:      Head: Normocephalic and atraumatic.      Mouth/Throat:      Mouth: Mucous membranes are moist.   Eyes:      Extraocular Movements: Extraocular movements intact.      Pupils: Pupils are equal, round, and reactive to light.   Cardiovascular:      Rate and Rhythm: Normal rate and regular rhythm.      Pulses: Normal pulses.      Heart sounds: Normal heart sounds. No murmur heard.     No gallop.   Pulmonary:      Effort: Pulmonary effort is normal. No respiratory distress.      Breath sounds: Rhonchi present. No wheezing or rales.      Comments: Diminished bilaterally  Abdominal:      General: Bowel sounds are normal. There is no distension.      Palpations: Abdomen is soft.      Tenderness: There is no abdominal tenderness. There is no guarding or rebound.   Musculoskeletal:         General: No swelling,  tenderness, deformity or signs of injury. Normal range of motion.      Cervical back: Normal range of motion and neck supple.   Skin:     General: Skin is warm and dry.      Capillary Refill: Capillary refill takes less than 2 seconds.      Coloration: Skin is pale. Skin is not jaundiced.      Findings: No bruising or rash.   Neurological:      General: No focal deficit present.      Mental Status: She is alert and oriented to person, place, and time. Mental status is at baseline.      Cranial Nerves: No cranial nerve deficit.      Sensory: No sensory deficit.      Motor: No weakness.      Gait: Gait normal.   Psychiatric:         Mood and Affect: Mood normal.         Behavior: Behavior normal.      Stable for discharge to home.  Total cumulative time spent in preparation of this discharge including documentation review, coordination of care with the medical team including PT/SW/care coordinators and treating consultants, discussion with patient and pertinent family members and finalization of prescriptions, follow-up appointments, and this discharge summary was approximately 45 minutes.    Outpatient Follow-Up  Future Appointments   Date Time Provider Department Center   7/22/2025  4:00 PM Mariah Schaeffer DPM [id number]         Ekta Martin APRN-CNP

## 2025-06-09 NOTE — NURSING NOTE
Assumed care of pt, pt in chair with alarm on and call light with in reach, no needs at this time.     1440- Discharge paperwork reviewed with daughter Eli, IV removed, vitals and last telemetry completed.

## 2025-06-10 LAB
ATRIAL RATE: 101 BPM
BACTERIA BLD CULT: NORMAL
BACTERIA BLD CULT: NORMAL
P AXIS: 64 DEGREES
P OFFSET: 197 MS
P ONSET: 142 MS
PR INTERVAL: 150 MS
Q ONSET: 217 MS
QRS COUNT: 17 BEATS
QRS DURATION: 84 MS
QT INTERVAL: 320 MS
QTC CALCULATION(BAZETT): 414 MS
QTC FREDERICIA: 380 MS
R AXIS: 18 DEGREES
T AXIS: 94 DEGREES
T OFFSET: 377 MS
VENTRICULAR RATE: 101 BPM

## 2025-06-14 LAB
ATRIAL RATE: 66 BPM
P AXIS: 67 DEGREES
P OFFSET: 204 MS
P ONSET: 141 MS
PR INTERVAL: 164 MS
Q ONSET: 223 MS
QRS COUNT: 11 BEATS
QRS DURATION: 82 MS
QT INTERVAL: 404 MS
QTC CALCULATION(BAZETT): 423 MS
QTC FREDERICIA: 417 MS
R AXIS: 20 DEGREES
T AXIS: 82 DEGREES
T OFFSET: 425 MS
VENTRICULAR RATE: 66 BPM

## 2025-06-25 LAB
ATRIAL RATE: 101 BPM
P AXIS: 64 DEGREES
P OFFSET: 197 MS
P ONSET: 142 MS
PR INTERVAL: 150 MS
Q ONSET: 217 MS
QRS COUNT: 17 BEATS
QRS DURATION: 84 MS
QT INTERVAL: 320 MS
QTC CALCULATION(BAZETT): 414 MS
QTC FREDERICIA: 380 MS
R AXIS: 18 DEGREES
T AXIS: 94 DEGREES
T OFFSET: 377 MS
VENTRICULAR RATE: 101 BPM

## 2025-07-06 ENCOUNTER — HOSPITAL ENCOUNTER (INPATIENT)
Facility: HOSPITAL | Age: 85
LOS: 3 days | Discharge: HOME HEALTH | End: 2025-07-11
Attending: EMERGENCY MEDICINE | Admitting: INTERNAL MEDICINE
Payer: MEDICARE

## 2025-07-06 ENCOUNTER — APPOINTMENT (OUTPATIENT)
Dept: RADIOLOGY | Facility: HOSPITAL | Age: 85
End: 2025-07-06
Payer: MEDICARE

## 2025-07-06 DIAGNOSIS — Z79.01 ON CONTINUOUS ORAL ANTICOAGULATION: ICD-10-CM

## 2025-07-06 DIAGNOSIS — I16.0 HYPERTENSIVE URGENCY: ICD-10-CM

## 2025-07-06 DIAGNOSIS — E03.9 HYPOTHYROIDISM, UNSPECIFIED TYPE: ICD-10-CM

## 2025-07-06 DIAGNOSIS — K86.2 PANCREATIC CYST (HHS-HCC): ICD-10-CM
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
## 2025-07-06 DIAGNOSIS — R79.89 LOW TSH LEVEL: ICD-10-CM

## 2025-07-06 DIAGNOSIS — R26.81 GAIT INSTABILITY: ICD-10-CM

## 2025-07-06 LAB
ALBUMIN SERPL BCP-MCNC: 3.9 G/DL (ref 3.4–5)
ALP SERPL-CCNC: 52 U/L (ref 33–136)
ALT SERPL W P-5'-P-CCNC: 23 U/L (ref 7–45)
AMPHETAMINES UR QL SCN: NORMAL
ANION GAP SERPL CALC-SCNC: 9 MMOL/L (ref 10–20)
APPEARANCE UR: CLEAR
AST SERPL W P-5'-P-CCNC: 24 U/L (ref 9–39)
BARBITURATES UR QL SCN: NORMAL
BASOPHILS # BLD AUTO: 0.05 X10*3/UL (ref 0–0.1)
BASOPHILS NFR BLD AUTO: 0.4 %
BENZODIAZ UR QL SCN: NORMAL
BILIRUB SERPL-MCNC: 0.8 MG/DL (ref 0–1.2)
BILIRUB UR STRIP.AUTO-MCNC: NEGATIVE MG/DL
BUN SERPL-MCNC: 25 MG/DL (ref 6–23)
BZE UR QL SCN: NORMAL
CALCIUM SERPL-MCNC: 9.3 MG/DL (ref 8.6–10.3)
CANNABINOIDS UR QL SCN: NORMAL
CARDIAC TROPONIN I PNL SERPL HS: 28 NG/L (ref 0–13)
CHLORIDE SERPL-SCNC: 103 MMOL/L (ref 98–107)
CK SERPL-CCNC: 85 U/L (ref 0–215)
CO2 SERPL-SCNC: 32 MMOL/L (ref 21–32)
COLOR UR: NORMAL
CREAT SERPL-MCNC: 0.73 MG/DL (ref 0.5–1.05)
EGFRCR SERPLBLD CKD-EPI 2021: 80 ML/MIN/1.73M*2
EOSINOPHIL # BLD AUTO: 0.04 X10*3/UL (ref 0–0.4)
EOSINOPHIL NFR BLD AUTO: 0.3 %
ERYTHROCYTE [DISTWIDTH] IN BLOOD BY AUTOMATED COUNT: 12.9 % (ref 11.5–14.5)
ETHANOL SERPL-MCNC: <10 MG/DL
FENTANYL+NORFENTANYL UR QL SCN: NORMAL
GLUCOSE SERPL-MCNC: 132 MG/DL (ref 74–99)
GLUCOSE UR STRIP.AUTO-MCNC: NORMAL MG/DL
HCT VFR BLD AUTO: 41.1 % (ref 36–46)
HGB BLD-MCNC: 13.1 G/DL (ref 12–16)
IMM GRANULOCYTES # BLD AUTO: 0.05 X10*3/UL (ref 0–0.5)
IMM GRANULOCYTES NFR BLD AUTO: 0.4 % (ref 0–0.9)
INR PPP: 1.3 (ref 0.9–1.1)
KETONES UR STRIP.AUTO-MCNC: NEGATIVE MG/DL
LEUKOCYTE ESTERASE UR QL STRIP.AUTO: NEGATIVE
LYMPHOCYTES # BLD AUTO: 0.68 X10*3/UL (ref 0.8–3)
LYMPHOCYTES NFR BLD AUTO: 5.2 %
MCH RBC QN AUTO: 29.4 PG (ref 26–34)
MCHC RBC AUTO-ENTMCNC: 31.9 G/DL (ref 32–36)
MCV RBC AUTO: 92 FL (ref 80–100)
METHADONE UR QL SCN: NORMAL
MONOCYTES # BLD AUTO: 1.04 X10*3/UL (ref 0.05–0.8)
MONOCYTES NFR BLD AUTO: 8 %
NEUTROPHILS # BLD AUTO: 11.15 X10*3/UL (ref 1.6–5.5)
NEUTROPHILS NFR BLD AUTO: 85.7 %
NITRITE UR QL STRIP.AUTO: NEGATIVE
NRBC BLD-RTO: 0 /100 WBCS (ref 0–0)
OPIATES UR QL SCN: NORMAL
OXYCODONE+OXYMORPHONE UR QL SCN: NORMAL
PCP UR QL SCN: NORMAL
PH UR STRIP.AUTO: 7 [PH]
PLATELET # BLD AUTO: 146 X10*3/UL (ref 150–450)
POTASSIUM SERPL-SCNC: 3.6 MMOL/L (ref 3.5–5.3)
PROT SERPL-MCNC: 6.9 G/DL (ref 6.4–8.2)
PROT UR STRIP.AUTO-MCNC: NEGATIVE MG/DL
PROTHROMBIN TIME: 14.5 SECONDS (ref 9.8–12.4)
RBC # BLD AUTO: 4.45 X10*6/UL (ref 4–5.2)
RBC # UR STRIP.AUTO: NEGATIVE MG/DL
SODIUM SERPL-SCNC: 140 MMOL/L (ref 136–145)
SP GR UR STRIP.AUTO: 1.03
T4 FREE SERPL-MCNC: 1.21 NG/DL (ref 0.61–1.12)
TSH SERPL-ACNC: 0.05 MIU/L (ref 0.44–3.98)
UROBILINOGEN UR STRIP.AUTO-MCNC: NORMAL MG/DL
WBC # BLD AUTO: 13 X10*3/UL (ref 4.4–11.3)

## 2025-07-06 PROCEDURE — 70486 CT MAXILLOFACIAL W/O DYE: CPT | Mod: RCN

## 2025-07-06 PROCEDURE — 80307 DRUG TEST PRSMV CHEM ANLYZR: CPT | Performed by: EMERGENCY MEDICINE

## 2025-07-06 PROCEDURE — 2500000004 HC RX 250 GENERAL PHARMACY W/ HCPCS (ALT 636 FOR OP/ED): Performed by: EMERGENCY MEDICINE

## 2025-07-06 PROCEDURE — 85025 COMPLETE CBC W/AUTO DIFF WBC: CPT | Performed by: EMERGENCY MEDICINE

## 2025-07-06 PROCEDURE — 81003 URINALYSIS AUTO W/O SCOPE: CPT | Performed by: EMERGENCY MEDICINE

## 2025-07-06 PROCEDURE — 82077 ASSAY SPEC XCP UR&BREATH IA: CPT | Performed by: EMERGENCY MEDICINE

## 2025-07-06 PROCEDURE — 2550000001 HC RX 255 CONTRASTS: Performed by: EMERGENCY MEDICINE

## 2025-07-06 PROCEDURE — 85610 PROTHROMBIN TIME: CPT | Performed by: EMERGENCY MEDICINE

## 2025-07-06 PROCEDURE — 2500000005 HC RX 250 GENERAL PHARMACY W/O HCPCS: Performed by: EMERGENCY MEDICINE

## 2025-07-06 PROCEDURE — G0378 HOSPITAL OBSERVATION PER HR: HCPCS

## 2025-07-06 PROCEDURE — 71260 CT THORAX DX C+: CPT

## 2025-07-06 PROCEDURE — 80053 COMPREHEN METABOLIC PANEL: CPT | Performed by: EMERGENCY MEDICINE

## 2025-07-06 PROCEDURE — 2500000001 HC RX 250 WO HCPCS SELF ADMINISTERED DRUGS (ALT 637 FOR MEDICARE OP): Performed by: EMERGENCY MEDICINE

## 2025-07-06 PROCEDURE — 72125 CT NECK SPINE W/O DYE: CPT | Performed by: RADIOLOGY

## 2025-07-06 PROCEDURE — 99291 CRITICAL CARE FIRST HOUR: CPT | Mod: 25 | Performed by: EMERGENCY MEDICINE

## 2025-07-06 PROCEDURE — 94760 N-INVAS EAR/PLS OXIMETRY 1: CPT

## 2025-07-06 PROCEDURE — 76377 3D RENDER W/INTRP POSTPROCES: CPT | Mod: RCN | Performed by: RADIOLOGY

## 2025-07-06 PROCEDURE — 72125 CT NECK SPINE W/O DYE: CPT

## 2025-07-06 PROCEDURE — 70486 CT MAXILLOFACIAL W/O DYE: CPT | Mod: RCN | Performed by: RADIOLOGY

## 2025-07-06 PROCEDURE — 71260 CT THORAX DX C+: CPT | Performed by: RADIOLOGY

## 2025-07-06 PROCEDURE — 84443 ASSAY THYROID STIM HORMONE: CPT | Performed by: EMERGENCY MEDICINE

## 2025-07-06 PROCEDURE — 74177 CT ABD & PELVIS W/CONTRAST: CPT | Performed by: RADIOLOGY

## 2025-07-06 PROCEDURE — 76377 3D RENDER W/INTRP POSTPROCES: CPT

## 2025-07-06 PROCEDURE — 70450 CT HEAD/BRAIN W/O DYE: CPT

## 2025-07-06 PROCEDURE — 70450 CT HEAD/BRAIN W/O DYE: CPT | Performed by: RADIOLOGY

## 2025-07-06 PROCEDURE — 36415 COLL VENOUS BLD VENIPUNCTURE: CPT | Performed by: EMERGENCY MEDICINE

## 2025-07-06 PROCEDURE — 84484 ASSAY OF TROPONIN QUANT: CPT | Performed by: EMERGENCY MEDICINE

## 2025-07-06 PROCEDURE — 82550 ASSAY OF CK (CPK): CPT | Performed by: EMERGENCY MEDICINE

## 2025-07-06 PROCEDURE — 84439 ASSAY OF FREE THYROXINE: CPT | Performed by: EMERGENCY MEDICINE

## 2025-07-06 RX ORDER — TRAZODONE HYDROCHLORIDE 50 MG/1
50 TABLET ORAL NIGHTLY
Status: DISCONTINUED | OUTPATIENT
Start: 2025-07-07 | End: 2025-07-11 | Stop reason: HOSPADM

## 2025-07-06 RX ORDER — ACETAMINOPHEN 325 MG/1
650 TABLET ORAL EVERY 4 HOURS PRN
Status: DISCONTINUED | OUTPATIENT
Start: 2025-07-06 | End: 2025-07-11 | Stop reason: HOSPADM

## 2025-07-06 RX ORDER — LANOLIN ALCOHOL/MO/W.PET/CERES
400 CREAM (GRAM) TOPICAL DAILY
Status: DISCONTINUED | OUTPATIENT
Start: 2025-07-07 | End: 2025-07-11 | Stop reason: HOSPADM

## 2025-07-06 RX ORDER — POTASSIUM CHLORIDE 20 MEQ/1
20 TABLET, EXTENDED RELEASE ORAL DAILY
Status: DISCONTINUED | OUTPATIENT
Start: 2025-07-07 | End: 2025-07-11 | Stop reason: HOSPADM

## 2025-07-06 RX ORDER — POLYETHYLENE GLYCOL 3350 17 G/17G
17 POWDER, FOR SOLUTION ORAL DAILY
Status: DISCONTINUED | OUTPATIENT
Start: 2025-07-06 | End: 2025-07-10

## 2025-07-06 RX ORDER — ACETAMINOPHEN 160 MG/5ML
650 SOLUTION ORAL EVERY 4 HOURS PRN
Status: DISCONTINUED | OUTPATIENT
Start: 2025-07-06 | End: 2025-07-11 | Stop reason: HOSPADM

## 2025-07-06 RX ORDER — ACETAMINOPHEN 650 MG/1
650 SUPPOSITORY RECTAL EVERY 4 HOURS PRN
Status: DISCONTINUED | OUTPATIENT
Start: 2025-07-06 | End: 2025-07-11 | Stop reason: HOSPADM

## 2025-07-06 RX ORDER — METOPROLOL TARTRATE 25 MG/1
25 TABLET, FILM COATED ORAL NIGHTLY
Status: DISCONTINUED | OUTPATIENT
Start: 2025-07-07 | End: 2025-07-07

## 2025-07-06 RX ORDER — ATORVASTATIN CALCIUM 40 MG/1
40 TABLET, FILM COATED ORAL DAILY
Status: DISCONTINUED | OUTPATIENT
Start: 2025-07-07 | End: 2025-07-11 | Stop reason: HOSPADM

## 2025-07-06 RX ORDER — TALC
3 POWDER (GRAM) TOPICAL NIGHTLY PRN
Status: DISCONTINUED | OUTPATIENT
Start: 2025-07-06 | End: 2025-07-11 | Stop reason: HOSPADM

## 2025-07-06 RX ORDER — CLONIDINE HYDROCHLORIDE 0.1 MG/1
0.1 TABLET ORAL ONCE
Status: COMPLETED | OUTPATIENT
Start: 2025-07-06 | End: 2025-07-06

## 2025-07-06 RX ORDER — LEVOTHYROXINE SODIUM 75 UG/1
112.5 TABLET ORAL DAILY
Status: DISCONTINUED | OUTPATIENT
Start: 2025-07-07 | End: 2025-07-11 | Stop reason: HOSPADM

## 2025-07-06 RX ORDER — PREDNISONE 5 MG/1
2.5 TABLET ORAL DAILY
Status: DISCONTINUED | OUTPATIENT
Start: 2025-07-07 | End: 2025-07-11 | Stop reason: HOSPADM

## 2025-07-06 RX ADMIN — SODIUM CHLORIDE 1000 ML: 9 INJECTION, SOLUTION INTRAVENOUS at 13:46

## 2025-07-06 RX ADMIN — IOHEXOL 75 ML: 350 INJECTION, SOLUTION INTRAVENOUS at 12:21

## 2025-07-06 RX ADMIN — Medication 2 L/MIN: at 15:28

## 2025-07-06 RX ADMIN — CLONIDINE HYDROCHLORIDE 0.1 MG: 0.1 TABLET ORAL at 15:28

## 2025-07-06 SDOH — SOCIAL STABILITY: SOCIAL INSECURITY: DO YOU FEEL UNSAFE GOING BACK TO THE PLACE WHERE YOU ARE LIVING?: UNABLE TO ASSESS

## 2025-07-06 SDOH — SOCIAL STABILITY: SOCIAL INSECURITY: ABUSE: ADULT

## 2025-07-06 SDOH — SOCIAL STABILITY: SOCIAL INSECURITY: HAVE YOU HAD ANY THOUGHTS OF HARMING ANYONE ELSE?: UNABLE TO ASSESS

## 2025-07-06 SDOH — SOCIAL STABILITY: SOCIAL INSECURITY: WITHIN THE LAST YEAR, HAVE YOU BEEN AFRAID OF YOUR PARTNER OR EX-PARTNER?: PATIENT UNABLE TO ANSWER

## 2025-07-06 SDOH — SOCIAL STABILITY: SOCIAL INSECURITY: HAS ANYONE EVER THREATENED TO HURT YOUR FAMILY OR YOUR PETS?: UNABLE TO ASSESS

## 2025-07-06 SDOH — SOCIAL STABILITY: SOCIAL INSECURITY: ARE THERE ANY APPARENT SIGNS OF INJURIES/BEHAVIORS THAT COULD BE RELATED TO ABUSE/NEGLECT?: UNABLE TO ASSESS

## 2025-07-06 SDOH — SOCIAL STABILITY: SOCIAL INSECURITY: WERE YOU ABLE TO COMPLETE ALL THE BEHAVIORAL HEALTH SCREENINGS?: NO

## 2025-07-06 SDOH — SOCIAL STABILITY: SOCIAL INSECURITY: DOES ANYONE TRY TO KEEP YOU FROM HAVING/CONTACTING OTHER FRIENDS OR DOING THINGS OUTSIDE YOUR HOME?: UNABLE TO ASSESS

## 2025-07-06 SDOH — SOCIAL STABILITY: SOCIAL INSECURITY: ARE YOU OR HAVE YOU BEEN THREATENED OR ABUSED PHYSICALLY, EMOTIONALLY, OR SEXUALLY BY ANYONE?: UNABLE TO ASSESS

## 2025-07-06 SDOH — SOCIAL STABILITY: SOCIAL INSECURITY: HAVE YOU HAD THOUGHTS OF HARMING ANYONE ELSE?: UNABLE TO ASSESS

## 2025-07-06 SDOH — SOCIAL STABILITY: SOCIAL INSECURITY: DO YOU FEEL ANYONE HAS EXPLOITED OR TAKEN ADVANTAGE OF YOU FINANCIALLY OR OF YOUR PERSONAL PROPERTY?: UNABLE TO ASSESS

## 2025-07-06 ASSESSMENT — LIFESTYLE VARIABLES
HOW OFTEN DO YOU HAVE A DRINK CONTAINING ALCOHOL: PATIENT UNABLE TO ANSWER
HOW OFTEN DO YOU HAVE 6 OR MORE DRINKS ON ONE OCCASION: PATIENT UNABLE TO ANSWER
AUDIT-C TOTAL SCORE: -1
AUDIT-C TOTAL SCORE: -1
HOW MANY STANDARD DRINKS CONTAINING ALCOHOL DO YOU HAVE ON A TYPICAL DAY: PATIENT UNABLE TO ANSWER
SKIP TO QUESTIONS 9-10: 0

## 2025-07-06 ASSESSMENT — PAIN - FUNCTIONAL ASSESSMENT: PAIN_FUNCTIONAL_ASSESSMENT: 0-10

## 2025-07-06 ASSESSMENT — ACTIVITIES OF DAILY LIVING (ADL)
TOILETING: UNABLE TO ASSESS
HEARING - RIGHT EAR: UNABLE TO ASSESS
LACK_OF_TRANSPORTATION: PATIENT UNABLE TO ANSWER
PATIENT'S MEMORY ADEQUATE TO SAFELY COMPLETE DAILY ACTIVITIES?: UNABLE TO ASSESS
GROOMING: UNABLE TO ASSESS
ADEQUATE_TO_COMPLETE_ADL: UNABLE TO ASSESS
HEARING - LEFT EAR: UNABLE TO ASSESS
WALKS IN HOME: UNABLE TO ASSESS
LACK_OF_TRANSPORTATION: PATIENT UNABLE TO ANSWER
FEEDING YOURSELF: UNABLE TO ASSESS
DRESSING YOURSELF: UNABLE TO ASSESS
JUDGMENT_ADEQUATE_SAFELY_COMPLETE_DAILY_ACTIVITIES: UNABLE TO ASSESS
BATHING: UNABLE TO ASSESS

## 2025-07-06 ASSESSMENT — PAIN SCALES - GENERAL
PAINLEVEL_OUTOF10: 0 - NO PAIN

## 2025-07-06 ASSESSMENT — COGNITIVE AND FUNCTIONAL STATUS - GENERAL: PATIENT BASELINE BEDBOUND: UNABLE TO ASSESS AT THIS TIME

## 2025-07-06 NOTE — NURSING NOTE
Patient is a new admit to med/surg room 232 from ED. Patient is stable at this time. VSS. Denies pain. Patient is pleasantly confused.Patient is resting in bed at this time with call light in reach.

## 2025-07-06 NOTE — ED TRIAGE NOTES
Pt was found on the ground by her bed this morning. Pt was last seen when she went to bed around 2030 last night. Unknown if pt hit her head due to hx dementia, but pt is on eliquis. Pt denies any pain currently. Pt daughter states pt is acting normal

## 2025-07-06 NOTE — DISCHARGE INSTRUCTIONS
Pancreatic cyst seen on CT abdomen pelvis.  These will need repeat imaging every 2 years to assess for stability.  
Health Care Proxy (HCP)

## 2025-07-06 NOTE — ED PROVIDER NOTES
History of Present Illness     History provided by: Family Member  Limitations to History: Trauma Activation    HPI:  Maisha Agosto is a 84 y.o. female presenting to the ED as a HIA Activation after fall.  Patient was last seen in her bed at 8:30 PM last night.  She was found this morning by family on the ground next to her bed.  Unclear how long she was on the ground.  She is demented and has no complaints.  She is on Eliquis.    Physical Exam   Arrival Vitals:  T 36.6 °C (97.8 °F)  HR 74  BP 96/54  RR 16  O2 97 % None (Room air)    Primary Survey:  Airway: Intact & patent  Breathing: Equal breath sounds bilaterally  Circulation: 2+ radial and DP pulses bilaterally  Disability: GCS 15.  Gross motor and sensation intact in the bilateral upper and lower extremities.    Secondary Survey:    Head: Atraumatic. No cephalohematoma.  Eye: Pupils equal, round, and reactive to light. Gaze is conjugate. No orbital ridge bony step-offs, or tenderness.  ENT:   Midface is stable.  No mandibular tenderness or dental malocclusion's.  There is no nasal bone tenderness or deformity.  No epistaxis.  No blood or CSF drainage and external auditory canals.  No intraoral lesions.  Neck: There is no C-spine midline tenderness to palpation, step-offs, or deformities.  Trachea is midline.  Chest: Clear to auscultation bilaterally, no chest wall tenderness palpation, crepitus, flail segments noted.  No bruising or abrasions noted to the anterior chest wall.  Cardiovascular: Regular rate, rhythm  Abdomen: Soft, nontender, nondistended.  No bruising or lacerations noted.  Not peritonitic.  Pelvis: Stable to compression  Back/spine: No midline T or L-spine tenderness palpation, step-offs, or deformities.  No lacerations, abrasions, or bruising noted.  Extremities: No gross bony deformities, no bony tenderness palpation.  Full range of motion all in 4 extremities.  Neuro: Pleasantly demented.  Oriented to self.  Face symmetric, speech  fluent.  Gross motor and sensory function intact in the bilateral upper and lower extremities.    Medical Decision Making & ED Course   Medical Decision Makin y.o. female presenting to the ED as a HIA Activation after fall from bed.  On arrival to the ED, the patient was immediately evaluated.  CT pan scan trauma series ordered.  Labs and urinalysis to evaluate for underlying electrolyte metabolic or infectious cause for symptoms.  ----  ED Course:  ED Course as of 25 1500   Sun 2025   1338 CT chest abdomen pelvis w IV contrast  CT chest abdomen pelvis shows improving mucous plugging and bronchiolitis.  Otherwise no acute findings.  CT abdomen pelvis with diverticulosis, pancreatic cyst or cystic mucinous neoplasm.  Follow-up recommendations in CT report.  Unchanged biliary dilatation.  Small fibroids and pelvic varices. [BT]   1339 CT head with age-related changes.  No skull fracture or ICH.  CT cervical spine negative for acute traumatic injuries. [BT]   1339 White count 13.  INR 1.3.  BUN 25.  Glucose 132.  Alcohol negative.  CK normal. [BT]   1458 Daughter states that patient is very unsteady on her feet.  She typically ambulates with a steady gait.  Nurse tried to get the patient in wheelchair and she nearly fell.  Given gait instability weakness and fall she will be hospitalized.  Of note, patient did become hypoxic to 88% with ambulation.  She has as needed oxygen at home.  She was placed on 2 L nasal cannula oxygen.  Her blood pressure is high at 209/88.  Given 0.1 mg of clonidine.  Case discussed with medicine hospitalist who will hospitalize for gait instability, fall [BT]      ED Course User Index  [BT] Alex Stapleton DO         Diagnoses as of 25   Fall, initial encounter   On continuous oral anticoagulation   Pancreatic cyst ([facility])   Hypothyroidism, unspecified type   Low TSH level   Gait instability       CT chest abdomen pelvis w IV contrast   Final Result    CHEST   1.  Improvement of mucous plugging and probable bronchiolitis.   Longer-term follow-up to document resolution of more dominant nodules   should be considered.   2. Otherwise no acute findings.        ABDOMEN - PELVIS   1.  Diverticulosis, possible constipation and developing impaction.   2. Numerous and large pancreatic cyst or cystic/mucinous neoplasms.   Follow-up as described above. Otherwise fairly marked pancreatic   parenchymal atrophy.   3. Biliary dilatation fairly similar to the prior examination.   4. Small calcified uterine fibroid, and pelvic varices.        MACRO:   1.        Signed by: Gerald De La Rosa 7/6/2025 1:00 PM   Dictation workstation:   [id number]      CT head W O contrast trauma protocol   Final Result   Age related degenerative change as described without acute findings   or significant change from the prior exam.        Signed by: Gerald De La Rosa 7/6/2025 12:25 PM   Dictation workstation:   [id number]      CT cervical spine wo IV contrast   Final Result   No acute findings.        Signed by: Gerald De La Rosa 7/6/2025 12:32 PM   Dictation workstation:   [id number]      CT maxillofacial bones wo IV contrast   Final Result   No acute findings.        Signed by: Gerald De La Rosa 7/6/2025 12:39 PM   Dictation workstation:   [id number]      CT 3D reconstruction   Final Result   No acute findings.        Signed by: Gerald De La Rosa 7/6/2025 12:39 PM   Dictation workstation:   [id number]            Disposition   Admit    Procedures   Critical Care    Performed by: Alex Stapleton DO  Authorized by: Alex Stapleton DO    Critical care provider statement:     Critical care time (minutes):  40    Critical care time was exclusive of:  Separately billable procedures and treating other patients and teaching time    Critical care was necessary to treat or prevent imminent or life-threatening deterioration of the following conditions:  Trauma    Critical care was time spent personally by me on the  following activities:  Ordering and performing treatments and interventions, ordering and review of laboratory studies, ordering and review of radiographic studies, pulse oximetry, review of old charts, re-evaluation of patient's condition, obtaining history from patient or surrogate, examination of patient, evaluation of patient's response to treatment and development of treatment plan with patient or surrogate    Care discussed with: admitting provider      Alex Stapleton DO  Emergency Medicine     Alex Stapleton DO  07/06/25 [id number]

## 2025-07-07 LAB
ANION GAP SERPL CALC-SCNC: 9 MMOL/L (ref 10–20)
BUN SERPL-MCNC: 18 MG/DL (ref 6–23)
CALCIUM SERPL-MCNC: 8.6 MG/DL (ref 8.6–10.3)
CHLORIDE SERPL-SCNC: 103 MMOL/L (ref 98–107)
CO2 SERPL-SCNC: 32 MMOL/L (ref 21–32)
CREAT SERPL-MCNC: 0.57 MG/DL (ref 0.5–1.05)
EGFRCR SERPLBLD CKD-EPI 2021: 89 ML/MIN/1.73M*2
ERYTHROCYTE [DISTWIDTH] IN BLOOD BY AUTOMATED COUNT: 13 % (ref 11.5–14.5)
GLUCOSE SERPL-MCNC: 107 MG/DL (ref 74–99)
HCT VFR BLD AUTO: 39 % (ref 36–46)
HGB BLD-MCNC: 12.5 G/DL (ref 12–16)
MCH RBC QN AUTO: 29.9 PG (ref 26–34)
MCHC RBC AUTO-ENTMCNC: 32.1 G/DL (ref 32–36)
MCV RBC AUTO: 93 FL (ref 80–100)
NRBC BLD-RTO: ABNORMAL /100{WBCS}
PLATELET # BLD AUTO: 148 X10*3/UL (ref 150–450)
POTASSIUM SERPL-SCNC: 3.1 MMOL/L (ref 3.5–5.3)
RBC # BLD AUTO: 4.18 X10*6/UL (ref 4–5.2)
SODIUM SERPL-SCNC: 141 MMOL/L (ref 136–145)
WBC # BLD AUTO: 9.5 X10*3/UL (ref 4.4–11.3)

## 2025-07-07 PROCEDURE — 2500000005 HC RX 250 GENERAL PHARMACY W/O HCPCS: Performed by: EMERGENCY MEDICINE

## 2025-07-07 PROCEDURE — 80048 BASIC METABOLIC PNL TOTAL CA: CPT | Performed by: NURSE PRACTITIONER

## 2025-07-07 PROCEDURE — 94760 N-INVAS EAR/PLS OXIMETRY 1: CPT

## 2025-07-07 PROCEDURE — 2500000005 HC RX 250 GENERAL PHARMACY W/O HCPCS: Performed by: NURSE PRACTITIONER

## 2025-07-07 PROCEDURE — 2500000001 HC RX 250 WO HCPCS SELF ADMINISTERED DRUGS (ALT 637 FOR MEDICARE OP): Performed by: HOSPITALIST

## 2025-07-07 PROCEDURE — 2500000002 HC RX 250 W HCPCS SELF ADMINISTERED DRUGS (ALT 637 FOR MEDICARE OP, ALT 636 FOR OP/ED): Performed by: HOSPITALIST

## 2025-07-07 PROCEDURE — 97161 PT EVAL LOW COMPLEX 20 MIN: CPT | Mod: GP | Performed by: PHYSICAL THERAPIST

## 2025-07-07 PROCEDURE — 85027 COMPLETE CBC AUTOMATED: CPT | Performed by: NURSE PRACTITIONER

## 2025-07-07 PROCEDURE — 2500000004 HC RX 250 GENERAL PHARMACY W/ HCPCS (ALT 636 FOR OP/ED): Performed by: NURSE PRACTITIONER

## 2025-07-07 PROCEDURE — 36415 COLL VENOUS BLD VENIPUNCTURE: CPT | Performed by: NURSE PRACTITIONER

## 2025-07-07 PROCEDURE — 2500000004 HC RX 250 GENERAL PHARMACY W/ HCPCS (ALT 636 FOR OP/ED): Performed by: HOSPITALIST

## 2025-07-07 PROCEDURE — 2500000001 HC RX 250 WO HCPCS SELF ADMINISTERED DRUGS (ALT 637 FOR MEDICARE OP): Performed by: INTERNAL MEDICINE

## 2025-07-07 PROCEDURE — G0378 HOSPITAL OBSERVATION PER HR: HCPCS

## 2025-07-07 PROCEDURE — 2500000001 HC RX 250 WO HCPCS SELF ADMINISTERED DRUGS (ALT 637 FOR MEDICARE OP): Performed by: NURSE PRACTITIONER

## 2025-07-07 PROCEDURE — 99223 1ST HOSP IP/OBS HIGH 75: CPT | Performed by: INTERNAL MEDICINE

## 2025-07-07 PROCEDURE — 97165 OT EVAL LOW COMPLEX 30 MIN: CPT | Mod: GO

## 2025-07-07 RX ORDER — SODIUM CHLORIDE 9 MG/ML
10 INJECTION, SOLUTION INTRAVENOUS CONTINUOUS PRN
Status: DISCONTINUED | OUTPATIENT
Start: 2025-07-07 | End: 2025-07-11 | Stop reason: HOSPADM

## 2025-07-07 RX ORDER — CHOLECALCIFEROL (VITAMIN D3) 25 MCG
25 TABLET ORAL DAILY
COMMUNITY

## 2025-07-07 RX ORDER — METOPROLOL TARTRATE 25 MG/1
25 TABLET, FILM COATED ORAL 2 TIMES DAILY
Status: DISCONTINUED | OUTPATIENT
Start: 2025-07-07 | End: 2025-07-09

## 2025-07-07 RX ORDER — LOPERAMIDE HYDROCHLORIDE 2 MG/1
2 CAPSULE ORAL AS NEEDED
COMMUNITY

## 2025-07-07 RX ADMIN — LEVOTHYROXINE SODIUM 112.5 MCG: 0.07 TABLET ORAL at 05:53

## 2025-07-07 RX ADMIN — Medication 3 MG: at 20:44

## 2025-07-07 RX ADMIN — POLYETHYLENE GLYCOL 3350 17 G: 17 POWDER, FOR SOLUTION ORAL at 08:22

## 2025-07-07 RX ADMIN — ATORVASTATIN CALCIUM 40 MG: 40 TABLET, FILM COATED ORAL at 08:22

## 2025-07-07 RX ADMIN — ACETAMINOPHEN 650 MG: 325 TABLET, FILM COATED ORAL at 20:44

## 2025-07-07 RX ADMIN — POTASSIUM CHLORIDE 20 MEQ: 1500 TABLET, EXTENDED RELEASE ORAL at 08:22

## 2025-07-07 RX ADMIN — TRAZODONE HYDROCHLORIDE 50 MG: 50 TABLET ORAL at 20:44

## 2025-07-07 RX ADMIN — ACETAMINOPHEN 650 MG: 325 TABLET, FILM COATED ORAL at 08:22

## 2025-07-07 RX ADMIN — Medication 2 L/MIN: at 00:04

## 2025-07-07 RX ADMIN — METOPROLOL TARTRATE 25 MG: 25 TABLET, FILM COATED ORAL at 20:44

## 2025-07-07 RX ADMIN — Medication 1 TABLET: at 08:22

## 2025-07-07 RX ADMIN — PREDNISONE 2.5 MG: 5 TABLET ORAL at 08:22

## 2025-07-07 ASSESSMENT — COGNITIVE AND FUNCTIONAL STATUS - GENERAL
DAILY ACTIVITIY SCORE: 19
CLIMB 3 TO 5 STEPS WITH RAILING: A LITTLE
CLIMB 3 TO 5 STEPS WITH RAILING: A LOT
MOVING TO AND FROM BED TO CHAIR: A LITTLE
MOVING TO AND FROM BED TO CHAIR: A LITTLE
STANDING UP FROM CHAIR USING ARMS: A LITTLE
MOBILITY SCORE: 17
DRESSING REGULAR UPPER BODY CLOTHING: A LITTLE
DRESSING REGULAR UPPER BODY CLOTHING: A LITTLE
PERSONAL GROOMING: A LITTLE
DAILY ACTIVITIY SCORE: 18
MOBILITY SCORE: 20
MOVING FROM LYING ON BACK TO SITTING ON SIDE OF FLAT BED WITH BEDRAILS: A LITTLE
TOILETING: A LITTLE
PERSONAL GROOMING: A LITTLE
TOILETING: A LITTLE
DRESSING REGULAR LOWER BODY CLOTHING: A LITTLE
HELP NEEDED FOR BATHING: A LITTLE
WALKING IN HOSPITAL ROOM: A LITTLE
STANDING UP FROM CHAIR USING ARMS: A LITTLE
EATING MEALS: A LITTLE
TURNING FROM BACK TO SIDE WHILE IN FLAT BAD: A LITTLE
HELP NEEDED FOR BATHING: A LITTLE
DRESSING REGULAR LOWER BODY CLOTHING: A LITTLE
WALKING IN HOSPITAL ROOM: A LITTLE

## 2025-07-07 ASSESSMENT — ACTIVITIES OF DAILY LIVING (ADL)
FEEDING: STAND BY
GROOMING_ASSISTANCE: STAND BY
TOILETING_ASSISTANCE: MINIMAL
BATHING_ASSISTANCE: MINIMAL
BATHING_ASSISTANCE: MINIMAL
ADL_ASSISTANCE: NEEDS ASSISTANCE
LACK_OF_TRANSPORTATION: NO

## 2025-07-07 ASSESSMENT — PAIN DESCRIPTION - LOCATION: LOCATION: HIP

## 2025-07-07 ASSESSMENT — PAIN SCALES - GENERAL
PAINLEVEL_OUTOF10: 0 - NO PAIN

## 2025-07-07 ASSESSMENT — PAIN SCALES - WONG BAKER
WONGBAKER_NUMERICALRESPONSE: NO HURT
WONGBAKER_NUMERICALRESPONSE: HURTS LITTLE BIT

## 2025-07-07 ASSESSMENT — PAIN DESCRIPTION - DESCRIPTORS: DESCRIPTORS: SORE

## 2025-07-07 ASSESSMENT — PAIN - FUNCTIONAL ASSESSMENT
PAIN_FUNCTIONAL_ASSESSMENT: 0-10
PAIN_FUNCTIONAL_ASSESSMENT: WONG-BAKER FACES
PAIN_FUNCTIONAL_ASSESSMENT: WONG-BAKER FACES
PAIN_FUNCTIONAL_ASSESSMENT: 0-10

## 2025-07-07 ASSESSMENT — PAIN DESCRIPTION - ORIENTATION: ORIENTATION: RIGHT

## 2025-07-07 NOTE — CARE PLAN
The patient's goals for the shift include      The clinical goals for the shift include No falls or injuries tonight    Pt slept well. No falls or injuries, Uneventful night

## 2025-07-07 NOTE — PROGRESS NOTES
07/07/25 1240   Discharge Planning   Living Arrangements Children;Family members   Assistance Needed Patient lives with her daughter, son in law and dog in a 2 story house with basement. (Laundry) She uses walker.  She has supervision 24/7-when daughter is at work, there are caregivers that come in to stay with her. DME: shower seat in [facility] with grab bars. Patient is mostly independent with her ADLs; she does have confusion. Daughter would prefer patient to return home with home care vs SNF-   Type of Residence Private residence   Number of Stairs to Enter Residence 2   Number of Stairs Within Residence 12  (basement)   Do you have animals or pets at home? Yes   Type of Animals or Pets Dog  Husky   Who is requesting discharge planning? Provider   Home or Post Acute Services Post acute facilities (Rehab/SNF/etc)   Type of Post Acute Facility Services Skilled nursing   Expected Discharge Disposition SNF  (Pending PT/OT eval)   Does the patient need discharge transport arranged? Yes   [facility] coordination needed? Yes   Has discharge transport been arranged? No   Financial Resource Strain   How hard is it for you to pay for the very basics like food, housing, medical care, and heating? Not hard   Housing Stability   In the last 12 months, was there a time when you were not able to pay the mortgage or rent on time? N   In the past 12 months, how many times have you moved where you were living? 1   At any time in the past 12 months, were you homeless or living in a shelter (including now)? N   Transportation Needs   In the past 12 months, has lack of transportation kept you from medical appointments or from getting medications? no   In the past 12 months, has lack of transportation kept you from meetings, work, or from getting things needed for daily living? No   Stroke Family Assessment   Stroke Family Assessment Needed No   Intensity of Service   Intensity of Service 0-30 min

## 2025-07-07 NOTE — PROGRESS NOTES
Pharmacy Medication History Review    Maisha Agosto is a 84 y.o. female admitted for Fall, initial encounter. Pharmacy reviewed the patient's [id number] medications and allergies for accuracy.    Sources used to confirm medication list: Informant interview and Medication Dispense History  Informant: Child (Eli, daughter)  Informant credibility: Good (Able to recall medication, indication, strength, frequency, and/or prescriber for >75% of medications).    Total number of adjustments: 2     Medications Added Medications Removed Medications Adjusted  Adjustments Made    Amlodipine      Ramipril       The list below reflectives the updated PTA list. Please review each medication in order reconciliation for additional clarification and justification.  Medications Prior to Admission   Medication Sig Dispense Refill Last Dose/Taking    apixaban (Eliquis) 2.5 mg tablet Take 1 tablet (2.5 mg) by mouth 2 times a day. 60 tablet 0 Unknown    atorvastatin (Lipitor) 40 mg tablet Take 1 tablet (40 mg) by mouth once daily.   Unknown    cholecalciferol (Vitamin D3) 25 mcg (1,000 units) tablet Take 1 tablet (25 mcg) by mouth once daily.   Unknown    levothyroxine (Synthroid, Levoxyl) 75 mcg tablet Take 1 tablet (75 mcg) by mouth early in the morning. Take on an empty stomach at the same time each day, either 30 to 60 minutes prior to breakfast (Patient taking differently: Take 1.5 tablets (112.5 mcg) by mouth early in the morning..) 30 tablet 0 Unknown    loperamide (Imodium A-D) 2 mg capsule Take 1 capsule (2 mg) by mouth if needed for diarrhea.   Unknown    magnesium oxide (Mag-Ox) 400 mg (241.3 mg magnesium) tablet Take 1 tablet by mouth once daily.   Unknown    metoprolol tartrate (Lopressor) 25 mg tablet Take 1 tablet (25 mg) by mouth once daily at bedtime. 30 tablet 0 Unknown    multivitamin tablet Take 1 tablet by mouth once daily.   Unknown    omega 3-dha-epa-fish oil (Fish OiL) 1,000 mg (120 mg-180 mg) capsule  Take 1 capsule (1,000 mg) by mouth once daily.   Unknown    oxygen (O2) gas therapy Inhale 1 each once every 24 hours. Wear nightly during sleep hours   Unknown    potassium chloride CR 20 mEq ER tablet Take 1 tablet (20 mEq) by mouth once daily. Do not crush or chew.   Unknown    predniSONE (Deltasone) 5 mg tablet Take 0.5 tablets (2.5 mg) by mouth once daily.   Unknown    traZODone (Desyrel) 50 mg tablet Take 1 tablet (50 mg) by mouth once daily at bedtime.   Unknown        The list below reflectives the updated allergy list. Please review each documented allergy for additional clarification and justification.  Allergies  Reviewed by Sommer Arthur RN on 7/6/2025        Severity Reactions Comments    Acthar [corticotropin] Not Specified Psychosis     Chloroquine Phosphate Not Specified Unknown Tremors/ weakness    Ciprofloxacin Not Specified Unknown     Humira [adalimumab] Not Specified Unknown     Levaquin [levofloxacin] Not Specified Unknown     Methotrexate Not Specified Unknown     Sulfamethoxazole-trimethoprim Not Specified Unknown     Xeljanz [tofacitinib] Not Specified Psychosis     Amoxicillin-pot Clavulanate Low Diarrhea Pt tolerated ceftriaxone during 9/28/24 admission. (DARREN Wallace, PharmD).     Enbrel [etanercept] Low Rash Legs and arms            Below are additional concerns with the patient's PTA list.  Spoke w/pt's daughterEli via telephone. Reviewed PTA and allergy list. Please review changes made to the PTA list in the chart above.    Kyra Espinoza CPhT  Medication History Pharmacy Technician  CARLOS 8-4:30  Available via [employer] Secure Chat  OR  [phone]

## 2025-07-07 NOTE — PROGRESS NOTES
Physical Therapy    Physical Therapy Evaluation    Patient Name: Maisha Agosto  MRN: [id number]  Department: [facility]  Room: [address] [place] [address]  Today's Date: 7/7/2025   Time Calculation  Start Time: 0930  Stop Time: 0945  Time Calculation (min): 15 min    Assessment/Plan   PT Assessment  PT Assessment Results: Decreased strength, Impaired balance, Decreased mobility, Decreased cognition, Impaired judgement, Decreased endurance  Rehab Prognosis: Good  Barriers to Discharge Home: Caregiver assistance (pt. is alone at night)  Caregiver Assistance: Caregiver assistance needed per identified barriers - however, level of patient's required assistance exceeds assistance available at home  Evaluation/Treatment Tolerance: Treatment limited secondary to medical complications (Comment) (BP dropped to 89/51)  Medical Staff Made Aware: Yes  Strengths: Support of Caregivers  Barriers to Participation: Comorbidities  End of Session Communication: Bedside nurse, Care Coordinator  Assessment Comment: Pleasant 84 y.o presents with weakness and impaired mobility s/p fall at home. Pt. lives with daughter and is normally has supervision when amb. with walker (except at night). Pt. currently requires CGA and would benefit from additional PT to address above noted limitations and prevent further decline.  End of Session Patient Position: Alarm on, Up in chair  IP OR SWING BED PT PLAN  Inpatient or Swing Bed: Inpatient  PT Plan  Treatment/Interventions: Transfer training, Bed mobility, Gait training, Stair training, Balance training, Strengthening, Endurance training, Therapeutic exercise, Therapeutic activity, Home exercise program, Neuromuscular re-education  PT Plan: Ongoing PT  PT Frequency: 3 times per week (during this acute inpatient hospitalization.)  PT Discharge Recommendations: Moderate intensity level of continued care (Based on current functional status and rehab potential, patient is anticipated to tolerate and benefit from 5 or more days  per week of skilled rehabilitative therapy after discharge from this acute inpatient hospitalization.)  PT Recommended Transfer Status: Contact guard  PT - OK to Discharge: Yes Based on completed evaluation and care plan recommendations, no barriers to discharge to next site of care       Subjective     PT Visit Info:  PT Received On: 07/07/25  General Visit Information:  General  Reason for Referral: impaired mobility, fall  Referred By: Johnny Pa DO  Past Medical History Relevant to Rehab: dementia, COPD, DM, HLD, hypothyroidism, RA, TIA  Family/Caregiver Present: Yes  Prior to Session Communication: Bedside nurse  Patient Position Received: Up in chair, Alarm on  General Comment: mami, 2L 02  Home Living:  Home Living  Type of Home: House  Lives With:  (daughter; pt. Has SUP during the day)  Home Adaptive Equipment: Walker rolling or standard  Home Layout: One level  Home Access: Stairs to enter with rails  Entrance Stairs-Rails: Right  Entrance Stairs-Number of Steps: 2  Bathroom Shower/Tub: Walk-in shower, None  Bathroom Equipment: Grab bars in shower (shower chair)  Prior Level of Function:  Prior Function Per Pt/Caregiver Report  Level of [place]: Needs assistance with homemaking  Receives Help From:  (IND with mobility, IND dressing/toileting, has supervision when bathing)  Ambulatory Assistance: Independent (with WW (SUP during the day))  Precautions:  Precautions  Medical Precautions: Fall precautions      Date/Time Vitals Session Patient Position Pulse Resp SpO2 BP MAP (mmHg)    07/07/25 0900 --  --  79  --  94 %  --  --     07/07/25 0930 --  --  74  --  96 %  --  --     07/07/25 1000 --  --  75  --  93 %  --  --              Objective   Pain:  Pain Assessment  Pain Assessment: 0-10  0-10 (Numeric) Pain Score: 0 - No pain  Cognition:  Cognition  Overall Cognitive Status: Impaired at baseline  Orientation Level: Disoriented to time, Disoriented to situation    General Assessments:  Activity  Tolerance  Endurance: Tolerates less than 10 min exercise with changes in vital signs (drop in BP)    Strength  Strength Comments: BLE: grossly 4-/5     Coordination  Movements are Fluid and Coordinated: Yes    Static Standing Balance  Static Standing-Comment/Number of Minutes: F+; with BUE support  Dynamic Standing Balance  Dynamic Standing-Comments: F+; with BUE support    Functional Assessments:     Transfers  Transfer: Yes  Transfer 1  Technique 1: Sit to stand, Stand to sit  Transfer Device 1: Walker, Gait belt  Transfer Level of Assistance 1: Contact guard  Trials/Comments 1: unable to complete without UE support    Ambulation/Gait Training  Ambulation/Gait Training Performed: Yes  Ambulation/Gait Training 1  Surface 1: Level tile  Device 1: Rolling walker  Gait Support Devices: Gait belt  Assistance 1: Contact guard  Quality of Gait 1: Decreased step length, Inconsistent stride length, Forward flexed posture  Comments/Distance (ft) 1: 3' (pt. became dizzy with amb. BP taken and pt. dropped 89/51 . Notified RN)    Extremity/Trunk Assessments:  RLE   RLE : Within Functional Limits  LLE   LLE : Within Functional Limits  Outcome Measures:  [facility] Basic Mobility  Turning from your back to your side while in a flat bed without using bedrails: A little  Moving from lying on your back to sitting on the side of a flat bed without using bedrails: A little  Moving to and from bed to chair (including a wheelchair): A little  Standing up from a chair using your arms (e.g. wheelchair or bedside chair): A little  To walk in hospital room: A little  Climbing 3-5 steps with railing: A lot  Basic Mobility - Total Score: 17    Other Measures  5x Sit to Stand: unable to complete STS without UE support 2/2 weakness    Encounter Problems       Encounter Problems (Active)       Balance       STG - Maintains dynamic standing balance with upper extremity support with good- balance        Start:  07/07/25    Expected End:  07/21/25             Pt. will complete 5 STS <15 seconds without UE support        Start:  07/07/25    Expected End:  07/21/25               Mobility       LTG - Patient will ambulate household distance with SUP and WW       Start:  07/07/25    Expected End:  07/21/25            STG - Patient will ascend and descend four to six stairs with 1 rail and SBA        Start:  07/07/25    Expected End:  07/21/25               PT Transfers       STG - Patient will perform bed mobility with SUP        Start:  07/07/25    Expected End:  07/21/25            STG - Patient will transfer sit to and from stand with SBA and WW        Start:  07/07/25    Expected End:  07/21/25                   Education Documentation  Mobility Training, taught by Mariah Ridley PT at 7/7/2025 10:48 AM.  Learner: Family, Patient  Readiness: Acceptance  Method: Explanation  Response: Verbalizes Understanding  Comment: Educated pt. and family on PT POC    Education Comments  No comments found.

## 2025-07-07 NOTE — H&P
History Of Present Illness  Maisha Agosto is a 84 y.o. female with past medical history of pAfib (Eliquis), HTN, diastolic heart failure, HLD, COPD, dementia and RA, admission to the hospital in 6/2025 due to hypoxia with CHF, pneumonia, generalized weakness who came to hospital secondary to fall.  Patient is a poor historian and does not remember why she was brought to the hospital.  She is alert and oriented to self, birthday but not to the year or president.  She denies recent fever, chest pain, shortness of breath, nausea, vomiting, diarrhea, dysuria, or any pain anywhere.  I spoke with patient's daughter, Eli, at the bedside, who states patient was staying with her brother.  As patient seemed to be sleeping longer than usual, brother went into see the patient and she was found on the floor with last being seen at 20:30 the night before.  Patient had no complaints at that time.  Daughter states patient typically stays with her through the week and with the patient's son on the weekends.  She states that patient has been in her normal state of health is not had any fever, chest pain, shortness of breath or other acute complaints recently.  Otherwise, 10 point review of systems is benign.     Past Medical History  Medical History[1]    Surgical History  Surgical History[2]     Social History  She reports that she has never smoked. She has never used smokeless tobacco. She reports that she does not drink alcohol and does not use drugs.    Family History  Family History[3]     Allergies  Acthar [corticotropin], Chloroquine phosphate, Ciprofloxacin, Humira [adalimumab], Levaquin [levofloxacin], Methotrexate, Sulfamethoxazole-trimethoprim, Xeljanz [tofacitinib], Amoxicillin-pot clavulanate, and Enbrel [etanercept]    Review of Systems  - As stated in the HPI.  Otherwise, 10 point review of systems is benign.    Physical Exam     Last Recorded Vitals  General: Alert and oriented to self, birthday, hospital not to  year or president.  No acute distress.  HEENT: Sclera clear.  CVS: RRR.   Lungs: CTAB.   Abdomen: Soft.  Nontender.  Bowel sounds present.  Extremities: No pitting edema bilateral ankles.  Psychiatric: Cooperative.  Neurologic: Cranial nerves II through XII are grossly intact.  Strength is 5/5 lower extremities.  Sensation intact in all 4 extremities. Finger to nose exam intact bilaterally.      Relevant Results      Results for orders placed or performed during the hospital encounter of 07/06/25 (from the past 24 hours)   CBC and Auto Differential   Result Value Ref Range    WBC 13.0 (H) 4.4 - 11.3 x10*3/uL    nRBC 0.0 0.0 - 0.0 /100 WBCs    RBC 4.45 4.00 - 5.20 x10*6/uL    Hemoglobin 13.1 12.0 - 16.0 g/dL    Hematocrit 41.1 36.0 - 46.0 %    MCV 92 80 - 100 fL    MCH 29.4 26.0 - 34.0 pg    MCHC 31.9 (L) 32.0 - 36.0 g/dL    RDW 12.9 11.5 - 14.5 %    Platelets 146 (L) 150 - 450 x10*3/uL    Neutrophils % 85.7 40.0 - 80.0 %    Immature Granulocytes %, Automated 0.4 0.0 - 0.9 %    Lymphocytes % 5.2 13.0 - 44.0 %    Monocytes % 8.0 2.0 - 10.0 %    Eosinophils % 0.3 0.0 - 6.0 %    Basophils % 0.4 0.0 - 2.0 %    Neutrophils Absolute 11.15 (H) 1.60 - 5.50 x10*3/uL    Immature Granulocytes Absolute, Automated 0.05 0.00 - 0.50 x10*3/uL    Lymphocytes Absolute 0.68 (L) 0.80 - 3.00 x10*3/uL    Monocytes Absolute 1.04 (H) 0.05 - 0.80 x10*3/uL    Eosinophils Absolute 0.04 0.00 - 0.40 x10*3/uL    Basophils Absolute 0.05 0.00 - 0.10 x10*3/uL   Comprehensive Metabolic Panel   Result Value Ref Range    Glucose 132 (H) 74 - 99 mg/dL    Sodium 140 136 - 145 mmol/L    Potassium 3.6 3.5 - 5.3 mmol/L    Chloride 103 98 - 107 mmol/L    Bicarbonate 32 21 - 32 mmol/L    Anion Gap 9 (L) 10 - 20 mmol/L    Urea Nitrogen 25 (H) 6 - 23 mg/dL    Creatinine 0.73 0.50 - 1.05 mg/dL    eGFR 80 >60 mL/min/1.73m*2    Calcium 9.3 8.6 - 10.3 mg/dL    Albumin 3.9 3.4 - 5.0 g/dL    Alkaline Phosphatase 52 33 - 136 U/L    Total Protein 6.9 6.4 - 8.2 g/dL     AST 24 9 - 39 U/L    Bilirubin, Total 0.8 0.0 - 1.2 mg/dL    ALT 23 7 - 45 U/L   Alcohol   Result Value Ref Range    Alcohol <10 <=10 mg/dL   Protime-INR   Result Value Ref Range    Protime 14.5 (H) 9.8 - 12.4 seconds    INR 1.3 (H) 0.9 - 1.1   Troponin I, High Sensitivity   Result Value Ref Range    Troponin I, High Sensitivity 28 (H) 0 - 13 ng/L   Creatine Kinase   Result Value Ref Range    Creatine Kinase 85 0 - 215 U/L   TSH with reflex to Free T4 if abnormal   Result Value Ref Range    Thyroid Stimulating Hormone 0.05 (L) 0.44 - 3.98 mIU/L   Thyroxine, Free   Result Value Ref Range    Thyroxine, Free 1.21 (H) 0.61 - 1.12 ng/dL   Drug Screen, Urine With Reflex to Confirmation   Result Value Ref Range    Amphetamine Screen, Urine Presumptive Negative Presumptive Negative    Barbiturate Screen, Urine Presumptive Negative Presumptive Negative    Benzodiazepines Screen, Urine Presumptive Negative Presumptive Negative    Cannabinoid Screen, Urine Presumptive Negative Presumptive Negative    Cocaine Metabolite Screen, Urine Presumptive Negative Presumptive Negative    Fentanyl Screen, Urine Presumptive Negative Presumptive Negative    Opiate Screen, Urine Presumptive Negative Presumptive Negative    Oxycodone Screen, Urine Presumptive Negative Presumptive Negative    PCP Screen, Urine Presumptive Negative Presumptive Negative    Methadone Screen, Urine Presumptive Negative Presumptive Negative   Urinalysis with Reflex Culture and Microscopic   Result Value Ref Range    Color, Urine Light-Yellow Light-Yellow, Yellow, Dark-Yellow    Appearance, Urine Clear Clear    Specific Gravity, Urine 1.031 1.005 - 1.035    pH, Urine 7.0 5.0, 5.5, 6.0, 6.5, 7.0, 7.5, 8.0    Protein, Urine NEGATIVE NEGATIVE, 10 (TRACE), 20 (TRACE) mg/dL    Glucose, Urine Normal Normal mg/dL    Blood, Urine NEGATIVE NEGATIVE mg/dL    Ketones, Urine NEGATIVE NEGATIVE mg/dL    Bilirubin, Urine NEGATIVE NEGATIVE mg/dL    Urobilinogen, Urine Normal Normal  mg/dL    Nitrite, Urine NEGATIVE NEGATIVE    Leukocyte Esterase, Urine NEGATIVE NEGATIVE   CBC   Result Value Ref Range    WBC 9.5 4.4 - 11.3 x10*3/uL    nRBC      RBC 4.18 4.00 - 5.20 x10*6/uL    Hemoglobin 12.5 12.0 - 16.0 g/dL    Hematocrit 39.0 36.0 - 46.0 %    MCV 93 80 - 100 fL    MCH 29.9 26.0 - 34.0 pg    MCHC 32.1 32.0 - 36.0 g/dL    RDW 13.0 11.5 - 14.5 %    Platelets 148 (L) 150 - 450 x10*3/uL   Basic metabolic panel   Result Value Ref Range    Glucose 107 (H) 74 - 99 mg/dL    Sodium 141 136 - 145 mmol/L    Potassium 3.1 (L) 3.5 - 5.3 mmol/L    Chloride 103 98 - 107 mmol/L    Bicarbonate 32 21 - 32 mmol/L    Anion Gap 9 (L) 10 - 20 mmol/L    Urea Nitrogen 18 6 - 23 mg/dL    Creatinine 0.57 0.50 - 1.05 mg/dL    eGFR 89 >60 mL/min/1.73m*2    Calcium 8.6 8.6 - 10.3 mg/dL      CT chest abdomen pelvis w IV contrast  Result Date: 7/6/2025  CHEST 1.  Improvement of mucous plugging and probable bronchiolitis. Longer-term follow-up to document resolution of more dominant nodules should be considered. 2. Otherwise no acute findings.   ABDOMEN - PELVIS 1.  Diverticulosis, possible constipation and developing impaction. 2. Numerous and large pancreatic cyst or cystic/mucinous neoplasms. Follow-up as described above. Otherwise fairly marked pancreatic parenchymal atrophy. 3. Biliary dilatation fairly similar to the prior examination. 4. Small calcified uterine fibroid, and pelvic varices.   MACRO: 1.   Signed by: Gerald De La Rosa 7/6/2025 1:00 PM Dictation workstation:   [id number]    CT maxillofacial bones wo IV contrast  Result Date: 7/6/2025  No acute findings.   Signed by: Gerald De La Rosa 7/6/2025 12:39 PM Dictation workstation:   [id number]    CT 3D reconstruction  Result Date: 7/6/2025  No acute findings.   Signed by: Gerald De La Rosa 7/6/2025 12:39 PM Dictation workstation:   [id number]    CT cervical spine wo IV contrast  Result Date: 7/6/2025  No acute findings.   Signed by: Gerald De La Rosa 7/6/2025 12:32 PM Dictation  workstation:   [id number]    CT head W O contrast trauma protocol  Result Date: 7/6/2025  Age related degenerative change as described without acute findings or significant change from the prior exam.   Signed by: Gerald De La Rosa 7/6/2025 12:25 PM Dictation workstation:   [id number]        Assessment & Plan    Maisha Agosto is a 84 y.o. female with past medical history of pAfib (Eliquis), HTN, chronic diastolic heart failure, HLD, COPD, dementia, RA, admission to the hospital in 6/2025 due to hypoxia with CHF, pneumonia, generalized weakness who came to hospital secondary to fall.    Fall  - Appears to be mechanical in nature.  Daughter states patient does sit at the edge of her bed frequently.  CT scans of the head, C-spine, abdomen, pelvis, chest with no acute fractures.  - Urinalysis was benign for infection.  -CPK was within normal limits at 85.  - Consult PT/OT for evaluation.  - Patient's daughter, Eli, was at the bedside and all of their questions were answered.    Transient hypoxia  - Patient back on room air today.  CT scan of the chest with improvement of mucous plugging.  Apparently wears O2 at times at night at home per the chart.  Monitor.    Leukocytosis  - CBCs initially elevated but have resolved.  No obvious signs of infection on CT scan of the chest and benign urinalysis.    Mildly elevated troponin  - Troponin was mildly elevated at 28.  Mild troponin elevation likely due to fall patient denies chest pain and daughter denies recent chest pain complaints by the patient.    Improvement mucous plugging and probable bronchiolitis on CT scan  - Recommend long-term follow-up to document resolution of the nodule should be considered per radiology report.  - Recommend follow-up with PCP and pulmonology as an outpatient.    Pancreatic lesions  - Numerous and large pancreatic cyst or cystic/mucinous neoplasms on CT scan.  - Follow-up contrast enhanced MRI or pancreas protocol per radiology's  recommendation.  Will have patient follow-up with primary care provider as an outpatient for further imaging, evaluation management.  I also discussed this result with the patient's daughter, Eli as well.    Chronic paroxysmal afib  - Apixaban currently on hold due to patient's fall initially.  Will consider to restart soon.  - Continue metoprolol and monitor.    Accelerated Hypertension  - BP was elevated at times the past 24 hours but appears more stable now.  - Continue metoprolol and monitor.    Chronic diastolic chf   - Patient does not appear to be volume overloaded.  Monitor.    Hypokalemia  - Replace and monitor.    COPD  - Monitor.    Dementia  - Continue home medications monitor.    Hypothyroidism  - Daughter states that patient recently had her thyroid medications adjusted and she states that even though her thyroid tests have been off, that her physician wanted her to be on her current dose of thyroid meds.  Monitor and recommend follow-up with her thyroid specialist as an outpatient.    RA  - Continue home med of prednisone 2.5 mg daily.  Daughter states patient no longer sees rheumatologist due to patient's preference.  Monitor.    Dvt prophylaxis  -Apixaban.     Disposition:   - Patient originally from home with daughter through the weekend brother on the weekends.  PT/OT consult pending.    Johnny Pa DO         [1]   Past Medical History:  Diagnosis Date    Bowel perforation (Multi) 2024    Clostridium difficile colitis     Colitis due to Clostridioides difficile 2024    Dementia     Heart murmur     Hypernatremia     Terminal ileitis with complication (Multi) 2024    UTI (urinary tract infection) 2024   [2]   Past Surgical History:  Procedure Laterality Date    CATARACT EXTRACTION       SECTION, CLASSIC  2016     Section    CHOLECYSTECTOMY  2014    Cholecystectomy    COLONOSCOPY  2014    Colonoscopy (Fiberoptic)    CT HEAD ANGIO W AND  WO IV CONTRAST  07/22/2021    CT HEAD ANGIO W AND WO IV CONTRAST 7/22/2021 [facility]    ESOPHAGOGASTRODUODENOSCOPY  02/12/2014    Diagnostic Esophagogastroduodenoscopy    MR HEAD ANGIO WO IV CONTRAST  07/21/2021    MR HEAD ANGIO WO IV CONTRAST 7/21/2021 [facility]    MR NECK ANGIO WO IV CONTRAST  07/21/2021    MR NECK ANGIO WO IV CONTRAST 7/21/2021 [facility]   [3]   Family History  Problem Relation Name Age of Onset    Diabetes Mother      Heart disease Other      Heart attack Other

## 2025-07-07 NOTE — PROGRESS NOTES
Occupational Therapy    Evaluation    Patient Name: Maisha Agosto  MRN: [id number]  Department: [facility]  Room: [address] [place] [address]  Today's Date: 7/7/2025  Time Calculation  Start Time: 1056  Stop Time: 1110  Time Calculation (min): 14 min    Assessment  IP OT Assessment  OT Assessment: RN cleared. OT orders received and occupational profile established via interivew method, data gathering, and review of medical records to determine low complexity and establish OT POC. At this time, pt displays a mild decline from PLOF including diminished- ADL independence and functional mobility. Pt unreliable historian ([facility] dementia), may need to clarify information obtained via chart review-- upon  return home, based on clinical judgement, pt may benefit from increased SUP at night additionally from the day, decrease fall risk. Pt would benefit from OT services to address deficit areas to restore QOL and improve safety prior to d/c home. At end, pt positioned in chair with alarm. Son remained present. RN notified.  Prognosis: Good  Barriers to Discharge Home: Cognition needs  Cognition Needs: 24hr supervision for safety awareness needed, Insight of patient limited regarding functional ability/needs, Cognition-related high falls risk, Medication and/or medical management daily assist needed  Evaluation/Treatment Tolerance: Patient tolerated treatment well (Irritated and wanting to leave throughout)  Medical Staff Made Aware: Yes  End of Session Communication: Bedside nurse  End of Session Patient Position: Up in chair, Alarm on    Plan:  Treatment Interventions: ADL retraining, Functional transfer training, UE strengthening/ROM, Endurance training, Patient/family training, Neuromuscular reeducation  OT Frequency: 3 times per week  OT Discharge Recommendations: Low intensity level of continued care  OT Recommended Transfer Status: Stand by assist, Assist of 1  OT - OK to Discharge: Yes    Subjective   Current Problem:  1. Fall, initial  "encounter        2. On continuous oral anticoagulation        3. Pancreatic cyst ([facility])        4. Hypothyroidism, unspecified type        5. Low TSH level        6. Gait instability          OT Visit Info:  OT Received On: 07/07/25  General Visit Info:  General  Reason for Referral: Impaired ADLs  Referred By: Johnny Pa DO  Past Medical History Relevant to Rehab: dementia, COPD, DM, HLD, hypothyroidism, RA, TIA  Family/Caregiver Present: Yes  Caregiver Feedback: Son present- unable to provide home environment set-up.  Prior to Session Communication: Bedside nurse  Patient Position Received: Up in chair, Alarm on  Preferred Learning Style: auditory, verbal  General Comment: Pt seated in recliner, son present. Pt observed to be irritated upon entrance, \"get me out of here.\". Explained to pt OT role/purpose. \"On edge\" throughout session.    Precautions:  Medical Precautions: Fall precautions     Date/Time Vitals Session Patient Position Pulse Resp SpO2 BP MAP (mmHg)    07/07/25 1000 --  --  75  --  93 %  --  --           Vital Signs Comment: BP maintained throughout, only x1 transfer into bathroom-- minimal exertion this date.    Pain:  Pain Assessment  Pain Assessment: 0-10  0-10 (Numeric) Pain Score: 0 - No pain    Objective   Cognition:  Overall Cognitive Status: Impaired at baseline  Orientation Level: Disoriented to time, Disoriented to place, Disoriented to situation  Memory: Exceptions to WFL  Long-Term Memory: Impaired  Short-Term Memory: Impaired  Safety/Judgement: Exceptions to WFL    Home Living:  Type of Home: House  Lives With: Adult children (Dtr)  Home Adaptive Equipment: Walker rolling or standard  Home Layout: One level  Home Access: Stairs to enter with rails  Entrance Stairs-Rails: Right  Entrance Stairs-Number of Steps: 2  Bathroom Shower/Tub: Walk-in shower, None  Bathroom Toilet: Standard  Bathroom Equipment: Grab bars in shower, Shower chair with back  Bathroom Accessibility: No " concerns.  Home Living Comments: Sleeps in reg bed. Laundry first level-dtr does.     Prior Function:  Level of [place]: Needs assistance with homemaking, Needs assistance with ADLs  Receives Help From: Family, Personal care attendant  ADL Assistance: Needs assistance  Bath: Minimal  Toileting: Minimal  Dressing: Stand by  Grooming: Stand by  Feeding: Stand by  Homemaking Assistance: Needs assistance  Meal Prep: Total  Laundry: Total  Vacuuming: Total  Cleaning: Total  Gardening: Total  Yard Work: Total  Driving/Transportation: Total  Shopping: Total  Ambulatory Assistance: Independent  Hand Dominance: Right  Prior Function Comments: Pt unrealiable historian- son present unable to provide additional information as he does not live with her. Need to confirm-- pt lives with dtr and has caregiver in home to assist as needed throughout day--SUP for ambulation with WW. Family  and caregiver complete all IADLs. Recent fall prior to admission, unwitnessed.    IADL History:  Homemaking Responsibilities: No  Current License: No  Mode of Transportation: Family    ADL:  Eating Assistance: Stand by (anticipated)  Eating Deficit: Setup  Grooming Assistance: Stand by (anticipated)  Grooming Deficit: Setup, Supervision/safety  Bathing Assistance: Minimal (anticipated)  Bathing Deficit: Setup, Supervision/safety, Right lower leg including foot, Left lower leg including foot, Use of adaptive equipment  UE Dressing Assistance: Minimal  UE Dressing Deficit: Fasteners  LE Dressing Assistance: Stand by  LE Dressing Deficit: Supervision/safety, Setup  Toileting Assistance with Device: Stand by (anticipated)  Toileting Deficit: Supervison/safety  Functional Assistance: Stand by  Functional Deficit: Supervision/safety    Activity Tolerance:  Endurance: Endurance does not limit participation in activity    Bed Mobility/Transfers:   Transfers  Transfer: Yes  Transfer 1  Transfer From 1: Sit to  Transfer to 1: Stand  Technique 1: Sit to  stand, Stand to sit  Transfer Device 1: Gait belt  Transfer Level of Assistance 1: Contact guard  Trials/Comments 1: Mult trials from varied surfaces (recliner, toilet) with consistent KATHLEEN with gait belt. Grab bars on toilet utilized.      Functional Mobility:  Functional Mobility  Functional Mobility Performed: Yes  Functional Mobility 1  Surface 1: Level tile  Functional Mobility Support Devices: Gait belt  Assistance 1: Contact guard  Comments 1: from recliner into bathroom to complete toilet transfer- consistent KATHLEEN    Sitting Balance:  Static Sitting Balance  Static Sitting-Balance Support: Feet supported  Static Sitting-Level of Assistance: Distant supervision  Dynamic Sitting Balance  Dynamic Sitting-Balance Support: Feet supported  Dynamic Sitting-Level of Assistance: Close supervision    Standing Balance:  Static Standing Balance  Static Standing-Balance Support: No upper extremity supported  Static Standing-Level of Assistance: Contact guard  Dynamic Standing Balance  Dynamic Standing-Balance Support: No upper extremity supported  Dynamic Standing-Level of Assistance: Contact guard    IADL's:   Homemaking Responsibilities: No  Current License: No  Mode of Transportation: Family    Vision:   Vision - Basic Assessment  Current Vision: Wears glasses all the time    Sensation:  Light Touch: No apparent deficits    Strength:  Strength Comments: BUE grossly 4/5 throughout--    Coordination:  Movements are Fluid and Coordinated: Yes     Hand Function:  Hand Function  Gross Grasp: Functional  Coordination: Functional    Extremities:   RUE   RUE : Within Functional Limits and LUE   LUE: Within Functional Limits    Outcome Measures:   [facility] Daily Activity  Putting on and taking off regular lower body clothing: A little  Bathing (including washing, rinsing, drying): A little  Putting on and taking off regular upper body clothing: A little  Toileting, which includes using toilet, bedpan or urinal: A little  Taking  care of personal grooming such as brushing teeth: A little  Eating Meals: None  Daily Activity - Total Score: 19      Education Documentation  No documentation found.  Education Comments  No comments found.      Goals:   Encounter Problems       Encounter Problems (Active)       ADLs       Patient will perform UB and LB bathing seated with SBA level of assistance and grab bars and shower chair.       Start:  07/07/25    Expected End:  07/16/25            Patient with complete upper body dressing with supervision level of assistance donning and doffing all UE clothes with PRN adaptive equipment while supported sitting       Start:  07/07/25    Expected End:  07/16/25            Patient will complete daily grooming tasks seated with supervision level of assistance and PRN adaptive equipment while supported sitting.       Start:  07/07/25    Expected End:  07/16/25            Patient will complete toileting including hygiene clothing management/hygiene with stand by assist level of assistance and raised toilet seat and grab bars.       Start:  07/07/25    Expected End:  07/16/25               BALANCE       Pt will maintain dynamic standing balance during ADL task with supervision level of assistance in order to demonstrate decreased risk of falling and improved postural control.       Start:  07/07/25    Expected End:  07/16/25               MOBILITY       Patient will perform Functional mobility min Household distances/Community Distances with supervision level of assistance and least restrictive device in order to improve safety and functional mobility.       Start:  07/07/25    Expected End:  07/16/25               TRANSFERS       Patient will perform bed mobility stand by assist level of assistance and bed rails in order to improve safety and independence with mobility       Start:  07/07/25    Expected End:  07/16/25            Patient will complete sit to stand transfer with supervision level of assistance and  least restrictive device in order to improve safety and prepare for out of bed mobility.       Start:  07/07/25    Expected End:  07/16/25

## 2025-07-08 LAB
ALBUMIN SERPL BCP-MCNC: 3.6 G/DL (ref 3.4–5)
ANION GAP SERPL CALC-SCNC: 9 MMOL/L (ref 10–20)
BUN SERPL-MCNC: 27 MG/DL (ref 6–23)
CALCIUM SERPL-MCNC: 9 MG/DL (ref 8.6–10.3)
CHLORIDE SERPL-SCNC: 107 MMOL/L (ref 98–107)
CO2 SERPL-SCNC: 31 MMOL/L (ref 21–32)
CREAT SERPL-MCNC: 0.64 MG/DL (ref 0.5–1.05)
EGFRCR SERPLBLD CKD-EPI 2021: 86 ML/MIN/1.73M*2
ERYTHROCYTE [DISTWIDTH] IN BLOOD BY AUTOMATED COUNT: 12.9 % (ref 11.5–14.5)
GLUCOSE SERPL-MCNC: 131 MG/DL (ref 74–99)
HCT VFR BLD AUTO: 39.6 % (ref 36–46)
HGB BLD-MCNC: 12.8 G/DL (ref 12–16)
MAGNESIUM SERPL-MCNC: 2.19 MG/DL (ref 1.6–2.4)
MCH RBC QN AUTO: 29.6 PG (ref 26–34)
MCHC RBC AUTO-ENTMCNC: 32.3 G/DL (ref 32–36)
MCV RBC AUTO: 92 FL (ref 80–100)
NRBC BLD-RTO: 0 /100 WBCS (ref 0–0)
PHOSPHATE SERPL-MCNC: 2.6 MG/DL (ref 2.5–4.9)
PLATELET # BLD AUTO: 160 X10*3/UL (ref 150–450)
POTASSIUM SERPL-SCNC: 3.3 MMOL/L (ref 3.5–5.3)
RBC # BLD AUTO: 4.33 X10*6/UL (ref 4–5.2)
SODIUM SERPL-SCNC: 144 MMOL/L (ref 136–145)
WBC # BLD AUTO: 9.3 X10*3/UL (ref 4.4–11.3)

## 2025-07-08 PROCEDURE — 2500000001 HC RX 250 WO HCPCS SELF ADMINISTERED DRUGS (ALT 637 FOR MEDICARE OP): Mod: IPSPLIT | Performed by: NURSE PRACTITIONER

## 2025-07-08 PROCEDURE — 85027 COMPLETE CBC AUTOMATED: CPT | Performed by: INTERNAL MEDICINE

## 2025-07-08 PROCEDURE — 83735 ASSAY OF MAGNESIUM: CPT | Performed by: INTERNAL MEDICINE

## 2025-07-08 PROCEDURE — 36415 COLL VENOUS BLD VENIPUNCTURE: CPT | Performed by: INTERNAL MEDICINE

## 2025-07-08 PROCEDURE — 97530 THERAPEUTIC ACTIVITIES: CPT | Mod: GP,CQ

## 2025-07-08 PROCEDURE — 1200000002 HC GENERAL ROOM WITH TELEMETRY DAILY: Mod: IPSPLIT

## 2025-07-08 PROCEDURE — 2500000004 HC RX 250 GENERAL PHARMACY W/ HCPCS (ALT 636 FOR OP/ED): Performed by: NURSE PRACTITIONER

## 2025-07-08 PROCEDURE — 99232 SBSQ HOSP IP/OBS MODERATE 35: CPT | Performed by: INTERNAL MEDICINE

## 2025-07-08 PROCEDURE — 2500000002 HC RX 250 W HCPCS SELF ADMINISTERED DRUGS (ALT 637 FOR MEDICARE OP, ALT 636 FOR OP/ED): Performed by: HOSPITALIST

## 2025-07-08 PROCEDURE — 97535 SELF CARE MNGMENT TRAINING: CPT | Mod: GO

## 2025-07-08 PROCEDURE — 2500000004 HC RX 250 GENERAL PHARMACY W/ HCPCS (ALT 636 FOR OP/ED): Performed by: HOSPITALIST

## 2025-07-08 PROCEDURE — 2500000001 HC RX 250 WO HCPCS SELF ADMINISTERED DRUGS (ALT 637 FOR MEDICARE OP): Mod: IPSPLIT | Performed by: INTERNAL MEDICINE

## 2025-07-08 PROCEDURE — 80069 RENAL FUNCTION PANEL: CPT | Performed by: INTERNAL MEDICINE

## 2025-07-08 PROCEDURE — 2500000001 HC RX 250 WO HCPCS SELF ADMINISTERED DRUGS (ALT 637 FOR MEDICARE OP): Mod: IPSPLIT | Performed by: HOSPITALIST

## 2025-07-08 PROCEDURE — 94760 N-INVAS EAR/PLS OXIMETRY 1: CPT

## 2025-07-08 PROCEDURE — 2500000005 HC RX 250 GENERAL PHARMACY W/O HCPCS: Mod: IPSPLIT | Performed by: NURSE PRACTITIONER

## 2025-07-08 PROCEDURE — 97116 GAIT TRAINING THERAPY: CPT | Mod: GP,CQ

## 2025-07-08 RX ORDER — AMLODIPINE BESYLATE 5 MG/1
5 TABLET ORAL DAILY
Status: DISCONTINUED | OUTPATIENT
Start: 2025-07-08 | End: 2025-07-11 | Stop reason: HOSPADM

## 2025-07-08 RX ADMIN — POLYETHYLENE GLYCOL 3350 17 G: 17 POWDER, FOR SOLUTION ORAL at 08:25

## 2025-07-08 RX ADMIN — AMLODIPINE BESYLATE 5 MG: 5 TABLET ORAL at 16:01

## 2025-07-08 RX ADMIN — TRAZODONE HYDROCHLORIDE 50 MG: 50 TABLET ORAL at 20:27

## 2025-07-08 RX ADMIN — ACETAMINOPHEN 650 MG: 325 TABLET, FILM COATED ORAL at 20:28

## 2025-07-08 RX ADMIN — APIXABAN 2.5 MG: 2.5 TABLET, FILM COATED ORAL at 20:28

## 2025-07-08 RX ADMIN — PREDNISONE 2.5 MG: 5 TABLET ORAL at 08:24

## 2025-07-08 RX ADMIN — Medication 1 TABLET: at 08:24

## 2025-07-08 RX ADMIN — Medication 3 MG: at 20:27

## 2025-07-08 RX ADMIN — POTASSIUM CHLORIDE 20 MEQ: 1500 TABLET, EXTENDED RELEASE ORAL at 08:24

## 2025-07-08 RX ADMIN — ATORVASTATIN CALCIUM 40 MG: 40 TABLET, FILM COATED ORAL at 20:54

## 2025-07-08 RX ADMIN — METOPROLOL TARTRATE 25 MG: 25 TABLET, FILM COATED ORAL at 08:24

## 2025-07-08 RX ADMIN — LEVOTHYROXINE SODIUM 112.5 MCG: 0.07 TABLET ORAL at 06:22

## 2025-07-08 RX ADMIN — METOPROLOL TARTRATE 25 MG: 25 TABLET, FILM COATED ORAL at 20:27

## 2025-07-08 ASSESSMENT — PAIN SCALES - WONG BAKER
WONGBAKER_NUMERICALRESPONSE: HURTS LITTLE BIT
WONGBAKER_NUMERICALRESPONSE: NO HURT

## 2025-07-08 ASSESSMENT — COGNITIVE AND FUNCTIONAL STATUS - GENERAL
MOBILITY SCORE: 19
WALKING IN HOSPITAL ROOM: A LOT
TOILETING: A LOT
DRESSING REGULAR UPPER BODY CLOTHING: A LITTLE
CLIMB 3 TO 5 STEPS WITH RAILING: A LOT
STANDING UP FROM CHAIR USING ARMS: A LITTLE
DRESSING REGULAR LOWER BODY CLOTHING: A LITTLE
MOVING TO AND FROM BED TO CHAIR: A LITTLE
MOVING TO AND FROM BED TO CHAIR: A LITTLE
DAILY ACTIVITIY SCORE: 18
PERSONAL GROOMING: A LITTLE
MOVING FROM LYING ON BACK TO SITTING ON SIDE OF FLAT BED WITH BEDRAILS: A LITTLE
EATING MEALS: A LITTLE
DAILY ACTIVITIY SCORE: 19
MOBILITY SCORE: 16
PERSONAL GROOMING: A LITTLE
DRESSING REGULAR LOWER BODY CLOTHING: A LITTLE
TURNING FROM BACK TO SIDE WHILE IN FLAT BAD: A LITTLE
STANDING UP FROM CHAIR USING ARMS: A LITTLE
TOILETING: A LITTLE
WALKING IN HOSPITAL ROOM: A LITTLE
HELP NEEDED FOR BATHING: A LITTLE
CLIMB 3 TO 5 STEPS WITH RAILING: A LOT
HELP NEEDED FOR BATHING: A LITTLE

## 2025-07-08 ASSESSMENT — PAIN SCALES - GENERAL
PAINLEVEL_OUTOF10: 0 - NO PAIN

## 2025-07-08 ASSESSMENT — ACTIVITIES OF DAILY LIVING (ADL)
BATHING_WHERE_ASSESSED: SITTING SINKSIDE
HOME_MANAGEMENT_TIME_ENTRY: 40
BATHING_LEVEL_OF_ASSISTANCE: SETUP;MAXIMUM VERBAL CUES

## 2025-07-08 ASSESSMENT — PAIN - FUNCTIONAL ASSESSMENT
PAIN_FUNCTIONAL_ASSESSMENT: WONG-BAKER FACES
PAIN_FUNCTIONAL_ASSESSMENT: WONG-BAKER FACES
PAIN_FUNCTIONAL_ASSESSMENT: 0-10

## 2025-07-08 ASSESSMENT — PAIN DESCRIPTION - LOCATION: LOCATION: GENERALIZED

## 2025-07-08 ASSESSMENT — PAIN DESCRIPTION - DESCRIPTORS: DESCRIPTORS: SORE

## 2025-07-08 NOTE — PROGRESS NOTES
Subjective    Patient denies chest pain, shortness of breath, nausea, vomiting or diarrhea.    Objective    Vitals  Visit Vitals  BP (!) 187/83 (BP Location: Right arm, Patient Position: Sitting)   Pulse 59   Temp 36.8 °C (98.2 °F) (Temporal)   Resp 18   Ht (!) 1.524 m (5')   Wt (!) 38.1 kg (83 lb 15.9 oz)   SpO2 93%   BMI 16.40 kg/m²   OB Status Postmenopausal   Smoking Status Never   BSA 1.27 m²       Physical Exam   General: Alert and oriented to self, bday, but not to president or yr. NAD.   HEENT: Sclera clear.  CVS: RRR.   Lungs: CTAB.   Abdomen: Soft. NT. +BS.   Extremities: No pitting edema bilat ankles.  Psychiatric: Cooperative.        IOs    Intake/Output Summary (Last 24 hours) at 7/8/2025 1534  Last data filed at 7/8/2025 1300  Gross per 24 hour   Intake 600 ml   Output --   Net 600 ml       Labs:   Results from last 72 hours   Lab Units 07/08/25  0715 07/07/25  0657 07/06/25  1309   SODIUM mmol/L 144 141 140   POTASSIUM mmol/L 3.3* 3.1* 3.6   CHLORIDE mmol/L 107 103 103   CO2 mmol/L 31 32 32   BUN mg/dL 27* 18 25*   CREATININE mg/dL 0.64 0.57 0.73   GLUCOSE mg/dL 131* 107* 132*   CALCIUM mg/dL 9.0 8.6 9.3   ANION GAP mmol/L 9* 9* 9*   EGFR mL/min/1.73m*2 86 89 80   PHOSPHORUS mg/dL 2.6  --   --       Results from last 72 hours   Lab Units 07/08/25  0715 07/07/25  0657 07/06/25  1309   WBC AUTO x10*3/uL 9.3 9.5 13.0*   HEMOGLOBIN g/dL 12.8 12.5 13.1   HEMATOCRIT % 39.6 39.0 41.1   PLATELETS AUTO x10*3/uL 160 148* 146*   NEUTROS PCT AUTO %  --   --  85.7   LYMPHS PCT AUTO %  --   --  5.2   MONOS PCT AUTO %  --   --  8.0   EOS PCT AUTO %  --   --  0.3      Lab Results   Component Value Date    CALCIUM 9.0 07/08/2025    PHOS 2.6 07/08/2025      Lab Results   Component Value Date    CRP 0.30 05/15/2024      [unfilled]       Images  CT chest abdomen pelvis w IV contrast  Narrative: Interpreted By:  Gerald De La Rosa,   STUDY:  CT CHEST ABDOMEN PELVIS W IV CONTRAST;  7/6/2025 12:27 pm       INDICATION:  Signs/Symptoms:Fall on thinners      COMPARISON:  CT of the chest of 06/05/2025, and abdomen and pelvis of 09/28/2024      ACCESSION NUMBER(S):  [id number]      ORDERING CLINICIAN:  ROSARIO PAL      TECHNIQUE:  CT of the chest, abdomen, and pelvis was performed.  Sequential transaxial images were obtained with orthogonal  reconstructions. 75 ML of Omnipaque 350 was administered  intravenously without immediate complication.      FINDINGS:  CHEST:      LUNG/PLEURA/LARGE AIRWAYS:  Again there is scattered centrilobular nodularity, which appear  relatively stable. There are several larger nodularities, the  dominant peripheral/subpleural measuring 1.1 cm at the right upper  lung anterolaterally. This appears slightly decreased. There is also  resolution of a 1.3 cm left upper lung nodularity, although  locoregional ground-glass opacity persists. There has also been  improvement of branching tubular opacity in the left upper lung  anteriorly. The findings described are probably due to bronchiectasis  with improvement of endobronchial mucous plugging, and infiltrates  probably due to bronchiolitis. Otherwise no consolidative infiltrate.  No pleural effusion.          VESSELS:  The thoracic aorta is of normal course and caliber , but with  moderate atherosclerotic calcification .      Main pulmonary artery and its branches are normal in caliber.      No coronary artery calcifications are seen. The study is not  optimized for evaluation of coronary arteries.      HEART:  The heart is normal in size.  There is no pericardial effusion.      MEDIASTINUM AND KAYLEIGH:  No significant mediastinal or hilar adenopathy.      The thyroid gland as visualized appears unremarkable.      CHEST WALL AND LOWER NECK:  No suspicious osseous lesions are identified.      The thoracic wall soft tissues are within normal limits.      ABDOMEN:      LIVER:  The hepatic parenchyma is homogeneous without evidence of focal  liver  lesions ,except for cortical cyst(s).The hepatic size is normal.      SPLEEN:  The spleen is normal in size and homogeneous.      ADRENAL GLANDS:  Bilateral adrenal glands appear normal.      KIDNEYS AND URETERS:  The renal cortices are unremarkable and the renal sizes within normal  limits.      The ureters throughout their distal course are not well identified  due to overlying crowded bowel loops, but the tracts otherwise are  unremarkable without identified ureteral dilatation or radiodense  calculi.      PANCREAS:  There are multiple pancreatic cysts or cystic/mucinous neoplasms. The  largest is present at the midbody measuring 2.5 cm, and at the neck  measuring 2.7 cm. These appear slightly enlarged since the previous  exam.  Incidental Finding:  There is a cystic lesion noted within the  pancreas measuring larger than 25 mm.  (**-YCF-**)      Instructions:  Follow-up contrast enhanced MRI or pancreas protocol  CT every 2 years for 4 years or surgical/gastroenterology  consult/EUS/FNA. (Jayleen AJ, Nicholas ME, Ryan DE, et al. Management  of Incidental Pancreatic Cysts: A White Paper of the ACR Incidental  Findings Committee. J Am Sofia Radiol. 2017;14(7):911-923.)  PANCREAS.ACR.IF.6 There is marked pancreatic parenchymal atrophy and  with ductal dilatation measuring up to 6 mm.      GALLBLADDER:  The patient is status post cholecystectomy, the gallbladder fossa is  unremarkable.      BILE DUCTS:  There is mild-to-moderate intrahepatic and moderate to marked  extrahepatic biliary dilatation, with common hepatic duct measuring  up to 1.7 cm in diameter. This is fairly similar to the previous  examination, probably post cholecystectomy state.          VESSELS:  Moderate atherosclerotic calcifications are noted predominantly at  the aorta and iliac system.      PERITONEUM AND RETROPERITONEUM:  No ascites or free air, no fluid collection.      The retroperitoneum appears unremarkable, and without  significant  adenopathy.      No enlarged mesenteric lymph nodes.      BOWEL:  Mild diverticulosis greatest at the proximal sigmoid colon. The  rectosigmoid colon is distended measuring 7.5 cm in diameter, with  fecal residue suggesting constipation or early impaction. The bowel  otherwise appears grossly unremarkable without significant dilatation  or mural thickening.      PELVIS:      BLADDER:  The urinary bladder contour is smooth.      REPRODUCTIVE ORGANS:  There is a small calcified fibroid at the fundus. There are pelvic  varices.          BONE, ABDOMINAL WALL AND OTHER FINDINGS:  No suspicious osseous lesions are identified.      The abdominal wall soft tissues appear normal.      Impression: CHEST  1.  Improvement of mucous plugging and probable bronchiolitis.  Longer-term follow-up to document resolution of more dominant nodules  should be considered.  2. Otherwise no acute findings.      ABDOMEN - PELVIS  1.  Diverticulosis, possible constipation and developing impaction.  2. Numerous and large pancreatic cyst or cystic/mucinous neoplasms.  Follow-up as described above. Otherwise fairly marked pancreatic  parenchymal atrophy.  3. Biliary dilatation fairly similar to the prior examination.  4. Small calcified uterine fibroid, and pelvic varices.      MACRO:  1.      Signed by: Gerald De La Rosa 7/6/2025 1:00 PM  Dictation workstation:   [id number]  CT maxillofacial bones wo IV contrast, CT 3D reconstruction  Narrative: Interpreted By:  Gerald De La Rosa,   STUDY:  CT FACIAL BONES WO IV CONTRAST; CT 3D RECONSTRUCTION  7/6/2025 12:18  pm      INDICATION:  Signs/Symptoms:Fall on thinners      COMPARISON:  None.      ACCESSION NUMBER(S):  [id number]; [id number]      ORDERING CLINICIAN:  ROSARIO PAL      TECHNIQUE:  Thin cut axial CT images through the facial bones were obtained  with  orthogonal reconstructions .  3D volumetric reconstructed images were  also generated at an independent workstation and  reviewed.      FINDINGS:  BONES:  There is no CT evidence of acute facial bone fracture.  There is an impacted right upper canine tooth.      SOFT TISSUES:  Intact. The salivary glands appear unremarkable.      LYMPH NODES:  No significant adenopathy.      INTRACRANIAL:  No acute findings as visualized.      SINUSES AND MASTOIDS:  Unremarkable.      VASCULATURE:  Mild atherosclerotic calcification is noted at the carotid  bifurcations. There is also moderate atherosclerotic calcification of  the carotid siphons.      OTHER FINDINGS:  None significant.      Impression: No acute findings.      Signed by: Gerald De La Rosa 7/6/2025 12:39 PM  Dictation workstation:   [id number]  CT cervical spine wo IV contrast  Narrative: Interpreted By:  Gerald De La Rosa,   STUDY:  CT CERVICAL SPINE WO IV CONTRAST;  7/6/2025 12:18 pm      INDICATION:  Fall and injury      COMPARISON:  None.      ACCESSION NUMBER(S):  [id number]      ORDERING CLINICIAN:  ROSARIO PAL      TECHNIQUE:  Transaxial images with orthogonal reconstructions      FINDINGS:  VERTEBRAL ALIGNMENT:  Within normal limits.      CRANIOCERVICAL JUNCTION:  Unremarkable      BONY STRUCTURES:  No fracture or dislocation are evident.      THE CERVICAL LEVELS INCLUDING DISC SPACES, APOPHYSEAL AND  UNCOVERTEBRAL JOINTS:  Mild spondylosis. Primarily there is mild  retrolisthesis at C3-4 level. There is moderate disc space narrowing  at C4-5 level also with a retrolisthesis. There is mild narrowing the  C5-6 disc interspace with mild marginal osteophyte formation. There  is mild multilevel apophyseal hypertrophy..      ADDITIONAL FINDINGS:  There is mild-to-moderate atherosclerotic calcification of the right  carotid bifurcation. There is also atherosclerotic calcification at  the carotid siphons.      Impression: No acute findings.      Signed by: Gerald De La Rosa 7/6/2025 12:32 PM  Dictation workstation:   [id number]  CT head W O contrast trauma protocol  Narrative: Interpreted  By:  Gerald De La Rosa,   STUDY:  CT HEAD W/O CONTRAST TRAUMA PROTOCOL;  7/6/2025 12:18 pm      INDICATION:  Signs/Symptoms:Fall on thinners.      COMPARISON:  03/18/2025      ACCESSION NUMBER(S):  [id number]      ORDERING CLINICIAN:  ROSARIO PAL      TECHNIQUE:  Sequential trans axial images were obtained  .      FINDINGS:  INTRACRANIAL:      There is moderate prominence of the cortical sulci indicating atrophy.      There is moderate ventriculomegaly, again consistent with atrophy.      Moderate decreased attenuation of the periventricular and long tracks  of the white matter most consistent with gliosis from arterial  disease. There is no evidence of definite subacute infarction,  intracranial hemorrhage or mass.          EXTRACRANIAL:  Visualized paranasal sinuses and mastoids are clear.  The calvarium is intact.      Impression: Age related degenerative change as described without acute findings  or significant change from the prior exam.      Signed by: Gerald De La Rosa 7/6/2025 12:25 PM  Dictation workstation:   [id number]      Meds  Scheduled medications  Scheduled Medications[1]  Continuous medications  Continuous Medications[2]  PRN medications  PRN Medications[3]     Assessment and Plan    Maisha Agosto is a 85 y.o. female with past medical history of pAfib (Eliquis), HTN, chronic diastolic heart failure, HLD, COPD, dementia, RA, admission to the hospital in 6/2025 due to hypoxia with CHF, pneumonia, generalized weakness who came to hospital secondary to fall.     Fall  - Appears to be mechanical in nature, although did have some drop in her blood pressure with standing on 7/7.  Daughter states patient does sit at the edge of her bed frequently.  CT scans of the head, C-spine, abdomen, pelvis, chest with no acute fractures.  - Urinalysis was benign for infection.  - CPK was within normal limits at 85.  - PT/OT following and recommend low intensity.    - Monitor.     Transient hypoxia  - Patient initially  required some supplemental O2 but then transitioned back to room air.  CT scan of the chest with improvement of mucous plugging.  Apparently wears O2 at times at night at home per the chart.   - Recommend follow-up with primary care provider as an outpatient as well due to CT scan findings.   - Monitor.     Leukocytosis  - CBCs initially elevated but have resolved.  No obvious signs of infection on CT scan of the chest and benign urinalysis.     Mildly elevated troponin  - Troponin was mildly elevated at 28.  Mild troponin elevation likely due to fall patient denies chest pain and daughter denies recent chest pain complaints by the patient.     Improvement mucous plugging and probable bronchiolitis on CT scan  - Recommend long-term follow-up to document resolution of the nodule should be considered per radiology report.  - Recommend follow-up with PCP and pulmonology as an outpatient.     Pancreatic lesions  - Numerous and large pancreatic cyst or cystic/mucinous neoplasms on CT scan.  - Follow-up contrast enhanced MRI or pancreas protocol per radiology's recommendation.  Will have patient follow-up with primary care provider as an outpatient for further imaging, evaluation management.  I also discussed this result with the patient's daughter, Eli as well.     Chronic paroxysmal afib  - Apixaban currently on hold due to patient's fall initially.  Will consider to restart soon.  - Continue metoprolol and monitor.     Accelerated Hypertension  - BP has been elevated at times.  She did have drop in SBP down to 80s to 90s on standing on 7/7.  But, she has been more hypertensive overall.  - On review of the chart, patient had previously been on amlodipine 5 mg daily, ramipril 10 mg daily that were discontinued during admission in 6/2025 when she had been placed on furosemide for diuresis.  - Will restart amlodipine 5 mg daily and continue home med of metoprolol and monitor.     Chronic diastolic chf   - Patient does  not appear to be volume overloaded.  Monitor.     Hypokalemia  - Replace and monitor.     COPD  - Monitor.     Dementia  - Continue home medications and monitor.     Hypothyroidism  - Daughter states that patient recently had her thyroid medications adjusted and she states that even though her thyroid tests have been off, that her physician wanted her to be on her current dose of thyroid meds.  Monitor and recommend follow-up with her thyroid specialist as an outpatient.     RA  - Continue home med of prednisone 2.5 mg daily.  Daughter states patient no longer sees rheumatologist due to patient's preference.  Monitor.     Dvt prophylaxis  -Apixaban.      Disposition:   - Patient originally from home with daughter through the weekend brother on the weekends.  Plan for discharge home with daughter when medically ready and currently titrating BP medications and monitoring blood pressure.            [1] amLODIPine, 5 mg, oral, Daily  [Held by provider] apixaban, 2.5 mg, oral, BID  atorvastatin, 40 mg, oral, Daily  levothyroxine, 112.5 mcg, oral, Daily  magnesium oxide, 400 mg of magnesium oxide, oral, Daily  metoprolol tartrate, 25 mg, oral, BID  oxygen, , inhalation, Continuous - Inhalation  polyethylene glycol, 17 g, oral, Daily  potassium chloride CR, 20 mEq, oral, Daily  predniSONE, 2.5 mg, oral, Daily  traZODone, 50 mg, oral, Nightly  [2] sodium chloride 0.9%, 10 mL/hr  [3] PRN medications: acetaminophen **OR** acetaminophen **OR** acetaminophen, melatonin, sodium chloride 0.9%

## 2025-07-08 NOTE — CONSULTS
Nutrition Initial Assessment:   Nutrition Assessment    Reason for Assessment: Admission nursing screening (MST=3)    Patient is a 85 y.o. female presenting with Fall, initial encounter.    PMH: impaired functional mobility, balance, gait, and endurance, generalized weakness, fatigue, rheumatoid arthritis, paroxysmal atrial fibrillation, hypothyroidism, benign essential hypertension (HTN), and dyslipidemia.     Nutrition History:  Energy Intake: Poor < 50 %  Pain affecting nutrition status: N/A  Food and Nutrient History: Visted pt at bedside. She was sleeping but awoke to name. She seemed very tried. She states she guesses she has lost weight, but was not sure how much. She knew [birth year] her birthday, but did not know today was July [birth year]. She was excited to eat the cup cake at her bed side. This dietitian is famiular with this pt and her daughter works in the kitchen. Per her daughter, her mom is lactose intoleraent and does not tolerate the ONS. Have given higher caloire recopes to daughter in the past.  Food Intolerance: Milk/lactose       Anthropometrics:  Height: (!) 152.4 cm (5')   Weight: (!) 38.1 kg (83 lb 15.9 oz)   BMI (Calculated): 16.4  IBW/kg (Dietitian Calculated): 53.64 kg (IBW based on BMI of 23)  Percent of IBW: 71 %       Weight History:   Wt Readings from Last 8 Encounters:   07/06/25 (!) 38.1 kg (83 lb 15.9 oz)   06/06/25 (!) 38.3 kg (84 lb 7 oz)   03/26/25 (!) 44.5 kg (98 lb)   03/20/25 47.3 kg (104 lb 4.4 oz)   02/26/25 (!) 42.8 kg (94 lb 5.7 oz)   02/25/25 (!) 39 kg (86 lb)   09/28/24 (!) 39.4 kg (86 lb 13.8 oz)   06/08/24 (!) 40.8 kg (90 lb)     Weight Change %:  Weight History / % Weight Change: 07/06/25 (38.1 kg) to 06/06/25 (38.3 kg), -0.5% loss in ~1 month. 07/06/25 (38.1 kg) to 02/26/25 (42.8 kg), -11.0% loss in ~4.5 months.  07/06/25 (38.1 kg) to 09/28/24 (39.4 kg), -3.3% loss in ~9 months.  Significant Weight Loss: Yes    Nutrition Focused Physical Exam Findings:    Subcutaneous Fat  Loss:   Orbital Fat Pads: Severe (dark circles, hollowing and loose skin)  Buccal Fat Pads: Severe (hollow, sunken and narrow face)  Triceps: Severe (negligible fat tissue)  Muscle Wasting:  Temporalis: Severe (hollowed scooping depression)  Pectoralis (Clavicular Region): Severe (protruding prominent clavicle)  Deltoid/Trapezius: Severe (squared shoulders, acromion process prominent)  Interosseous: Severe (depressed area between thumb and forefinger)  Edema:  Edema: none  Physical Findings:  Hair: Negative  Eyes: Negative  Digestive System Findings: Loose stool  Mouth Findings: Dry mucous membranes    Nutrition Significant Labs:  BMP Trend:   Results from last 7 days   Lab Units 07/08/25  0715 07/07/25  0657 07/06/25  1309   GLUCOSE mg/dL 131* 107* 132*   CALCIUM mg/dL 9.0 8.6 9.3   SODIUM mmol/L 144 141 140   POTASSIUM mmol/L 3.3* 3.1* 3.6   CO2 mmol/L 31 32 32   CHLORIDE mmol/L 107 103 103   BUN mg/dL 27* 18 25*   CREATININE mg/dL 0.64 0.57 0.73     Nutrition Specific Medications:  Scheduled medications  Scheduled Medications[1]  Continuous medications  Continuous Medications[2]    I/O:   Last BM Date: 07/07/25; Stool Appearance: Watery (07/08/25 1000)    Dietary Orders (From admission, onward)       Start     Ordered    07/06/25 2027  Adult diet Regular  Diet effective now        Question:  Diet type  Answer:  Regular    07/06/25 2026 07/06/25 1827  May Participate in Room Service With Assistance  ( ROOM SERVICE MAY PARTICIPATE WITH ASSISTANCE)  Once        Question:  .  Answer:  Yes    07/06/25 1826                Estimated Needs:   Total Energy Estimated Needs in 24 hours (kCal): 1333 kCal  Method for Estimating Needs: ~35 kcal/kg of actual BW  Total Protein Estimated Needs in 24 Hours (g):  (57-76g)  Method for Estimating 24 Hour Protein Needs: g/kg of actual BW  Total Fluid Estimated Needs in 24 Hours (mL): 1400 mL  Method for Estimating 24 Hour Fluid Needs: 1 mL/kcal or per medical team.  Patient on  Order Fluid Restriction: No        Nutrition Diagnosis   Malnutrition Diagnosis  Patient has Malnutrition Diagnosis: Yes  Diagnosis Status: New  Malnutrition Diagnosis: Severe malnutrition related to chronic disease or condition  Related to: inadequate intake associated with chronic disease and advanced ag  As Evidenced by: 11% weight loss over ~4.5 months, BMI of 16.4, and severe loss of subcutaneous fat (orbital, buccal, triceps) and muscle mass (temporalis, clavicle, deltoid, interosseous).            Nutrition Interventions/Recommendations   Nutrition prescription for oral nutrition    Nutrition Recommendations:  Individualized Nutrition Prescription Provided for : Reguar diet, encourage high caloire, high protein options    Nutrition Interventions/Goals:   Meals and Snacks: General healthful diet  Coordination of Care with Providers:  (IDT rounds)  Additional Interventions: No ONS at this time t/t intolerance    Education Documentation  Not appropriate at this time.        Nutrition Monitoring and Evaluation   Intake / Amount of food: Consumes at least 50% or more of meals/snacks/supplements    Body Weight: Body weight - Maintain stable weight, Body weight - Promote weight restoration         Digestive System Finding: Loose stool    Goal Status: New goal(s) identified    Time Spent (min): 75 minutes              [1] amLODIPine, 5 mg, oral, Daily  apixaban, 2.5 mg, oral, BID  atorvastatin, 40 mg, oral, Daily  levothyroxine, 112.5 mcg, oral, Daily  magnesium oxide, 400 mg of magnesium oxide, oral, Daily  metoprolol tartrate, 25 mg, oral, BID  oxygen, , inhalation, Continuous - Inhalation  polyethylene glycol, 17 g, oral, Daily  potassium chloride CR, 20 mEq, oral, Daily  predniSONE, 2.5 mg, oral, Daily  traZODone, 50 mg, oral, Nightly     [2] sodium chloride 0.9%, 10 mL/hr

## 2025-07-08 NOTE — CARE PLAN
Problem: Pain - Adult  Goal: Verbalizes/displays adequate comfort level or baseline comfort level  Outcome: Progressing     Problem: Safety - Adult  Goal: Free from fall injury  Outcome: Met     Problem: Skin  Goal: Participates in plan/prevention/treatment measures  Flowsheets (Taken 7/8/2025 0543)  Participates in plan/prevention/treatment measures: Elevate heels  Goal: Prevent/manage excess moisture  Flowsheets (Taken 7/8/2025 0543)  Prevent/manage excess moisture: Monitor for/manage infection if present  Goal: Prevent/minimize sheer/friction injuries  Flowsheets (Taken 7/8/2025 0543)  Prevent/minimize sheer/friction injuries:   Use pull sheet   Turn/reposition every 2 hours/use positioning/transfer devices  Goal: Promote/optimize nutrition  Flowsheets (Taken 7/8/2025 0543)  Promote/optimize nutrition:   Assist with feeding   Monitor/record intake including meals   The patient's goals for the shift include      The clinical goals for the shift include Pt will have no falls this shift.    Over the shift, the patient did make progress toward the following goals.

## 2025-07-08 NOTE — PROGRESS NOTES
Physical Therapy    Physical Therapy Treatment    Patient Name: Maisha Agosto  MRN: [id number]  Department: [facility]  Room: [address] [place] [address]  Today's Date: 7/8/2025  Time Calculation  Start Time: 0819 (PTA present 819-848 with BP still high, BP administered by nurse and PTA returned later 7856-9757 BP lowered to WFL per RN and cleared for mobility up on feet at this time.)  Stop Time: 1107  Time Calculation (min): 168 min         Assessment/Plan   PT Assessment  PT Assessment Results: Decreased strength, Impaired balance, Decreased mobility, Decreased cognition, Impaired judgement, Decreased endurance  Rehab Prognosis: Good  Barriers to Discharge Home: Caregiver assistance  Caregiver Assistance: Caregiver assistance needed per identified barriers - however, level of patient's required assistance exceeds assistance available at home  Evaluation/Treatment Tolerance: Patient tolerated treatment well  Medical Staff Made Aware: Yes  Strengths: Support of Caregivers  Barriers to Participation: Comorbidities  End of Session Communication: Bedside nurse  Assessment Comment: Pt easily distracted and confused but able to be re directed easily and able and willing to follow one step commands. Pt improved bed mobility this date to SBA and functional transfers CGA to min a depending on level of surfaces. Pt able to improve gait to CGA but required min a around turns and in tight spaces to reduce risk for falls and keep base of suppoty within walker to reduce risk for falls.  Pt cont to require skilled PT to improve functional safety and endurnace and prevent further decline while here in hospital.  Communication with supervising PT about reccomendations being changed from MOD to LOW with 24 hour supervision at home and would benefit from home health care PT.  End of Session Patient Position: Up in chair, Alarm on     PT Plan  Treatment/Interventions: Transfer training, Bed mobility, Gait training, Stair training, Balance training,  Strengthening, Endurance training, Therapeutic exercise, Therapeutic activity, Home exercise program, Neuromuscular re-education  PT Plan: Ongoing PT  PT Frequency: 3 times per week (during this acute inpatient hospitalization.)  PT Discharge Recommendations: Low intensity level of continued care  PT Recommended Transfer Status: Contact guard  PT - OK to Discharge: Yes    PT Visit Info:  PT Received On: 07/08/25  Response to Previous Treatment: Patient with no complaints from previous session.     General Visit Information:   General  Reason for Referral: impaired mobility, fall  Referred By: Johnny Pa DO  Past Medical History Relevant to Rehab: dementia, COPD, DM, HLD, hypothyroidism, RA, TIA  Family/Caregiver Present: No  Prior to Session Communication: Bedside nurse  Patient Position Received: Alarm on, Bed, 2 rail up  Preferred Learning Style: auditory, verbal  General Comment: Pt awake in bed with nurse present and agreeable to therapy and cleared by nursing prior to session. RN requested PTA for vitals and ortho BP to be taken and reported back to her.    Subjective I am okay today. Just tired.   Precautions:  Precautions  Medical Precautions: Fall precautions  Precautions Comment: berta bolaños    Vital Signs Comment: BP taken sitting /79 93%- Standing 184/94 90%- BP taken after walk to bathroom 198/100 87% O2- communication with RN and therapy halted at that moment as meds were given for BP     Objective   Pain:  Pain Assessment  Pain Assessment: 0-10  0-10 (Numeric) Pain Score: 0 - No pain  Cognition:  Cognition  Overall Cognitive Status: Impaired at baseline  Orientation Level: Disoriented to situation, Disoriented to time, Disoriented to place  Coordination:  Movements are Fluid and Coordinated: Yes    Activity Tolerance:  Activity Tolerance  Endurance: Tolerates 30 min exercise with multiple rests  Treatments:     Bed Mobility  Bed Mobility: Yes  Bed Mobility 1  Bed Mobility 1: Supine to  sitting  Level of Assistance 1: Close supervision  Bed Mobility Comments 1: Increased time and cues for log rolling technique with ue reliance on bed rail but SBA    Ambulation/Gait Training  Ambulation/Gait Training Performed: Yes  Ambulation/Gait Training 1  Surface 1: Level tile  Device 1: Rolling walker  Gait Support Devices: Gait belt  Assistance 1: Contact guard, Minimum assistance  Quality of Gait 1: Decreased step length, Inconsistent stride length, Forward flexed posture  Comments/Distance (ft) 1: 8 ft gait EOB to toilet and back to recliner chair with FWW x 2 trials with CGA to Min a needed with increased time for turns and min a for FWW manuerving in tight spaces.  Cues for head up, foot clearing steps and longer stride lengths.  Ambulation/Gait Training 2  Surface 2: Level tile  Device 2: Rolling walker  Gait Support Devices: Gait belt  Assistance 2: Contact guard, Minimum assistance  Quality of Gait 2: Diminished heel strike, Shuffling gait, Forward flexed posture  Comments/Distance (ft) 2: 100-110 ft gait x 1 with min a for FWW control during turns and safety cues for keeping AD closer to the body and b le within base of walker with poor carryover and easy to re direct.  Transfers  Transfer: Yes  Transfer 1  Transfer From 1: Bed to  Transfer to 1: Stand, Toilet  Technique 1: Sit to stand, Stand to sit  Transfer Device 1: Walker, Gait belt  Transfer Level of Assistance 1: Contact guard, Minimum assistance  Trials/Comments 1: cues for ue sequencing, FWW manuevering and controlled descent  Transfers 2  Transfer From 2: Toilet to  Transfer to 2: Stand, Chair with arms  Technique 2: Sit to stand, Stand to sit, Stand pivot  Transfer Device 2: Walker, Gait belt  Transfer Level of Assistance 2: Minimum assistance  Trials/Comments 2: min up from toilet, CGA during clothing mgmt and improved dynamic stand at sink for hand hygiene. CGA.  Transfers 3  Transfer From 3: Chair with arms to  Transfer to 3:  Stand  Technique 3: Sit to stand, Stand to sit  Transfer Device 3: Walker, Gait belt  Transfer Level of Assistance 3: Contact guard  Trials/Comments 3: cues for ue push of and reaching back with good command follow and CGA       Outcome Measures:  [facility] Basic Mobility  Turning from your back to your side while in a flat bed without using bedrails: None  Moving from lying on your back to sitting on the side of a flat bed without using bedrails: None  Moving to and from bed to chair (including a wheelchair): A little  Standing up from a chair using your arms (e.g. wheelchair or bedside chair): A little  To walk in hospital room: A little  Climbing 3-5 steps with railing: A lot  Basic Mobility - Total Score: 19    Education Documentation  Mobility Training, taught by Jennifer Moore PTA at 7/8/2025 11:20 AM.  Learner: Patient  Readiness: Acceptance  Method: Explanation  Response: Needs Reinforcement  Comment: Education for safer gait mechanics and FWW mobility to reduce risk for falls.    Education Comments  No comments found.      Encounter Problems       Encounter Problems (Active)       Balance       STG - Maintains dynamic standing balance with upper extremity support with good- balance  (Progressing)       Start:  07/07/25    Expected End:  07/21/25            Pt. will complete 5 STS <15 seconds without UE support        Start:  07/07/25    Expected End:  07/21/25               Mobility       LTG - Patient will ambulate household distance with SUP and WW (Progressing)       Start:  07/07/25    Expected End:  07/21/25            STG - Patient will ascend and descend four to six stairs with 1 rail and SBA        Start:  07/07/25    Expected End:  07/21/25               PT Transfers       STG - Patient will perform bed mobility with SUP  (Progressing)       Start:  07/07/25    Expected End:  07/21/25            STG - Patient will transfer sit to and from stand with SBA and WW  (Progressing)       Start:   07/07/25    Expected End:  07/21/25

## 2025-07-08 NOTE — PROGRESS NOTES
07/08/25 1214   Discharge Planning   Who is requesting discharge planning? Provider   Home or Post Acute Services In home services   Type of Home Care Services Home OT;Home PT   Expected Discharge Disposition Home H  (Low intensity recommendation from PT/OT, discussed with daughter Eli, would like to have home care. Does not want [facility] because the last time they never came out. [facility] list given, chose [facility].Referral sent.)   Patient Choice   Provider Choice list and CMS website (https://medicare.gov/care-compare#search) for post-acute Quality and Resource Measure Data were provided and reviewed with: Family   Patient / Family choosing to utilize agency / facility established prior to hospitalization No        07/08/25 1323   Discharge Planning   Home or Post Acute Services In home services   Type of Home Care Services Home OT;Home PT   Expected Discharge Disposition Home H  ([facility] unable to accept at this time. Referral sent to [facility] per family request.)      07/08/25 1502   Discharge Planning   Who is requesting discharge planning? Provider   Home or Post Acute Services In home services   Type of Home Care Services Home OT;Home PT   Expected Discharge Disposition Home H  ([facility] able to accept, daughter Eli made aware. Provider stated maybe tomorrow she will be discharged.)

## 2025-07-08 NOTE — PROGRESS NOTES
Occupational Therapy    Occupational Therapy Treatment    Name: Maisha Agosto  MRN: [id number]  Department: [facility]  Room: [address] [place] [address]  Date: 07/08/25  Time Calculation  Start Time: 1005  Stop Time: 1045  Time Calculation (min): 40 min    Assessment:  OT Assessment: Pt is an 84 yo F referred to occupational therapy for impaired self-care and functional mobility 2/2 hospitalization for fall. Pt demonstrates good progress towards goals this date completing transfers w/ CGA and functional mobility w/ CGA. Pt completed UE/LE bathing w/ setup and max verbal cues for task inititation. Pt would benefit from 24 supervision for safety and LOW intensity recommendation at discharge.  Prognosis: Good  Barriers to Discharge Home: Caregiver assistance, Cognition needs  Caregiver Assistance: Other (Comment) (Would benefit from 24 hour supervision)  Cognition Needs: 24hr supervision for safety awareness needed, Insight of patient limited regarding functional ability/needs, Cognition-related high falls risk, Medication and/or medical management daily assist needed  Evaluation/Treatment Tolerance: Patient tolerated treatment well  Medical Staff Made Aware: Yes  End of Session Communication: Bedside nurse  End of Session Patient Position: Up in chair, Alarm on  Plan:  Treatment Interventions: ADL retraining, Functional transfer training, Endurance training, Cognitive reorientation, Patient/family training, Equipment evaluation/education, Compensatory technique education  OT Frequency: 3 times per week  OT Discharge Recommendations: Low intensity level of continued care  OT Recommended Transfer Status: Stand by assist, Assist of 1  OT - OK to Discharge: Yes (Based on completed evaluation and care plan recommendations, no barriers to discharge to next site of care)    Subjective     OT Visit Info:  OT Received On: 07/08/25  General:  General  Reason for Referral: Pt is an 84 yo F referred to occupational therapy for impaired self-care and  functional mobility 2/2 hospitalization for fall  Referred By: Johnny Pa DO  Past Medical History Relevant to Rehab: dementia, COPD, DM, HLD, hypothyroidism, RA, TIA  Family/Caregiver Present: No  Prior to Session Communication: Bedside nurse  Patient Position Received: Up in chair, Alarm on  Preferred Learning Style: auditory, verbal  General Comment: Pt pleasantly confused and agreeable to therapy session. Pt cleared by RN prior to session  Precautions:  Medical Precautions: Fall precautions  Precautions Comment: tele,masimo     Date/Time Vitals Session Patient Position Pulse Resp SpO2 BP MAP (mmHg)    07/08/25 1200 --  --  53  --  96 %  --  --           Vital Signs Comment: Seated BP - 152/66    Pain Assessment:  Pain Assessment  Pain Assessment: 0-10  0-10 (Numeric) Pain Score: 0 - No pain    Objective   Cognition:  Overall Cognitive Status: Impaired at baseline  Arousal/Alertness: Appropriate responses to stimuli  Orientation Level: Disoriented to time, Disoriented to place, Disoriented to situation  Activities of Daily Living:    Grooming  Grooming Level of Assistance: Setup, Close supervision  Grooming Where Assessed: Standing sinkside  Grooming Comments: Pt completed washing face standing sinkside w/ setup and supervision for standing balance    UE Bathing  UE Bathing Level of Assistance: Setup, Close supervision, Maximum verbal cues  UE Bathing Where Assessed: Standing sinkside  UE Bathing Comments: Pt completed UE bathing of arms/chest/stomach standing sinkside w/ supervision for balance. Pt required max verbal cues for task initiation    LE Bathing  LE Bathing Level of Assistance: Setup, Maximum verbal cues  LE Bathing Where Assessed: Sitting sinkside  LE Bathing Comments: Pt completed LE bathing seated in chair at sink w/ max verbal cues for task initiation    UE Dressing  UE Dressing Level of Assistance: Minimum assistance  UE Dressing Where Assessed: Chair  UE Dressing Comments: Pt required min A  for bra fasteners, able to don over shoulders. Pt required min A for doffing/donning sleeves of gown 2/2 lines    LE Dressing  LE Dressing: Yes  Sock Level of Assistance: Setup, Maximum verbal cues  Adult Briefs Level of Assistance: Setup, Maximum verbal cues  LE Dressing Where Assessed: Chair  LE Dressing Comments: Pt required max verbal cues for task initation and doffing/donning brief and socks. Able to doff/dave seated and standing at chair    Bed Mobility/Transfers:    Transfers  Transfer: Yes  Transfer 1  Transfer From 1: Chair with arms to  Transfer to 1: Stand  Technique 1: Sit to stand, Stand to sit  Transfer Device 1: Walker  Transfer Level of Assistance 1: Contact guard  Trials/Comments 1: Cues for UE sequencing and use of walker    Functional Mobility:  Functional Mobility  Functional Mobility Performed: Yes  Functional Mobility 1  Surface 1: Level tile  Device 1: Rolling walker  Assistance 1: Contact guard  Comments 1: Short functional mobility in room to and from sink  Sitting Balance:  Static Sitting Balance  Static Sitting-Balance Support: Feet supported  Static Sitting-Level of Assistance: Distant supervision  Dynamic Sitting Balance  Dynamic Sitting-Balance Support: Feet supported  Dynamic Sitting-Level of Assistance: Distant supervision  Dynamic Sitting-Balance: Forward lean, Reaching for objects  Standing Balance:  Static Standing Balance  Static Standing-Balance Support: Bilateral upper extremity supported  Static Standing-Level of Assistance: Close supervision  Dynamic Standing Balance  Dynamic Standing-Balance Support: Bilateral upper extremity supported  Dynamic Standing-Level of Assistance: Close supervision  Dynamic Standing-Balance: Turning    Strength:  Strength  Strength Comments: ENRICO kramer 4-/5  RUE: Within Functional Limits   LUE: Within Functional Limits    Outcome Measures:  [facility] Daily Activity  Putting on and taking off regular lower body clothing: A little  Bathing (including  washing, rinsing, drying): A little  Putting on and taking off regular upper body clothing: A little  Toileting, which includes using toilet, bedpan or urinal: A little  Taking care of personal grooming such as brushing teeth: A little  Eating Meals: None  Daily Activity - Total Score: 19    Education Documentation  Body Mechanics, taught by Bety Barrientos OT at 7/8/2025 12:19 PM.  Learner: Patient  Readiness: Acceptance  Method: Explanation  Response: Needs Reinforcement  Comment: Pt educated on POC, DC recs, safety and body mechanics during transfers and ADLs, call bell usage    ADL Training, taught by Bety Barrientos OT at 7/8/2025 12:19 PM.  Learner: Patient  Readiness: Acceptance  Method: Explanation  Response: Needs Reinforcement  Comment: Pt educated on POC, DC recs, safety and body mechanics during transfers and ADLs, call bell usage    Goals:  Encounter Problems       Encounter Problems (Active)       ADLs       Patient will perform UB and LB bathing seated with SBA level of assistance and grab bars and shower chair. (Progressing)       Start:  07/07/25    Expected End:  07/16/25            Patient with complete upper body dressing with supervision level of assistance donning and doffing all UE clothes with PRN adaptive equipment while supported sitting (Progressing)       Start:  07/07/25    Expected End:  07/16/25            Patient will complete daily grooming tasks seated with supervision level of assistance and PRN adaptive equipment while supported sitting. (Progressing)       Start:  07/07/25    Expected End:  07/16/25            Patient will complete toileting including hygiene clothing management/hygiene with stand by assist level of assistance and raised toilet seat and grab bars.       Start:  07/07/25    Expected End:  07/16/25               BALANCE       Pt will maintain dynamic standing balance during ADL task with supervision level of assistance in order to demonstrate decreased risk of  falling and improved postural control. (Progressing)       Start:  07/07/25    Expected End:  07/16/25               MOBILITY       Patient will perform Functional mobility min Household distances/Community Distances with supervision level of assistance and least restrictive device in order to improve safety and functional mobility. (Progressing)       Start:  07/07/25    Expected End:  07/16/25               TRANSFERS       Patient will perform bed mobility stand by assist level of assistance and bed rails in order to improve safety and independence with mobility       Start:  07/07/25    Expected End:  07/16/25            Patient will complete sit to stand transfer with supervision level of assistance and least restrictive device in order to improve safety and prepare for out of bed mobility. (Progressing)       Start:  07/07/25    Expected End:  07/16/25

## 2025-07-09 LAB
ALBUMIN SERPL BCP-MCNC: 3.7 G/DL (ref 3.4–5)
ANION GAP SERPL CALC-SCNC: 10 MMOL/L (ref 10–20)
BUN SERPL-MCNC: 22 MG/DL (ref 6–23)
CALCIUM SERPL-MCNC: 9 MG/DL (ref 8.6–10.3)
CHLORIDE SERPL-SCNC: 105 MMOL/L (ref 98–107)
CO2 SERPL-SCNC: 29 MMOL/L (ref 21–32)
CREAT SERPL-MCNC: 0.64 MG/DL (ref 0.5–1.05)
EGFRCR SERPLBLD CKD-EPI 2021: 86 ML/MIN/1.73M*2
ERYTHROCYTE [DISTWIDTH] IN BLOOD BY AUTOMATED COUNT: 12.9 % (ref 11.5–14.5)
GLUCOSE SERPL-MCNC: 128 MG/DL (ref 74–99)
HCT VFR BLD AUTO: 40.2 % (ref 36–46)
HGB BLD-MCNC: 12.9 G/DL (ref 12–16)
MAGNESIUM SERPL-MCNC: 2.13 MG/DL (ref 1.6–2.4)
MCH RBC QN AUTO: 30.4 PG (ref 26–34)
MCHC RBC AUTO-ENTMCNC: 32.1 G/DL (ref 32–36)
MCV RBC AUTO: 95 FL (ref 80–100)
NRBC BLD-RTO: NORMAL /100{WBCS}
PHOSPHATE SERPL-MCNC: 2.7 MG/DL (ref 2.5–4.9)
PLATELET # BLD AUTO: 156 X10*3/UL (ref 150–450)
POTASSIUM SERPL-SCNC: 3.4 MMOL/L (ref 3.5–5.3)
RBC # BLD AUTO: 4.25 X10*6/UL (ref 4–5.2)
SODIUM SERPL-SCNC: 141 MMOL/L (ref 136–145)
WBC # BLD AUTO: 9.7 X10*3/UL (ref 4.4–11.3)

## 2025-07-09 PROCEDURE — 83735 ASSAY OF MAGNESIUM: CPT | Mod: IPSPLIT | Performed by: INTERNAL MEDICINE

## 2025-07-09 PROCEDURE — 1200000002 HC GENERAL ROOM WITH TELEMETRY DAILY: Mod: IPSPLIT

## 2025-07-09 PROCEDURE — 82565 ASSAY OF CREATININE: CPT | Mod: IPSPLIT | Performed by: INTERNAL MEDICINE

## 2025-07-09 PROCEDURE — 2500000002 HC RX 250 W HCPCS SELF ADMINISTERED DRUGS (ALT 637 FOR MEDICARE OP, ALT 636 FOR OP/ED): Mod: IPSPLIT | Performed by: HOSPITALIST

## 2025-07-09 PROCEDURE — 85027 COMPLETE CBC AUTOMATED: CPT | Mod: IPSPLIT | Performed by: INTERNAL MEDICINE

## 2025-07-09 PROCEDURE — 36415 COLL VENOUS BLD VENIPUNCTURE: CPT | Mod: IPSPLIT | Performed by: INTERNAL MEDICINE

## 2025-07-09 PROCEDURE — 2500000005 HC RX 250 GENERAL PHARMACY W/O HCPCS: Mod: IPSPLIT | Performed by: EMERGENCY MEDICINE

## 2025-07-09 PROCEDURE — 99232 SBSQ HOSP IP/OBS MODERATE 35: CPT | Performed by: INTERNAL MEDICINE

## 2025-07-09 PROCEDURE — 2500000002 HC RX 250 W HCPCS SELF ADMINISTERED DRUGS (ALT 637 FOR MEDICARE OP, ALT 636 FOR OP/ED): Mod: IPSPLIT | Performed by: NURSE PRACTITIONER

## 2025-07-09 PROCEDURE — 2500000001 HC RX 250 WO HCPCS SELF ADMINISTERED DRUGS (ALT 637 FOR MEDICARE OP): Mod: IPSPLIT | Performed by: INTERNAL MEDICINE

## 2025-07-09 PROCEDURE — 2500000001 HC RX 250 WO HCPCS SELF ADMINISTERED DRUGS (ALT 637 FOR MEDICARE OP): Mod: IPSPLIT | Performed by: HOSPITALIST

## 2025-07-09 PROCEDURE — 2500000004 HC RX 250 GENERAL PHARMACY W/ HCPCS (ALT 636 FOR OP/ED): Mod: IPSPLIT | Performed by: HOSPITALIST

## 2025-07-09 RX ORDER — METOPROLOL TARTRATE 25 MG/1
25 TABLET, FILM COATED ORAL NIGHTLY
Status: DISCONTINUED | OUTPATIENT
Start: 2025-07-10 | End: 2025-07-11 | Stop reason: HOSPADM

## 2025-07-09 RX ORDER — POTASSIUM CHLORIDE 20 MEQ/1
20 TABLET, EXTENDED RELEASE ORAL ONCE
Status: COMPLETED | OUTPATIENT
Start: 2025-07-09 | End: 2025-07-09

## 2025-07-09 RX ADMIN — Medication 2 L/MIN: at 22:26

## 2025-07-09 RX ADMIN — POTASSIUM CHLORIDE 20 MEQ: 1500 TABLET, EXTENDED RELEASE ORAL at 12:36

## 2025-07-09 RX ADMIN — LEVOTHYROXINE SODIUM 112.5 MCG: 0.07 TABLET ORAL at 06:16

## 2025-07-09 RX ADMIN — APIXABAN 2.5 MG: 2.5 TABLET, FILM COATED ORAL at 09:06

## 2025-07-09 RX ADMIN — POTASSIUM CHLORIDE 20 MEQ: 1500 TABLET, EXTENDED RELEASE ORAL at 09:06

## 2025-07-09 RX ADMIN — METOPROLOL TARTRATE 25 MG: 25 TABLET, FILM COATED ORAL at 09:06

## 2025-07-09 RX ADMIN — Medication 1 TABLET: at 09:06

## 2025-07-09 RX ADMIN — TRAZODONE HYDROCHLORIDE 50 MG: 50 TABLET ORAL at 20:05

## 2025-07-09 RX ADMIN — PREDNISONE 2.5 MG: 5 TABLET ORAL at 09:06

## 2025-07-09 RX ADMIN — ATORVASTATIN CALCIUM 40 MG: 40 TABLET, FILM COATED ORAL at 20:05

## 2025-07-09 RX ADMIN — APIXABAN 2.5 MG: 2.5 TABLET, FILM COATED ORAL at 20:05

## 2025-07-09 ASSESSMENT — ENCOUNTER SYMPTOMS
CONFUSION: 1
COUGH: 0
ABDOMINAL DISTENTION: 0
PALPITATIONS: 0
ABDOMINAL PAIN: 0
CHILLS: 0
DIZZINESS: 0
WEAKNESS: 1
FEVER: 0
SHORTNESS OF BREATH: 0

## 2025-07-09 ASSESSMENT — COGNITIVE AND FUNCTIONAL STATUS - GENERAL
CLIMB 3 TO 5 STEPS WITH RAILING: A LITTLE
MOBILITY SCORE: 18
HELP NEEDED FOR BATHING: A LITTLE
MOVING TO AND FROM BED TO CHAIR: A LITTLE
DRESSING REGULAR LOWER BODY CLOTHING: A LITTLE
STANDING UP FROM CHAIR USING ARMS: A LITTLE
WALKING IN HOSPITAL ROOM: A LITTLE
TOILETING: A LITTLE
DAILY ACTIVITIY SCORE: 21
TURNING FROM BACK TO SIDE WHILE IN FLAT BAD: A LITTLE
MOVING FROM LYING ON BACK TO SITTING ON SIDE OF FLAT BED WITH BEDRAILS: A LITTLE

## 2025-07-09 ASSESSMENT — PAIN SCALES - GENERAL
PAINLEVEL_OUTOF10: 0 - NO PAIN

## 2025-07-09 ASSESSMENT — PAIN - FUNCTIONAL ASSESSMENT
PAIN_FUNCTIONAL_ASSESSMENT: 0-10

## 2025-07-09 NOTE — PROGRESS NOTES
Subjective    Patient denies chest pain, shortness breath, nausea, vomiting or diarrhea.  Patient's daughter is also at the bedside    Objective    Vitals  Visit Vitals  /59 (BP Location: Right arm, Patient Position: Sitting)   Pulse 54   Temp 36.7 °C (98.1 °F) (Temporal)   Resp 17   Ht (!) 1.524 m (5')   Wt (!) 38.1 kg (83 lb 15.9 oz)   SpO2 92%   BMI 16.40 kg/m²   OB Status Postmenopausal   Smoking Status Never   BSA 1.27 m²       Physical Exam   General: Alert and oriented to self, bday, but not to year or president. No acute distress.    HEENT: Sclera clear.  CVS: RRR.   Lungs: CTAB.   Abdomen: Soft.  Nontender.  Bowel sounds present.    Extremities: No pitting edema bilateral ankles.  Psychiatric: Cooperative.     IOs    Intake/Output Summary (Last 24 hours) at 7/9/2025 1607  Last data filed at 7/9/2025 1322  Gross per 24 hour   Intake 716 ml   Output --   Net 716 ml       Labs:   Results from last 72 hours   Lab Units 07/09/25  0726 07/08/25  0715 07/07/25  0657   SODIUM mmol/L 141 144 141   POTASSIUM mmol/L 3.4* 3.3* 3.1*   CHLORIDE mmol/L 105 107 103   CO2 mmol/L 29 31 32   BUN mg/dL 22 27* 18   CREATININE mg/dL 0.64 0.64 0.57   GLUCOSE mg/dL 128* 131* 107*   CALCIUM mg/dL 9.0 9.0 8.6   ANION GAP mmol/L 10 9* 9*   EGFR mL/min/1.73m*2 86 86 89   PHOSPHORUS mg/dL 2.7 2.6  --       Results from last 72 hours   Lab Units 07/09/25  0726 07/08/25  0715 07/07/25  0657   WBC AUTO x10*3/uL 9.7 9.3 9.5   HEMOGLOBIN g/dL 12.9 12.8 12.5   HEMATOCRIT % 40.2 39.6 39.0   PLATELETS AUTO x10*3/uL 156 160 148*      Lab Results   Component Value Date    CALCIUM 9.0 07/09/2025    PHOS 2.7 07/09/2025      Lab Results   Component Value Date    CRP 0.30 05/15/2024      [unfilled]       Images  CT chest abdomen pelvis w IV contrast  Narrative: Interpreted By:  Gerald De La Rosa,   STUDY:  CT CHEST ABDOMEN PELVIS W IV CONTRAST;  7/6/2025 12:27 pm      INDICATION:  Signs/Symptoms:Fall on thinners      COMPARISON:  CT of the  chest of 06/05/2025, and abdomen and pelvis of 09/28/2024      ACCESSION NUMBER(S):  [id number]      ORDERING CLINICIAN:  ROSARIO PAL      TECHNIQUE:  CT of the chest, abdomen, and pelvis was performed.  Sequential transaxial images were obtained with orthogonal  reconstructions. 75 ML of Omnipaque 350 was administered  intravenously without immediate complication.      FINDINGS:  CHEST:      LUNG/PLEURA/LARGE AIRWAYS:  Again there is scattered centrilobular nodularity, which appear  relatively stable. There are several larger nodularities, the  dominant peripheral/subpleural measuring 1.1 cm at the right upper  lung anterolaterally. This appears slightly decreased. There is also  resolution of a 1.3 cm left upper lung nodularity, although  locoregional ground-glass opacity persists. There has also been  improvement of branching tubular opacity in the left upper lung  anteriorly. The findings described are probably due to bronchiectasis  with improvement of endobronchial mucous plugging, and infiltrates  probably due to bronchiolitis. Otherwise no consolidative infiltrate.  No pleural effusion.          VESSELS:  The thoracic aorta is of normal course and caliber , but with  moderate atherosclerotic calcification .      Main pulmonary artery and its branches are normal in caliber.      No coronary artery calcifications are seen. The study is not  optimized for evaluation of coronary arteries.      HEART:  The heart is normal in size.  There is no pericardial effusion.      MEDIASTINUM AND KAYLEIGH:  No significant mediastinal or hilar adenopathy.      The thyroid gland as visualized appears unremarkable.      CHEST WALL AND LOWER NECK:  No suspicious osseous lesions are identified.      The thoracic wall soft tissues are within normal limits.      ABDOMEN:      LIVER:  The hepatic parenchyma is homogeneous without evidence of focal liver  lesions ,except for cortical cyst(s).The hepatic size is normal.       SPLEEN:  The spleen is normal in size and homogeneous.      ADRENAL GLANDS:  Bilateral adrenal glands appear normal.      KIDNEYS AND URETERS:  The renal cortices are unremarkable and the renal sizes within normal  limits.      The ureters throughout their distal course are not well identified  due to overlying crowded bowel loops, but the tracts otherwise are  unremarkable without identified ureteral dilatation or radiodense  calculi.      PANCREAS:  There are multiple pancreatic cysts or cystic/mucinous neoplasms. The  largest is present at the midbody measuring 2.5 cm, and at the neck  measuring 2.7 cm. These appear slightly enlarged since the previous  exam.  Incidental Finding:  There is a cystic lesion noted within the  pancreas measuring larger than 25 mm.  (**-YCF-**)      Instructions:  Follow-up contrast enhanced MRI or pancreas protocol  CT every 2 years for 4 years or surgical/gastroenterology  consult/EUS/FNA. (Jayleen AJ, Nicholas EM, Ryan DE, et al. Management  of Incidental Pancreatic Cysts: A White Paper of the ACR Incidental  Findings Committee. J Am Sofia Radiol. 2017;14(7):911-923.)  PANCREAS.ACR.IF.6 There is marked pancreatic parenchymal atrophy and  with ductal dilatation measuring up to 6 mm.      GALLBLADDER:  The patient is status post cholecystectomy, the gallbladder fossa is  unremarkable.      BILE DUCTS:  There is mild-to-moderate intrahepatic and moderate to marked  extrahepatic biliary dilatation, with common hepatic duct measuring  up to 1.7 cm in diameter. This is fairly similar to the previous  examination, probably post cholecystectomy state.          VESSELS:  Moderate atherosclerotic calcifications are noted predominantly at  the aorta and iliac system.      PERITONEUM AND RETROPERITONEUM:  No ascites or free air, no fluid collection.      The retroperitoneum appears unremarkable, and without significant  adenopathy.      No enlarged mesenteric lymph nodes.      BOWEL:  Mild  diverticulosis greatest at the proximal sigmoid colon. The  rectosigmoid colon is distended measuring 7.5 cm in diameter, with  fecal residue suggesting constipation or early impaction. The bowel  otherwise appears grossly unremarkable without significant dilatation  or mural thickening.      PELVIS:      BLADDER:  The urinary bladder contour is smooth.      REPRODUCTIVE ORGANS:  There is a small calcified fibroid at the fundus. There are pelvic  varices.          BONE, ABDOMINAL WALL AND OTHER FINDINGS:  No suspicious osseous lesions are identified.      The abdominal wall soft tissues appear normal.      Impression: CHEST  1.  Improvement of mucous plugging and probable bronchiolitis.  Longer-term follow-up to document resolution of more dominant nodules  should be considered.  2. Otherwise no acute findings.      ABDOMEN - PELVIS  1.  Diverticulosis, possible constipation and developing impaction.  2. Numerous and large pancreatic cyst or cystic/mucinous neoplasms.  Follow-up as described above. Otherwise fairly marked pancreatic  parenchymal atrophy.  3. Biliary dilatation fairly similar to the prior examination.  4. Small calcified uterine fibroid, and pelvic varices.      MACRO:  1.      Signed by: Gerald De La Rosa 7/6/2025 1:00 PM  Dictation workstation:   [id number]  CT maxillofacial bones wo IV contrast, CT 3D reconstruction  Narrative: Interpreted By:  Gerald De La Rosa,   STUDY:  CT FACIAL BONES WO IV CONTRAST; CT 3D RECONSTRUCTION  7/6/2025 12:18  pm      INDICATION:  Signs/Symptoms:Fall on thinners      COMPARISON:  None.      ACCESSION NUMBER(S):  [id number]; [id number]      ORDERING CLINICIAN:  ROSARIO PAL      TECHNIQUE:  Thin cut axial CT images through the facial bones were obtained  with  orthogonal reconstructions .  3D volumetric reconstructed images were  also generated at an independent workstation and reviewed.      FINDINGS:  BONES:  There is no CT evidence of acute facial bone  fracture.  There is an impacted right upper canine tooth.      SOFT TISSUES:  Intact. The salivary glands appear unremarkable.      LYMPH NODES:  No significant adenopathy.      INTRACRANIAL:  No acute findings as visualized.      SINUSES AND MASTOIDS:  Unremarkable.      VASCULATURE:  Mild atherosclerotic calcification is noted at the carotid  bifurcations. There is also moderate atherosclerotic calcification of  the carotid siphons.      OTHER FINDINGS:  None significant.      Impression: No acute findings.      Signed by: Gerald De La Rosa 7/6/2025 12:39 PM  Dictation workstation:   [id number]  CT cervical spine wo IV contrast  Narrative: Interpreted By:  Gerald De La Rosa,   STUDY:  CT CERVICAL SPINE WO IV CONTRAST;  7/6/2025 12:18 pm      INDICATION:  Fall and injury      COMPARISON:  None.      ACCESSION NUMBER(S):  [id number]      ORDERING CLINICIAN:  ROSARIO PAL      TECHNIQUE:  Transaxial images with orthogonal reconstructions      FINDINGS:  VERTEBRAL ALIGNMENT:  Within normal limits.      CRANIOCERVICAL JUNCTION:  Unremarkable      BONY STRUCTURES:  No fracture or dislocation are evident.      THE CERVICAL LEVELS INCLUDING DISC SPACES, APOPHYSEAL AND  UNCOVERTEBRAL JOINTS:  Mild spondylosis. Primarily there is mild  retrolisthesis at C3-4 level. There is moderate disc space narrowing  at C4-5 level also with a retrolisthesis. There is mild narrowing the  C5-6 disc interspace with mild marginal osteophyte formation. There  is mild multilevel apophyseal hypertrophy..      ADDITIONAL FINDINGS:  There is mild-to-moderate atherosclerotic calcification of the right  carotid bifurcation. There is also atherosclerotic calcification at  the carotid siphons.      Impression: No acute findings.      Signed by: Gerald De La Rosa 7/6/2025 12:32 PM  Dictation workstation:   [id number]  CT head W O contrast trauma protocol  Narrative: Interpreted By:  Gerald De La Rosa,   STUDY:  CT HEAD W/O CONTRAST TRAUMA PROTOCOL;  7/6/2025  12:18 pm      INDICATION:  Signs/Symptoms:Fall on thinners.      COMPARISON:  03/18/2025      ACCESSION NUMBER(S):  [id number]      ORDERING CLINICIAN:  ROSARIO PAL      TECHNIQUE:  Sequential trans axial images were obtained  .      FINDINGS:  INTRACRANIAL:      There is moderate prominence of the cortical sulci indicating atrophy.      There is moderate ventriculomegaly, again consistent with atrophy.      Moderate decreased attenuation of the periventricular and long tracks  of the white matter most consistent with gliosis from arterial  disease. There is no evidence of definite subacute infarction,  intracranial hemorrhage or mass.          EXTRACRANIAL:  Visualized paranasal sinuses and mastoids are clear.  The calvarium is intact.      Impression: Age related degenerative change as described without acute findings  or significant change from the prior exam.      Signed by: Gerald De La Rosa 7/6/2025 12:25 PM  Dictation workstation:   [id number]      Meds  Scheduled medications  Scheduled Medications[1]  Continuous medications  Continuous Medications[2]  PRN medications  PRN Medications[3]     Assessment and Plan    Maisha Agosto is a 85 y.o. female with past medical history of pAfib (Eliquis), HTN, chronic diastolic heart failure, HLD, COPD, dementia, RA, admission to the hospital in 6/2025 due to hypoxia with CHF, pneumonia, generalized weakness who came to hospital secondary to fall.      Fall  - Appears to be mechanical in nature, although did have some drop in her blood pressure with standing on 7/7.  Daughter states patient does sit at the edge of her bed frequently.  CT scans of the head, C-spine, abdomen, pelvis, chest with no acute fractures.  - Urinalysis was benign for infection.  - CPK was within normal limits at 85.  - PT/OT following and recommend low intensity.    - Monitor.    Chronic paroxysmal afib with bradycardia.  - Continue metoprolol tartrate and change back to home dose of 25mg at  bedtime, from bid, and continue apixaban.   - Cardiology consulted and recommended to change metoprolol tartrate back to home dose of nightly as stated above, and recommends outpatient ambulatory monitor to assess heart rate trend.  - Monitor on telemetry     Accelerated Hypertension  - BP has been elevated at times.  She did have drop in SBP down to 80s to 90s on standing on 7/7.    - On review of the chart, patient had previously been on amlodipine 5 mg daily, ramipril 10 mg daily that were discontinued during admission in 6/2025 when she had been placed on furosemide for diuresis.  - Started amlodipine 5 mg daily in the afternoon on 7/8 and continued metoprolol twice daily, blood BP did drop overnight.  - Consulted cardiology, who recommended to change patient's metoprolol to tartrate back to regular home dose of 25 mg nightly, instead of twice daily, and to continue amlodipine 5 mg daily; check orthostatic BP and pulse and to use blood pressures in the left arm only.  - Monitor.     Transient hypoxia  - Patient initially required some supplemental O2 but then transitioned back to room air.  CT scan of the chest with improvement of mucous plugging.  Apparently wears O2 at times at night at home per the chart.   - Recommend follow-up with primary care provider as an outpatient as well due to CT scan findings.   - Monitor.     Leukocytosis  - CBCs initially elevated but has resolved.  No obvious signs of infection on CT scan of the chest and benign urinalysis.     Mildly elevated troponin  - Troponin was mildly elevated at 28.  Mild troponin elevation likely due to fall patient denies chest pain and daughter denies recent chest pain complaints by the patient.     Improvement mucous plugging and probable bronchiolitis on CT scan  - Recommend long-term follow-up to document resolution of the nodule should be considered per radiology report.  - Recommend follow-up with PCP and pulmonology as an outpatient.      Pancreatic lesions  - Numerous and large pancreatic cyst or cystic/mucinous neoplasms on CT scan.  - Follow-up contrast enhanced MRI or pancreas protocol per radiology's recommendation.  Will have patient follow-up with primary care provider as an outpatient for further imaging, evaluation management.  I also discussed this result with the patient's daughter, Eli as well.      Chronic diastolic chf   - Patient does not appear to be volume overloaded.  Monitor.     Hypokalemia  - Replace and monitor.     COPD  - Monitor.     Dementia  - Continue home medications and monitor.     Hypothyroidism  - Daughter states that patient recently had her thyroid medications adjusted and she states that even though her thyroid tests have been off, that her physician wanted her to be on her current dose of thyroid meds.  Monitor and recommend follow-up with her thyroid specialist as an outpatient.     RA  - Continue home med of prednisone 2.5 mg daily.  Daughter states patient no longer sees rheumatologist due to patient's preference.  Monitor.     Dvt prophylaxis  -Apixaban.      Disposition:   - Patient originally from home with daughter through the weekend brother on the weekends.  Plan for discharge home with daughter when medically ready and currently titrating BP medications and monitoring blood pressure and heart rate as stated above.                        [1] amLODIPine, 5 mg, oral, Daily  apixaban, 2.5 mg, oral, BID  atorvastatin, 40 mg, oral, Daily  levothyroxine, 112.5 mcg, oral, Daily  magnesium oxide, 400 mg of magnesium oxide, oral, Daily  [START ON 7/10/2025] metoprolol tartrate, 25 mg, oral, Nightly  oxygen, , inhalation, Continuous - Inhalation  polyethylene glycol, 17 g, oral, Daily  potassium chloride CR, 20 mEq, oral, Daily  predniSONE, 2.5 mg, oral, Daily  traZODone, 50 mg, oral, Nightly  [2] sodium chloride 0.9%, 10 mL/hr  [3] PRN medications: acetaminophen **OR** acetaminophen **OR** acetaminophen,  melatonin, sodium chloride 0.9%

## 2025-07-09 NOTE — CARE PLAN
Problem: Skin  Goal: Decreased wound size/increased tissue granulation at next dressing change  Flowsheets (Taken 7/9/2025 7970)  Decreased wound size/increased tissue granulation at next dressing change: Promote sleep for wound healing   The patient's goals for the shift include      The clinical goals for the shift include Pt will have no falls this shift.    Over the shift, the patient did make progress toward the following goals.

## 2025-07-09 NOTE — CARE PLAN
Problem: Pain - Adult  Goal: Verbalizes/displays adequate comfort level or baseline comfort level  Outcome: Met     Problem: Skin  Goal: Participates in plan/prevention/treatment measures  Outcome: Met  Goal: Prevent/manage excess moisture  Outcome: Met  Goal: Prevent/minimize sheer/friction injuries  Outcome: Met   The patient's goals for the shift include      The clinical goals for the shift include Pt will have no falls this shift.    Over the shift, the patient did make progress toward the following goals.

## 2025-07-09 NOTE — CONSULTS
Inpatient consult to Cardiology  Consult performed by: Sosa Gongora, ELIO-CNP  Consult ordered by: Johnny Pa DO  Reason for consult: bradycardia, HTN          History Of Present Illness  Maisha Agosto is a 85 y.o. female presenting with a fall. She does not recall why she is in the hospital. She lives at home with her daughter but was staying with her son. According to the medical records, her son went to check on her and was found on the floor. Last she was seen was the night before. She denies any complaints.     Lab work in the ER showed glucose 132, sodium 140, potassium 3.6, BUN/Cr 25/0.73, troponin 28, TSH 0.05, INR 1.3, WBC 13, H&H 13.1/41.1, UA negative, CT head negative, ST chest/abd/pelvis showed improvement of the mucous plugging.    EKG showed SR, no acute ischemic changes.      Past Medical History  Medical History[1]    Surgical History  Surgical History[2]     Social History  She reports that she has never smoked. She has never used smokeless tobacco. She reports that she does not drink alcohol and does not use drugs.    Family History  Family History[3]     Allergies  Acthar [corticotropin], Chloroquine phosphate, Ciprofloxacin, Humira [adalimumab], Levaquin [levofloxacin], Methotrexate, Sulfamethoxazole-trimethoprim, Xeljanz [tofacitinib], Amoxicillin-pot clavulanate, and Enbrel [etanercept]    Review of Systems  Review of Systems   Constitutional:  Negative for chills and fever.   Respiratory:  Negative for cough and shortness of breath.    Cardiovascular:  Negative for chest pain, palpitations and leg swelling.   Gastrointestinal:  Negative for abdominal distention and abdominal pain.   Neurological:  Positive for weakness. Negative for dizziness.   Psychiatric/Behavioral:  Positive for confusion.    All other systems reviewed and are negative.         Physical Exam  Constitutional: Well developed, awake/alert/oriented to self, no distress, alert and cooperative  Eyes: PERRL, EOMI, clear  sclera  ENMT: mucous membranes moist, no apparent injury, no lesions seen  Head/Neck: Neck supple, no apparent injury, thyroid without mass or tenderness, No JVD, trachea midline, no bruits  Respiratory/Thorax: Patent airways, CTAB, normal breath sounds with good chest expansion, thorax symmetric  Cardiovascular: Regular, rate and rhythm, no murmurs, 2+ equal pulses of the extremities, normal S 1and S 2  Gastrointestinal: Nondistended, soft, non-tender, no rebound tenderness or guarding, no masses palpable, no organomegaly, +BS, no bruits  Musculoskeletal: ROM intact, no joint swelling, normal strength  Extremities: normal extremities, no cyanosis edema, contusions or wounds, no clubbing  Neurological: alert and oriented to self  Lymphatic: No significant lymphadenopathy  Psychological: Appropriate mood and behavior  Skin: Warm and dry, no lesions, no rashes       Last Recorded Vitals  Blood pressure 175/75, pulse 50, temperature 36.4 °C (97.5 °F), temperature source Temporal, resp. rate 18, height (!) 1.524 m (5'), weight (!) 38.1 kg (83 lb 15.9 oz), SpO2 93%.    Medications  Scheduled medications  Scheduled Medications[4]  Continuous medications  Continuous Medications[5]  PRN medications  PRN Medications[6]    Relevant Results  Results for orders placed or performed during the hospital encounter of 07/06/25 (from the past 24 hours)   CBC   Result Value Ref Range    WBC 9.7 4.4 - 11.3 x10*3/uL    nRBC      RBC 4.25 4.00 - 5.20 x10*6/uL    Hemoglobin 12.9 12.0 - 16.0 g/dL    Hematocrit 40.2 36.0 - 46.0 %    MCV 95 80 - 100 fL    MCH 30.4 26.0 - 34.0 pg    MCHC 32.1 32.0 - 36.0 g/dL    RDW 12.9 11.5 - 14.5 %    Platelets 156 150 - 450 x10*3/uL   Renal Function Panel   Result Value Ref Range    Glucose 128 (H) 74 - 99 mg/dL    Sodium 141 136 - 145 mmol/L    Potassium 3.4 (L) 3.5 - 5.3 mmol/L    Chloride 105 98 - 107 mmol/L    Bicarbonate 29 21 - 32 mmol/L    Anion Gap 10 10 - 20 mmol/L    Urea Nitrogen 22 6 - 23  mg/dL    Creatinine 0.64 0.50 - 1.05 mg/dL    eGFR 86 >60 mL/min/1.73m*2    Calcium 9.0 8.6 - 10.3 mg/dL    Phosphorus 2.7 2.5 - 4.9 mg/dL    Albumin 3.7 3.4 - 5.0 g/dL   Magnesium   Result Value Ref Range    Magnesium 2.13 1.60 - 2.40 mg/dL       CT chest abdomen pelvis w IV contrast   Final Result   CHEST   1.  Improvement of mucous plugging and probable bronchiolitis.   Longer-term follow-up to document resolution of more dominant nodules   should be considered.   2. Otherwise no acute findings.        ABDOMEN - PELVIS   1.  Diverticulosis, possible constipation and developing impaction.   2. Numerous and large pancreatic cyst or cystic/mucinous neoplasms.   Follow-up as described above. Otherwise fairly marked pancreatic   parenchymal atrophy.   3. Biliary dilatation fairly similar to the prior examination.   4. Small calcified uterine fibroid, and pelvic varices.        MACRO:   1.        Signed by: Gerald De La Rosa 7/6/2025 1:00 PM   Dictation workstation:   [id number]      CT head W O contrast trauma protocol   Final Result   Age related degenerative change as described without acute findings   or significant change from the prior exam.        Signed by: Gerald De La Rosa 7/6/2025 12:25 PM   Dictation workstation:   [id number]      CT cervical spine wo IV contrast   Final Result   No acute findings.        Signed by: Gerald De La Rosa 7/6/2025 12:32 PM   Dictation workstation:   [id number]      CT maxillofacial bones wo IV contrast   Final Result   No acute findings.        Signed by: Gerald De La Rosa 7/6/2025 12:39 PM   Dictation workstation:   [id number]      CT 3D reconstruction   Final Result   No acute findings.        Signed by: Gerald De La Rosa 7/6/2025 12:39 PM   Dictation workstation:   [id number]          Transthoracic Echo (TTE) Complete  Result Date: 2/27/2025            [facility] [address], [place] [id number]             Phone [phone] TRANSTHORACIC ECHOCARDIOGRAM REPORT Patient  Name:       DAVID TOLENTINO        Reading Physician:    [id number] Chilo Vizcarra MD Study Date:         2/26/2025            Ordering Provider:    [id number] TORSTEN CROCKETT MRN/PID:            [id number]             Fellow: Accession#:         [id number]         Nurse: Date of Birth/Age:  1940 / 84 years  Sonographer:          Karie JOHNSON Gender Assigned at  F                    Additional Staff: Birth: Height:             152.40 cm            Admit Date:           2/26/2025 Weight:             42.64 kg             Admission Status:     Inpatient -                                                                Routine BSA / BMI:          1.35 m2 / 18.36      Department Location:                     kg/m2 Blood Pressure: 158 /86 mmHg Study Type:    TRANSTHORACIC ECHO (TTE) COMPLETE Diagnosis/ICD: Paroxysmal atrial fibrillation-I48.0; Cerebral Infarction,                unspecified-I63.9 Indication:    Punctate Foci Subacute Versus Acute Stroke on MRI CPT Codes:     Echo Complete w Full [id number] Patient History: Pertinent History: HLD CVA PAF HTN PVD CAD COPD DMII Arteriosclerosis Of                    Coronary Artery Chronic Liver Disease AMS. Study Detail: The following Echo studies were performed: 2D, M-Mode, Doppler and               color flow. Technically challenging study due to patient lying in               supine position. Agitated saline used as a contrast agent for               intraseptal flow evaluation. Unable to obtain suprasternal notch               view.  PHYSICIAN INTERPRETATION: Left Ventricle: The left ventricular systolic function is normal, with a visually estimated ejection fraction of 60-65%. There are no regional wall motion abnormalities. The left ventricular cavity size is normal. There is normal  septal and normal posterior left ventricular wall thickness. There is left ventricular concentric remodeling. Spectral Doppler shows a Grade I (impaired relaxation pattern) of left ventricular diastolic filling with normal left atrial filling pressure. Left Atrium: The left atrial size is mildly dilated. A bubble study using agitated saline was performed. Bubble study is negative. Right Ventricle: The right ventricle is normal in size. There is normal right ventricular global systolic function. Right Atrium: The right atrial size is normal. Aortic Valve: The aortic valve is trileaflet. There is mild aortic valve thickening. There is evidence of mildly elevated transaortic gradients consistent with sclerosis of the aortic valve. There is mild aortic valve regurgitation. The peak instantaneous gradient of the aortic valve is 12 mmHg. Mitral Valve: The mitral valve is normal in structure. There is mild mitral annular calcification. There is no evidence of mitral valve regurgitation. Tricuspid Valve: The tricuspid valve is structurally normal. There is trace tricuspid regurgitation. The right ventricular systolic pressure is unable to be estimated. Pulmonic Valve: The pulmonic valve is structurally normal. There is physiologic pulmonic valve regurgitation. Pericardium: No pericardial effusion noted. Aorta: The aortic root is normal. Systemic Veins: The inferior vena cava appears normal in size, with IVC inspiratory collapse greater than 50%.  CONCLUSIONS:  1. The left ventricular systolic function is normal, with a visually estimated ejection fraction of 60-65%.  2. Spectral Doppler shows a Grade I (impaired relaxation pattern) of left ventricular diastolic filling with normal left atrial filling pressure.  3. No evidence of mitral valve regurgitation.  4. Trace tricuspid regurgitation is visualized.  5. Aortic valve sclerosis.  6. Mild aortic valve regurgitation. QUANTITATIVE DATA SUMMARY:  2D MEASUREMENTS:              Normal Ranges: LAs:             3.40 cm     (2.7-4.0cm) IVSd:            0.82 cm     (0.6-1.1cm) LVPWd:           0.93 cm     (0.6-1.1cm) LVIDd:           3.17 cm     (3.9-5.9cm) LVIDs:           2.01 cm LV Mass Index:   54.3 g/m2 LVEDV Index:     42.02 ml/m2 LV % FS          36.6 %  LEFT ATRIUM:                  Normal Ranges: LA Vol A4C:        40.6 ml    (22+/-6mL/m2) LA Vol A2C:        46.8 ml LA Vol BP:         44.2 ml LA Vol Index A4C:  30.0ml/m2 LA Vol Index A2C:  34.5 ml/m2 LA Vol Index BP:   32.6 ml/m2 LA Area A4C:       15.4 cm2 LA Area A2C:       16.3 cm2 LA Major Axis A4C: 5.0 cm LA Major Axis A2C: 4.8 cm LA Volume Index:   30.7 ml/m2 LA Vol A4C:        38.3 ml LA Vol A2C:        43.9 ml LA Vol Index BSA:  30.3 ml/m2  RIGHT ATRIUM:                 Normal Ranges: RA Vol A4C:        20.4 ml    (8.3-19.5ml) RA Vol Index A4C:  15.1 ml/m2 RA Area A4C:       9.9 cm2 RA Major Axis A4C: 4.0 cm  M-MODE MEASUREMENTS:         Normal Ranges: Ao Root:             2.80 cm (2.0-3.7cm) LAs:                 4.50 cm (2.7-4.0cm)  AORTA MEASUREMENTS:         Normal Ranges: Ao Sinus, d:        3.50 cm (2.1-3.5cm) Ao STJ, d:          3.08 cm (1.7-3.4cm) Asc Ao, d:          3.60 cm (2.1-3.4cm)  LV SYSTOLIC FUNCTION:                      Normal Ranges: EF-A4C View:    63 % (>=55%) EF-A2C View:    57 % EF-Biplane:     61 % EF-Visual:      63 % LV EF Reported: 63 %  LV DIASTOLIC FUNCTION:            Normal Ranges: MV Peak E:             0.80 m/s   (0.7-1.2 m/s) MV Peak A:             1.31 m/s   (0.42-0.7 m/s) E/A Ratio:             0.61       (1.0-2.2) MV e'                  0.048 m/s  (>8.0) MV lateral e'          0.05 m/s MV medial e'           0.04 m/s E/e' Ratio:            16.49      (<8.0) PulmV Sys Damien:         44.60 cm/s PulmV Dhaliwal Damien:        37.30 cm/s PulmV S/D Damien:         1.20 PulmV A Revs Damien:      33.80 cm/s  MITRAL VALVE:          Normal Ranges: MV DT:        264 msec (150-240msec)  AORTIC VALVE:            Normal  Ranges: AoV Vmax:      1.75 m/s  (<=1.7m/s) AoV Peak P.2 mmHg (<20mmHg) LVOT Max Damien:  1.24 m/s  (<=1.1m/s) LVOT VTI:      26.30 cm LVOT Diameter: 1.80 cm   (1.8-2.4cm) AoV Area,Vmax: 1.80 cm2  (2.5-4.5cm2)  AORTIC INSUFFICIENCY: AI Vmax:       4.03 m/s AI Half-time:  836 msec AI Decel Rate: 137.50 cm/s2  RIGHT VENTRICLE: RV Basal 2.36 cm RV Mid   2.00 cm RV Major 4.3 cm TAPSE:   16.1 mm RV s'    0.12 m/s  TRICUSPID VALVE/RVSP:         Normal Ranges: IVC Diam:             1.65 cm  PULMONIC VALVE:          Normal Ranges: PV Accel Time:  82 msec  (>120ms) PV Max Damien:     1.3 m/s  (0.6-0.9m/s) PV Max P.8 mmHg  PULMONARY VEINS: PulmV A Revs Damien: 33.80 cm/s PulmV Dhaliwal Damien:   37.30 cm/s PulmV S/D Damien:    1.20 PulmV Sys Damien:    44.60 cm/s  [id number] Chilo Vizcarra MD Electronically signed on 2025 at 8:31:47 AM  ** Final **          Assessment/Plan   Fall, weakness  -CT head negative  -I reviewed the EKG, SR no acute changes, ST elevation, or depression  -Telemetry reviewed, HR down to 40's  -TSH 0.05  -Change metoprolol tartrate to 25mg nightly  -Suggest outpt ambulatory monitor    2. Bradycardia, paroxysmal atrial fibrillation  -EKG reviewed, SR  -Telemetry reviewed, HR down to the 40's at times  -Concerned that she will go into AF without beta blocker  -Pt on metoprolol tartrate 25mg nightly at home, ordered BID here  -Change metoprolol to nightly  -Monitor on tele  -Outpt ambulatory monitor to assess HR trend     3. Hypertension, labile BP  -Amlodipine and Lisinopril recently stopped during hospitalization while being diuresed  -BP low, systolic 90's at times and then up to 190's  -Check orthostatic VS  -restart amlodipine 5mg daily  -Change metoprolol as above  -BP right arm 149/64, left 170/66  -Check BP in left arm ONLY  -2gm na diet  -cont meds  -monitor     4. Hyperlipidemia  -Cont statin    5. Hypokalemia  -supplement  -monitor      ELIO Stevens-CNP         [1]   Past Medical  History:  Diagnosis Date    Bowel perforation (Multi) 2024    Clostridium difficile colitis     Colitis due to Clostridioides difficile 2024    Dementia     Heart murmur     Hypernatremia     Terminal ileitis with complication (Multi) 2024    UTI (urinary tract infection) 2024   [2]   Past Surgical History:  Procedure Laterality Date    CATARACT EXTRACTION       SECTION, CLASSIC  2016     Section    CHOLECYSTECTOMY  2014    Cholecystectomy    COLONOSCOPY  2014    Colonoscopy (Fiberoptic)    CT HEAD ANGIO W AND WO IV CONTRAST  2021    CT HEAD ANGIO W AND WO IV CONTRAST 2021 [facility]    ESOPHAGOGASTRODUODENOSCOPY  2014    Diagnostic Esophagogastroduodenoscopy    MR HEAD ANGIO WO IV CONTRAST  2021    MR HEAD ANGIO WO IV CONTRAST 2021 [facility]    MR NECK ANGIO WO IV CONTRAST  2021    MR NECK ANGIO WO IV CONTRAST 2021 [facility]   [3]   Family History  Problem Relation Name Age of Onset    Diabetes Mother      Heart disease Other      Heart attack Other     [4] amLODIPine, 5 mg, oral, Daily  apixaban, 2.5 mg, oral, BID  atorvastatin, 40 mg, oral, Daily  levothyroxine, 112.5 mcg, oral, Daily  magnesium oxide, 400 mg of magnesium oxide, oral, Daily  [START ON 7/10/2025] metoprolol tartrate, 25 mg, oral, Nightly  oxygen, , inhalation, Continuous - Inhalation  polyethylene glycol, 17 g, oral, Daily  potassium chloride CR, 20 mEq, oral, Daily  potassium chloride CR, 20 mEq, oral, Once  predniSONE, 2.5 mg, oral, Daily  traZODone, 50 mg, oral, Nightly  [5] sodium chloride 0.9%, 10 mL/hr  [6] PRN medications: acetaminophen **OR** acetaminophen **OR** acetaminophen, melatonin, sodium chloride 0.9%

## 2025-07-09 NOTE — PROGRESS NOTES
07/09/25 1000   Discharge Planning   Assistance Needed Patient lives with her daughter, son in law and dog in a 2 story house with basement. (Laundry) She uses walker.  She has supervision 24/7-when daughter is at work, there are caregivers that come in to stay with her. DME: shower seat in [facility] with grab bars. Patient is mostly independent with her ADLs; she does have confusion. Daughter would prefer patient to return home with home care vs SNF.   Who is requesting discharge planning? Provider   Home or Post Acute Services In home services   Type of Home Care Services Home OT;Home PT   Expected Discharge Disposition Home H  (Patient will discharge home with [facility] PT/OT services when medically cleared.)   Does the patient need discharge transport arranged? No  (daughter to )   [facility] coordination needed? No   Patient Choice   Provider Choice list and CMS website (https://medicare.gov/care-compare#search) for post-acute Quality and Resource Measure Data were provided and reviewed with: Family   Patient / Family choosing to utilize agency / facility established prior to hospitalization No

## 2025-07-09 NOTE — CARE PLAN
The patient's goals for the shift include      The clinical goals for the shift include Pt will have SBP<180 throughout shift.    Over the shift, the patient did not make progress toward the following goals. Barriers to progression include increase or decrease in blood pressure or heart rate. Recommendations to address these barriers include monitor vital signs throughout this shift.

## 2025-07-10 LAB
ALBUMIN SERPL BCP-MCNC: 3.4 G/DL (ref 3.4–5)
ANION GAP SERPL CALC-SCNC: 10 MMOL/L (ref 10–20)
BUN SERPL-MCNC: 21 MG/DL (ref 6–23)
CALCIUM SERPL-MCNC: 8.6 MG/DL (ref 8.6–10.3)
CHLORIDE SERPL-SCNC: 105 MMOL/L (ref 98–107)
CO2 SERPL-SCNC: 29 MMOL/L (ref 21–32)
CREAT SERPL-MCNC: 0.55 MG/DL (ref 0.5–1.05)
EGFRCR SERPLBLD CKD-EPI 2021: 89 ML/MIN/1.73M*2
ERYTHROCYTE [DISTWIDTH] IN BLOOD BY AUTOMATED COUNT: 12.7 % (ref 11.5–14.5)
GLUCOSE SERPL-MCNC: 122 MG/DL (ref 74–99)
HCT VFR BLD AUTO: 39.5 % (ref 36–46)
HGB BLD-MCNC: 12.5 G/DL (ref 12–16)
MAGNESIUM SERPL-MCNC: 1.92 MG/DL (ref 1.6–2.4)
MCH RBC QN AUTO: 29.3 PG (ref 26–34)
MCHC RBC AUTO-ENTMCNC: 31.6 G/DL (ref 32–36)
MCV RBC AUTO: 93 FL (ref 80–100)
NRBC BLD-RTO: 0 /100 WBCS (ref 0–0)
PHOSPHATE SERPL-MCNC: 2.2 MG/DL (ref 2.5–4.9)
PLATELET # BLD AUTO: 171 X10*3/UL (ref 150–450)
POTASSIUM SERPL-SCNC: 3.7 MMOL/L (ref 3.5–5.3)
RBC # BLD AUTO: 4.27 X10*6/UL (ref 4–5.2)
SODIUM SERPL-SCNC: 140 MMOL/L (ref 136–145)
WBC # BLD AUTO: 9.9 X10*3/UL (ref 4.4–11.3)

## 2025-07-10 PROCEDURE — 2500000004 HC RX 250 GENERAL PHARMACY W/ HCPCS (ALT 636 FOR OP/ED): Mod: IPSPLIT | Performed by: HOSPITALIST

## 2025-07-10 PROCEDURE — 2500000001 HC RX 250 WO HCPCS SELF ADMINISTERED DRUGS (ALT 637 FOR MEDICARE OP): Mod: IPSPLIT | Performed by: INTERNAL MEDICINE

## 2025-07-10 PROCEDURE — 2500000001 HC RX 250 WO HCPCS SELF ADMINISTERED DRUGS (ALT 637 FOR MEDICARE OP): Mod: IPSPLIT | Performed by: HOSPITALIST

## 2025-07-10 PROCEDURE — 36415 COLL VENOUS BLD VENIPUNCTURE: CPT | Mod: IPSPLIT | Performed by: INTERNAL MEDICINE

## 2025-07-10 PROCEDURE — 97116 GAIT TRAINING THERAPY: CPT | Mod: GP,CQ,IPSPLIT

## 2025-07-10 PROCEDURE — 80069 RENAL FUNCTION PANEL: CPT | Mod: IPSPLIT | Performed by: INTERNAL MEDICINE

## 2025-07-10 PROCEDURE — 2500000005 HC RX 250 GENERAL PHARMACY W/O HCPCS: Mod: IPSPLIT | Performed by: EMERGENCY MEDICINE

## 2025-07-10 PROCEDURE — 83735 ASSAY OF MAGNESIUM: CPT | Mod: IPSPLIT | Performed by: INTERNAL MEDICINE

## 2025-07-10 PROCEDURE — 2500000002 HC RX 250 W HCPCS SELF ADMINISTERED DRUGS (ALT 637 FOR MEDICARE OP, ALT 636 FOR OP/ED): Mod: IPSPLIT | Performed by: HOSPITALIST

## 2025-07-10 PROCEDURE — 2500000001 HC RX 250 WO HCPCS SELF ADMINISTERED DRUGS (ALT 637 FOR MEDICARE OP): Mod: IPSPLIT | Performed by: NURSE PRACTITIONER

## 2025-07-10 PROCEDURE — 99232 SBSQ HOSP IP/OBS MODERATE 35: CPT | Performed by: INTERNAL MEDICINE

## 2025-07-10 PROCEDURE — 97535 SELF CARE MNGMENT TRAINING: CPT | Mod: GO,IPSPLIT

## 2025-07-10 PROCEDURE — 85027 COMPLETE CBC AUTOMATED: CPT | Mod: IPSPLIT | Performed by: INTERNAL MEDICINE

## 2025-07-10 PROCEDURE — 1200000002 HC GENERAL ROOM WITH TELEMETRY DAILY: Mod: IPSPLIT

## 2025-07-10 PROCEDURE — 97530 THERAPEUTIC ACTIVITIES: CPT | Mod: GP,CQ,IPSPLIT

## 2025-07-10 PROCEDURE — 94760 N-INVAS EAR/PLS OXIMETRY 1: CPT | Mod: IPSPLIT

## 2025-07-10 RX ORDER — SODIUM,POTASSIUM PHOSPHATES 280-250MG
1 POWDER IN PACKET (EA) ORAL 4 TIMES DAILY
Status: COMPLETED | OUTPATIENT
Start: 2025-07-10 | End: 2025-07-10

## 2025-07-10 RX ORDER — POLYETHYLENE GLYCOL 3350 17 G/17G
17 POWDER, FOR SOLUTION ORAL DAILY PRN
Status: DISCONTINUED | OUTPATIENT
Start: 2025-07-10 | End: 2025-07-11 | Stop reason: HOSPADM

## 2025-07-10 RX ADMIN — POTASSIUM & SODIUM PHOSPHATES POWDER PACK 280-160-250 MG 1 PACKET: 280-160-250 PACK at 15:20

## 2025-07-10 RX ADMIN — Medication 2 L/MIN: at 21:18

## 2025-07-10 RX ADMIN — POTASSIUM & SODIUM PHOSPHATES POWDER PACK 280-160-250 MG 1 PACKET: 280-160-250 PACK at 19:01

## 2025-07-10 RX ADMIN — METOPROLOL TARTRATE 25 MG: 25 TABLET, FILM COATED ORAL at 20:52

## 2025-07-10 RX ADMIN — POTASSIUM CHLORIDE 20 MEQ: 1500 TABLET, EXTENDED RELEASE ORAL at 09:22

## 2025-07-10 RX ADMIN — ATORVASTATIN CALCIUM 40 MG: 40 TABLET, FILM COATED ORAL at 09:22

## 2025-07-10 RX ADMIN — Medication 1 TABLET: at 09:22

## 2025-07-10 RX ADMIN — PREDNISONE 2.5 MG: 5 TABLET ORAL at 09:22

## 2025-07-10 RX ADMIN — APIXABAN 2.5 MG: 2.5 TABLET, FILM COATED ORAL at 20:52

## 2025-07-10 RX ADMIN — AMLODIPINE BESYLATE 5 MG: 5 TABLET ORAL at 09:22

## 2025-07-10 RX ADMIN — TRAZODONE HYDROCHLORIDE 50 MG: 50 TABLET ORAL at 20:52

## 2025-07-10 RX ADMIN — LEVOTHYROXINE SODIUM 112.5 MCG: 0.07 TABLET ORAL at 06:19

## 2025-07-10 RX ADMIN — APIXABAN 2.5 MG: 2.5 TABLET, FILM COATED ORAL at 09:22

## 2025-07-10 RX ADMIN — Medication 2 L/MIN: at 20:54

## 2025-07-10 ASSESSMENT — COGNITIVE AND FUNCTIONAL STATUS - GENERAL
DAILY ACTIVITIY SCORE: 21
MOVING FROM LYING ON BACK TO SITTING ON SIDE OF FLAT BED WITH BEDRAILS: A LITTLE
MOVING TO AND FROM BED TO CHAIR: A LITTLE
CLIMB 3 TO 5 STEPS WITH RAILING: A LITTLE
CLIMB 3 TO 5 STEPS WITH RAILING: A LITTLE
HELP NEEDED FOR BATHING: A LITTLE
TOILETING: A LITTLE
TURNING FROM BACK TO SIDE WHILE IN FLAT BAD: A LITTLE
WALKING IN HOSPITAL ROOM: A LITTLE
MOVING TO AND FROM BED TO CHAIR: A LITTLE
HELP NEEDED FOR BATHING: A LITTLE
MOBILITY SCORE: 18
MOBILITY SCORE: 19
DRESSING REGULAR LOWER BODY CLOTHING: A LITTLE
MOVING FROM LYING ON BACK TO SITTING ON SIDE OF FLAT BED WITH BEDRAILS: A LITTLE
STANDING UP FROM CHAIR USING ARMS: A LITTLE
WALKING IN HOSPITAL ROOM: A LITTLE
HELP NEEDED FOR BATHING: A LITTLE
DRESSING REGULAR LOWER BODY CLOTHING: A LITTLE
DRESSING REGULAR LOWER BODY CLOTHING: A LITTLE
DAILY ACTIVITIY SCORE: 19
CLIMB 3 TO 5 STEPS WITH RAILING: A LOT
MOBILITY SCORE: 18
WALKING IN HOSPITAL ROOM: A LITTLE
PERSONAL GROOMING: A LITTLE
DAILY ACTIVITIY SCORE: 21
MOVING TO AND FROM BED TO CHAIR: A LITTLE
STANDING UP FROM CHAIR USING ARMS: A LITTLE
TOILETING: A LITTLE
DRESSING REGULAR UPPER BODY CLOTHING: A LITTLE
STANDING UP FROM CHAIR USING ARMS: A LITTLE
TURNING FROM BACK TO SIDE WHILE IN FLAT BAD: A LITTLE
TOILETING: A LITTLE

## 2025-07-10 ASSESSMENT — PAIN - FUNCTIONAL ASSESSMENT
PAIN_FUNCTIONAL_ASSESSMENT: 0-10

## 2025-07-10 ASSESSMENT — ACTIVITIES OF DAILY LIVING (ADL): HOME_MANAGEMENT_TIME_ENTRY: 30

## 2025-07-10 ASSESSMENT — PAIN SCALES - GENERAL
PAINLEVEL_OUTOF10: 0 - NO PAIN

## 2025-07-10 NOTE — CARE PLAN
The patient's goals for the shift include      The clinical goals for the shift include patient will be reoriented as needed this shift    Patient BP stable today no acute needs. Upset she is staying the night  confused but frequently reoriented as needed

## 2025-07-10 NOTE — CARE PLAN
The patient's goals for the shift include      The clinical goals for the shift include pt will be free of falls/ injuries this shift    Over the shift, the patient did make progress toward the following goals. Pt has bed alarm on and up with walker and 1 assist. No falls this shift

## 2025-07-10 NOTE — PROGRESS NOTES
Subjective    Patient denies chest pain, shortness of breath, nausea, vomiting or diarrhea.    Objective    Vitals  Visit Vitals  /77 (BP Location: Right arm, Patient Position: Lying)   Pulse 73   Temp 37 °C (98.6 °F) (Temporal)   Resp 17   Ht (!) 1.524 m (5')   Wt (!) 38.1 kg (83 lb 15.9 oz)   SpO2 90%   BMI 16.40 kg/m²   OB Status Postmenopausal   Smoking Status Never   BSA 1.27 m²       Physical Exam   General: Alert and oriented to self, bday, but not to president or yr. NAD.   HEENT: Sclera clear.  CVS: RRR.   Lungs: CTAB.   Abdomen: Soft. NT. +BS.     Extremities: No pitting edema bilat ankles.  Psychiatric: Cooperative.     IOs    Intake/Output Summary (Last 24 hours) at 7/10/2025 1638  Last data filed at 7/10/2025 0620  Gross per 24 hour   Intake 220 ml   Output --   Net 220 ml       Labs:   Results from last 72 hours   Lab Units 07/10/25  0734 07/09/25  0726 07/08/25  0715   SODIUM mmol/L 140 141 144   POTASSIUM mmol/L 3.7 3.4* 3.3*   CHLORIDE mmol/L 105 105 107   CO2 mmol/L 29 29 31   BUN mg/dL 21 22 27*   CREATININE mg/dL 0.55 0.64 0.64   GLUCOSE mg/dL 122* 128* 131*   CALCIUM mg/dL 8.6 9.0 9.0   ANION GAP mmol/L 10 10 9*   EGFR mL/min/1.73m*2 89 86 86   PHOSPHORUS mg/dL 2.2* 2.7 2.6      Results from last 72 hours   Lab Units 07/10/25  0734 07/09/25  0726 07/08/25  0715   WBC AUTO x10*3/uL 9.9 9.7 9.3   HEMOGLOBIN g/dL 12.5 12.9 12.8   HEMATOCRIT % 39.5 40.2 39.6   PLATELETS AUTO x10*3/uL 171 156 160      Lab Results   Component Value Date    CALCIUM 8.6 07/10/2025    PHOS 2.2 (L) 07/10/2025      Lab Results   Component Value Date    CRP 0.30 05/15/2024      [unfilled]       Images  CT chest abdomen pelvis w IV contrast  Narrative: Interpreted By:  Gerald De La Rosa,   STUDY:  CT CHEST ABDOMEN PELVIS W IV CONTRAST;  7/6/2025 12:27 pm      INDICATION:  Signs/Symptoms:Fall on thinners      COMPARISON:  CT of the chest of 06/05/2025, and abdomen and pelvis of 09/28/2024      ACCESSION  NUMBER(S):  [id number]      ORDERING CLINICIAN:  ROSARIO PAL      TECHNIQUE:  CT of the chest, abdomen, and pelvis was performed.  Sequential transaxial images were obtained with orthogonal  reconstructions. 75 ML of Omnipaque 350 was administered  intravenously without immediate complication.      FINDINGS:  CHEST:      LUNG/PLEURA/LARGE AIRWAYS:  Again there is scattered centrilobular nodularity, which appear  relatively stable. There are several larger nodularities, the  dominant peripheral/subpleural measuring 1.1 cm at the right upper  lung anterolaterally. This appears slightly decreased. There is also  resolution of a 1.3 cm left upper lung nodularity, although  locoregional ground-glass opacity persists. There has also been  improvement of branching tubular opacity in the left upper lung  anteriorly. The findings described are probably due to bronchiectasis  with improvement of endobronchial mucous plugging, and infiltrates  probably due to bronchiolitis. Otherwise no consolidative infiltrate.  No pleural effusion.          VESSELS:  The thoracic aorta is of normal course and caliber , but with  moderate atherosclerotic calcification .      Main pulmonary artery and its branches are normal in caliber.      No coronary artery calcifications are seen. The study is not  optimized for evaluation of coronary arteries.      HEART:  The heart is normal in size.  There is no pericardial effusion.      MEDIASTINUM AND KAYLEIGH:  No significant mediastinal or hilar adenopathy.      The thyroid gland as visualized appears unremarkable.      CHEST WALL AND LOWER NECK:  No suspicious osseous lesions are identified.      The thoracic wall soft tissues are within normal limits.      ABDOMEN:      LIVER:  The hepatic parenchyma is homogeneous without evidence of focal liver  lesions ,except for cortical cyst(s).The hepatic size is normal.      SPLEEN:  The spleen is normal in size and homogeneous.      ADRENAL  GLANDS:  Bilateral adrenal glands appear normal.      KIDNEYS AND URETERS:  The renal cortices are unremarkable and the renal sizes within normal  limits.      The ureters throughout their distal course are not well identified  due to overlying crowded bowel loops, but the tracts otherwise are  unremarkable without identified ureteral dilatation or radiodense  calculi.      PANCREAS:  There are multiple pancreatic cysts or cystic/mucinous neoplasms. The  largest is present at the midbody measuring 2.5 cm, and at the neck  measuring 2.7 cm. These appear slightly enlarged since the previous  exam.  Incidental Finding:  There is a cystic lesion noted within the  pancreas measuring larger than 25 mm.  (**-YCF-**)      Instructions:  Follow-up contrast enhanced MRI or pancreas protocol  CT every 2 years for 4 years or surgical/gastroenterology  consult/EUS/FNA. (Jayleen AJ, Nicholas ME, Ryan DE, et al. Management  of Incidental Pancreatic Cysts: A White Paper of the ACR Incidental  Findings Committee. J Am Sofia Radiol. 2017;14(7):911-923.)  PANCREAS.ACR.IF.6 There is marked pancreatic parenchymal atrophy and  with ductal dilatation measuring up to 6 mm.      GALLBLADDER:  The patient is status post cholecystectomy, the gallbladder fossa is  unremarkable.      BILE DUCTS:  There is mild-to-moderate intrahepatic and moderate to marked  extrahepatic biliary dilatation, with common hepatic duct measuring  up to 1.7 cm in diameter. This is fairly similar to the previous  examination, probably post cholecystectomy state.          VESSELS:  Moderate atherosclerotic calcifications are noted predominantly at  the aorta and iliac system.      PERITONEUM AND RETROPERITONEUM:  No ascites or free air, no fluid collection.      The retroperitoneum appears unremarkable, and without significant  adenopathy.      No enlarged mesenteric lymph nodes.      BOWEL:  Mild diverticulosis greatest at the proximal sigmoid colon. The  rectosigmoid  colon is distended measuring 7.5 cm in diameter, with  fecal residue suggesting constipation or early impaction. The bowel  otherwise appears grossly unremarkable without significant dilatation  or mural thickening.      PELVIS:      BLADDER:  The urinary bladder contour is smooth.      REPRODUCTIVE ORGANS:  There is a small calcified fibroid at the fundus. There are pelvic  varices.          BONE, ABDOMINAL WALL AND OTHER FINDINGS:  No suspicious osseous lesions are identified.      The abdominal wall soft tissues appear normal.      Impression: CHEST  1.  Improvement of mucous plugging and probable bronchiolitis.  Longer-term follow-up to document resolution of more dominant nodules  should be considered.  2. Otherwise no acute findings.      ABDOMEN - PELVIS  1.  Diverticulosis, possible constipation and developing impaction.  2. Numerous and large pancreatic cyst or cystic/mucinous neoplasms.  Follow-up as described above. Otherwise fairly marked pancreatic  parenchymal atrophy.  3. Biliary dilatation fairly similar to the prior examination.  4. Small calcified uterine fibroid, and pelvic varices.      MACRO:  1.      Signed by: Gerald De La Rosa 7/6/2025 1:00 PM  Dictation workstation:   [id number]  CT maxillofacial bones wo IV contrast, CT 3D reconstruction  Narrative: Interpreted By:  Gerald De La Rosa,   STUDY:  CT FACIAL BONES WO IV CONTRAST; CT 3D RECONSTRUCTION  7/6/2025 12:18  pm      INDICATION:  Signs/Symptoms:Fall on thinners      COMPARISON:  None.      ACCESSION NUMBER(S):  [id number]; [id number]      ORDERING CLINICIAN:  ROSARIO PAL      TECHNIQUE:  Thin cut axial CT images through the facial bones were obtained  with  orthogonal reconstructions .  3D volumetric reconstructed images were  also generated at an independent workstation and reviewed.      FINDINGS:  BONES:  There is no CT evidence of acute facial bone fracture.  There is an impacted right upper canine tooth.      SOFT TISSUES:  Intact.  The salivary glands appear unremarkable.      LYMPH NODES:  No significant adenopathy.      INTRACRANIAL:  No acute findings as visualized.      SINUSES AND MASTOIDS:  Unremarkable.      VASCULATURE:  Mild atherosclerotic calcification is noted at the carotid  bifurcations. There is also moderate atherosclerotic calcification of  the carotid siphons.      OTHER FINDINGS:  None significant.      Impression: No acute findings.      Signed by: Gerald De La Rosa 7/6/2025 12:39 PM  Dictation workstation:   [id number]  CT cervical spine wo IV contrast  Narrative: Interpreted By:  Gerald De La Rosa,   STUDY:  CT CERVICAL SPINE WO IV CONTRAST;  7/6/2025 12:18 pm      INDICATION:  Fall and injury      COMPARISON:  None.      ACCESSION NUMBER(S):  [id number]      ORDERING CLINICIAN:  ROSARIO PAL      TECHNIQUE:  Transaxial images with orthogonal reconstructions      FINDINGS:  VERTEBRAL ALIGNMENT:  Within normal limits.      CRANIOCERVICAL JUNCTION:  Unremarkable      BONY STRUCTURES:  No fracture or dislocation are evident.      THE CERVICAL LEVELS INCLUDING DISC SPACES, APOPHYSEAL AND  UNCOVERTEBRAL JOINTS:  Mild spondylosis. Primarily there is mild  retrolisthesis at C3-4 level. There is moderate disc space narrowing  at C4-5 level also with a retrolisthesis. There is mild narrowing the  C5-6 disc interspace with mild marginal osteophyte formation. There  is mild multilevel apophyseal hypertrophy..      ADDITIONAL FINDINGS:  There is mild-to-moderate atherosclerotic calcification of the right  carotid bifurcation. There is also atherosclerotic calcification at  the carotid siphons.      Impression: No acute findings.      Signed by: Gerald De La Rosa 7/6/2025 12:32 PM  Dictation workstation:   [id number]  CT head W O contrast trauma protocol  Narrative: Interpreted By:  Gerald De La Rosa,   STUDY:  CT HEAD W/O CONTRAST TRAUMA PROTOCOL;  7/6/2025 12:18 pm      INDICATION:  Signs/Symptoms:Fall on thinners.       COMPARISON:  03/18/2025      ACCESSION NUMBER(S):  [id number]      ORDERING CLINICIAN:  ROSARIO PAL      TECHNIQUE:  Sequential trans axial images were obtained  .      FINDINGS:  INTRACRANIAL:      There is moderate prominence of the cortical sulci indicating atrophy.      There is moderate ventriculomegaly, again consistent with atrophy.      Moderate decreased attenuation of the periventricular and long tracks  of the white matter most consistent with gliosis from arterial  disease. There is no evidence of definite subacute infarction,  intracranial hemorrhage or mass.          EXTRACRANIAL:  Visualized paranasal sinuses and mastoids are clear.  The calvarium is intact.      Impression: Age related degenerative change as described without acute findings  or significant change from the prior exam.      Signed by: Gerald De La Rosa 7/6/2025 12:25 PM  Dictation workstation:   [id number]      Meds  Scheduled medications  Scheduled Medications[1]  Continuous medications  Continuous Medications[2]  PRN medications  PRN Medications[3]     Assessment and Plan    Maisha Agosto is a 85 y.o. female with past medical history of pAfib (Eliquis), HTN, chronic diastolic heart failure, HLD, COPD, dementia, RA, admission to the hospital in 6/2025 due to hypoxia with CHF, pneumonia, generalized weakness who came to hospital secondary to fall.      Fall  - Appears to be mechanical in nature, although did have some drop in her blood pressure with standing on 7/7.  Daughter states patient does sit at the edge of her bed frequently.  CT scans of the head, C-spine, abdomen, pelvis, chest with no acute fractures.  - Urinalysis was benign for infection.  - CPK was within normal limits at 85.  - PT/OT following and recommend low intensity.    - Monitor.  -I spoke with patient's daughter today and all of her questions were answered.     Chronic paroxysmal afib with bradycardia.  - Continue metoprolol tartrate and change back to home  dose of 25mg at bedtime on 7/9 from bid, and continue apixaban.   - Cardiology consulted and recommended to change metoprolol tartrate back to home dose of nightly as stated above, and recommends outpatient ambulatory monitor to assess heart rate trend.  - Monitor on telemetry     Accelerated Hypertension  - BP has been elevated at times.  She did have drop in SBP down to 80s to 90s on standing on 7/7.    - On review of the chart, patient had previously been on amlodipine 5 mg daily, ramipril 10 mg daily that were discontinued during admission in 6/2025 when she had been placed on furosemide for diuresis.  - Started amlodipine 5 mg daily in the afternoon on 7/8 and continued metoprolol twice daily, blood BP did drop overnight.  - Consulted cardiology, who recommended to change patient's metoprolol to tartrate back to regular home dose of 25 mg nightly, instead of twice daily on 7/9, and to continue amlodipine 5 mg daily; use blood pressures in the left arm only.  -7/10: BP was elevated overnight.  Will discuss with cardiology if we need to make further changes with BP medications.  - Monitor.     Transient hypoxia  - Patient initially required some supplemental O2 but then transitioned back to room air.  CT scan of the chest with improvement of mucous plugging.  Apparently wears O2 at times at night at home per the chart.   - Recommend follow-up with primary care provider as an outpatient as well due to CT scan findings.   - Monitor.    Hypophosphatemia  -Replace and monitor.     Leukocytosis  - CBCs initially elevated but has resolved.  No obvious signs of infection on CT scan of the chest and benign urinalysis.     Mildly elevated troponin  - Troponin was mildly elevated at 28.  Mild troponin elevation likely due to fall patient denies chest pain and daughter denies recent chest pain complaints by the patient.     Improvement mucous plugging and probable bronchiolitis on CT scan  - Recommend long-term follow-up to  document resolution of the nodule should be considered per radiology report.  - Recommend follow-up with PCP and pulmonology as an outpatient.     Pancreatic lesions  - Numerous and large pancreatic cyst or cystic/mucinous neoplasms on CT scan.  - Follow-up contrast enhanced MRI or pancreas protocol per radiology's recommendation.  Will have patient follow-up with primary care provider as an outpatient for further imaging, evaluation management.  I also discussed this result with the patient's daughter, Eli as well.      Chronic diastolic chf   - Patient does not appear to be volume overloaded.  Monitor.     Hypokalemia  - Replace and monitor.     COPD  - Monitor.     Dementia  - Continue home medications and monitor.     Hypothyroidism  - Daughter states that patient recently had her thyroid medications adjusted and she states that even though her thyroid tests have been off, that her physician wanted her to be on her current dose of thyroid meds.  Monitor and recommend follow-up with her thyroid specialist as an outpatient.     RA  - Continue home med of prednisone 2.5 mg daily.  Daughter states patient no longer sees rheumatologist due to patient's preference.  Monitor.     Dvt prophylaxis  -Apixaban.      Disposition:   - Patient originally from home with daughter through the weekend brother on the weekends.  Plan for discharge home with daughter when medically ready and currently titrating BP medications and monitoring blood pressure and heart rate as stated above.                        [1] amLODIPine, 5 mg, oral, Daily  apixaban, 2.5 mg, oral, BID  atorvastatin, 40 mg, oral, Daily  levothyroxine, 112.5 mcg, oral, Daily  magnesium oxide, 400 mg of magnesium oxide, oral, Daily  metoprolol tartrate, 25 mg, oral, Nightly  oxygen, , inhalation, Continuous - Inhalation  potassium chloride CR, 20 mEq, oral, Daily  potassium, sodium phosphates, 1 packet, oral, 4x daily  predniSONE, 2.5 mg, oral, Daily  traZODone,  50 mg, oral, Nightly  [2] sodium chloride 0.9%, 10 mL/hr  [3] PRN medications: acetaminophen **OR** acetaminophen **OR** acetaminophen, melatonin, polyethylene glycol, sodium chloride 0.9%

## 2025-07-10 NOTE — PROGRESS NOTES
Occupational Therapy    Occupational Therapy Treatment    Name: Maisha Agosto  MRN: [id number]  Department: [facility]  Room: [address] [place] [address]  Date: 07/10/25  Time Calculation  Start Time: 1250  Stop Time: 1320  Time Calculation (min): 30 min    Assessment:  OT Assessment: Pt is an 84 yo F referred to occupational therapy for impaired self-care and functional mobility 2/2 hospitalization for fall. Pt demonstrates good progress towards goals this date completing functional mobility w/ CGA and standing grooming w/ setup and min verbal cues. Pt required setup for toileting and min A for LE dressing. Pt would continue to benefit from 24 hour supervision for safety and LOW intensity OT services to increase safety and independence.  Prognosis: Good  Barriers to Discharge Home: Caregiver assistance, Cognition needs  Caregiver Assistance: Other (Comment) (Would benefit from 24 hour supervision)  Cognition Needs: 24hr supervision for safety awareness needed, Insight of patient limited regarding functional ability/needs, Cognition-related high falls risk, Medication and/or medical management daily assist needed  Evaluation/Treatment Tolerance: Patient tolerated treatment well  Medical Staff Made Aware: Yes  End of Session Communication: Bedside nurse  End of Session Patient Position: Up in chair, Alarm on (Caregiver present in room)  Plan:  Treatment Interventions: ADL retraining, Functional transfer training, Endurance training, Patient/family training, Compensatory technique education  OT Frequency: 3 times per week  OT Discharge Recommendations: Low intensity level of continued care  OT Recommended Transfer Status: Stand by assist, Assist of 1  OT - OK to Discharge: Yes (Based on completed evaluation and care plan recommendations, no barriers to discharge to next site of care)    Subjective     OT Visit Info:  OT Received On: 07/10/25  General:  General  Reason for Referral: Pt is an 84 yo F referred to occupational therapy for  impaired self-care and functional mobility 2/2 hospitalization for fall  Referred By: Johnny Pa DO  Past Medical History Relevant to Rehab: dementia, COPD, DM, HLD, hypothyroidism, RA, TIA  Family/Caregiver Present: Yes (Caregiver present at end of session)  Prior to Session Communication: Bedside nurse, PCT/NA/CTA  Patient Position Received: Up in chair, Alarm on (at nurses station)  Preferred Learning Style: auditory, verbal  General Comment: Pt pleasantly confused seated at nurses station. Agreeable to therapy session, cleared by RN prior to session  Precautions:  Medical Precautions: Fall precautions  Precautions Comment: tele,masimmargaret    Vital Signs Comment: Vitals stable throughout session    Pain Assessment:  Pain Assessment  Pain Assessment: 0-10  0-10 (Numeric) Pain Score: 0 - No pain    Objective   Cognition:  Overall Cognitive Status: Impaired at baseline  Arousal/Alertness: Appropriate responses to stimuli  Orientation Level: Disoriented to situation, Disoriented to time  Activities of Daily Living:    Grooming  Grooming Level of Assistance: Setup, Close supervision  Grooming Where Assessed: Standing sinkside  Grooming Comments: Pt completed standing grooming including oral hygiene, washing face/hands standing sinkside. Pt required mod verbal cues for task initiation and sup for standing balance. Pt w/ R lateral lean in standing    LE Dressing  LE Dressing: Yes  Adult Briefs Level of Assistance: Minimum assistance  LE Dressing Where Assessed: Toilet  LE Dressing Comments: Pt able to doff brief seated and standing at toilet. Pt required min A to thread feet inot brief while seated on toilet, able to pull up over hips in standing w/ supervision    Toileting  Toileting Level of Assistance: Setup  Where Assessed: Toilet  Toileting Comments: Pt able to complete front and rear perineal hygiene seated on toilet w/ supervision for safety    Functional Standing Tolerance:  Functional Standing Tolerance  Time:  Approximately 10 minutes  Activity: Standing ADLs - grooming  Functional Standing Tolerance Comments: Good balance throughout, R lateral lean in standing w/ CGA and tactile cues to correct to midline  Bed Mobility/Transfers:    Transfers  Transfer: Yes  Transfer 1  Transfer From 1: Chair with arms to  Transfer to 1: Stand  Technique 1: Sit to stand, Stand to sit  Transfer Device 1: Walker  Transfer Level of Assistance 1: Contact guard, Minimal verbal cues  Transfers 2  Transfer From 2: Toilet to  Transfer to 2: Stand  Technique 2: Sit to stand, Stand to sit  Transfer Device 2: Walker  Transfer Level of Assistance 2: Contact guard, Minimal verbal cues  Trials/Comments 2: Cues for hand placement    Functional Mobility:  Functional Mobility  Functional Mobility Performed: Yes  Functional Mobility 1  Surface 1: Level tile  Device 1: Rolling walker  Assistance 1: Contact guard, Minimal verbal cues  Comments 1: Pt completed functional mobility in room and hallway w/ CGA and FWW. Pt required min VCs for walker placement during turns  Sitting Balance:  Static Sitting Balance  Static Sitting-Balance Support: Feet supported  Static Sitting-Level of Assistance: Distant supervision  Dynamic Sitting Balance  Dynamic Sitting-Balance Support: Feet supported  Dynamic Sitting-Level of Assistance: Distant supervision  Dynamic Sitting-Balance: Forward lean  Standing Balance:  Static Standing Balance  Static Standing-Balance Support: Bilateral upper extremity supported  Static Standing-Level of Assistance: Close supervision  Dynamic Standing Balance  Dynamic Standing-Balance Support: Bilateral upper extremity supported  Dynamic Standing-Level of Assistance: Contact guard  Dynamic Standing-Balance: Turning    Strength:  Strength  Strength Comments: ENRICO kramer 4-/5  RUE: Within Functional Limits   LUE: Within Functional Limits    Outcome Measures:  [facility] Daily Activity  Putting on and taking off regular lower body clothing: A  little  Bathing (including washing, rinsing, drying): A little  Putting on and taking off regular upper body clothing: A little  Toileting, which includes using toilet, bedpan or urinal: A little  Taking care of personal grooming such as brushing teeth: A little  Eating Meals: None  Daily Activity - Total Score: 19    Education Documentation  Body Mechanics, taught by Bety Barrientos OT at 7/10/2025  2:17 PM.  Learner: Patient  Readiness: Acceptance  Method: Explanation  Response: Needs Reinforcement  Comment: Pt educated on POC, DC recs, safety and body mechanics when completing transfers and ADLs    ADL Training, taught by Bety Barrientos OT at 7/10/2025  2:17 PM.  Learner: Patient  Readiness: Acceptance  Method: Explanation  Response: Needs Reinforcement  Comment: Pt educated on POC, DC recs, safety and body mechanics when completing transfers and ADLs    Goals:  Encounter Problems       Encounter Problems (Active)       ADLs       Patient will perform UB and LB bathing seated with SBA level of assistance and grab bars and shower chair. (Progressing)       Start:  07/07/25    Expected End:  07/16/25            Patient with complete upper body dressing with supervision level of assistance donning and doffing all UE clothes with PRN adaptive equipment while supported sitting (Progressing)       Start:  07/07/25    Expected End:  07/16/25            Patient will complete daily grooming tasks seated with supervision level of assistance and PRN adaptive equipment while supported sitting. (Progressing)       Start:  07/07/25    Expected End:  07/16/25            Patient will complete toileting including hygiene clothing management/hygiene with stand by assist level of assistance and raised toilet seat and grab bars. (Progressing)       Start:  07/07/25    Expected End:  07/16/25               BALANCE       Pt will maintain dynamic standing balance during ADL task with supervision level of assistance in order to  demonstrate decreased risk of falling and improved postural control. (Progressing)       Start:  07/07/25    Expected End:  07/16/25               MOBILITY       Patient will perform Functional mobility min Household distances/Community Distances with supervision level of assistance and least restrictive device in order to improve safety and functional mobility. (Progressing)       Start:  07/07/25    Expected End:  07/16/25               TRANSFERS       Patient will perform bed mobility stand by assist level of assistance and bed rails in order to improve safety and independence with mobility       Start:  07/07/25    Expected End:  07/16/25            Patient will complete sit to stand transfer with supervision level of assistance and least restrictive device in order to improve safety and prepare for out of bed mobility. (Progressing)       Start:  07/07/25    Expected End:  07/16/25

## 2025-07-10 NOTE — PROGRESS NOTES
Physical Therapy    Physical Therapy Treatment    Patient Name: Maisha Agosto  MRN: [id number]  Department: [facility]  Room: [address] [place] [address]  Today's Date: 7/10/2025  Time Calculation  Start Time: 0907 (Therapist with pt [id number]; pt's breakfast arrived; therapist with pt [id number])  Stop Time: 1018  Time Calculation (min): 71 min    Assessment/Plan   PT Assessment  PT Assessment Results: Decreased strength, Impaired balance, Decreased mobility, Decreased cognition, Impaired judgement, Decreased endurance  Rehab Prognosis: Good  Barriers to Discharge Home: Caregiver assistance  Caregiver Assistance: Caregiver assistance needed per identified barriers - however, level of patient's required assistance exceeds assistance available at home  Evaluation/Treatment Tolerance: Patient tolerated treatment well  Medical Staff Made Aware: Yes  Strengths: Support of Caregivers  Barriers to Participation: Comorbidities  End of Session Communication: Bedside nurse  Assessment Comment: Pt performed STS from chair with arms with CGA and Zeinab from toilet. Pt was able to amb 125' using FWW;  pt was CGA 90% of time but required min a around turns and in tight spaces and keep base of support within walker to reduce risk for falls. Pt cont to require skilled PT to improve functional safety and endurnace and prevent further decline while here in hospital.  End of Session Patient Position: Up in chair, Alarm on     PT Plan  Treatment/Interventions: Transfer training, Bed mobility, Gait training, Stair training, Balance training, Strengthening, Endurance training, Therapeutic exercise, Therapeutic activity, Home exercise program, Neuromuscular re-education  PT Plan: Ongoing PT  PT Frequency: 3 times per week (during this acute inpatient hospitalization.)  PT Discharge Recommendations: Low intensity level of continued care  PT Recommended Transfer Status: Contact guard  PT - OK to Discharge: Yes    PT Visit Info:  PT Received On: 07/10/25     General  Visit Information:   General  Reason for Referral: impaired mobility, fall  Referred By: Johnny Pa DO  Past Medical History Relevant to Rehab: dementia, COPD, DM, HLD, hypothyroidism, RA, TIA  Family/Caregiver Present: No  Prior to Session Communication: Bedside nurse  Patient Position Received: Up in chair, Alarm on  Preferred Learning Style: auditory, verbal  General Comment: Pt pleasantly confused and agreeable to therapy session. Pt cleared by RN prior to session    Subjective   Precautions:  Precautions  Medical Precautions: Fall precautions  Precautions Comment: mami hampton    Objective   Pain:  Pain Assessment  Pain Assessment: 0-10  0-10 (Numeric) Pain Score: 0 - No pain  Cognition:  Cognition  Overall Cognitive Status: Impaired at baseline  Arousal/Alertness: Appropriate responses to stimuli  Orientation Level: Disoriented to situation, Disoriented to time  Coordination:  Movements are Fluid and Coordinated: Yes  Activity Tolerance:  Activity Tolerance  Endurance: Tolerates 30 min exercise with multiple rests    Treatments:  Therapeutic Activity  Therapeutic Activity Performed: Yes  Therapeutic Activity 1: Pt was able to maintain standing balance using FWW as UE support during BM hygiene and while doff/donning brief with CGA.    Ambulation/Gait Training  Ambulation/Gait Training Performed: Yes  Ambulation/Gait Training 1  Surface 1: Level tile  Device 1: Rolling walker  Gait Support Devices: Gait belt  Assistance 1: Contact guard, Minimum assistance  Quality of Gait 1: Decreased step length, Inconsistent stride length, Forward flexed posture  Comments/Distance (ft) 1: 125' with min a for FWW control during turns and safety cues for keeping AD closer to the body and b le within base of walker with poor carryover and easy to re direct.  Ambulation/Gait Training 2  Surface 2: Level tile  Device 2: Rolling walker  Gait Support Devices: Gait belt  Assistance 2: Contact guard, Minimum assistance  Quality of  Gait 2: Diminished heel strike, Shuffling gait, Forward flexed posture  Comments/Distance (ft) 2: 15' x 2 with Zeinab for AD management during turns and pivot to chair    Transfers  Transfer: Yes  Transfer 1  Transfer From 1: Chair with arms to  Transfer to 1: Stand  Technique 1: Sit to stand, Stand to sit  Transfer Device 1: Walker, Gait belt  Transfer Level of Assistance 1: Contact guard  Trials/Comments 1: multiple trials throughout session  Transfers 2  Transfer From 2: Toilet to  Transfer to 2: Stand  Technique 2: Sit to stand, Stand to sit  Transfer Device 2: Walker, Gait belt  Transfer Level of Assistance 2: Minimum assistance  Trials/Comments 2: pt required cues for hand placement    Outcome Measures:  [facility] Basic Mobility  Turning from your back to your side while in a flat bed without using bedrails: None  Moving from lying on your back to sitting on the side of a flat bed without using bedrails: None  Moving to and from bed to chair (including a wheelchair): A little  Standing up from a chair using your arms (e.g. wheelchair or bedside chair): A little  To walk in hospital room: A little  Climbing 3-5 steps with railing: A lot  Basic Mobility - Total Score: 19    Education Documentation  Mobility Training, taught by Emilee Harris PTA at 7/10/2025 10:35 AM.  Learner: Patient  Readiness: Acceptance  Method: Explanation  Response: Verbalizes Understanding, Needs Reinforcement  Comment: AD management when ambulating and hand placement during transfers    Encounter Problems       Encounter Problems (Active)       Balance       STG - Maintains dynamic standing balance with upper extremity support with good- balance  (Progressing)       Start:  07/07/25    Expected End:  07/21/25            Pt. will complete 5 STS <15 seconds without UE support  (Progressing)       Start:  07/07/25    Expected End:  07/21/25               Mobility       LTG - Patient will ambulate household distance with SUP and WW  (Progressing)       Start:  07/07/25    Expected End:  07/21/25            STG - Patient will ascend and descend four to six stairs with 1 rail and SBA  (Progressing)       Start:  07/07/25    Expected End:  07/21/25               PT Transfers       STG - Patient will perform bed mobility with SUP  (Progressing)       Start:  07/07/25    Expected End:  07/21/25            STG - Patient will transfer sit to and from stand with SBA and WW  (Progressing)       Start:  07/07/25    Expected End:  07/21/25

## 2025-07-10 NOTE — PROGRESS NOTES
07/10/25 1501   Discharge Planning   Expected Discharge Disposition Home H  (Patient will discharge home with [facility] PT/OT services when medically cleared.  Cardiology consult pending. External home care referral needed prior to discharge.)   Intensity of Service   Intensity of Service 0-30 min     DC Not Secure

## 2025-07-11 VITALS
BODY MASS INDEX: 16.49 KG/M2 | RESPIRATION RATE: 16 BRPM | OXYGEN SATURATION: 79 % | SYSTOLIC BLOOD PRESSURE: 112 MMHG | TEMPERATURE: 97.9 F | HEART RATE: 75 BPM | WEIGHT: 84 LBS | HEIGHT: 60 IN | DIASTOLIC BLOOD PRESSURE: 57 MMHG

## 2025-07-11 LAB
ALBUMIN SERPL BCP-MCNC: 3.6 G/DL (ref 3.4–5)
ANION GAP SERPL CALC-SCNC: 12 MMOL/L (ref 10–20)
BUN SERPL-MCNC: 22 MG/DL (ref 6–23)
CALCIUM SERPL-MCNC: 8.7 MG/DL (ref 8.6–10.3)
CHLORIDE SERPL-SCNC: 105 MMOL/L (ref 98–107)
CO2 SERPL-SCNC: 28 MMOL/L (ref 21–32)
CREAT SERPL-MCNC: 0.53 MG/DL (ref 0.5–1.05)
EGFRCR SERPLBLD CKD-EPI 2021: 90 ML/MIN/1.73M*2
ERYTHROCYTE [DISTWIDTH] IN BLOOD BY AUTOMATED COUNT: 12.7 % (ref 11.5–14.5)
GLUCOSE SERPL-MCNC: 117 MG/DL (ref 74–99)
HCT VFR BLD AUTO: 41.5 % (ref 36–46)
HGB BLD-MCNC: 13 G/DL (ref 12–16)
MAGNESIUM SERPL-MCNC: 2.03 MG/DL (ref 1.6–2.4)
MCH RBC QN AUTO: 29.1 PG (ref 26–34)
MCHC RBC AUTO-ENTMCNC: 31.3 G/DL (ref 32–36)
MCV RBC AUTO: 93 FL (ref 80–100)
NRBC BLD-RTO: 0 /100 WBCS (ref 0–0)
PHOSPHATE SERPL-MCNC: 2.7 MG/DL (ref 2.5–4.9)
PLATELET # BLD AUTO: 173 X10*3/UL (ref 150–450)
POTASSIUM SERPL-SCNC: 3.8 MMOL/L (ref 3.5–5.3)
RBC # BLD AUTO: 4.47 X10*6/UL (ref 4–5.2)
SODIUM SERPL-SCNC: 141 MMOL/L (ref 136–145)
WBC # BLD AUTO: 9.7 X10*3/UL (ref 4.4–11.3)

## 2025-07-11 PROCEDURE — 85027 COMPLETE CBC AUTOMATED: CPT | Mod: IPSPLIT | Performed by: INTERNAL MEDICINE

## 2025-07-11 PROCEDURE — 2500000002 HC RX 250 W HCPCS SELF ADMINISTERED DRUGS (ALT 637 FOR MEDICARE OP, ALT 636 FOR OP/ED): Mod: IPSPLIT | Performed by: HOSPITALIST

## 2025-07-11 PROCEDURE — 97116 GAIT TRAINING THERAPY: CPT | Mod: GP,CQ,IPSPLIT

## 2025-07-11 PROCEDURE — 36415 COLL VENOUS BLD VENIPUNCTURE: CPT | Mod: IPSPLIT | Performed by: INTERNAL MEDICINE

## 2025-07-11 PROCEDURE — 97110 THERAPEUTIC EXERCISES: CPT | Mod: GP,CQ,IPSPLIT

## 2025-07-11 PROCEDURE — 2500000004 HC RX 250 GENERAL PHARMACY W/ HCPCS (ALT 636 FOR OP/ED): Mod: IPSPLIT | Performed by: HOSPITALIST

## 2025-07-11 PROCEDURE — 2500000001 HC RX 250 WO HCPCS SELF ADMINISTERED DRUGS (ALT 637 FOR MEDICARE OP): Mod: IPSPLIT | Performed by: INTERNAL MEDICINE

## 2025-07-11 PROCEDURE — 2500000001 HC RX 250 WO HCPCS SELF ADMINISTERED DRUGS (ALT 637 FOR MEDICARE OP): Mod: IPSPLIT | Performed by: NURSE PRACTITIONER

## 2025-07-11 PROCEDURE — 83735 ASSAY OF MAGNESIUM: CPT | Mod: IPSPLIT | Performed by: INTERNAL MEDICINE

## 2025-07-11 PROCEDURE — 99239 HOSP IP/OBS DSCHRG MGMT >30: CPT | Performed by: NURSE PRACTITIONER

## 2025-07-11 PROCEDURE — 80069 RENAL FUNCTION PANEL: CPT | Mod: IPSPLIT | Performed by: INTERNAL MEDICINE

## 2025-07-11 PROCEDURE — 2500000001 HC RX 250 WO HCPCS SELF ADMINISTERED DRUGS (ALT 637 FOR MEDICARE OP): Mod: IPSPLIT | Performed by: HOSPITALIST

## 2025-07-11 RX ORDER — AMLODIPINE BESYLATE 5 MG/1
5 TABLET ORAL DAILY
Qty: 30 TABLET | Refills: 2 | Status: SHIPPED | OUTPATIENT
Start: 2025-07-12 | End: 2025-10-10

## 2025-07-11 RX ADMIN — LEVOTHYROXINE SODIUM 112.5 MCG: 0.07 TABLET ORAL at 05:30

## 2025-07-11 RX ADMIN — Medication 1 TABLET: at 08:26

## 2025-07-11 RX ADMIN — AMLODIPINE BESYLATE 5 MG: 5 TABLET ORAL at 08:32

## 2025-07-11 RX ADMIN — POTASSIUM CHLORIDE 20 MEQ: 1500 TABLET, EXTENDED RELEASE ORAL at 08:32

## 2025-07-11 RX ADMIN — PREDNISONE 2.5 MG: 5 TABLET ORAL at 08:27

## 2025-07-11 RX ADMIN — ATORVASTATIN CALCIUM 40 MG: 40 TABLET, FILM COATED ORAL at 08:27

## 2025-07-11 RX ADMIN — APIXABAN 2.5 MG: 2.5 TABLET, FILM COATED ORAL at 08:27

## 2025-07-11 RX ADMIN — ACETAMINOPHEN 650 MG: 325 TABLET, FILM COATED ORAL at 08:27

## 2025-07-11 ASSESSMENT — COGNITIVE AND FUNCTIONAL STATUS - GENERAL
CLIMB 3 TO 5 STEPS WITH RAILING: A LITTLE
MOVING TO AND FROM BED TO CHAIR: A LITTLE
TOILETING: A LITTLE
MOBILITY SCORE: 17
HELP NEEDED FOR BATHING: A LITTLE
WALKING IN HOSPITAL ROOM: A LITTLE
STANDING UP FROM CHAIR USING ARMS: A LITTLE
STANDING UP FROM CHAIR USING ARMS: A LITTLE
MOVING FROM LYING ON BACK TO SITTING ON SIDE OF FLAT BED WITH BEDRAILS: A LITTLE
WALKING IN HOSPITAL ROOM: A LITTLE
MOVING FROM LYING ON BACK TO SITTING ON SIDE OF FLAT BED WITH BEDRAILS: A LITTLE
MOVING TO AND FROM BED TO CHAIR: A LITTLE
DRESSING REGULAR LOWER BODY CLOTHING: A LITTLE
TURNING FROM BACK TO SIDE WHILE IN FLAT BAD: A LITTLE
TURNING FROM BACK TO SIDE WHILE IN FLAT BAD: A LITTLE
MOBILITY SCORE: 18
DAILY ACTIVITIY SCORE: 21
CLIMB 3 TO 5 STEPS WITH RAILING: A LOT

## 2025-07-11 ASSESSMENT — PAIN - FUNCTIONAL ASSESSMENT: PAIN_FUNCTIONAL_ASSESSMENT: 0-10

## 2025-07-11 ASSESSMENT — PAIN SCALES - GENERAL: PAINLEVEL_OUTOF10: 0 - NO PAIN

## 2025-07-11 NOTE — CARE PLAN
The patient's goals for the shift include      Problem: Fall/Injury  Goal: Not fall by end of shift  Outcome: Progressing  Goal: Be free from injury by end of the shift  Outcome: Progressing  Goal: Verbalize understanding of personal risk factors for fall in the hospital  Outcome: Progressing  Goal: Verbalize understanding of risk factor reduction measures to prevent injury from fall in the home  Outcome: Progressing  Goal: Use assistive devices by end of the shift  Outcome: Progressing  Goal: Pace activities to prevent fatigue by end of the shift  Outcome: Progressing     Problem: Discharge Planning  Goal: Discharge to home or other facility with appropriate resources  Outcome: Progressing     The clinical goals for the shift include Maintain pt safety/comfort, remain free from falls/injury; monitor labs/vitals

## 2025-07-11 NOTE — PROGRESS NOTES
07/11/25 1010   Discharge Planning   Living Arrangements Children;Family members   Support Systems Children   Assistance Needed Patient lives with her daughter, son in law and dog in a 2 story house with basement. (Laundry) She uses walker. She has supervision 24/7-when daughter is at work, there are caregivers that come in to stay with her. DME: shower seat in [facility] with grab bars. Patient is mostly independent with her ADLs; she does have confusion. Daughter would prefer patient to return home with home care vs SNF   Type of Residence Private residence   Number of Stairs to Enter Residence 2   Number of Stairs Within Residence 12   Do you have animals or pets at home? Yes   Type of Animals or Pets Dog  Husky   Who is requesting discharge planning? Provider   Home or Post Acute Services In home services   Type of Home Care Services Home OT;Home PT   Expected Discharge Disposition Home H  (Patient will discharge home with [facility]. [facility]   Does the patient need discharge transport arranged? No  (daughter will pick her up)   Intensity of Service   Intensity of Service 0-30 min     DC Secure

## 2025-07-11 NOTE — DISCHARGE SUMMARY
Discharge Diagnosis  Fall, initial encounter  HTN urgency    Malnutrition Diagnosis Status: New  Malnutrition Diagnosis: Severe malnutrition related to chronic disease or condition  Related to: inadequate intake associated with chronic disease and advanced ag  As Evidenced by: 11% weight loss over ~4.5 months, BMI of 16.4, and severe loss of subcutaneous fat (orbital, buccal, triceps) and muscle mass (temporalis, clavicle, deltoid, interosseous).  I agree with the dietitian's malnutrition diagnosis.        Issues Requiring Follow-Up      Discharge Meds     Medication List      START taking these medications     amLODIPine 5 mg tablet; Commonly known as: Norvasc; Take 1 tablet (5 mg)   by mouth once daily.; Start taking on: July 12, 2025     CHANGE how you take these medications     levothyroxine 75 mcg tablet; Commonly known as: Synthroid, Levoxyl; Take   1 tablet (75 mcg) by mouth early in the morning. Take on an empty stomach   at the same time each day, either 30 to 60 minutes prior to breakfast;   What changed: how much to take     CONTINUE taking these medications     atorvastatin 40 mg tablet; Commonly known as: Lipitor   Eliquis 2.5 mg tablet; Generic drug: apixaban; Take 1 tablet (2.5 mg) by   mouth 2 times a day.   Fish OiL 1,000 (120-180) mg capsule; Generic drug: omega 3-dha-epa-fish   oil   Imodium A-D 2 mg capsule; Generic drug: loperamide   magnesium oxide 400 mg (241.3 mg elemental) tablet; Commonly known as:   Mag-Ox   metoprolol tartrate 25 mg tablet; Commonly known as: Lopressor; Take 1   tablet (25 mg) by mouth once daily at bedtime.   multivitamin tablet   oxygen gas therapy; Commonly known as: O2; Inhale 1 each once every 24   hours. Wear nightly during sleep hours   potassium chloride CR 20 mEq ER tablet; Commonly known as: Klor-Con M20   predniSONE 5 mg tablet; Commonly known as: Deltasone   traZODone 50 mg tablet; Commonly known as: Desyrel   Vitamin D3 25 mcg (1,000 units) tablet; Generic  drug: cholecalciferol       Test Results Pending At Discharge  Pending Labs       Order Current Status    Urinalysis with Reflex Culture and Microscopic In process        84 y.o. female with past medical history of pAfib (Eliquis), HTN, diastolic heart failure, HLD, COPD, dementia and RA, admission to the hospital in 6/2025 due to hypoxia with CHF, pneumonia, generalized weakness who came to hospital secondary to fall.  Patient is a poor historian and does not remember why she was brought to the hospital.  She is alert and oriented to self, birthday but not to the year or president.  She denies recent fever, chest pain, shortness of breath, nausea, vomiting, diarrhea, dysuria, or any pain anywhere.  I spoke with patient's daughter, Eli, at the bedside, who states patient was staying with her brother.  As patient seemed to be sleeping longer than usual, brother went into see the patient and she was found on the floor with last being seen at 20:30 the night before.  Patient had no complaints at that time.  Daughter states patient typically stays with her through the week and with the patient's son on the weekends.  She states that patient has been in her normal state of health is not had any fever, chest pain, shortness of breath or other acute complaints recently.     Hospital Course    Fall  - Appears to be mechanical in nature, although did have some drop in her blood pressure with standing on 7/7.  Daughter states patient does sit at the edge of her bed frequently.  CT scans of the head, C-spine, abdomen, pelvis, chest with no acute fractures.  - Urinalysis was benign for infection.  - CPK was within normal limits at 85.  - PT/OT following and recommend low intensity.    - Monitor.  -I spoke with patient's daughter today and all of her questions were answered.     Chronic paroxysmal afib with bradycardia.  - Continue metoprolol tartrate and change back to home dose of 25mg at bedtime on 7/9 from bid, and  continue apixaban.   - Cardiology consulted and recommended to change metoprolol tartrate back to home dose of nightly as stated above, and recommends outpatient ambulatory monitor to assess heart rate trend.  - Monitor on telemetry     Accelerated Hypertension  - BP has been elevated at times.  She did have drop in SBP down to 80s to 90s on standing on 7/7.    - On review of the chart, patient had previously been on amlodipine 5 mg daily, ramipril 10 mg daily that were discontinued during admission in 6/2025 when she had been placed on furosemide for diuresis.  - Started amlodipine 5 mg daily in the afternoon on 7/8 and continued metoprolol twice daily, blood BP did drop overnight.  - Consulted cardiology, who recommended to change patient's metoprolol to tartrate back to regular home dose of 25 mg nightly, instead of twice daily on 7/9, and to continue amlodipine 5 mg daily; use blood pressures in the left arm only.  -7/10: BP was elevated overnight.  Will discuss with cardiology if we need to make further changes with BP medications.  - Monitor.     Transient hypoxia  - Patient initially required some supplemental O2 but then transitioned back to room air.  CT scan of the chest with improvement of mucous plugging.  Apparently wears O2 at times at night at home per the chart.   - Recommend follow-up with primary care provider as an outpatient as well due to CT scan findings.   - Monitor.     Hypophosphatemia  -Replace and monitor.     Leukocytosis  - CBCs initially elevated but has resolved.  No obvious signs of infection on CT scan of the chest and benign urinalysis.     Mildly elevated troponin  - Troponin was mildly elevated at 28.  Mild troponin elevation likely due to fall patient denies chest pain and daughter denies recent chest pain complaints by the patient.     Improvement mucous plugging and probable bronchiolitis on CT scan  - Recommend long-term follow-up to document resolution of the nodule should be  considered per radiology report.  - Recommend follow-up with PCP and pulmonology as an outpatient.     Pancreatic lesions  - Numerous and large pancreatic cyst or cystic/mucinous neoplasms on CT scan.  - Follow-up contrast enhanced MRI or pancreas protocol per radiology's recommendation.  Will have patient follow-up with primary care provider as an outpatient for further imaging, evaluation management.  I also discussed this result with the patient's daughter, Eli as well.      Chronic diastolic chf   - Patient does not appear to be volume overloaded.  Monitor.     Hypokalemia  - Replace and monitor.     COPD  - Monitor.     Dementia  - Continue home medications and monitor.     Hypothyroidism  - Daughter states that patient recently had her thyroid medications adjusted and she states that even though her thyroid tests have been off, that her physician wanted her to be on her current dose of thyroid meds.  Monitor and recommend follow-up with her thyroid specialist as an outpatient.     RA  - Continue home med of prednisone 2.5 mg daily.  Daughter states patient no longer sees rheumatologist due to patient's preference.  Monitor.     Dvt prophylaxis  -Apixaban.      Disposition: Patient was stable to be discharged to home. She was discharged on amlodipine. She will follow up with her Pcp and cardiology.    Total cumulative time spent in preparation of this discharge including documentation review, coordination of care with the medical team including PT/SW/care coordinators and treating consultants, discussion with patient and pertinent family members and finalization of prescriptions, follow-up appointments, and this discharge summary was approximately 45 minutes.     Pertinent Physical Exam At Time of Discharge  Physical Exam  Vitals reviewed.   Constitutional:       Appearance: She is normal weight. She is ill-appearing.   HENT:      Head: Normocephalic and atraumatic.      Right Ear: External ear normal.       Left Ear: External ear normal.      Nose: Nose normal.      Mouth/Throat:      Mouth: Mucous membranes are moist.      Pharynx: Oropharynx is clear.   Eyes:      Pupils: Pupils are equal, round, and reactive to light.   Cardiovascular:      Rate and Rhythm: Normal rate and regular rhythm.      Pulses: Normal pulses.      Heart sounds: Normal heart sounds.   Pulmonary:      Effort: Pulmonary effort is normal.      Breath sounds: Normal breath sounds.   Abdominal:      General: Bowel sounds are normal.      Palpations: Abdomen is soft.   Musculoskeletal:         General: Normal range of motion.      Cervical back: Normal range of motion and neck supple.   Skin:     General: Skin is warm and dry.   Neurological:      General: No focal deficit present.      Mental Status: She is alert and oriented to person, place, and time.   Psychiatric:         Mood and Affect: Mood normal.         Behavior: Behavior normal.         Outpatient Follow-Up  Future Appointments   Date Time Provider Department Center   7/22/2025  4:00 PM Mariah Schaeffer DPM [id number]         Petra Campa, ELIO-CNP

## 2025-07-22 ENCOUNTER — APPOINTMENT (OUTPATIENT)
Dept: PODIATRY | Facility: CLINIC | Age: 85
End: 2025-07-22
Payer: MEDICARE

## 2025-07-22 DIAGNOSIS — Z79.01 CHRONIC ANTICOAGULATION: ICD-10-CM

## 2025-07-22 DIAGNOSIS — E11.29 CONTROLLED TYPE 2 DIABETES MELLITUS WITH MICROALBUMINURIA, WITHOUT LONG-TERM CURRENT USE OF INSULIN (MULTI): Primary | ICD-10-CM

## 2025-07-22 DIAGNOSIS — B35.1 ONYCHOMYCOSIS: ICD-10-CM

## 2025-07-22 DIAGNOSIS — R80.9 CONTROLLED TYPE 2 DIABETES MELLITUS WITH MICROALBUMINURIA, WITHOUT LONG-TERM CURRENT USE OF INSULIN (MULTI): Primary | ICD-10-CM

## 2025-07-22 DIAGNOSIS — I73.9 PVD (PERIPHERAL VASCULAR DISEASE): ICD-10-CM

## 2025-07-22 PROCEDURE — 1123F ACP DISCUSS/DSCN MKR DOCD: CPT | Performed by: PODIATRIST

## 2025-07-22 PROCEDURE — 1160F RVW MEDS BY RX/DR IN RCRD: CPT | Performed by: PODIATRIST

## 2025-07-22 PROCEDURE — 1036F TOBACCO NON-USER: CPT | Performed by: PODIATRIST

## 2025-07-22 PROCEDURE — 1159F MED LIST DOCD IN RCRD: CPT | Performed by: PODIATRIST

## 2025-07-22 PROCEDURE — 1111F DSCHRG MED/CURRENT MED MERGE: CPT | Performed by: PODIATRIST

## 2025-07-22 PROCEDURE — 1157F ADVNC CARE PLAN IN RCRD: CPT | Performed by: PODIATRIST

## 2025-07-22 PROCEDURE — 99212 OFFICE O/P EST SF 10 MIN: CPT | Performed by: PODIATRIST

## 2025-07-22 NOTE — PROGRESS NOTES
History of Present Illness:   Patient states they are here for Dm exam  Denies NTB to feet  Most recent A1C is 6.6 May 2025  Unable to safely trim nails on own    Presents with daughter, assists in history and     Last visit April 2025      Past Medical History  Past Medical History:   Diagnosis Date    Bowel perforation (Multi) 06/05/2024    Clostridium difficile colitis     Colitis due to Clostridioides difficile 05/16/2024    Heart murmur     Hypernatremia     Terminal ileitis with complication (Multi) 06/06/2024    UTI (urinary tract infection) 05/07/2024       Medications and Allergies have been reviewed.    Review Of Systems:  GENERAL: No weight loss, malaise or fevers.  HEENT: Negative for frequent or significant headaches,   RESPIRATORY: Negative for cough, wheezing or shortness of breath.  CARDIOVASCULAR: Negative for chest pain, leg swelling or palpitations.    Physical Exam:  Vascular exam: Dorsalis pedis and Posterior Tibial pulses palpable 1/4 bilateral. Capillary refill time less than 3 seconds b/l. No Hair growth noted to digits. +1 bl LE edema noted. Skin temp warm to warm from proximal to distal b/l. + varicosities noted.     Neurologic exam: Vibratory, protective and proprioception intact b/l. No neurologic deficits noted.      Musculoskeletal exam: Muscle strength 5/5 for all major muscle groups tested in the lower extremity b/l. +Ra, lateral deviation noted of digits     Dermatologic exam: Nails 1-5 b/l are discolored and thick. Non elongated Webspaces 1-4 b/l are clean, dry and intact. No primary or secondary skin lesions noted. No open lesions or wounds noted at this time .    1. Controlled type 2 diabetes mellitus with microalbuminuria, without long-term current use of insulin (Multi)        2. PVD (peripheral vascular disease)        3. Onychomycosis        4. Chronic anticoagulation              Patient educated on proper diabetic foot care.  Nails 1-5 b/l were debrided in thickness and  length with nail cutting forceps.  A1C revd. 6.6 May 2025  Low pedal risk noted, clinically  Patient to follow up in 6 mos or sooner if any problems arise.   Patient was in agreement to this plan. All questions answered.      PCP Dr. Valente Last visit 7/16/2025    Mariah Schaeffer DPM  [phone]  [facility]  Fax: [phone]

## 2025-12-09 ENCOUNTER — APPOINTMENT (OUTPATIENT)
Dept: PODIATRY | Facility: CLINIC | Age: 85
End: 2025-12-09
Payer: MEDICARE